# Patient Record
Sex: FEMALE | Race: WHITE | Employment: OTHER | URBAN - NONMETROPOLITAN AREA
[De-identification: names, ages, dates, MRNs, and addresses within clinical notes are randomized per-mention and may not be internally consistent; named-entity substitution may affect disease eponyms.]

---

## 2017-01-01 ENCOUNTER — HOSPITAL ENCOUNTER (OUTPATIENT)
Dept: PHYSICAL THERAPY | Age: 67
Discharge: HOME | End: 2017-01-19
Attending: EMERGENCY MEDICINE | Admitting: EMERGENCY MEDICINE

## 2018-03-07 ENCOUNTER — HOSPITAL ENCOUNTER (OUTPATIENT)
Dept: CT IMAGING | Age: 68
Discharge: HOME | End: 2018-03-07
Attending: EMERGENCY MEDICINE | Admitting: EMERGENCY MEDICINE

## 2018-03-07 DIAGNOSIS — M26.601 DISORDER OF RIGHT TEMPOROMANDIBULAR JOINT: ICD-10-CM

## 2018-03-07 DIAGNOSIS — R68.84 JAW PAIN: ICD-10-CM

## 2018-08-02 ENCOUNTER — HOSPITAL ENCOUNTER (OUTPATIENT)
Dept: LAB | Age: 68
Discharge: HOME | End: 2018-08-02
Attending: NURSE PRACTITIONER | Admitting: NURSE PRACTITIONER

## 2018-08-02 LAB
ALT SERPL-CCNC: 22 U/L (ref 10–40)
ANION GAP SERPL CALCULATED.3IONS-SCNC: 12 MMOL/L (ref 4–16)
BASOPHILS ABSOLUTE: 0.1 K/CU MM
BASOPHILS RELATIVE PERCENT: 0.8 % (ref 0–1)
BUN BLDV-MCNC: 19 MG/DL (ref 6–23)
CALCIUM SERPL-MCNC: 9.5 MG/DL (ref 8.3–10.6)
CHLORIDE BLD-SCNC: 100 MMOL/L (ref 99–110)
CHOLESTEROL, FASTING: 320 MG/DL
CO2: 28 MMOL/L (ref 21–32)
CREAT SERPL-MCNC: 0.9 MG/DL (ref 0.6–1.1)
DIFFERENTIAL TYPE: ABNORMAL
EOSINOPHILS ABSOLUTE: 0.4 K/CU MM
EOSINOPHILS RELATIVE PERCENT: 5.4 % (ref 0–3)
GFR AFRICAN AMERICAN: >60 ML/MIN/1.73M2
GFR NON-AFRICAN AMERICAN: >60 ML/MIN/1.73M2
GLUCOSE FASTING: 125 MG/DL (ref 70–99)
HCT VFR BLD CALC: 41.1 % (ref 37–47)
HDLC SERPL-MCNC: 89 MG/DL
HEMOGLOBIN: 13.6 GM/DL (ref 12.5–16)
IMMATURE NEUTROPHIL %: 0.3 % (ref 0–0.43)
LDL CHOLESTEROL DIRECT: 241 MG/DL
LYMPHOCYTES ABSOLUTE: 2.3 K/CU MM
LYMPHOCYTES RELATIVE PERCENT: 34.4 % (ref 24–44)
MCH RBC QN AUTO: 32.6 PG (ref 27–31)
MCHC RBC AUTO-ENTMCNC: 33.1 % (ref 32–36)
MCV RBC AUTO: 98.6 FL (ref 78–100)
MONOCYTES ABSOLUTE: 0.6 K/CU MM
MONOCYTES RELATIVE PERCENT: 9 % (ref 0–4)
PDW BLD-RTO: 11.5 % (ref 11.7–14.9)
PLATELET # BLD: 220 K/CU MM (ref 140–440)
PMV BLD AUTO: 10.2 FL (ref 7.5–11.1)
POTASSIUM SERPL-SCNC: 4.9 MMOL/L (ref 3.5–5.1)
RBC # BLD: 4.17 M/CU MM (ref 4.2–5.4)
SEGMENTED NEUTROPHILS ABSOLUTE COUNT: 3.3 K/CU MM
SEGMENTED NEUTROPHILS RELATIVE PERCENT: 50.1 % (ref 36–66)
SODIUM BLD-SCNC: 140 MMOL/L (ref 135–145)
TOTAL IMMATURE NEUTOROPHIL: 0.02 K/CU MM
TRIGLYCERIDE, FASTING: 125 MG/DL
TSH HIGH SENSITIVITY: 2.54 UIU/ML (ref 0.27–4.2)
VITAMIN D 25-HYDROXY: 30.88 NG/ML
WBC # BLD: 6.6 K/CU MM (ref 4–10.5)

## 2018-08-15 ENCOUNTER — HOSPITAL ENCOUNTER (OUTPATIENT)
Dept: LAB | Age: 68
Discharge: HOME | End: 2018-08-15
Attending: NURSE PRACTITIONER | Admitting: NURSE PRACTITIONER

## 2018-08-15 LAB
ESTIMATED AVERAGE GLUCOSE: 126 MG/DL
HBA1C MFR BLD: 6 % (ref 4.2–6.3)

## 2019-01-09 ENCOUNTER — APPOINTMENT (OUTPATIENT)
Dept: GENERAL RADIOLOGY | Age: 69
End: 2019-01-09
Payer: MEDICARE

## 2019-01-09 ENCOUNTER — HOSPITAL ENCOUNTER (EMERGENCY)
Age: 69
Discharge: HOME | End: 2019-01-09
Attending: EMERGENCY MEDICINE
Payer: MEDICARE

## 2019-01-09 VITALS
HEART RATE: 64 BPM | TEMPERATURE: 97.1 F | OXYGEN SATURATION: 98 % | HEIGHT: 64 IN | DIASTOLIC BLOOD PRESSURE: 78 MMHG | BODY MASS INDEX: 25.61 KG/M2 | WEIGHT: 150 LBS | SYSTOLIC BLOOD PRESSURE: 178 MMHG | RESPIRATION RATE: 16 BRPM

## 2019-01-09 DIAGNOSIS — R05.9 COUGH: ICD-10-CM

## 2019-01-09 DIAGNOSIS — R11.0 NAUSEA: Primary | ICD-10-CM

## 2019-01-09 LAB
ALBUMIN SERPL-MCNC: 4.2 GM/DL (ref 3.4–5)
ALP BLD-CCNC: 160 IU/L (ref 40–129)
ALT SERPL-CCNC: 46 U/L (ref 10–40)
ANION GAP SERPL CALCULATED.3IONS-SCNC: 19 MMOL/L (ref 4–16)
AST SERPL-CCNC: 34 IU/L (ref 15–37)
BACTERIA: ABNORMAL /HPF
BASOPHILS ABSOLUTE: 0.1 K/CU MM
BASOPHILS RELATIVE PERCENT: 1 % (ref 0–1)
BILIRUB SERPL-MCNC: 0.2 MG/DL (ref 0–1)
BILIRUBIN URINE: NEGATIVE MG/DL
BLOOD, URINE: NEGATIVE
BUN BLDV-MCNC: 15 MG/DL (ref 6–23)
CALCIUM SERPL-MCNC: 9.8 MG/DL (ref 8.3–10.6)
CAST TYPE: NEGATIVE /HPF
CHLORIDE BLD-SCNC: 103 MMOL/L (ref 99–110)
CLARITY: CLEAR
CO2: 19 MMOL/L (ref 21–32)
COLOR: YELLOW
CREAT SERPL-MCNC: 0.9 MG/DL (ref 0.6–1.1)
CRYSTAL TYPE: NEGATIVE /HPF
DIFFERENTIAL TYPE: ABNORMAL
EOSINOPHILS ABSOLUTE: 0.2 K/CU MM
EOSINOPHILS RELATIVE PERCENT: 3.4 % (ref 0–3)
EPITHELIAL CELLS, UA: NEGATIVE /HPF
GFR AFRICAN AMERICAN: >60 ML/MIN/1.73M2
GFR NON-AFRICAN AMERICAN: >60 ML/MIN/1.73M2
GLUCOSE BLD-MCNC: 118 MG/DL (ref 70–99)
GLUCOSE, URINE: NEGATIVE MG/DL
HCT VFR BLD CALC: 40.8 % (ref 37–47)
HEMOGLOBIN: 13.3 GM/DL (ref 12.5–16)
IMMATURE NEUTROPHIL %: 0.3 % (ref 0–0.43)
KETONES, URINE: NEGATIVE MG/DL
LEUKOCYTE ESTERASE, URINE: NEGATIVE
LYMPHOCYTES ABSOLUTE: 2 K/CU MM
LYMPHOCYTES RELATIVE PERCENT: 33.3 % (ref 24–44)
MCH RBC QN AUTO: 32.2 PG (ref 27–31)
MCHC RBC AUTO-ENTMCNC: 32.6 % (ref 32–36)
MCV RBC AUTO: 98.8 FL (ref 78–100)
MONOCYTES ABSOLUTE: 0.6 K/CU MM
MONOCYTES RELATIVE PERCENT: 9.9 % (ref 0–4)
NITRITE URINE, QUANTITATIVE: NEGATIVE
PDW BLD-RTO: 12 % (ref 11.7–14.9)
PH, URINE: 5 (ref 5–8)
PLATELET # BLD: 224 K/CU MM (ref 140–440)
PMV BLD AUTO: 10.2 FL (ref 7.5–11.1)
POTASSIUM SERPL-SCNC: 4.7 MMOL/L (ref 3.5–5.1)
PROTEIN UA: NEGATIVE MG/DL
RBC # BLD: 4.13 M/CU MM (ref 4.2–5.4)
RBC URINE: NEGATIVE /HPF (ref 0–6)
SEGMENTED NEUTROPHILS ABSOLUTE COUNT: 3.2 K/CU MM
SEGMENTED NEUTROPHILS RELATIVE PERCENT: 52.1 % (ref 36–66)
SODIUM BLD-SCNC: 141 MMOL/L (ref 135–145)
SPECIFIC GRAVITY UA: 1.01 (ref 1–1.03)
TOTAL IMMATURE NEUTOROPHIL: 0.02 K/CU MM
TOTAL PROTEIN: 6.4 GM/DL (ref 6.4–8.2)
TROPONIN T: <0.01 NG/ML
UROBILINOGEN, URINE: 0.2 MG/DL (ref 0.2–1)
WBC # BLD: 6.1 K/CU MM (ref 4–10.5)
WBC UA: ABNORMAL /HPF (ref 0–5)

## 2019-01-09 PROCEDURE — 85025 COMPLETE CBC W/AUTO DIFF WBC: CPT

## 2019-01-09 PROCEDURE — 81001 URINALYSIS AUTO W/SCOPE: CPT

## 2019-01-09 PROCEDURE — 84484 ASSAY OF TROPONIN QUANT: CPT

## 2019-01-09 PROCEDURE — 93010 ELECTROCARDIOGRAM REPORT: CPT | Performed by: INTERNAL MEDICINE

## 2019-01-09 PROCEDURE — 80053 COMPREHEN METABOLIC PANEL: CPT

## 2019-01-09 PROCEDURE — 99284 EMERGENCY DEPT VISIT MOD MDM: CPT

## 2019-01-09 PROCEDURE — 71046 X-RAY EXAM CHEST 2 VIEWS: CPT

## 2019-01-09 PROCEDURE — 6360000002 HC RX W HCPCS: Performed by: EMERGENCY MEDICINE

## 2019-01-09 PROCEDURE — 93005 ELECTROCARDIOGRAM TRACING: CPT | Performed by: EMERGENCY MEDICINE

## 2019-01-09 RX ORDER — ONDANSETRON 4 MG/1
4 TABLET, ORALLY DISINTEGRATING ORAL EVERY 8 HOURS PRN
Qty: 20 TABLET | Refills: 0 | Status: SHIPPED | OUTPATIENT
Start: 2019-01-09 | End: 2019-05-28 | Stop reason: ALTCHOICE

## 2019-01-09 RX ORDER — ONDANSETRON 4 MG/1
4 TABLET, ORALLY DISINTEGRATING ORAL ONCE
Status: COMPLETED | OUTPATIENT
Start: 2019-01-09 | End: 2019-01-09

## 2019-01-09 RX ADMIN — ONDANSETRON 4 MG: 4 TABLET, ORALLY DISINTEGRATING ORAL at 04:00

## 2019-01-10 LAB
EKG ATRIAL RATE: 70 BPM
EKG DIAGNOSIS: NORMAL
EKG P AXIS: 77 DEGREES
EKG P-R INTERVAL: 168 MS
EKG Q-T INTERVAL: 414 MS
EKG QRS DURATION: 88 MS
EKG QTC CALCULATION (BAZETT): 447 MS
EKG R AXIS: 11 DEGREES
EKG T AXIS: 42 DEGREES
EKG VENTRICULAR RATE: 70 BPM

## 2019-02-26 ENCOUNTER — APPOINTMENT (OUTPATIENT)
Dept: CT IMAGING | Age: 69
End: 2019-02-26
Payer: MEDICARE

## 2019-02-26 ENCOUNTER — HOSPITAL ENCOUNTER (OUTPATIENT)
Age: 69
Setting detail: OBSERVATION
Discharge: LEFT BEFORE DISCHARGE | End: 2019-02-26
Attending: EMERGENCY MEDICINE | Admitting: FAMILY MEDICINE
Payer: MEDICARE

## 2019-02-26 ENCOUNTER — APPOINTMENT (OUTPATIENT)
Dept: GENERAL RADIOLOGY | Age: 69
End: 2019-02-26
Payer: MEDICARE

## 2019-02-26 VITALS
SYSTOLIC BLOOD PRESSURE: 134 MMHG | HEIGHT: 62 IN | HEART RATE: 79 BPM | WEIGHT: 156 LBS | RESPIRATION RATE: 16 BRPM | OXYGEN SATURATION: 96 % | DIASTOLIC BLOOD PRESSURE: 76 MMHG | BODY MASS INDEX: 28.71 KG/M2 | TEMPERATURE: 98.1 F

## 2019-02-26 DIAGNOSIS — R42 DIZZINESS: Primary | ICD-10-CM

## 2019-02-26 DIAGNOSIS — R07.9 CHEST PAIN, UNSPECIFIED TYPE: ICD-10-CM

## 2019-02-26 PROBLEM — R07.2 PRECORDIAL CHEST PAIN: Status: ACTIVE | Noted: 2019-02-26

## 2019-02-26 LAB
ALBUMIN SERPL-MCNC: 4.4 GM/DL (ref 3.4–5)
ALP BLD-CCNC: 127 IU/L (ref 40–129)
ALT SERPL-CCNC: 19 U/L (ref 10–40)
ANION GAP SERPL CALCULATED.3IONS-SCNC: 12 MMOL/L (ref 4–16)
AST SERPL-CCNC: 24 IU/L (ref 15–37)
BACTERIA: ABNORMAL /HPF
BASOPHILS ABSOLUTE: 0 K/CU MM
BASOPHILS RELATIVE PERCENT: 0.3 % (ref 0–1)
BILIRUB SERPL-MCNC: 0.5 MG/DL (ref 0–1)
BILIRUBIN URINE: NEGATIVE MG/DL
BLOOD, URINE: NEGATIVE
BUN BLDV-MCNC: 12 MG/DL (ref 6–23)
CALCIUM SERPL-MCNC: 9.6 MG/DL (ref 8.3–10.6)
CAST TYPE: NEGATIVE /HPF
CHLORIDE BLD-SCNC: 98 MMOL/L (ref 99–110)
CLARITY: CLEAR
CO2: 26 MMOL/L (ref 21–32)
COLOR: YELLOW
CREAT SERPL-MCNC: 0.9 MG/DL (ref 0.6–1.1)
CRYSTAL TYPE: NEGATIVE /HPF
D DIMER: <200 NG/ML(DDU)
DIFFERENTIAL TYPE: ABNORMAL
EOSINOPHILS ABSOLUTE: 0.1 K/CU MM
EOSINOPHILS RELATIVE PERCENT: 0.7 % (ref 0–3)
EPITHELIAL CELLS, UA: ABNORMAL /HPF
GFR AFRICAN AMERICAN: >60 ML/MIN/1.73M2
GFR NON-AFRICAN AMERICAN: >60 ML/MIN/1.73M2
GLUCOSE BLD-MCNC: 133 MG/DL (ref 70–99)
GLUCOSE, URINE: NEGATIVE MG/DL
HCT VFR BLD CALC: 38.1 % (ref 37–47)
HEMOGLOBIN: 12.9 GM/DL (ref 12.5–16)
IMMATURE NEUTROPHIL %: 0.3 % (ref 0–0.43)
KETONES, URINE: NEGATIVE MG/DL
LEUKOCYTE ESTERASE, URINE: NEGATIVE
LYMPHOCYTES ABSOLUTE: 1.1 K/CU MM
LYMPHOCYTES RELATIVE PERCENT: 10.4 % (ref 24–44)
MCH RBC QN AUTO: 32.7 PG (ref 27–31)
MCHC RBC AUTO-ENTMCNC: 33.9 % (ref 32–36)
MCV RBC AUTO: 96.7 FL (ref 78–100)
MONOCYTES ABSOLUTE: 0.9 K/CU MM
MONOCYTES RELATIVE PERCENT: 8.6 % (ref 0–4)
NITRITE URINE, QUANTITATIVE: NEGATIVE
PDW BLD-RTO: 11.9 % (ref 11.7–14.9)
PH, URINE: 7 (ref 5–8)
PLATELET # BLD: 204 K/CU MM (ref 140–440)
PMV BLD AUTO: 10.6 FL (ref 7.5–11.1)
POTASSIUM SERPL-SCNC: 4.1 MMOL/L (ref 3.5–5.1)
PRO-BNP: 171.2 PG/ML
PROTEIN UA: NEGATIVE MG/DL
RBC # BLD: 3.94 M/CU MM (ref 4.2–5.4)
RBC URINE: NEGATIVE /HPF (ref 0–6)
SEGMENTED NEUTROPHILS ABSOLUTE COUNT: 8.4 K/CU MM
SEGMENTED NEUTROPHILS RELATIVE PERCENT: 79.7 % (ref 36–66)
SODIUM BLD-SCNC: 136 MMOL/L (ref 135–145)
SPECIFIC GRAVITY UA: 1.01 (ref 1–1.03)
TOTAL IMMATURE NEUTOROPHIL: 0.03 K/CU MM
TOTAL PROTEIN: 7.1 GM/DL (ref 6.4–8.2)
TROPONIN T: <0.01 NG/ML
UROBILINOGEN, URINE: 0.2 MG/DL (ref 0.2–1)
WBC # BLD: 10.5 K/CU MM (ref 4–10.5)
WBC UA: ABNORMAL /HPF (ref 0–5)

## 2019-02-26 PROCEDURE — 71045 X-RAY EXAM CHEST 1 VIEW: CPT

## 2019-02-26 PROCEDURE — 83880 ASSAY OF NATRIURETIC PEPTIDE: CPT

## 2019-02-26 PROCEDURE — 70450 CT HEAD/BRAIN W/O DYE: CPT

## 2019-02-26 PROCEDURE — 85025 COMPLETE CBC W/AUTO DIFF WBC: CPT

## 2019-02-26 PROCEDURE — 96360 HYDRATION IV INFUSION INIT: CPT

## 2019-02-26 PROCEDURE — 6370000000 HC RX 637 (ALT 250 FOR IP): Performed by: EMERGENCY MEDICINE

## 2019-02-26 PROCEDURE — 85379 FIBRIN DEGRADATION QUANT: CPT

## 2019-02-26 PROCEDURE — 93005 ELECTROCARDIOGRAM TRACING: CPT | Performed by: EMERGENCY MEDICINE

## 2019-02-26 PROCEDURE — 99285 EMERGENCY DEPT VISIT HI MDM: CPT

## 2019-02-26 PROCEDURE — 84484 ASSAY OF TROPONIN QUANT: CPT

## 2019-02-26 PROCEDURE — 93010 ELECTROCARDIOGRAM REPORT: CPT | Performed by: INTERNAL MEDICINE

## 2019-02-26 PROCEDURE — 81001 URINALYSIS AUTO W/SCOPE: CPT

## 2019-02-26 PROCEDURE — 72125 CT NECK SPINE W/O DYE: CPT

## 2019-02-26 PROCEDURE — G0378 HOSPITAL OBSERVATION PER HR: HCPCS

## 2019-02-26 PROCEDURE — 80053 COMPREHEN METABOLIC PANEL: CPT

## 2019-02-26 PROCEDURE — 2580000003 HC RX 258: Performed by: EMERGENCY MEDICINE

## 2019-02-26 RX ORDER — ELECTROLYTES/DEXTROSE
1 SOLUTION, ORAL ORAL DAILY
Status: DISCONTINUED | OUTPATIENT
Start: 2019-02-26 | End: 2019-02-26 | Stop reason: SDUPTHER

## 2019-02-26 RX ORDER — 0.9 % SODIUM CHLORIDE 0.9 %
500 INTRAVENOUS SOLUTION INTRAVENOUS ONCE
Status: COMPLETED | OUTPATIENT
Start: 2019-02-26 | End: 2019-02-26

## 2019-02-26 RX ORDER — M-VIT,TX,IRON,MINS/CALC/FOLIC 27MG-0.4MG
1 TABLET ORAL DAILY
Status: DISCONTINUED | OUTPATIENT
Start: 2019-02-26 | End: 2019-02-26 | Stop reason: HOSPADM

## 2019-02-26 RX ORDER — ASPIRIN 81 MG/1
324 TABLET, CHEWABLE ORAL ONCE
Status: DISCONTINUED | OUTPATIENT
Start: 2019-02-26 | End: 2019-02-26 | Stop reason: HOSPADM

## 2019-02-26 RX ORDER — MECLIZINE HCL 12.5 MG/1
12.5 TABLET ORAL ONCE
Status: COMPLETED | OUTPATIENT
Start: 2019-02-26 | End: 2019-02-26

## 2019-02-26 RX ORDER — AMLODIPINE BESYLATE 10 MG/1
10 TABLET ORAL DAILY
Status: DISCONTINUED | OUTPATIENT
Start: 2019-02-26 | End: 2019-02-26 | Stop reason: HOSPADM

## 2019-02-26 RX ORDER — FAMOTIDINE 20 MG/1
20 TABLET, FILM COATED ORAL DAILY
Status: DISCONTINUED | OUTPATIENT
Start: 2019-02-26 | End: 2019-02-26 | Stop reason: HOSPADM

## 2019-02-26 RX ORDER — ONDANSETRON 4 MG/1
4 TABLET, ORALLY DISINTEGRATING ORAL EVERY 8 HOURS PRN
Status: DISCONTINUED | OUTPATIENT
Start: 2019-02-26 | End: 2019-02-26 | Stop reason: HOSPADM

## 2019-02-26 RX ADMIN — MECLIZINE 12.5 MG: 12.5 TABLET ORAL at 03:40

## 2019-02-26 RX ADMIN — SODIUM CHLORIDE 500 ML: 9 INJECTION, SOLUTION INTRAVENOUS at 03:42

## 2019-02-26 ASSESSMENT — PAIN SCALES - GENERAL
PAINLEVEL_OUTOF10: 0
PAINLEVEL_OUTOF10: 0

## 2019-02-26 ASSESSMENT — HEART SCORE: ECG: 1

## 2019-02-27 LAB
EKG ATRIAL RATE: 84 BPM
EKG DIAGNOSIS: NORMAL
EKG P AXIS: 73 DEGREES
EKG P-R INTERVAL: 172 MS
EKG Q-T INTERVAL: 378 MS
EKG QRS DURATION: 88 MS
EKG QTC CALCULATION (BAZETT): 446 MS
EKG R AXIS: -8 DEGREES
EKG T AXIS: 15 DEGREES
EKG VENTRICULAR RATE: 84 BPM

## 2019-05-14 ENCOUNTER — HOSPITAL ENCOUNTER (OUTPATIENT)
Age: 69
Discharge: HOME | End: 2019-05-14
Payer: MEDICARE

## 2019-05-14 LAB
ALT SERPL-CCNC: 13 U/L (ref 10–40)
ANION GAP SERPL CALCULATED.3IONS-SCNC: 6 MMOL/L (ref 4–16)
BACTERIA: NEGATIVE /HPF
BASOPHILS ABSOLUTE: 0 K/CU MM
BASOPHILS RELATIVE PERCENT: 0.6 % (ref 0–1)
BILIRUBIN URINE: NEGATIVE MG/DL
BLOOD, URINE: NEGATIVE
BUN BLDV-MCNC: 20 MG/DL (ref 6–23)
CALCIUM SERPL-MCNC: 9.5 MG/DL (ref 8.3–10.6)
CAST TYPE: ABNORMAL /HPF
CHLORIDE BLD-SCNC: 102 MMOL/L (ref 99–110)
CHOLESTEROL, FASTING: 316 MG/DL
CLARITY: CLEAR
CO2: 31 MMOL/L (ref 21–32)
COLOR: YELLOW
CREAT SERPL-MCNC: 0.8 MG/DL (ref 0.6–1.1)
CRYSTAL TYPE: ABNORMAL /HPF
DIFFERENTIAL TYPE: ABNORMAL
EOSINOPHILS ABSOLUTE: 0.2 K/CU MM
EOSINOPHILS RELATIVE PERCENT: 2.7 % (ref 0–3)
EPITHELIAL CELLS, UA: ABNORMAL /HPF
GFR AFRICAN AMERICAN: >60 ML/MIN/1.73M2
GFR NON-AFRICAN AMERICAN: >60 ML/MIN/1.73M2
GLUCOSE FASTING: 127 MG/DL (ref 70–99)
GLUCOSE, URINE: NEGATIVE MG/DL
HCT VFR BLD CALC: 42.3 % (ref 37–47)
HDLC SERPL-MCNC: 87 MG/DL
HEMOGLOBIN: 13.7 GM/DL (ref 12.5–16)
IMMATURE NEUTROPHIL %: 0.5 % (ref 0–0.43)
KETONES, URINE: NEGATIVE MG/DL
LDL CHOLESTEROL DIRECT: 234 MG/DL
LEUKOCYTE ESTERASE, URINE: ABNORMAL
LYMPHOCYTES ABSOLUTE: 1.7 K/CU MM
LYMPHOCYTES RELATIVE PERCENT: 25.3 % (ref 24–44)
MCH RBC QN AUTO: 31.9 PG (ref 27–31)
MCHC RBC AUTO-ENTMCNC: 32.4 % (ref 32–36)
MCV RBC AUTO: 98.6 FL (ref 78–100)
MONOCYTES ABSOLUTE: 0.5 K/CU MM
MONOCYTES RELATIVE PERCENT: 7.1 % (ref 0–4)
MUCUS: NEGATIVE HPF
NITRITE URINE, QUANTITATIVE: NEGATIVE
PDW BLD-RTO: 11.9 % (ref 11.7–14.9)
PH, URINE: 5.5 (ref 5–8)
PLATELET # BLD: 251 K/CU MM (ref 140–440)
PMV BLD AUTO: 10 FL (ref 7.5–11.1)
POTASSIUM SERPL-SCNC: 5 MMOL/L (ref 3.5–5.1)
PROTEIN UA: NEGATIVE MG/DL
RBC # BLD: 4.29 M/CU MM (ref 4.2–5.4)
RBC URINE: ABNORMAL /HPF (ref 0–6)
SEGMENTED NEUTROPHILS ABSOLUTE COUNT: 4.2 K/CU MM
SEGMENTED NEUTROPHILS RELATIVE PERCENT: 63.8 % (ref 36–66)
SODIUM BLD-SCNC: 139 MMOL/L (ref 135–145)
SPECIFIC GRAVITY UA: 1.02 (ref 1–1.03)
TOTAL IMMATURE NEUTOROPHIL: 0.03 K/CU MM
TRIGLYCERIDE, FASTING: 177 MG/DL
TSH HIGH SENSITIVITY: 2.13 UIU/ML (ref 0.27–4.2)
UROBILINOGEN, URINE: 0.2 MG/DL (ref 0.2–1)
VITAMIN D 25-HYDROXY: 22.52 NG/ML
VOLUME, (UVOL): 12 ML (ref 10–12)
WBC # BLD: 6.6 K/CU MM (ref 4–10.5)
WBC UA: ABNORMAL /HPF (ref 0–5)

## 2019-05-14 PROCEDURE — 80048 BASIC METABOLIC PNL TOTAL CA: CPT

## 2019-05-14 PROCEDURE — 85025 COMPLETE CBC W/AUTO DIFF WBC: CPT

## 2019-05-14 PROCEDURE — 36415 COLL VENOUS BLD VENIPUNCTURE: CPT

## 2019-05-14 PROCEDURE — 80061 LIPID PANEL: CPT

## 2019-05-14 PROCEDURE — 81001 URINALYSIS AUTO W/SCOPE: CPT

## 2019-05-14 PROCEDURE — 82306 VITAMIN D 25 HYDROXY: CPT

## 2019-05-14 PROCEDURE — 84460 ALANINE AMINO (ALT) (SGPT): CPT

## 2019-05-14 PROCEDURE — 83036 HEMOGLOBIN GLYCOSYLATED A1C: CPT

## 2019-05-14 PROCEDURE — 84443 ASSAY THYROID STIM HORMONE: CPT

## 2019-05-15 ENCOUNTER — HOSPITAL ENCOUNTER (OUTPATIENT)
Dept: MAMMOGRAPHY | Age: 69
Discharge: HOME | End: 2019-05-15
Payer: MEDICARE

## 2019-05-15 DIAGNOSIS — Z12.31 VISIT FOR SCREENING MAMMOGRAM: ICD-10-CM

## 2019-05-15 PROCEDURE — 77067 SCR MAMMO BI INCL CAD: CPT

## 2019-05-16 LAB
ESTIMATED AVERAGE GLUCOSE: 137 MG/DL
HBA1C MFR BLD: 6.4 % (ref 4.2–6.3)

## 2019-05-28 ENCOUNTER — APPOINTMENT (OUTPATIENT)
Dept: GENERAL RADIOLOGY | Age: 69
End: 2019-05-28
Payer: MEDICARE

## 2019-05-28 ENCOUNTER — HOSPITAL ENCOUNTER (EMERGENCY)
Age: 69
Discharge: HOME | End: 2019-05-28
Attending: EMERGENCY MEDICINE
Payer: MEDICARE

## 2019-05-28 VITALS
DIASTOLIC BLOOD PRESSURE: 99 MMHG | WEIGHT: 155 LBS | HEART RATE: 74 BPM | OXYGEN SATURATION: 99 % | RESPIRATION RATE: 16 BRPM | BODY MASS INDEX: 24.91 KG/M2 | TEMPERATURE: 97.6 F | HEIGHT: 66 IN | SYSTOLIC BLOOD PRESSURE: 177 MMHG

## 2019-05-28 DIAGNOSIS — S50.02XA CONTUSION OF LEFT ELBOW, INITIAL ENCOUNTER: ICD-10-CM

## 2019-05-28 DIAGNOSIS — S80.02XA CONTUSION OF LEFT KNEE, INITIAL ENCOUNTER: ICD-10-CM
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
## 2019-05-28 PROCEDURE — 73560 X-RAY EXAM OF KNEE 1 OR 2: CPT

## 2019-05-28 PROCEDURE — 71046 X-RAY EXAM CHEST 2 VIEWS: CPT

## 2019-05-28 PROCEDURE — 6370000000 HC RX 637 (ALT 250 FOR IP): Performed by: EMERGENCY MEDICINE

## 2019-05-28 PROCEDURE — 99284 EMERGENCY DEPT VISIT MOD MDM: CPT

## 2019-05-28 PROCEDURE — 70110 X-RAY EXAM OF JAW 4/> VIEWS: CPT

## 2019-05-28 PROCEDURE — 73080 X-RAY EXAM OF ELBOW: CPT

## 2019-05-28 RX ORDER — ACETAMINOPHEN 325 MG/1
650 TABLET ORAL
Status: COMPLETED | OUTPATIENT
Start: 2019-05-28 | End: 2019-05-28

## 2019-05-28 RX ORDER — NAPROXEN 500 MG/1
500 TABLET ORAL 2 TIMES DAILY
Qty: 20 TABLET | Refills: 0 | Status: SHIPPED | OUTPATIENT
Start: 2019-05-28 | End: 2019-06-14 | Stop reason: ALTCHOICE

## 2019-05-28 RX ADMIN — ACETAMINOPHEN 650 MG: 325 TABLET ORAL at 10:05

## 2019-05-28 ASSESSMENT — PAIN DESCRIPTION - FREQUENCY: FREQUENCY: CONTINUOUS

## 2019-05-28 ASSESSMENT — PAIN DESCRIPTION - PAIN TYPE
TYPE: ACUTE PAIN
TYPE: ACUTE PAIN

## 2019-05-28 ASSESSMENT — PAIN DESCRIPTION - ORIENTATION: ORIENTATION: RIGHT;LEFT

## 2019-05-28 ASSESSMENT — PAIN SCALES - GENERAL
PAINLEVEL_OUTOF10: 1
PAINLEVEL_OUTOF10: 5
PAINLEVEL_OUTOF10: 2

## 2019-05-28 ASSESSMENT — PAIN DESCRIPTION - DESCRIPTORS: DESCRIPTORS: ACHING

## 2019-05-28 ASSESSMENT — PAIN DESCRIPTION - LOCATION: LOCATION: BACK;JAW

## 2019-05-28 NOTE — ED PROVIDER NOTES
on file    Food insecurity:     Worry: Not on file     Inability: Not on file    Transportation needs:     Medical: Not on file     Non-medical: Not on file   Tobacco Use    Smoking status: Never Smoker    Smokeless tobacco: Never Used   Substance and Sexual Activity    Alcohol use: No    Drug use: No    Sexual activity: Not on file   Lifestyle    Physical activity:     Days per week: Not on file     Minutes per session: Not on file    Stress: Not on file   Relationships    Social connections:     Talks on phone: Not on file     Gets together: Not on file     Attends Restoration service: Not on file     Active member of club or organization: Not on file     Attends meetings of clubs or organizations: Not on file     Relationship status: Not on file    Intimate partner violence:     Fear of current or ex partner: Not on file     Emotionally abused: Not on file     Physically abused: Not on file     Forced sexual activity: Not on file   Other Topics Concern    Not on file   Social History Narrative    Not on file     No current facility-administered medications for this encounter. Current Outpatient Medications   Medication Sig Dispense Refill    naproxen (NAPROSYN) 500 MG tablet Take 1 tablet by mouth 2 times daily 20 tablet 0    famotidine (PEPCID) 40 MG tablet Take 0.5 tablets by mouth daily 30 tablet 0    Multiple Vitamins-Minerals (MULTIVITAMIN ADULT PO) Take by mouth      AmLODIPine Besylate (NORVASC PO) Take 10 mg by mouth daily        Allergies   Allergen Reactions    Floxin [Ofloxacin]      Made her \"feel weird\"       Nursing Notes Reviewed    Physical Exam:  ED Triage Vitals [05/28/19 0910]   Enc Vitals Group      BP (!) 177/99      Pulse 74      Resp 16      Temp 97.6 °F (36.4 °C)      Temp Source Oral      SpO2 99 %      Weight 155 lb (70.3 kg)      Height 5' 6\" (1.676 m)      Head Circumference       Peak Flow       Pain Score       Pain Loc       Pain Edu? Excl.  in [address]? GENERAL APPEARANCE: Awake and alert. Cooperative. No acute distress. Nontoxic in appearance  HEAD: Normocephalic. Atraumatic. EYES: EOM's grossly intact. Sclera anicteric. ENT: Tolerates saliva. No trismus. Oral mucosa is moist and examination of her alveolar gums reveals no evidence of step. There is no tenderness palpation. There is no evidence of lesions in the area where she is feeling pain. She has no bruising to her jaw. NECK: Supple. Trachea midline. CARDIO: RRR. Radial pulse 2+. Chest wall is nontender   LUNGS: Respirations unlabored. CTAB. ABDOMEN: Soft. Non-distended. Non-tender. EXTREMITIES: No acute deformities. Patient has contusion and abrasion to her left knee and her left elbow. SKIN: Warm and dry. NEUROLOGICAL: No gross facial drooping. Moves all 4 extremities spontaneously. PSYCHIATRIC: Normal mood. Labs:  No results found for this visit on 05/28/19. Radiographs (if obtained):  [] The following radiograph was interpreted by myself in the absence of a radiologist:  [x] Radiologist's Report reviewed at time of ED visit:  XR CHEST STANDARD (2 VW)   Final Result   No acute abnormality. XR MANDIBLE (MIN 4 VIEWS)   Final Result   1. Normal mandibular alignment with bilateral TMJ degenerative changes and no   acute fracture. 2. Normal left elbow alignment with no acute fracture. 3. Normal left knee alignment with no acute fracture. XR ELBOW LEFT (MIN 3 VIEWS)   Final Result   1. Normal mandibular alignment with bilateral TMJ degenerative changes and no   acute fracture. 2. Normal left elbow alignment with no acute fracture. 3. Normal left knee alignment with no acute fracture. XR KNEE LEFT (1-2 VIEWS)   Final Result   1. Normal mandibular alignment with bilateral TMJ degenerative changes and no   acute fracture. 2. Normal left elbow alignment with no acute fracture. 3. Normal left knee alignment with no acute fracture. ED Course and MDM:  This patient fell yesterday. She is having pain in her mandible and her left elbow left knee and chest.  Her x-rays are negative. She was given Tylenol and her jaw pain improved and she was able to get her dentures placed in her mouth. She will be discharged in stable condition on Naprosyn. She is instructed to follow-up with her primary caregiver for recheck. She is instructed to return for any problems or concerns. Final Impression:  1. Fall, initial encounter    2. Contusion of left knee, initial encounter    3. Contusion of left elbow, initial encounter      DISPOSITION Decision To Discharge    Patient referred to:   Susan Velez, APRN - CNP   [address] [facility] [address] [facility]  [phone]    In 3 days  For follow up    Discharge medications:  Discharge Medication List as of 5/28/2019 12:27 PM      START taking these medications    Details   naproxen (NAPROSYN) 500 MG tablet Take 1 tablet by mouth 2 times daily, Disp-20 tablet, R-0Print           (Please note that portions of this note may have been completed with a voice recognition program. Efforts were made to edit the dictations but occasionally words are mis-transcribed.)    Ashtyn Chavez DO, [facility]  Board certified in [address], [place]  05/28/19 1603

## 2019-05-28 NOTE — ED NOTES
The patient presents to the er today with complaints of jaw pain secondary to a fall yesterday. She denies hitting her head, although she reports that this morning she was unable to get her dentures in. She has an abrasion to the left knee and left elbow. She is alert and oriented and does not appear to be in any distress.                          Lalit Holder RN  05/28/19 8215

## 2019-06-14 ENCOUNTER — HOSPITAL ENCOUNTER (EMERGENCY)
Age: 69
Discharge: HOME | End: 2019-06-14
Attending: EMERGENCY MEDICINE
Payer: MEDICARE

## 2019-06-14 ENCOUNTER — APPOINTMENT (OUTPATIENT)
Dept: CT IMAGING | Age: 69
End: 2019-06-14
Payer: MEDICARE

## 2019-06-14 VITALS
HEART RATE: 67 BPM | WEIGHT: 150 LBS | SYSTOLIC BLOOD PRESSURE: 158 MMHG | RESPIRATION RATE: 14 BRPM | OXYGEN SATURATION: 98 % | TEMPERATURE: 98.3 F | BODY MASS INDEX: 24.99 KG/M2 | DIASTOLIC BLOOD PRESSURE: 74 MMHG | HEIGHT: 65 IN

## 2019-06-14 DIAGNOSIS — M54.2 NECK PAIN: Primary | ICD-10-CM

## 2019-06-14 PROCEDURE — 99284 EMERGENCY DEPT VISIT MOD MDM: CPT

## 2019-06-14 PROCEDURE — 72125 CT NECK SPINE W/O DYE: CPT

## 2019-06-14 ASSESSMENT — PAIN DESCRIPTION - DESCRIPTORS: DESCRIPTORS: SHOOTING;SHARP

## 2019-06-14 ASSESSMENT — PAIN DESCRIPTION - LOCATION: LOCATION: NECK

## 2019-06-14 ASSESSMENT — PAIN DESCRIPTION - FREQUENCY: FREQUENCY: INTERMITTENT
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
## 2019-06-14 NOTE — ED NOTES
Discharge instructions reviewed with patient. Reviewed prescriptions with patient. No additional questions asked. Voiced understanding. Encouraged patient to follow up as discussed by the ED physician. Discussed with patient alternating acetaminophen and ibuprofen for pain control. Reviewed taking medications every 6 hours as directed on packages. For example: take acetaminophen then three hours later take ibuprofen then three hours later take acetaminophen then take ibuprofen three hours later. By doing that something is given every three hours for pain reduction and comfort.       Foster Rodriguez RN  06/14/19 6258

## 2019-06-14 NOTE — ED TRIAGE NOTES
Arrived ambulatory to room 2 for triage. Tolerated without difficulty. Bed in lowest position. Call light given.

## 2019-06-15 ENCOUNTER — HOSPITAL ENCOUNTER (OUTPATIENT)
Dept: MRI IMAGING | Age: 69
Discharge: HOME | End: 2019-06-15
Payer: MEDICARE

## 2019-06-15 DIAGNOSIS — M25.562 ACUTE PAIN OF LEFT KNEE: ICD-10-CM

## 2019-06-15 PROCEDURE — 73721 MRI JNT OF LWR EXTRE W/O DYE: CPT

## 2019-08-20 ENCOUNTER — HOSPITAL ENCOUNTER (EMERGENCY)
Age: 69
Discharge: HOME | End: 2019-08-20
Attending: FAMILY MEDICINE
Payer: MEDICARE

## 2019-08-20 VITALS
HEIGHT: 60 IN | HEART RATE: 76 BPM | WEIGHT: 150 LBS | DIASTOLIC BLOOD PRESSURE: 85 MMHG | BODY MASS INDEX: 29.45 KG/M2 | TEMPERATURE: 98.1 F | SYSTOLIC BLOOD PRESSURE: 192 MMHG | OXYGEN SATURATION: 99 % | RESPIRATION RATE: 16 BRPM

## 2019-08-20 DIAGNOSIS — M79.602 PAIN OF LEFT UPPER EXTREMITY: Primary | ICD-10-CM

## 2019-08-20 PROCEDURE — 6370000000 HC RX 637 (ALT 250 FOR IP): Performed by: FAMILY MEDICINE

## 2019-08-20 PROCEDURE — 99283 EMERGENCY DEPT VISIT LOW MDM: CPT

## 2019-08-20 RX ORDER — ACETAMINOPHEN 500 MG
1000 TABLET ORAL ONCE
Status: COMPLETED | OUTPATIENT
Start: 2019-08-20 | End: 2019-08-20

## 2019-08-20 RX ADMIN — ACETAMINOPHEN 1000 MG: 500 TABLET ORAL at 22:48

## 2019-08-20 ASSESSMENT — PAIN DESCRIPTION - PAIN TYPE: TYPE: ACUTE PAIN

## 2019-08-20 ASSESSMENT — PAIN SCALES - GENERAL
PAINLEVEL_OUTOF10: 3
PAINLEVEL_OUTOF10: 3

## 2019-08-20 ASSESSMENT — PAIN DESCRIPTION - ORIENTATION: ORIENTATION: LEFT

## 2019-08-20 ASSESSMENT — PAIN DESCRIPTION - LOCATION: LOCATION: ARM

## 2019-08-21 NOTE — ED NOTES
Patient given AVS, and discharge instructions. Verbalizes understanding no further needs identified at this time.      Dulce Pearl RN  08/20/19 1730
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
## 2019-09-06 ENCOUNTER — HOSPITAL ENCOUNTER (EMERGENCY)
Age: 69
Discharge: HOME | End: 2019-09-06
Attending: EMERGENCY MEDICINE
Payer: MEDICARE

## 2019-09-06 ENCOUNTER — APPOINTMENT (OUTPATIENT)
Dept: CT IMAGING | Age: 69
End: 2019-09-06
Payer: MEDICARE

## 2019-09-06 VITALS
BODY MASS INDEX: 31.41 KG/M2 | DIASTOLIC BLOOD PRESSURE: 73 MMHG | RESPIRATION RATE: 16 BRPM | TEMPERATURE: 98.6 F | OXYGEN SATURATION: 99 % | SYSTOLIC BLOOD PRESSURE: 151 MMHG | WEIGHT: 160 LBS | HEIGHT: 60 IN | HEART RATE: 80 BPM

## 2019-09-06 DIAGNOSIS — R10.9 RIGHT FLANK PAIN: ICD-10-CM

## 2019-09-06 DIAGNOSIS — N20.1 URETEROLITHIASIS: Primary | ICD-10-CM

## 2019-09-06 DIAGNOSIS — R11.0 NAUSEA: ICD-10-CM

## 2019-09-06 LAB
ALBUMIN SERPL-MCNC: 4.3 GM/DL (ref 3.4–5)
ALP BLD-CCNC: 147 IU/L (ref 40–129)
ALT SERPL-CCNC: 15 U/L (ref 10–40)
ANION GAP SERPL CALCULATED.3IONS-SCNC: 14 MMOL/L (ref 4–16)
AST SERPL-CCNC: 21 IU/L (ref 15–37)
BACTERIA: ABNORMAL /HPF
BASOPHILS ABSOLUTE: 0 K/CU MM
BASOPHILS RELATIVE PERCENT: 0.5 % (ref 0–1)
BILIRUB SERPL-MCNC: 0.2 MG/DL (ref 0–1)
BILIRUBIN URINE: NEGATIVE MG/DL
BLOOD, URINE: ABNORMAL
BUN BLDV-MCNC: 19 MG/DL (ref 6–23)
CALCIUM SERPL-MCNC: 9.4 MG/DL (ref 8.3–10.6)
CAST TYPE: NEGATIVE /HPF
CHLORIDE BLD-SCNC: 99 MMOL/L (ref 99–110)
CLARITY: CLEAR
CO2: 24 MMOL/L (ref 21–32)
COLOR: YELLOW
CREAT SERPL-MCNC: 1.1 MG/DL (ref 0.6–1.1)
CRYSTAL TYPE: NEGATIVE /HPF
DIFFERENTIAL TYPE: ABNORMAL
EOSINOPHILS ABSOLUTE: 0.2 K/CU MM
EOSINOPHILS RELATIVE PERCENT: 3 % (ref 0–3)
EPITHELIAL CELLS, UA: ABNORMAL /HPF
GFR AFRICAN AMERICAN: 59 ML/MIN/1.73M2
GFR NON-AFRICAN AMERICAN: 49 ML/MIN/1.73M2
GLUCOSE BLD-MCNC: 142 MG/DL (ref 70–99)
GLUCOSE, URINE: NEGATIVE MG/DL
HCT VFR BLD CALC: 39.5 % (ref 37–47)
HEMOGLOBIN: 12.9 GM/DL (ref 12.5–16)
IMMATURE NEUTROPHIL %: 0.4 % (ref 0–0.43)
KETONES, URINE: NEGATIVE MG/DL
LEUKOCYTE ESTERASE, URINE: NEGATIVE
LIPASE: 43 IU/L (ref 13–60)
LYMPHOCYTES ABSOLUTE: 2.1 K/CU MM
LYMPHOCYTES RELATIVE PERCENT: 27.1 % (ref 24–44)
MCH RBC QN AUTO: 32.2 PG (ref 27–31)
MCHC RBC AUTO-ENTMCNC: 32.7 % (ref 32–36)
MCV RBC AUTO: 98.5 FL (ref 78–100)
MONOCYTES ABSOLUTE: 0.7 K/CU MM
MONOCYTES RELATIVE PERCENT: 8.5 % (ref 0–4)
NITRITE URINE, QUANTITATIVE: NEGATIVE
PDW BLD-RTO: 11.8 % (ref 11.7–14.9)
PH, URINE: 5 (ref 5–8)
PLATELET # BLD: 217 K/CU MM (ref 140–440)
PMV BLD AUTO: 10.3 FL (ref 7.5–11.1)
POTASSIUM SERPL-SCNC: 4.3 MMOL/L (ref 3.5–5.1)
PROTEIN UA: NEGATIVE MG/DL
RBC # BLD: 4.01 M/CU MM (ref 4.2–5.4)
RBC URINE: ABNORMAL /HPF (ref 0–6)
SEGMENTED NEUTROPHILS ABSOLUTE COUNT: 4.6 K/CU MM
SEGMENTED NEUTROPHILS RELATIVE PERCENT: 60.5 % (ref 36–66)
SODIUM BLD-SCNC: 137 MMOL/L (ref 135–145)
SPECIFIC GRAVITY UA: 1.01 (ref 1–1.03)
TOTAL IMMATURE NEUTOROPHIL: 0.03 K/CU MM
TOTAL PROTEIN: 7.2 GM/DL (ref 6.4–8.2)
UROBILINOGEN, URINE: 0.2 MG/DL (ref 0.2–1)
WBC # BLD: 7.6 K/CU MM (ref 4–10.5)
WBC UA: ABNORMAL /HPF (ref 0–5)

## 2019-09-06 PROCEDURE — 6370000000 HC RX 637 (ALT 250 FOR IP): Performed by: EMERGENCY MEDICINE

## 2019-09-06 PROCEDURE — 81001 URINALYSIS AUTO W/SCOPE: CPT

## 2019-09-06 PROCEDURE — 6360000002 HC RX W HCPCS: Performed by: EMERGENCY MEDICINE

## 2019-09-06 PROCEDURE — 96375 TX/PRO/DX INJ NEW DRUG ADDON: CPT

## 2019-09-06 PROCEDURE — 83690 ASSAY OF LIPASE: CPT

## 2019-09-06 PROCEDURE — 2580000003 HC RX 258: Performed by: EMERGENCY MEDICINE

## 2019-09-06 PROCEDURE — 80053 COMPREHEN METABOLIC PANEL: CPT

## 2019-09-06 PROCEDURE — 74176 CT ABD & PELVIS W/O CONTRAST: CPT

## 2019-09-06 PROCEDURE — 96374 THER/PROPH/DIAG INJ IV PUSH: CPT

## 2019-09-06 PROCEDURE — 85025 COMPLETE CBC W/AUTO DIFF WBC: CPT

## 2019-09-06 PROCEDURE — 99283 EMERGENCY DEPT VISIT LOW MDM: CPT

## 2019-09-06 RX ORDER — NAPROXEN 500 MG/1
500 TABLET ORAL 2 TIMES DAILY WITH MEALS
Qty: 30 TABLET | Refills: 0 | Status: SHIPPED | OUTPATIENT
Start: 2019-09-06 | End: 2019-10-01 | Stop reason: ALTCHOICE

## 2019-09-06 RX ORDER — PROMETHAZINE HYDROCHLORIDE 25 MG/ML
12.5 INJECTION, SOLUTION INTRAMUSCULAR; INTRAVENOUS ONCE
Status: COMPLETED | OUTPATIENT
Start: 2019-09-06 | End: 2019-09-06

## 2019-09-06 RX ORDER — ONDANSETRON 4 MG/1
4 TABLET, ORALLY DISINTEGRATING ORAL EVERY 8 HOURS PRN
Qty: 20 TABLET | Refills: 0 | Status: SHIPPED | OUTPATIENT
Start: 2019-09-06 | End: 2019-09-12

## 2019-09-06 RX ORDER — NAPROXEN 500 MG/1
500 TABLET ORAL ONCE
Status: COMPLETED | OUTPATIENT
Start: 2019-09-06 | End: 2019-09-06

## 2019-09-06 RX ORDER — HYDROCODONE BITARTRATE AND ACETAMINOPHEN 5; 325 MG/1; MG/1
1 TABLET ORAL EVERY 6 HOURS PRN
Qty: 10 TABLET | Refills: 0 | Status: SHIPPED | OUTPATIENT
Start: 2019-09-06 | End: 2019-09-09

## 2019-09-06 RX ORDER — ONDANSETRON 2 MG/ML
4 INJECTION INTRAMUSCULAR; INTRAVENOUS EVERY 30 MIN PRN
Status: DISCONTINUED | OUTPATIENT
Start: 2019-09-06 | End: 2019-09-06 | Stop reason: HOSPADM

## 2019-09-06 RX ORDER — MORPHINE SULFATE 4 MG/ML
4 INJECTION, SOLUTION INTRAMUSCULAR; INTRAVENOUS ONCE
Status: COMPLETED | OUTPATIENT
Start: 2019-09-06 | End: 2019-09-06

## 2019-09-06 RX ORDER — SODIUM CHLORIDE 9 MG/ML
INJECTION, SOLUTION INTRAVENOUS CONTINUOUS
Status: DISCONTINUED | OUTPATIENT
Start: 2019-09-06 | End: 2019-09-06 | Stop reason: HOSPADM

## 2019-09-06 RX ORDER — TAMSULOSIN HYDROCHLORIDE 0.4 MG/1
0.4 CAPSULE ORAL DAILY
Qty: 10 CAPSULE | Refills: 0 | Status: SHIPPED | OUTPATIENT
Start: 2019-09-06 | End: 2019-09-12 | Stop reason: ALTCHOICE

## 2019-09-06 RX ADMIN — MORPHINE SULFATE 4 MG: 4 INJECTION, SOLUTION INTRAMUSCULAR; INTRAVENOUS at 06:24

## 2019-09-06 RX ADMIN — ONDANSETRON 4 MG: 2 INJECTION INTRAMUSCULAR; INTRAVENOUS at 05:39

## 2019-09-06 RX ADMIN — SODIUM CHLORIDE: 9 INJECTION, SOLUTION INTRAVENOUS at 05:39

## 2019-09-06 RX ADMIN — PROMETHAZINE HYDROCHLORIDE 12.5 MG: 25 INJECTION INTRAMUSCULAR; INTRAVENOUS at 06:24

## 2019-09-06 RX ADMIN — NAPROXEN 500 MG: 500 TABLET ORAL at 08:09

## 2019-09-06 ASSESSMENT — ENCOUNTER SYMPTOMS
NAUSEA: 1
ABDOMINAL PAIN: 1
SORE THROAT: 0
EYES NEGATIVE: 1
DIARRHEA: 0
RESPIRATORY NEGATIVE: 1
COUGH: 0

## 2019-09-06 ASSESSMENT — PAIN SCALES - GENERAL
PAINLEVEL_OUTOF10: 5
PAINLEVEL_OUTOF10: 9
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
## 2019-09-12 ENCOUNTER — APPOINTMENT (OUTPATIENT)
Dept: CT IMAGING | Age: 69
End: 2019-09-12
Payer: MEDICARE

## 2019-09-12 ENCOUNTER — HOSPITAL ENCOUNTER (EMERGENCY)
Age: 69
Discharge: HOME | End: 2019-09-12
Attending: EMERGENCY MEDICINE
Payer: MEDICARE

## 2019-09-12 VITALS
BODY MASS INDEX: 27.29 KG/M2 | RESPIRATION RATE: 16 BRPM | HEART RATE: 66 BPM | TEMPERATURE: 98.4 F | SYSTOLIC BLOOD PRESSURE: 167 MMHG | OXYGEN SATURATION: 100 % | DIASTOLIC BLOOD PRESSURE: 75 MMHG | WEIGHT: 154 LBS | HEIGHT: 63 IN

## 2019-09-12 DIAGNOSIS — R11.0 NAUSEA: ICD-10-CM

## 2019-09-12 DIAGNOSIS — N20.1 URETEROLITHIASIS: Primary | ICD-10-CM

## 2019-09-12 LAB
ALBUMIN SERPL-MCNC: 4.1 GM/DL (ref 3.4–5)
ALP BLD-CCNC: 153 IU/L (ref 40–129)
ALT SERPL-CCNC: 35 U/L (ref 10–40)
ANION GAP SERPL CALCULATED.3IONS-SCNC: 13 MMOL/L (ref 4–16)
AST SERPL-CCNC: 60 IU/L (ref 15–37)
BACTERIA: NEGATIVE /HPF
BASOPHILS ABSOLUTE: 0.1 K/CU MM
BASOPHILS RELATIVE PERCENT: 0.8 % (ref 0–1)
BILIRUB SERPL-MCNC: 0.2 MG/DL (ref 0–1)
BILIRUBIN URINE: NEGATIVE MG/DL
BLOOD, URINE: ABNORMAL
BUN BLDV-MCNC: 21 MG/DL (ref 6–23)
CALCIUM SERPL-MCNC: 9.9 MG/DL (ref 8.3–10.6)
CAST TYPE: ABNORMAL /HPF
CHLORIDE BLD-SCNC: 103 MMOL/L (ref 99–110)
CLARITY: CLEAR
CO2: 23 MMOL/L (ref 21–32)
COLOR: YELLOW
CREAT SERPL-MCNC: 1 MG/DL (ref 0.6–1.1)
CRYSTAL TYPE: ABNORMAL /HPF
DIFFERENTIAL TYPE: ABNORMAL
EOSINOPHILS ABSOLUTE: 0.3 K/CU MM
EOSINOPHILS RELATIVE PERCENT: 4.9 % (ref 0–3)
EPITHELIAL CELLS, UA: ABNORMAL /HPF
GFR AFRICAN AMERICAN: >60 ML/MIN/1.73M2
GFR NON-AFRICAN AMERICAN: 55 ML/MIN/1.73M2
GLUCOSE BLD-MCNC: 151 MG/DL (ref 70–99)
GLUCOSE, URINE: NEGATIVE MG/DL
HCT VFR BLD CALC: 37.9 % (ref 37–47)
HEMOGLOBIN: 12.4 GM/DL (ref 12.5–16)
IMMATURE NEUTROPHIL %: 0.3 % (ref 0–0.43)
KETONES, URINE: NEGATIVE MG/DL
LEUKOCYTE ESTERASE, URINE: ABNORMAL
LYMPHOCYTES ABSOLUTE: 1.8 K/CU MM
LYMPHOCYTES RELATIVE PERCENT: 29.8 % (ref 24–44)
MCH RBC QN AUTO: 32.3 PG (ref 27–31)
MCHC RBC AUTO-ENTMCNC: 32.7 % (ref 32–36)
MCV RBC AUTO: 98.7 FL (ref 78–100)
MONOCYTES ABSOLUTE: 0.6 K/CU MM
MONOCYTES RELATIVE PERCENT: 9.5 % (ref 0–4)
MUCUS: NEGATIVE HPF
NITRITE URINE, QUANTITATIVE: NEGATIVE
PDW BLD-RTO: 11.8 % (ref 11.7–14.9)
PH, URINE: 5.5 (ref 5–8)
PLATELET # BLD: 214 K/CU MM (ref 140–440)
PMV BLD AUTO: 10.4 FL (ref 7.5–11.1)
POTASSIUM SERPL-SCNC: 4.5 MMOL/L (ref 3.5–5.1)
PROTEIN UA: NEGATIVE MG/DL
RBC # BLD: 3.84 M/CU MM (ref 4.2–5.4)
RBC URINE: ABNORMAL /HPF (ref 0–6)
SEGMENTED NEUTROPHILS ABSOLUTE COUNT: 3.2 K/CU MM
SEGMENTED NEUTROPHILS RELATIVE PERCENT: 54.7 % (ref 36–66)
SODIUM BLD-SCNC: 139 MMOL/L (ref 135–145)
SPECIFIC GRAVITY UA: 1.01 (ref 1–1.03)
TOTAL IMMATURE NEUTOROPHIL: 0.02 K/CU MM
TOTAL PROTEIN: 6.8 GM/DL (ref 6.4–8.2)
UROBILINOGEN, URINE: 0.2 MG/DL (ref 0.2–1)
VOLUME, (UVOL): 12 ML (ref 10–12)
WBC # BLD: 5.9 K/CU MM (ref 4–10.5)
WBC UA: ABNORMAL /HPF (ref 0–5)

## 2019-09-12 PROCEDURE — 85025 COMPLETE CBC W/AUTO DIFF WBC: CPT

## 2019-09-12 PROCEDURE — 6360000002 HC RX W HCPCS: Performed by: EMERGENCY MEDICINE

## 2019-09-12 PROCEDURE — 2580000003 HC RX 258: Performed by: EMERGENCY MEDICINE

## 2019-09-12 PROCEDURE — 74176 CT ABD & PELVIS W/O CONTRAST: CPT

## 2019-09-12 PROCEDURE — 80053 COMPREHEN METABOLIC PANEL: CPT

## 2019-09-12 PROCEDURE — 81001 URINALYSIS AUTO W/SCOPE: CPT

## 2019-09-12 PROCEDURE — 99284 EMERGENCY DEPT VISIT MOD MDM: CPT

## 2019-09-12 PROCEDURE — 96374 THER/PROPH/DIAG INJ IV PUSH: CPT

## 2019-09-12 RX ORDER — ONDANSETRON 4 MG/1
4 TABLET, ORALLY DISINTEGRATING ORAL EVERY 8 HOURS PRN
Qty: 20 TABLET | Refills: 0 | Status: SHIPPED | OUTPATIENT
Start: 2019-09-12 | End: 2019-10-01 | Stop reason: SDUPTHER

## 2019-09-12 RX ORDER — TAMSULOSIN HYDROCHLORIDE 0.4 MG/1
0.4 CAPSULE ORAL DAILY
Qty: 10 CAPSULE | Refills: 0 | Status: SHIPPED | OUTPATIENT
Start: 2019-09-12 | End: 2019-10-11

## 2019-09-12 RX ORDER — ONDANSETRON 2 MG/ML
4 INJECTION INTRAMUSCULAR; INTRAVENOUS EVERY 30 MIN PRN
Status: DISCONTINUED | OUTPATIENT
Start: 2019-09-12 | End: 2019-09-12 | Stop reason: HOSPADM

## 2019-09-12 RX ORDER — 0.9 % SODIUM CHLORIDE 0.9 %
1000 INTRAVENOUS SOLUTION INTRAVENOUS ONCE
Status: COMPLETED | OUTPATIENT
Start: 2019-09-12 | End: 2019-09-12

## 2019-09-12 RX ADMIN — SODIUM CHLORIDE 1000 ML: 9 INJECTION, SOLUTION INTRAVENOUS at 09:01

## 2019-09-12 RX ADMIN — ONDANSETRON 4 MG: 2 INJECTION INTRAMUSCULAR; INTRAVENOUS at 09:01

## 2019-09-12 NOTE — ED PROVIDER NOTES
brother with cabg in his 45s    Hypertension     Kidney stone     Lumbar herniated disc     Restless leg syndrome     Spinal stenosis      Past Surgical History:   Procedure Laterality Date    HYSTERECTOMY         CURRENT MEDICATIONS    Current Outpatient Rx   Medication Sig Dispense Refill    ondansetron (ZOFRAN ODT) 4 MG disintegrating tablet Take 1 tablet by mouth every 8 hours as needed for Nausea 20 tablet 0    naproxen (NAPROSYN) 500 MG tablet Take 1 tablet by mouth 2 times daily (with meals) 30 tablet 0    Multiple Vitamins-Minerals (MULTIVITAMIN ADULT PO) Take by mouth      AmLODIPine Besylate (NORVASC PO) Take 10 mg by mouth daily          ALLERGIES    Allergies   Allergen Reactions    Floxin [Ofloxacin]      Made her \"feel weird\"       FAMILY HISTORY/SOCIAL HISTORY  History reviewed. No pertinent family history.   Social History     Socioeconomic History    Marital status: Single     Spouse name: None    Number of children: None    Years of education: None    Highest education level: None   Occupational History    None   Social Needs    Financial resource strain: None    Food insecurity:     Worry: None     Inability: None    Transportation needs:     Medical: None     Non-medical: None   Tobacco Use    Smoking status: Never Smoker    Smokeless tobacco: Never Used   Substance and Sexual Activity    Alcohol use: No    Drug use: No    Sexual activity: None   Lifestyle    Physical activity:     Days per week: None     Minutes per session: None    Stress: None   Relationships    Social connections:     Talks on phone: None     Gets together: None     Attends Congregational service: None     Active member of club or organization: None     Attends meetings of clubs or organizations: None     Relationship status: None    Intimate partner violence:     Fear of current or ex partner: None     Emotionally abused: None     Physically abused: None     Forced sexual activity: None   Other and the return precautions. Clinical  IMPRESSION    Nausea, ureterolithiasis      Diagnosis and plan discussed in detail with patient who understands and agrees. Patient agrees to return emergency department if symptoms worsen or any new symptoms develop.       (Please note the MDM and HPI sections of this note were completed with a voice recognition program.  Efforts were made to edit the dictations but occasionally words are mis-transcribed.)      Archana Kemp,   09/12/19 1038

## 2019-09-12 NOTE — ED NOTES
Discharge instructions reviewed; patient verbalized understanding; patient transferred from ED via private vehicle by family.       Susan Ford RN  09/12/19 9205

## 2019-10-01 ENCOUNTER — HOSPITAL ENCOUNTER (EMERGENCY)
Age: 69
Discharge: HOME | End: 2019-10-01
Attending: EMERGENCY MEDICINE
Payer: MEDICARE

## 2019-10-01 VITALS
OXYGEN SATURATION: 98 % | DIASTOLIC BLOOD PRESSURE: 80 MMHG | SYSTOLIC BLOOD PRESSURE: 161 MMHG | RESPIRATION RATE: 16 BRPM | TEMPERATURE: 98.4 F | BODY MASS INDEX: 27.29 KG/M2 | HEIGHT: 63 IN | HEART RATE: 72 BPM | WEIGHT: 154 LBS

## 2019-10-01 DIAGNOSIS — N21.9 CALCULUS OF LOWER URINARY TRACT: Primary | ICD-10-CM

## 2019-10-01 LAB
BACTERIA: NEGATIVE /HPF
BILIRUBIN URINE: NEGATIVE MG/DL
BLOOD, URINE: NEGATIVE
CAST TYPE: NORMAL /HPF
CLARITY: CLEAR
COLOR: YELLOW
CRYSTAL TYPE: NORMAL /HPF
EPITHELIAL CELLS, UA: NORMAL /HPF
GLUCOSE, URINE: NEGATIVE MG/DL
KETONES, URINE: NEGATIVE MG/DL
LEUKOCYTE ESTERASE, URINE: NEGATIVE
MUCUS: NEGATIVE HPF
NITRITE URINE, QUANTITATIVE: NEGATIVE
PH, URINE: 5.5 (ref 5–8)
PROTEIN UA: NEGATIVE MG/DL
RBC URINE: NORMAL /HPF (ref 0–6)
SPECIFIC GRAVITY UA: 1.01 (ref 1–1.03)
UROBILINOGEN, URINE: 0.2 MG/DL (ref 0.2–1)
VOLUME, (UVOL): 12 ML (ref 10–12)
WBC UA: NORMAL /HPF (ref 0–5)

## 2019-10-01 PROCEDURE — 99284 EMERGENCY DEPT VISIT MOD MDM: CPT

## 2019-10-01 PROCEDURE — 6370000000 HC RX 637 (ALT 250 FOR IP): Performed by: EMERGENCY MEDICINE

## 2019-10-01 PROCEDURE — 87086 URINE CULTURE/COLONY COUNT: CPT

## 2019-10-01 PROCEDURE — 81001 URINALYSIS AUTO W/SCOPE: CPT

## 2019-10-01 RX ORDER — ONDANSETRON 4 MG/1
4 TABLET, ORALLY DISINTEGRATING ORAL EVERY 8 HOURS PRN
Qty: 20 TABLET | Refills: 0 | Status: SHIPPED | OUTPATIENT
Start: 2019-10-01 | End: 2019-10-22

## 2019-10-01 RX ORDER — ONDANSETRON 4 MG/1
4 TABLET, ORALLY DISINTEGRATING ORAL ONCE
Status: COMPLETED | OUTPATIENT
Start: 2019-10-01 | End: 2019-10-01

## 2019-10-01 RX ADMIN — ONDANSETRON 4 MG: 4 TABLET, ORALLY DISINTEGRATING ORAL at 09:07

## 2019-10-01 ASSESSMENT — ENCOUNTER SYMPTOMS
ABDOMINAL PAIN: 1
NAUSEA: 1

## 2019-10-03 LAB
CULTURE: ABNORMAL
Lab: ABNORMAL
SPECIMEN: ABNORMAL
TOTAL COLONY COUNT: ABNORMAL

## 2019-10-11 ENCOUNTER — HOSPITAL ENCOUNTER (EMERGENCY)
Age: 69
Discharge: HOME | End: 2019-10-11
Attending: EMERGENCY MEDICINE
Payer: MEDICARE

## 2019-10-11 ENCOUNTER — APPOINTMENT (OUTPATIENT)
Dept: GENERAL RADIOLOGY | Age: 69
End: 2019-10-11
Payer: MEDICARE

## 2019-10-11 VITALS
HEART RATE: 78 BPM | OXYGEN SATURATION: 98 % | WEIGHT: 150 LBS | HEIGHT: 63 IN | BODY MASS INDEX: 26.58 KG/M2 | DIASTOLIC BLOOD PRESSURE: 74 MMHG | TEMPERATURE: 97.8 F | RESPIRATION RATE: 18 BRPM | SYSTOLIC BLOOD PRESSURE: 154 MMHG

## 2019-10-11 DIAGNOSIS — M25.512 ACUTE PAIN OF LEFT SHOULDER: Primary | ICD-10-CM
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
## 2019-10-11 PROCEDURE — 73030 X-RAY EXAM OF SHOULDER: CPT

## 2019-10-11 PROCEDURE — 99284 EMERGENCY DEPT VISIT MOD MDM: CPT

## 2019-10-11 ASSESSMENT — PAIN DESCRIPTION - ONSET: ONSET: ON-GOING

## 2019-10-11 ASSESSMENT — ENCOUNTER SYMPTOMS
ROS SKIN COMMENTS: ABRASION TO LEFT KNEE
COUGH: 0
SHORTNESS OF BREATH: 0

## 2019-10-11 ASSESSMENT — PAIN DESCRIPTION - FREQUENCY: FREQUENCY: INTERMITTENT

## 2019-10-11 ASSESSMENT — PAIN - FUNCTIONAL ASSESSMENT: PAIN_FUNCTIONAL_ASSESSMENT: PREVENTS OR INTERFERES SOME ACTIVE ACTIVITIES AND ADLS

## 2019-10-11 ASSESSMENT — PAIN DESCRIPTION - PAIN TYPE: TYPE: ACUTE PAIN

## 2019-10-11 ASSESSMENT — PAIN DESCRIPTION - LOCATION: LOCATION: SHOULDER

## 2019-10-11 ASSESSMENT — PAIN SCALES - GENERAL: PAINLEVEL_OUTOF10: 3

## 2019-10-11 ASSESSMENT — PAIN DESCRIPTION - ORIENTATION: ORIENTATION: LEFT

## 2019-10-11 ASSESSMENT — PAIN DESCRIPTION - PROGRESSION: CLINICAL_PROGRESSION: GRADUALLY IMPROVING

## 2019-10-11 ASSESSMENT — PAIN DESCRIPTION - DESCRIPTORS: DESCRIPTORS: SHARP;STABBING

## 2019-10-21 ENCOUNTER — TELEPHONE (OUTPATIENT)
Dept: INTERNAL MEDICINE CLINIC | Age: 69
End: 2019-10-21

## 2019-10-21 PROBLEM — N20.1 URETERIC STONE: Status: ACTIVE | Noted: 2019-10-21

## 2019-10-21 PROBLEM — R11.0 NAUSEA: Status: ACTIVE | Noted: 2019-10-21

## 2019-10-21 PROBLEM — Z82.49 FAMILY HISTORY OF ISCHEMIC HEART DISEASE AND OTHER DISEASES OF THE CIRCULATORY SYSTEM: Status: ACTIVE | Noted: 2019-10-21

## 2019-10-21 PROBLEM — R10.9 RIGHT FLANK PAIN: Status: ACTIVE | Noted: 2019-10-21

## 2019-10-22 ENCOUNTER — OFFICE VISIT (OUTPATIENT)
Dept: INTERNAL MEDICINE CLINIC | Age: 69
End: 2019-10-22
Payer: MEDICARE

## 2019-10-22 VITALS
WEIGHT: 147 LBS | OXYGEN SATURATION: 99 % | HEIGHT: 63 IN | SYSTOLIC BLOOD PRESSURE: 140 MMHG | HEART RATE: 65 BPM | DIASTOLIC BLOOD PRESSURE: 80 MMHG | BODY MASS INDEX: 26.05 KG/M2

## 2019-10-22 DIAGNOSIS — Z23 NEEDS FLU SHOT: ICD-10-CM

## 2019-10-22 DIAGNOSIS — E78.2 MIXED HYPERLIPIDEMIA: ICD-10-CM

## 2019-10-22 DIAGNOSIS — Z91.81 AT HIGH RISK FOR FALLS: ICD-10-CM

## 2019-10-22 DIAGNOSIS — R32 URINARY INCONTINENCE IN FEMALE: ICD-10-CM

## 2019-10-22 DIAGNOSIS — Z12.11 SCREENING FOR COLON CANCER: ICD-10-CM

## 2019-10-22 DIAGNOSIS — I10 ESSENTIAL HYPERTENSION: Primary | ICD-10-CM

## 2019-10-22 DIAGNOSIS — Z78.0 POST-MENOPAUSAL: ICD-10-CM

## 2019-10-22 DIAGNOSIS — Z23 NEED FOR VACCINATION AGAINST STREPTOCOCCUS PNEUMONIAE USING PNEUMOCOCCAL CONJUGATE VACCINE 13: ICD-10-CM

## 2019-10-22 DIAGNOSIS — R74.8 ELEVATED ALKALINE PHOSPHATASE LEVEL: ICD-10-CM

## 2019-10-22 DIAGNOSIS — E11.9 DIABETES MELLITUS TYPE 2, DIET-CONTROLLED (HCC): ICD-10-CM

## 2019-10-22 PROCEDURE — G8417 CALC BMI ABV UP PARAM F/U: HCPCS | Performed by: INTERNAL MEDICINE

## 2019-10-22 PROCEDURE — 1123F ACP DISCUSS/DSCN MKR DOCD: CPT | Performed by: INTERNAL MEDICINE

## 2019-10-22 PROCEDURE — 90653 IIV ADJUVANT VACCINE IM: CPT | Performed by: INTERNAL MEDICINE

## 2019-10-22 PROCEDURE — 4040F PNEUMOC VAC/ADMIN/RCVD: CPT | Performed by: INTERNAL MEDICINE

## 2019-10-22 PROCEDURE — 1090F PRES/ABSN URINE INCON ASSESS: CPT | Performed by: INTERNAL MEDICINE

## 2019-10-22 PROCEDURE — G8482 FLU IMMUNIZE ORDER/ADMIN: HCPCS | Performed by: INTERNAL MEDICINE

## 2019-10-22 PROCEDURE — 1036F TOBACCO NON-USER: CPT | Performed by: INTERNAL MEDICINE

## 2019-10-22 PROCEDURE — 99204 OFFICE O/P NEW MOD 45 MIN: CPT | Performed by: INTERNAL MEDICINE

## 2019-10-22 PROCEDURE — 2022F DILAT RTA XM EVC RTNOPTHY: CPT | Performed by: INTERNAL MEDICINE

## 2019-10-22 PROCEDURE — G0008 ADMIN INFLUENZA VIRUS VAC: HCPCS | Performed by: INTERNAL MEDICINE

## 2019-10-22 PROCEDURE — 3044F HG A1C LEVEL LT 7.0%: CPT | Performed by: INTERNAL MEDICINE

## 2019-10-22 PROCEDURE — 0509F URINE INCON PLAN DOCD: CPT | Performed by: INTERNAL MEDICINE

## 2019-10-22 PROCEDURE — G8400 PT W/DXA NO RESULTS DOC: HCPCS | Performed by: INTERNAL MEDICINE

## 2019-10-22 PROCEDURE — 3017F COLORECTAL CA SCREEN DOC REV: CPT | Performed by: INTERNAL MEDICINE

## 2019-10-22 PROCEDURE — G8427 DOCREV CUR MEDS BY ELIG CLIN: HCPCS | Performed by: INTERNAL MEDICINE

## 2019-10-22 RX ORDER — AMLODIPINE BESYLATE 10 MG/1
10 TABLET ORAL DAILY
Qty: 90 TABLET | Refills: 1 | Status: SHIPPED | OUTPATIENT
Start: 2019-10-22 | End: 2019-12-27 | Stop reason: SDUPTHER

## 2019-10-22 RX ORDER — LISINOPRIL 5 MG/1
5 TABLET ORAL DAILY
Qty: 90 TABLET | Refills: 1 | Status: SHIPPED | OUTPATIENT
Start: 2019-10-22 | End: 2019-12-27 | Stop reason: SDUPTHER

## 2019-10-22 RX ORDER — ATORVASTATIN CALCIUM 40 MG/1
40 TABLET, FILM COATED ORAL DAILY
Qty: 90 TABLET | Refills: 1 | Status: SHIPPED | OUTPATIENT
Start: 2019-10-22 | End: 2019-12-27

## 2019-10-22 ASSESSMENT — PATIENT HEALTH QUESTIONNAIRE - PHQ9
SUM OF ALL RESPONSES TO PHQ QUESTIONS 1-9: 0
SUM OF ALL RESPONSES TO PHQ QUESTIONS 1-9: 0
2. FEELING DOWN, DEPRESSED OR HOPELESS: 0
SUM OF ALL RESPONSES TO PHQ9 QUESTIONS 1 & 2: 0
1. LITTLE INTEREST OR PLEASURE IN DOING THINGS: 0

## 2019-10-23 ENCOUNTER — HOSPITAL ENCOUNTER (OUTPATIENT)
Dept: ULTRASOUND IMAGING | Age: 69
Discharge: HOME | End: 2019-10-23
Payer: MEDICARE

## 2019-10-23 DIAGNOSIS — R74.8 ELEVATED ALKALINE PHOSPHATASE LEVEL: ICD-10-CM

## 2019-10-23 PROCEDURE — 76705 ECHO EXAM OF ABDOMEN: CPT

## 2019-10-25 ENCOUNTER — NURSE ONLY (OUTPATIENT)
Dept: INTERNAL MEDICINE CLINIC | Age: 69
End: 2019-10-25
Payer: MEDICARE

## 2019-10-25 DIAGNOSIS — Z12.11 SCREENING FOR COLON CANCER: ICD-10-CM

## 2019-10-25 DIAGNOSIS — Z23 NEED FOR PNEUMOCOCCAL VACCINATION: Primary | ICD-10-CM

## 2019-10-25 LAB
CONTROL: PRESENT
HEMOCCULT STL QL: NEGATIVE

## 2019-10-25 PROCEDURE — 90670 PCV13 VACCINE IM: CPT | Performed by: INTERNAL MEDICINE

## 2019-10-25 PROCEDURE — G0009 ADMIN PNEUMOCOCCAL VACCINE: HCPCS | Performed by: INTERNAL MEDICINE

## 2019-10-25 PROCEDURE — 82274 ASSAY TEST FOR BLOOD FECAL: CPT | Performed by: INTERNAL MEDICINE
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
## 2019-11-21 PROBLEM — M25.519 SHOULDER PAIN: Status: ACTIVE | Noted: 2019-11-21

## 2019-11-21 PROBLEM — Z12.11 SCREENING FOR COLON CANCER: Status: RESOLVED | Noted: 2019-10-22 | Resolved: 2019-11-21

## 2019-12-27 ENCOUNTER — OFFICE VISIT (OUTPATIENT)
Dept: INTERNAL MEDICINE CLINIC | Age: 69
End: 2019-12-27
Payer: MEDICARE

## 2019-12-27 VITALS
BODY MASS INDEX: 25.87 KG/M2 | SYSTOLIC BLOOD PRESSURE: 150 MMHG | OXYGEN SATURATION: 98 % | WEIGHT: 146 LBS | HEART RATE: 85 BPM | DIASTOLIC BLOOD PRESSURE: 80 MMHG | HEIGHT: 63 IN

## 2019-12-27 DIAGNOSIS — R32 URINARY INCONTINENCE IN FEMALE: ICD-10-CM

## 2019-12-27 DIAGNOSIS — J06.9 VIRAL URI: ICD-10-CM

## 2019-12-27 DIAGNOSIS — E78.2 MIXED HYPERLIPIDEMIA: ICD-10-CM

## 2019-12-27 DIAGNOSIS — I10 ESSENTIAL HYPERTENSION: Primary | ICD-10-CM

## 2019-12-27 DIAGNOSIS — K21.9 GASTROESOPHAGEAL REFLUX DISEASE, ESOPHAGITIS PRESENCE NOT SPECIFIED: ICD-10-CM

## 2019-12-27 PROCEDURE — 99214 OFFICE O/P EST MOD 30 MIN: CPT | Performed by: INTERNAL MEDICINE

## 2019-12-27 PROCEDURE — G8427 DOCREV CUR MEDS BY ELIG CLIN: HCPCS | Performed by: INTERNAL MEDICINE

## 2019-12-27 PROCEDURE — G8482 FLU IMMUNIZE ORDER/ADMIN: HCPCS | Performed by: INTERNAL MEDICINE

## 2019-12-27 PROCEDURE — 1090F PRES/ABSN URINE INCON ASSESS: CPT | Performed by: INTERNAL MEDICINE

## 2019-12-27 PROCEDURE — G8400 PT W/DXA NO RESULTS DOC: HCPCS | Performed by: INTERNAL MEDICINE

## 2019-12-27 PROCEDURE — G8417 CALC BMI ABV UP PARAM F/U: HCPCS | Performed by: INTERNAL MEDICINE

## 2019-12-27 PROCEDURE — 0509F URINE INCON PLAN DOCD: CPT | Performed by: INTERNAL MEDICINE

## 2019-12-27 PROCEDURE — 1036F TOBACCO NON-USER: CPT | Performed by: INTERNAL MEDICINE

## 2019-12-27 PROCEDURE — 1123F ACP DISCUSS/DSCN MKR DOCD: CPT | Performed by: INTERNAL MEDICINE

## 2019-12-27 PROCEDURE — 3017F COLORECTAL CA SCREEN DOC REV: CPT | Performed by: INTERNAL MEDICINE

## 2019-12-27 PROCEDURE — 4040F PNEUMOC VAC/ADMIN/RCVD: CPT | Performed by: INTERNAL MEDICINE

## 2019-12-27 RX ORDER — FLUTICASONE PROPIONATE 50 MCG
1 SPRAY, SUSPENSION (ML) NASAL DAILY
Qty: 1 BOTTLE | Refills: 0 | Status: SHIPPED | OUTPATIENT
Start: 2019-12-27 | End: 2020-04-03 | Stop reason: SDUPTHER

## 2019-12-27 RX ORDER — ATORVASTATIN CALCIUM 80 MG/1
80 TABLET, FILM COATED ORAL DAILY
Qty: 90 TABLET | Refills: 1 | Status: SHIPPED | OUTPATIENT
Start: 2019-12-27 | End: 2020-06-10 | Stop reason: SDUPTHER

## 2019-12-27 RX ORDER — FAMOTIDINE 20 MG/1
20 TABLET, FILM COATED ORAL 2 TIMES DAILY
Qty: 180 TABLET | Refills: 1 | Status: SHIPPED | OUTPATIENT
Start: 2019-12-27 | End: 2020-07-10 | Stop reason: SDUPTHER

## 2019-12-27 RX ORDER — LISINOPRIL 5 MG/1
5 TABLET ORAL DAILY
Qty: 90 TABLET | Refills: 1 | Status: ON HOLD
Start: 2019-12-27 | End: 2020-04-02 | Stop reason: HOSPADM

## 2019-12-27 RX ORDER — AMLODIPINE BESYLATE 10 MG/1
10 TABLET ORAL DAILY
Qty: 90 TABLET | Refills: 1 | Status: SHIPPED | OUTPATIENT
Start: 2019-12-27 | End: 2020-06-10 | Stop reason: SDUPTHER

## 2020-01-30 ENCOUNTER — HOSPITAL ENCOUNTER (EMERGENCY)
Age: 70
Discharge: OTHER INSTITUTION | End: 2020-01-31
Attending: EMERGENCY MEDICINE
Payer: MEDICARE

## 2020-01-30 ENCOUNTER — APPOINTMENT (OUTPATIENT)
Dept: CT IMAGING | Age: 70
End: 2020-01-30
Payer: MEDICARE

## 2020-01-30 PROCEDURE — 70450 CT HEAD/BRAIN W/O DYE: CPT

## 2020-01-30 PROCEDURE — 99285 EMERGENCY DEPT VISIT HI MDM: CPT

## 2020-01-30 ASSESSMENT — ENCOUNTER SYMPTOMS
VOMITING: 0
SHORTNESS OF BREATH: 0
ABDOMINAL PAIN: 0
BACK PAIN: 0
RHINORRHEA: 0
SORE THROAT: 0
NAUSEA: 0
COUGH: 0
EYE DISCHARGE: 0
EYE PAIN: 0

## 2020-01-31 ENCOUNTER — HOSPITAL ENCOUNTER (INPATIENT)
Age: 70
LOS: 3 days | Discharge: HOME | DRG: 057 | End: 2020-02-03
Attending: PSYCHIATRY & NEUROLOGY | Admitting: PSYCHIATRY & NEUROLOGY
Payer: MEDICARE

## 2020-01-31 ENCOUNTER — TELEPHONE (OUTPATIENT)
Dept: INTERNAL MEDICINE CLINIC | Age: 70
End: 2020-01-31

## 2020-01-31 VITALS
HEART RATE: 82 BPM | SYSTOLIC BLOOD PRESSURE: 148 MMHG | HEIGHT: 63 IN | WEIGHT: 150 LBS | OXYGEN SATURATION: 100 % | RESPIRATION RATE: 18 BRPM | BODY MASS INDEX: 26.58 KG/M2 | TEMPERATURE: 97.9 F | DIASTOLIC BLOOD PRESSURE: 84 MMHG

## 2020-01-31 PROBLEM — F02.818 LATE ONSET ALZHEIMER'S DISEASE WITH BEHAVIORAL DISTURBANCE (HCC): Status: ACTIVE | Noted: 2020-01-31

## 2020-01-31 PROBLEM — G30.1 LATE ONSET ALZHEIMER'S DISEASE WITH BEHAVIORAL DISTURBANCE (HCC): Status: ACTIVE | Noted: 2020-01-31

## 2020-01-31 LAB
ACETAMINOPHEN LEVEL: <5 UG/ML (ref 15–30)
ALBUMIN SERPL-MCNC: 4 GM/DL (ref 3.4–5)
ALCOHOL SCREEN SERUM: NORMAL %WT/VOL
ALP BLD-CCNC: 169 IU/L (ref 40–129)
ALT SERPL-CCNC: 10 U/L (ref 10–40)
AMPHETAMINES: NEGATIVE
ANION GAP SERPL CALCULATED.3IONS-SCNC: 12 MMOL/L (ref 4–16)
AST SERPL-CCNC: 17 IU/L (ref 15–37)
BACTERIA: NEGATIVE /HPF
BARBITURATE SCREEN URINE: NEGATIVE
BASOPHILS ABSOLUTE: 0.1 K/CU MM
BASOPHILS RELATIVE PERCENT: 0.7 % (ref 0–1)
BENZODIAZEPINE SCREEN, URINE: NEGATIVE
BILIRUB SERPL-MCNC: 0.2 MG/DL (ref 0–1)
BILIRUBIN URINE: NEGATIVE MG/DL
BLOOD, URINE: NEGATIVE
BUN BLDV-MCNC: 18 MG/DL (ref 6–23)
CALCIUM SERPL-MCNC: 9.4 MG/DL (ref 8.3–10.6)
CANNABINOID SCREEN URINE: NEGATIVE
CHLORIDE BLD-SCNC: 101 MMOL/L (ref 99–110)
CLARITY: CLEAR
CO2: 27 MMOL/L (ref 21–32)
COCAINE METABOLITE: NEGATIVE
COLOR: YELLOW
CREAT SERPL-MCNC: 0.9 MG/DL (ref 0.6–1.1)
DIFFERENTIAL TYPE: ABNORMAL
DOSE AMOUNT: ABNORMAL
DOSE AMOUNT: ABNORMAL
DOSE TIME: ABNORMAL
DOSE TIME: ABNORMAL
EOSINOPHILS ABSOLUTE: 0.1 K/CU MM
EOSINOPHILS RELATIVE PERCENT: 1.5 % (ref 0–3)
GFR AFRICAN AMERICAN: >60 ML/MIN/1.73M2
GFR NON-AFRICAN AMERICAN: >60 ML/MIN/1.73M2
GLUCOSE BLD-MCNC: 165 MG/DL (ref 70–99)
GLUCOSE, URINE: NEGATIVE MG/DL
HCT VFR BLD CALC: 38.9 % (ref 37–47)
HEMOGLOBIN: 12.6 GM/DL (ref 12.5–16)
IMMATURE NEUTROPHIL %: 0.4 % (ref 0–0.43)
KETONES, URINE: NEGATIVE MG/DL
LEUKOCYTE ESTERASE, URINE: NEGATIVE
LYMPHOCYTES ABSOLUTE: 2.1 K/CU MM
LYMPHOCYTES RELATIVE PERCENT: 24.5 % (ref 24–44)
MCH RBC QN AUTO: 31.5 PG (ref 27–31)
MCHC RBC AUTO-ENTMCNC: 32.4 % (ref 32–36)
MCV RBC AUTO: 97.3 FL (ref 78–100)
MONOCYTES ABSOLUTE: 0.5 K/CU MM
MONOCYTES RELATIVE PERCENT: 6.4 % (ref 0–4)
NITRITE URINE, QUANTITATIVE: NEGATIVE
NUCLEATED RBC %: 0 %
OPIATES, URINE: NEGATIVE
OXYCODONE: NORMAL
PDW BLD-RTO: 11.9 % (ref 11.7–14.9)
PH, URINE: 6 (ref 5–8)
PHENCYCLIDINE, URINE: NEGATIVE
PLATELET # BLD: 293 K/CU MM (ref 140–440)
PMV BLD AUTO: 10 FL (ref 7.5–11.1)
POTASSIUM SERPL-SCNC: 4.7 MMOL/L (ref 3.5–5.1)
PROTEIN UA: NEGATIVE MG/DL
RBC # BLD: 4 M/CU MM (ref 4.2–5.4)
RBC URINE: NORMAL /HPF (ref 0–6)
SALICYLATE LEVEL: <0.3 MG/DL (ref 15–30)
SEGMENTED NEUTROPHILS ABSOLUTE COUNT: 5.7 K/CU MM
SEGMENTED NEUTROPHILS RELATIVE PERCENT: 66.5 % (ref 36–66)
SODIUM BLD-SCNC: 140 MMOL/L (ref 135–145)
SPECIFIC GRAVITY UA: 1.01 (ref 1–1.03)
TOTAL IMMATURE NEUTOROPHIL: 0.03 K/CU MM
TOTAL NUCLEATED RBC: 0 K/CU MM
TOTAL PROTEIN: 7.4 GM/DL (ref 6.4–8.2)
TRICHOMONAS: NORMAL /HPF
UROBILINOGEN, URINE: NORMAL MG/DL (ref 0.2–1)
WBC # BLD: 8.5 K/CU MM (ref 4–10.5)
WBC UA: <1 /HPF (ref 0–5)

## 2020-01-31 PROCEDURE — 81001 URINALYSIS AUTO W/SCOPE: CPT

## 2020-01-31 PROCEDURE — 80307 DRUG TEST PRSMV CHEM ANLYZR: CPT

## 2020-01-31 PROCEDURE — G0480 DRUG TEST DEF 1-7 CLASSES: HCPCS

## 2020-01-31 PROCEDURE — 1240000000 HC EMOTIONAL WELLNESS R&B

## 2020-01-31 PROCEDURE — 85025 COMPLETE CBC W/AUTO DIFF WBC: CPT

## 2020-01-31 PROCEDURE — 80053 COMPREHEN METABOLIC PANEL: CPT

## 2020-01-31 PROCEDURE — 6370000000 HC RX 637 (ALT 250 FOR IP): Performed by: NURSE PRACTITIONER

## 2020-01-31 PROCEDURE — 99223 1ST HOSP IP/OBS HIGH 75: CPT | Performed by: NURSE PRACTITIONER

## 2020-01-31 RX ORDER — MEMANTINE HYDROCHLORIDE 5 MG/1
5 TABLET ORAL DAILY
Status: DISCONTINUED | OUTPATIENT
Start: 2020-01-31 | End: 2020-02-03 | Stop reason: HOSPADM

## 2020-01-31 RX ORDER — LORAZEPAM 1 MG/1
1 TABLET ORAL EVERY 4 HOURS PRN
Status: DISCONTINUED | OUTPATIENT
Start: 2020-01-31 | End: 2020-02-03 | Stop reason: HOSPADM

## 2020-01-31 RX ORDER — CARBAMAZEPINE 200 MG/1
100 TABLET ORAL 2 TIMES DAILY
Status: DISCONTINUED | OUTPATIENT
Start: 2020-01-31 | End: 2020-02-01

## 2020-01-31 RX ORDER — LISINOPRIL 2.5 MG/1
5 TABLET ORAL DAILY
Status: DISCONTINUED | OUTPATIENT
Start: 2020-01-31 | End: 2020-02-03

## 2020-01-31 RX ORDER — HALOPERIDOL 5 MG/ML
5 INJECTION INTRAMUSCULAR EVERY 4 HOURS PRN
Status: DISCONTINUED | OUTPATIENT
Start: 2020-01-31 | End: 2020-02-03 | Stop reason: HOSPADM

## 2020-01-31 RX ORDER — ATORVASTATIN CALCIUM 40 MG/1
80 TABLET, FILM COATED ORAL DAILY
Status: DISCONTINUED | OUTPATIENT
Start: 2020-02-01 | End: 2020-02-03 | Stop reason: HOSPADM

## 2020-01-31 RX ORDER — ACETAMINOPHEN 500 MG
500 TABLET ORAL EVERY 4 HOURS PRN
Status: DISCONTINUED | OUTPATIENT
Start: 2020-01-31 | End: 2020-02-03 | Stop reason: HOSPADM

## 2020-01-31 RX ORDER — ACETAMINOPHEN 325 MG/1
325 TABLET ORAL EVERY 4 HOURS PRN
Status: DISCONTINUED | OUTPATIENT
Start: 2020-01-31 | End: 2020-02-03 | Stop reason: HOSPADM

## 2020-01-31 RX ORDER — M-VIT,TX,IRON,MINS/CALC/FOLIC 27MG-0.4MG
1 TABLET ORAL DAILY
Status: DISCONTINUED | OUTPATIENT
Start: 2020-02-01 | End: 2020-02-03 | Stop reason: HOSPADM

## 2020-01-31 RX ORDER — TRAZODONE HYDROCHLORIDE 50 MG/1
50 TABLET ORAL NIGHTLY PRN
Status: DISCONTINUED | OUTPATIENT
Start: 2020-01-31 | End: 2020-02-03 | Stop reason: HOSPADM

## 2020-01-31 RX ORDER — LORAZEPAM 2 MG/ML
1 INJECTION INTRAMUSCULAR EVERY 4 HOURS PRN
Status: DISCONTINUED | OUTPATIENT
Start: 2020-01-31 | End: 2020-02-03 | Stop reason: HOSPADM

## 2020-01-31 RX ORDER — FLUTICASONE PROPIONATE 50 MCG
1 SPRAY, SUSPENSION (ML) NASAL DAILY
Status: DISCONTINUED | OUTPATIENT
Start: 2020-02-01 | End: 2020-02-03 | Stop reason: HOSPADM

## 2020-01-31 RX ORDER — FAMOTIDINE 10 MG
20 TABLET ORAL 2 TIMES DAILY
Status: DISCONTINUED | OUTPATIENT
Start: 2020-01-31 | End: 2020-02-03 | Stop reason: HOSPADM

## 2020-01-31 RX ORDER — HALOPERIDOL 5 MG
5 TABLET ORAL EVERY 4 HOURS PRN
Status: DISCONTINUED | OUTPATIENT
Start: 2020-01-31 | End: 2020-02-03 | Stop reason: HOSPADM

## 2020-01-31 RX ORDER — ACETAMINOPHEN 325 MG/1
650 TABLET ORAL EVERY 4 HOURS PRN
Status: DISCONTINUED | OUTPATIENT
Start: 2020-01-31 | End: 2020-02-03 | Stop reason: HOSPADM

## 2020-01-31 RX ORDER — AMLODIPINE BESYLATE 5 MG/1
10 TABLET ORAL DAILY
Status: DISCONTINUED | OUTPATIENT
Start: 2020-01-31 | End: 2020-02-03 | Stop reason: HOSPADM

## 2020-01-31 RX ADMIN — AMLODIPINE BESYLATE 10 MG: 5 TABLET ORAL at 16:35

## 2020-01-31 RX ADMIN — CARBAMAZEPINE 100 MG: 200 TABLET ORAL at 20:53

## 2020-01-31 RX ADMIN — TRAZODONE HYDROCHLORIDE 50 MG: 50 TABLET ORAL at 20:53

## 2020-01-31 RX ADMIN — FAMOTIDINE 20 MG: 10 TABLET ORAL at 21:00

## 2020-01-31 RX ADMIN — LISINOPRIL 5 MG: 2.5 TABLET ORAL at 16:36

## 2020-01-31 RX ADMIN — MEMANTINE 5 MG: 5 TABLET ORAL at 16:37

## 2020-01-31 ASSESSMENT — SLEEP AND FATIGUE QUESTIONNAIRES
AVERAGE NUMBER OF SLEEP HOURS: 8
DO YOU USE A SLEEP AID: NO
DO YOU HAVE DIFFICULTY SLEEPING: NO

## 2020-01-31 ASSESSMENT — LIFESTYLE VARIABLES: HISTORY_ALCOHOL_USE: NO

## 2020-01-31 ASSESSMENT — PAIN SCALES - GENERAL: PAINLEVEL_OUTOF10: 0

## 2020-01-31 NOTE — ED PROVIDER NOTES
her medications today. Nursing Notes, Triage Notes & Vital Signs were reviewed. REVIEW OF SYSTEMS    (2-9 systems for level 4, 10 or more for level 5)     Review of Systems   Constitutional: Negative for chills and fever. HENT: Negative for congestion, rhinorrhea and sore throat. Eyes: Negative for pain and discharge. Respiratory: Negative for cough and shortness of breath. Cardiovascular: Negative for chest pain and palpitations. Gastrointestinal: Negative for abdominal pain, nausea and vomiting. Endocrine: Negative for polydipsia and polyuria. Genitourinary: Negative for dysuria and flank pain. Musculoskeletal: Negative for back pain and neck pain. Skin: Negative for pallor and wound. Neurological: Negative for dizziness, facial asymmetry, light-headedness, numbness and headaches. Psychiatric/Behavioral: Positive for behavioral problems. Negative for self-injury. Except as noted above the remainder of the review of systems was reviewed and negative. PAST MEDICAL HISTORY     Past Medical History:   Diagnosis Date    Bronchitis     CAD (coronary artery disease)     Chest wall trauma     COPD [facility]chronic obstructive pulmonary disease) ([facility] [address]     FH: CAD (coronary artery disease)     brother with cabg in his 45s    Hypertension     Kidney stone     Lumbar herniated disc     Restless leg syndrome     Spinal stenosis        Prior to Admission medications    Medication Sig Start Date End Date Taking?  Authorizing Provider   amLODIPine (NORVASC) 10 MG tablet Take 1 tablet by mouth daily 12/27/19   Alphonse Hernandez MD   lisinopril (PRINIVIL;ZESTRIL) 5 MG tablet Take 1 tablet by mouth daily 12/27/19   Alphonse Hernandez MD   Incontinence Supplies MISC Pull-ups size large 12/27/19   Alphonse Hernandez MD   famotidine (PEPCID) 20 MG tablet Take 1 tablet by mouth 2 times daily 12/27/19   Alphonse Hernandez MD   atorvastatin (LIPITOR) 80 MG tablet Take 1 tablet by mouth (FLONASE) 50 MCG/ACT nasal spray 1 spray by Each Nostril route daily, Disp-1 Bottle, R-0Normal      Multiple Vitamins-Minerals (MULTIVITAMIN ADULT PO) Take by mouth             ALLERGIES     Aspirin and Floxin [ofloxacin]    FAMILY HISTORY       Family History   Problem Relation Age of Onset    No Known Problems Mother     No Known Problems Sister     Heart Disease Brother     Coronary Art Dis Brother         stents    No Known Problems Sister     No Known Problems Sister     Alcohol Abuse Brother           SOCIAL HISTORY       Social History     Socioeconomic History    Marital status: Single     Spouse name: None    Number of children: 0    Years of education: 9    Highest education level: 9th grade   Occupational History    None   Social Needs    Financial resource strain: None    Food insecurity:     Worry: None     Inability: None    Transportation needs:     Medical: None     Non-medical: None   Tobacco Use    Smoking status: Never Smoker    Smokeless tobacco: Never Used   Substance and Sexual Activity    Alcohol use: No    Drug use: No    Sexual activity: None   Lifestyle    Physical activity:     Days per week: None     Minutes per session: None    Stress: None   Relationships    Social connections:     Talks on phone: None     Gets together: None     Attends Latter-day service: None     Active member of club or organization: None     Attends meetings of clubs or organizations: None     Relationship status: None    Intimate partner violence:     Fear of current or ex partner: None     Emotionally abused: None     Physically abused: None     Forced sexual activity: None   Other Topics Concern    None   Social History Narrative    None       SCREENINGS    [place] Coma Scale  Eye Opening: Spontaneous  Best Verbal Response: Oriented  Best Motor Response: Obeys commands  [place] Coma Scale Score: 15          PHYSICAL EXAM    (up to 7 for level 4, 8 or more for level 5)     ED Triage MCH 31.5 (*)     Segs Relative 66.5 (*)     Monocytes % 6.4 (*)     All other components within normal limits   ACETAMINOPHEN LEVEL - Abnormal; Notable for the following components:    Acetaminophen Level <5.0 (*)     All other components within normal limits   SALICYLATE LEVEL - Abnormal; Notable for the following components:    Salicylate Lvl <6.6 (*)     All other components within normal limits   URINE DRUG SCREEN   URINALYSIS   ETHANOL            RADIOLOGY:     Non-plain film images such as CT, Ultrasound and MRI are read by the radiologist. Plain radiographic images are visualized and preliminarily interpreted by the emergency physician. Interpretation per the Radiologist below, if available at the time of this note:    CT HEAD WO CONTRAST   Final Result   No acute intracranial abnormality. Global cerebral atrophy with chronic small vessel ischemic change an   intracranial atherosclerosis. ED BEDSIDE ULTRASOUND:   Performed by ED Physician Keysha Swain MD       LABS:  Labs Reviewed   COMPREHENSIVE METABOLIC PANEL W/ REFLEX TO MG FOR LOW K - Abnormal; Notable for the following components:       Result Value    Glucose 165 (*)     Alkaline Phosphatase 169 (*)     All other components within normal limits   CBC WITH AUTO DIFFERENTIAL - Abnormal; Notable for the following components:    RBC 4.00 (*)     MCH 31.5 (*)     Segs Relative 66.5 (*)     Monocytes % 6.4 (*)     All other components within normal limits   ACETAMINOPHEN LEVEL - Abnormal; Notable for the following components:    Acetaminophen Level <5.0 (*)     All other components within normal limits   SALICYLATE LEVEL - Abnormal; Notable for the following components:    Salicylate Lvl <9.9 (*)     All other components within normal limits   URINE DRUG SCREEN   URINALYSIS   ETHANOL       All other labs were within normal range or not returned as of this dictation.     EMERGENCY DEPARTMENT COURSE and DIFFERENTIAL

## 2020-01-31 NOTE — CARE COORDINATION
CM consult per Dr Tal Nova for discharge planning. Pt walked off from home today was gone for 2 hours police had to be contacted to assist with finding pt. Police did locate pt at [facility] and brought her home. Family/sister brought pt in for mental health evaluation due to increase in this unsafe behavior has flights of ideas, becomes upset when attempts made to redirect. Pt behavior has continued to worsen. She/pt was charged with trespassing at [address] [facility], was deem incompetent by court appointed psychiatrist case was dismissed. Pt lives with sister who can no longer control pt behavior and feels she is unable to keep her safe. Information given for [place] court contact for Emergency guardianship # [phone], explained they would need to  paperwork from [place] and pt PCP has to complete a form. Spoke with pt, sister and granddaughter at bedside. Pt agrees to be admitted to hospital if needed. This CM discussed with pt family concerns for her safety, pt states she was find, was up set and was going to stay at [facility]. I explained she couldn't spend the night at [facility] pt states that what they told her a [employer]. Discussed with Dr Tal Nova admission to [facility] vs admission at [facility] for behavioral stabilization. Call to [facility] #[phone], spoke with intake, initial information given on pt, Dr Blanca Myers will be contacted for possible admission. Waiting a call back from [facility]. Discussed with Dr Lizette Ayers pt will have to be [place] [facility] to transport pt from ER to [place] if accepted. Update to sister/    Dr Tal Nova will accept call back from [facility] Unit for decision on admission from Dr Blanca Myers.  MICHAEL,RN/CM

## 2020-01-31 NOTE — H&P
Initial Psychiatric History and Physical    Kacie Hartman  [id number]  1/31/2020 01/31/20    ID: Patient is a 68 yrs y.o. female    CC: \"My sister wants to rule my life. \"    HPI:  Carmela Mariscal is a 68year old female who presented to the ER on 1/30/2020 with family who is concerned for her safety at home. She was recently diagnosed with Alzheimer's dementia. Family notes that she has had increasingly bizarre delusions at home. Today, patient became upset with family and left the home for 2 hours. She went to a grocery store. Police were called and she was brought to the ED for further evaluation. She denied any SI, HI or AVH for police. No pink slip was written. There is no report that patient try to hurt herself or anyone else. In the emergency department, patient is alert oriented answering questions appropriately. She is endorsing chronic back pain. She denies any SI, HI or AVH. She states she is a \"Godly woman. \"  She states she does not believe that she has a mental health disease. She reports that she simply became upset at her sister earlier today and left the home and walk to the grocery store. Family is available emergency department for collateral information. They do believe that patient is becoming increasingly unable to take care of herself at home. They are concerned and believe that patient may need placement in a facility. Patient does appear well groomed in the emergency department. She is coherent. Family denies that she is ever been deemed incompetent or has any kind of guardian or power of  who makes medical decisions. Patient does report that her blood pressure is high because she is not taking her medications today. During today's interview she was alert & oriented x 3. She denies SI/HI and AVH. She denies depression and anxiety. She states she is sleeping \"very good\" and that her appetite is \"very good. \" She denies any history of jovita, issues with her temper or risky Floxin [Ofloxacin]      Made her \"feel weird\"        OBJECTIVE  Vital Signs:  Vitals:    01/31/20 1110   BP: (!) 144/70   Pulse: 90   Resp: 16   Temp: 97.2 °F (36.2 °C)   SpO2: 97%       Labs:  Recent Results (from the past 48 hour(s))   Comprehensive Metabolic Panel w/ Reflex to MG    Collection Time: 01/31/20 12:57 AM   Result Value Ref Range    Sodium 140 135 - 145 MMOL/L    Potassium 4.7 3.5 - 5.1 MMOL/L    Chloride 101 99 - 110 mMol/L    CO2 27 21 - 32 MMOL/L    BUN 18 6 - 23 MG/DL    CREATININE 0.9 0.6 - 1.1 MG/DL    Glucose 165 (H) 70 - 99 MG/DL    Calcium 9.4 8.3 - 10.6 MG/DL    Alb 4.0 3.4 - 5.0 GM/DL    Total Protein 7.4 6.4 - 8.2 GM/DL    Total Bilirubin 0.2 0.0 - 1.0 MG/DL    ALT 10 10 - 40 U/L    AST 17 15 - 37 IU/L    Alkaline Phosphatase 169 (H) 40 - 129 IU/L    GFR Non-African American >60 >60 mL/min/1.73m2    GFR African American >60 >60 mL/min/1.73m2    Anion Gap 12 4 - 16   CBC Auto Differential    Collection Time: 01/31/20 12:57 AM   Result Value Ref Range    WBC 8.5 4.0 - 10.5 K/CU MM    RBC 4.00 (L) 4.2 - 5.4 M/CU MM    Hemoglobin 12.6 12.5 - 16.0 GM/DL    Hematocrit 38.9 37 - 47 %    MCV 97.3 78 - 100 FL    MCH 31.5 (H) 27 - 31 PG    MCHC 32.4 32.0 - 36.0 %    RDW 11.9 11.7 - 14.9 %    Platelets 426 385 - 978 K/CU MM    MPV 10.0 7.5 - 11.1 FL    Differential Type AUTOMATED DIFFERENTIAL     Segs Relative 66.5 (H) 36 - 66 %    Lymphocytes % 24.5 24 - 44 %    Monocytes % 6.4 (H) 0 - 4 %    Eosinophils % 1.5 0 - 3 %    Basophils % 0.7 0 - 1 %    Segs Absolute 5.7 K/CU MM    Lymphocytes Absolute 2.1 K/CU MM    Monocytes Absolute 0.5 K/CU MM    Eosinophils Absolute 0.1 K/CU MM    Basophils Absolute 0.1 K/CU MM    Nucleated RBC % 0.0 %    Total Nucleated RBC 0.0 K/CU MM    Total Immature Neutrophil 0.03 K/CU MM    Immature Neutrophil % 0.4 0 - 0.43 %   Ethanol Level    Collection Time: 01/31/20 12:57 AM   Result Value Ref Range    Alcohol Scrn <0.01  THE VALUE IS BELOW OUR DETECTION LIMIT.    <0.01 %WT/VOL   Acetaminophen Level    Collection Time: 01/31/20 12:57 AM   Result Value Ref Range    Acetaminophen Level <5.0 (L) 15 - 30 ug/ml    DOSE AMOUNT DOSE AMT. GIVEN - UNKNOWN     DOSE TIME DOSE TIME GIVEN - UNKNOWN    Salicylate Level    Collection Time: 01/31/20 12:57 AM   Result Value Ref Range    Salicylate Lvl <8.7 (L) 15 - 30 MG/DL    DOSE AMOUNT DOSE AMT. GIVEN - UNKNOWN     DOSE TIME DOSE TIME GIVEN - UNKNOWN    Drug screen multi urine    Collection Time: 01/31/20  2:15 AM   Result Value Ref Range    Cannabinoid Scrn, Ur NEGATIVE NEGATIVE    Amphetamines NEGATIVE NEGATIVE    Cocaine Metabolite NEGATIVE NEGATIVE    Benzodiazepine Screen, Urine NEGATIVE NEGATIVE    Barbiturate Screen, Ur NEGATIVE NEGATIVE    Opiates, Urine NEGATIVE NEGATIVE    Phencyclidine, Urine NEGATIVE NEGATIVE    Oxycodone  NEGATIVE     NEGATIVE          THRESHOLD CONCENTRATIONS (mg/dL)  AMPHT               1000  JADA,OPIA             300  BZO,BAR              200  PCP                   25  THC                   50  OXY                  100          IF POSITIVE, SPECIMEN WILL BE  DISCARDED AFTER 6 MONTHS. CALL LAB IF CONFIRMATION NEEDED. ALL NEGATIVE SPECIMENS WILL BE  DISCARDED AFTER ONE WEEK. * UNCONFIRMED POSITIVES MAY  NOT MEET FORENSIC REQUIREMENTS.              Urinalysis    Collection Time: 01/31/20  2:15 AM   Result Value Ref Range    Color, UA YELLOW YELLOW    Clarity, UA CLEAR CLEAR    Glucose, Urine NEGATIVE NEGATIVE MG/DL    Bilirubin Urine NEGATIVE NEGATIVE MG/DL    Ketones, Urine NEGATIVE NEGATIVE MG/DL    Specific Gravity, UA 1.014 1.001 - 1.035    Blood, Urine NEGATIVE NEGATIVE    pH, Urine 6.0 5.0 - 8.0    Protein, UA NEGATIVE NEGATIVE MG/DL    Urobilinogen, Urine NORMAL 0.2 - 1.0 MG/DL    Nitrite Urine, Quantitative NEGATIVE NEGATIVE    Leukocyte Esterase, Urine NEGATIVE NEGATIVE    RBC, UA NONE SEEN 0 - 6 /HPF    WBC, UA <1 0 - 5 /HPF    Bacteria, UA NEGATIVE NEGATIVE /HPF    Trichomonas, UA NONE SEEN NONE SEEN /HPF       Review of Systems:  Reports of no current cardiovascular, respiratory, gastrointestinal, genitourinary, integumentary, neurological, musculoskeletal, or immunological symptoms today. PSYCHIATRIC: See HPI above. Neurologic examination:  Mental status: The patient is alert, attentive, and oriented. Speech is clear and fluent with good repetition, comprehension, and naming. Cranial nerves:    CN II: Visual fields are full to confrontation. Pupils are 4 mm and briskly reactive to light. CN III, IV, VI: At primary gaze, there is no eye deviation. EOMs intact    CN V: Facial sensation is intact to pinprick in all 3 divisions bilaterally. Corneal responses are intact. CN VII: Face is symmetric with normal eye closure and smile. CN VII: Hearing is normal to rubbing fingers    CN IX, X: Palate elevates symmetrically. Phonation is normal.    CN XI: Head turning and shoulder shrug are intact    CN XII: Tongue is midline with normal movements and no atrophy. Motor: There is no pronator drift of out-stretched arms. Muscle bulk and tone are normal. Strength is full bilaterally. Reflexes:  Reflexes are 2+ and symmetric at the biceps, triceps, knees, and ankles. Plantar responses are flexor. Sensory:  Light touch, pinprick, position sense, and vibration sense are intact in fingers and toes. Coordination:  Rapid alternating movements and fine finger movements are intact. There is no dysmetria on finger-to-nose and heel-knee-shin. There are no abnormal or extraneous movements. Romberg is absent. Gait/Stance:  Posture is normal. Gait is steady with normal steps, base, arm swing, and turning. Heel and toe walking are normal. Tandem gait is normal when the patient closes one of her eyes.         PSYCHIATRIC EXAMINATION / MENTAL STATUS EXAM    CONSTITUTIONAL:    Vitals:   Vitals:    01/31/20 1110   BP: (!) 144/70   Pulse: 90   Resp: 16   Temp: 97.2 °F (36.2 °C)   SpO2: 97%      General

## 2020-01-31 NOTE — GROUP NOTE
Group Therapy Note    Date: 1/31/2020    Group Start Time: 1300  Group End Time: 1330    Number of Participants: 5/7    Type: Exercise/Recreation Group    Group Topic/Objective: [place] [address] and Chair Exercises    Notes:  Pt participated actively in the group. Pt noted to socialize actively with peers during group. Pt's affect was bright and conversation was appropriate to activities. Status After Intervention:  Unchanged    Participation Level:  Active Listener and Interactive    Participation Quality: Appropriate, Attentive and Sharing    Speech:  normal    Thought Process/Content: Logical    Affective Functioning: Congruent    Mood: Bright    Level of consciousness:  Alert and Attentive    Response to Learning: Able to verbalize current knowledge/experience    Endings: None Reported    Modes of Intervention: Activity and Movement    Discipline Responsible: Certified Therapeutic Recreation Specialist    Electronically signed by JIM Hendricks MA on 1/31/2020 at 1:50 PM

## 2020-01-31 NOTE — GROUP NOTE
Group Therapy Note    Date: 1/31/2020    Group Start Time: 1500  Group End Time: 2684  Group Topic: Recreational    [address]        Group Therapy Note    Attendees: 6/6       Notes:  Pts participated in recreational therapy group; Bingo Activity. Pts were encouraged to engage in a leisure activity to promote socialization, positive mood, and coping with negative emotions. Pt participated actively in the group. Pt reported prior to group starting that she had never participate in this leisure activity before. After engaging in leisure activity, pt expressed interest in participating in it more. Pt noted to laugh and smile throughout the group. Status After Intervention:  Improved    Participation Level:  Active Listener and Interactive    Participation Quality: Appropriate, Attentive and Sharing      Speech:  normal      Thought Process/Content: Logical      Affective Functioning: Congruent      Mood: Bright      Level of consciousness:  Alert and Attentive      Response to Learning: Able to verbalize current knowledge/experience      Endings: None Reported    Modes of Intervention: Socialization and Activity      Discipline Responsible: Certified Therapeutic Recreation Specailist      Signature:  Darlyn Alpers, [place], [place]

## 2020-01-31 NOTE — PROGRESS NOTES
Pt arrived on [facility] at approx 1045. Pleasant and cooperative with staff, changed into gown and skin assessed. Clothing being laundered and personal items taken to locker. Pt very talkative, although tends to talk in circles at times, avoiding the primary question asked. Questioning staff why she is here and wants to go home, stating she will call the police if needed. Denies any pain or any needs at this time.

## 2020-01-31 NOTE — ED NOTES
This nurse gave the patient a blanket and pillow. She is alert and watching TV. Updated on plan of care and breakfast offered. Call light placed in lap.      Anabel Bangura RN  01/31/20 0800

## 2020-01-31 NOTE — ED NOTES
This nurse allowed the patient to use the phone and speak with her sister, Maria Teresa Garza. The patient understands that she is going to another facility in [place]. She does seem confused about how long she may be there.  This nurse chose not to concentrate on that prospect, as we do not know the outcome at this point of her length of stay     Davey Leigh RN  01/31/20 [id number]

## 2020-01-31 NOTE — ED NOTES
Breakfast tray ordered for this patient. Assisted to position of comfort.       Elzbieta Adams RN  01/31/20 0800

## 2020-01-31 NOTE — ED TRIAGE NOTES
Pt to ED with family for mental health evaluation. Pt went missing tonight for 2 hours and was found by police, was not pink slipped due to not being suicidal. Pt had recent court date for trespassing at [address] and was evaluated and dx with alzheimers. Pt appears manic in triage with flight of ideas. Pt is cooperative at this time.

## 2020-01-31 NOTE — ED PROVIDER NOTES
PCP: Mouna Seay MD    CHIEF COMPLAINT    Chief Complaint   Patient presents with    Mental Health Problem       HPI    In brief, Evelina Isaac is a 68 y.o. female who presents with dementia and behavioral issues. Signout by Dr. Fran Huitron for pending transfer to [facility] at [facility]. PHYSICAL EXAM    VITAL SIGNS: BP (!) 201/97   Pulse 100   Temp 97.4 °F (36.3 °C) (Oral)   Resp 18   Ht 5' 3\" (1.6 m)   Wt 150 lb (68 kg)   SpO2 100%   BMI 26.57 kg/m²    Focused exam revealed awake, alert, well-hydrated, well-nourished, and in no acute distress. Speech is clear. Breathing is unlabored. Skin is dry. Mental status is normal. The patient moves all extremities. LABS:  Labs Reviewed   COMPREHENSIVE METABOLIC PANEL W/ REFLEX TO MG FOR LOW K - Abnormal; Notable for the following components:       Result Value    Glucose 165 (*)     Alkaline Phosphatase 169 (*)     All other components within normal limits   CBC WITH AUTO DIFFERENTIAL - Abnormal; Notable for the following components:    RBC 4.00 (*)     MCH 31.5 (*)     Segs Relative 66.5 (*)     Monocytes % 6.4 (*)     All other components within normal limits   ACETAMINOPHEN LEVEL - Abnormal; Notable for the following components:    Acetaminophen Level <5.0 (*)     All other components within normal limits   SALICYLATE LEVEL - Abnormal; Notable for the following components:    Salicylate Lvl <1.3 (*)     All other components within normal limits   URINE DRUG SCREEN   URINALYSIS   ETHANOL     Ct Head Wo Contrast    Result Date: 1/31/2020  EXAMINATION: CT OF THE HEAD WITHOUT CONTRAST  1/30/2020 11:53 pm TECHNIQUE: CT of the head was performed without the administration of intravenous contrast. Dose modulation, iterative reconstruction, and/or weight based adjustment of the mA/kV was utilized to reduce the radiation dose to as low as reasonably achievable.  COMPARISON: 02/28/2019 HISTORY: ORDERING SYSTEM PROVIDED HISTORY: altered mental status TECHNOLOGIST PROVIDED HISTORY: Reason for exam:->altered mental status Has a \"code stroke\" or \"stroke alert\" been called? ->No Reason for Exam: ams Acuity: Acute Type of Exam: Initial Additional signs and symptoms: Pt to ED with family for mental health evaluation. Pt went missing tonight for 2 hours and was found by police, was not pink slipped due to not being suicidal. Pt had recent court date for trespassing at [address] and was evaluated and dx with alzheimers. Pt appears manic in triage with flight of ideas. Pt is cooperative at this time. Relevant Medical/Surgical History: pt alert FINDINGS: BRAIN/VENTRICLES: There is no acute intracranial hemorrhage, mass effect or midline shift. No abnormal extra-axial fluid collection. No evidence of recent territorial infarct. There are nonspecific areas of hypoattenuation in the periventricular white matter and centrum semiovale that are likely related to chronic small vessel ischemic disease. The ventricles and cisternal spaces are prominent consistent with cerebral atrophy. There is no evidence of hydrocephalus. There is intracranial atherosclerosis. ORBITS: The visualized portion of the orbits demonstrate no acute abnormality. SINUSES: The visualized paranasal sinuses and mastoid air cells demonstrate no acute abnormality. Scattered areas of mucosal thickening. SOFT TISSUES/SKULL:  No acute abnormality of the visualized skull or soft tissues. No acute intracranial abnormality. Global cerebral atrophy with chronic small vessel ischemic change an intracranial atherosclerosis. ED COURSE & MEDICAL DECISION MAKING       Vital signs and nursing notes reviewed during ED course. All pertinent Lab data and radiographic results reviewed with patient at bedside. The patient and/or the family were informed of the results of any tests/labs/imaging, the treatment plan, and time was allotted to answer questions.      This patient was seen and evaluated by an initial ED provider and signed out to me pending transfer. They performed the initial evaluation including history, physical exam and laboratory evaluation. No medical problems were identified which require immediate intervention or which would preclude psychiatric evaluation. They have required no interventions during my shift.      Clinical  IMPRESSION    Dementia with behavioral disturbance           (Please note the MDM and HPI sections of this note were completed with a voice recognition program.  Efforts were made to edit the dictations but occasionally words are mis-transcribed.)      John Mishra,   01/31/20 5814

## 2020-02-01 LAB
ANION GAP SERPL CALCULATED.3IONS-SCNC: 0 MMOL/L (ref 4–16)
BUN BLDV-MCNC: 15 MG/DL (ref 6–23)
CALCIUM SERPL-MCNC: 9.3 MG/DL (ref 8.3–10.6)
CHLORIDE BLD-SCNC: 103 MMOL/L (ref 99–110)
CHOLESTEROL: 222 MG/DL
CO2: 37 MMOL/L (ref 21–32)
CREAT SERPL-MCNC: 1 MG/DL (ref 0.6–1.1)
ESTIMATED AVERAGE GLUCOSE: 134 MG/DL
GFR AFRICAN AMERICAN: >60 ML/MIN/1.73M2
GFR NON-AFRICAN AMERICAN: 55 ML/MIN/1.73M2
GLUCOSE BLD-MCNC: 125 MG/DL (ref 70–99)
HBA1C MFR BLD: 6.3 % (ref 4.2–6.3)
HDLC SERPL-MCNC: 77 MG/DL
LDL CHOLESTEROL DIRECT: 150 MG/DL
POTASSIUM SERPL-SCNC: 5.3 MMOL/L (ref 3.5–5.1)
SODIUM BLD-SCNC: 140 MMOL/L (ref 135–145)
T4 FREE: 1.1 NG/DL (ref 0.9–1.8)
TRIGL SERPL-MCNC: 75 MG/DL
TSH HIGH SENSITIVITY: 1.16 UIU/ML (ref 0.27–4.2)

## 2020-02-01 PROCEDURE — 80048 BASIC METABOLIC PNL TOTAL CA: CPT

## 2020-02-01 PROCEDURE — 99232 SBSQ HOSP IP/OBS MODERATE 35: CPT | Performed by: PSYCHIATRY & NEUROLOGY

## 2020-02-01 PROCEDURE — 84443 ASSAY THYROID STIM HORMONE: CPT

## 2020-02-01 PROCEDURE — 1240000000 HC EMOTIONAL WELLNESS R&B

## 2020-02-01 PROCEDURE — 6370000000 HC RX 637 (ALT 250 FOR IP): Performed by: NURSE PRACTITIONER

## 2020-02-01 PROCEDURE — 83036 HEMOGLOBIN GLYCOSYLATED A1C: CPT

## 2020-02-01 PROCEDURE — 80061 LIPID PANEL: CPT

## 2020-02-01 PROCEDURE — 36415 COLL VENOUS BLD VENIPUNCTURE: CPT

## 2020-02-01 PROCEDURE — 6370000000 HC RX 637 (ALT 250 FOR IP): Performed by: PSYCHIATRY & NEUROLOGY

## 2020-02-01 PROCEDURE — 84439 ASSAY OF FREE THYROXINE: CPT

## 2020-02-01 PROCEDURE — 83721 ASSAY OF BLOOD LIPOPROTEIN: CPT

## 2020-02-01 RX ORDER — CARBAMAZEPINE 200 MG/1
200 TABLET ORAL 2 TIMES DAILY
Status: DISCONTINUED | OUTPATIENT
Start: 2020-02-01 | End: 2020-02-03 | Stop reason: HOSPADM

## 2020-02-01 RX ADMIN — CARBAMAZEPINE 200 MG: 200 TABLET ORAL at 20:52

## 2020-02-01 RX ADMIN — ATORVASTATIN CALCIUM 80 MG: 40 TABLET, FILM COATED ORAL at 08:27

## 2020-02-01 RX ADMIN — CARBAMAZEPINE 100 MG: 200 TABLET ORAL at 08:28

## 2020-02-01 RX ADMIN — LISINOPRIL 5 MG: 2.5 TABLET ORAL at 08:28

## 2020-02-01 RX ADMIN — FAMOTIDINE 20 MG: 10 TABLET ORAL at 08:33

## 2020-02-01 RX ADMIN — MEMANTINE 5 MG: 5 TABLET ORAL at 08:26

## 2020-02-01 RX ADMIN — AMLODIPINE BESYLATE 10 MG: 5 TABLET ORAL at 08:28

## 2020-02-01 RX ADMIN — MULTIPLE VITAMINS W/ MINERALS TAB 1 TABLET: TAB at 08:27

## 2020-02-01 RX ADMIN — FAMOTIDINE 20 MG: 10 TABLET ORAL at 20:52

## 2020-02-01 ASSESSMENT — PAIN SCALES - GENERAL
PAINLEVEL_OUTOF10: 0
PAINLEVEL_OUTOF10: 0

## 2020-02-01 NOTE — BH NOTE
Pt calm and cooperative after admission. Pt admitted that she was angry about being here because she had no memory problems or mental health problems. Pt stated she was willing to prove that to the psychiatrist tomorrow. Mini Mental Status Exam completed and pt scored 20 of 30. Pt continues to be very verbal but unfocused and unable to stay on topic. All of verbalizations are syntactically correct and appear to be conveying events and feelings in a lucid manner.

## 2020-02-01 NOTE — BH NOTE
Focus Note    General Observations: Pt appears to be Anxious, Cooperative and Pleasant during the shift today. Pt alert and oriented to self, and place majority of the time. Pt confused at times. Pt has been compliant with medication and denies side effects. Pt denies SI/HI, & AVH. Pt also denies pain. Pt ambulates on unit without assistive devices, steady gait. Pt has came out of room just a few times after she went to bed but has rested and slept most of the shift once she has gone to sleep. Pt is continent of bowel and bladder.          Appetite: normal    Suicidal Ideations: No    Homicidal Ideations: No    Hallucinations:  absent     Delusions:  absent         Nancy Shaffer LPN

## 2020-02-01 NOTE — GROUP NOTE
Group Therapy Note    Date: 2/1/2020    Group Start Time: 1445  Group End Time: 8102  Group Topic: Recreational    [address]    ISABELLA Gill LSW; JIM Avilez    Group Therapy Note    Attendees: 5/8       Patient's Goal: [place] another day here and be able to go home\"    Notes: Patient actively participated in group. Relaxation Through Music. Pts were each allowed to pick a song they could listen to that will help them relax. Pts were encouraged to relax and socialize as the music was playing. Status After Intervention:  Improved    Participation Level:  Active Listener and Interactive    Participation Quality: Appropriate, Sharing and Supportive    Affective Functioning: Congruent    Mood: elevated    Level of consciousness:  Alert and Attentive    Response to Learning: Able to verbalize current knowledge/experience    Endings: None Reported    Modes of Intervention: Socialization and Activity    Discipline Responsible: /Counselor    Signature: ISABELLA Gill LSW

## 2020-02-01 NOTE — PROGRESS NOTES
MMOL/L    Chloride 101 99 - 110 mMol/L    CO2 27 21 - 32 MMOL/L    BUN 18 6 - 23 MG/DL    CREATININE 0.9 0.6 - 1.1 MG/DL    Glucose 165 (H) 70 - 99 MG/DL    Calcium 9.4 8.3 - 10.6 MG/DL    Alb 4.0 3.4 - 5.0 GM/DL    Total Protein 7.4 6.4 - 8.2 GM/DL    Total Bilirubin 0.2 0.0 - 1.0 MG/DL    ALT 10 10 - 40 U/L    AST 17 15 - 37 IU/L    Alkaline Phosphatase 169 (H) 40 - 129 IU/L    GFR Non-African American >60 >60 mL/min/1.73m2    GFR African American >60 >60 mL/min/1.73m2    Anion Gap 12 4 - 16   CBC Auto Differential    Collection Time: 01/31/20 12:57 AM   Result Value Ref Range    WBC 8.5 4.0 - 10.5 K/CU MM    RBC 4.00 (L) 4.2 - 5.4 M/CU MM    Hemoglobin 12.6 12.5 - 16.0 GM/DL    Hematocrit 38.9 37 - 47 %    MCV 97.3 78 - 100 FL    MCH 31.5 (H) 27 - 31 PG    MCHC 32.4 32.0 - 36.0 %    RDW 11.9 11.7 - 14.9 %    Platelets 596 004 - 903 K/CU MM    MPV 10.0 7.5 - 11.1 FL    Differential Type AUTOMATED DIFFERENTIAL     Segs Relative 66.5 (H) 36 - 66 %    Lymphocytes % 24.5 24 - 44 %    Monocytes % 6.4 (H) 0 - 4 %    Eosinophils % 1.5 0 - 3 %    Basophils % 0.7 0 - 1 %    Segs Absolute 5.7 K/CU MM    Lymphocytes Absolute 2.1 K/CU MM    Monocytes Absolute 0.5 K/CU MM    Eosinophils Absolute 0.1 K/CU MM    Basophils Absolute 0.1 K/CU MM    Nucleated RBC % 0.0 %    Total Nucleated RBC 0.0 K/CU MM    Total Immature Neutrophil 0.03 K/CU MM    Immature Neutrophil % 0.4 0 - 0.43 %   Ethanol Level    Collection Time: 01/31/20 12:57 AM   Result Value Ref Range    Alcohol Scrn <0.01  THE VALUE IS BELOW OUR DETECTION LIMIT. <0.01 %WT/VOL   Acetaminophen Level    Collection Time: 01/31/20 12:57 AM   Result Value Ref Range    Acetaminophen Level <5.0 (L) 15 - 30 ug/ml    DOSE AMOUNT DOSE AMT. GIVEN - UNKNOWN     DOSE TIME DOSE TIME GIVEN - UNKNOWN    Salicylate Level    Collection Time: 01/31/20 12:57 AM   Result Value Ref Range    Salicylate Lvl <0.9 (L) 15 - 30 MG/DL    DOSE AMOUNT DOSE AMT.  GIVEN - UNKNOWN     DOSE TIME DOSE TIME GIVEN - UNKNOWN    Drug screen multi urine    Collection Time: 01/31/20  2:15 AM   Result Value Ref Range    Cannabinoid Scrn, Ur NEGATIVE NEGATIVE    Amphetamines NEGATIVE NEGATIVE    Cocaine Metabolite NEGATIVE NEGATIVE    Benzodiazepine Screen, Urine NEGATIVE NEGATIVE    Barbiturate Screen, Ur NEGATIVE NEGATIVE    Opiates, Urine NEGATIVE NEGATIVE    Phencyclidine, Urine NEGATIVE NEGATIVE    Oxycodone  NEGATIVE     NEGATIVE          THRESHOLD CONCENTRATIONS (mg/dL)  AMPHT               1000  JADA,OPIA             300  BZO,BAR              200  PCP                   25  THC                   50  OXY                  100          IF POSITIVE, SPECIMEN WILL BE  DISCARDED AFTER 6 MONTHS. CALL LAB IF CONFIRMATION NEEDED. ALL NEGATIVE SPECIMENS WILL BE  DISCARDED AFTER ONE WEEK. * UNCONFIRMED POSITIVES MAY  NOT MEET FORENSIC REQUIREMENTS.              Urinalysis    Collection Time: 01/31/20  2:15 AM   Result Value Ref Range    Color, UA YELLOW YELLOW    Clarity, UA CLEAR CLEAR    Glucose, Urine NEGATIVE NEGATIVE MG/DL    Bilirubin Urine NEGATIVE NEGATIVE MG/DL    Ketones, Urine NEGATIVE NEGATIVE MG/DL    Specific Gravity, UA 1.014 1.001 - 1.035    Blood, Urine NEGATIVE NEGATIVE    pH, Urine 6.0 5.0 - 8.0    Protein, UA NEGATIVE NEGATIVE MG/DL    Urobilinogen, Urine NORMAL 0.2 - 1.0 MG/DL    Nitrite Urine, Quantitative NEGATIVE NEGATIVE    Leukocyte Esterase, Urine NEGATIVE NEGATIVE    RBC, UA NONE SEEN 0 - 6 /HPF    WBC, UA <1 0 - 5 /HPF    Bacteria, UA NEGATIVE NEGATIVE /HPF    Trichomonas, UA NONE SEEN NONE SEEN /HPF   Basic Metabolic Panel w/ Reflex to MG    Collection Time: 02/01/20  6:00 AM   Result Value Ref Range    Sodium 140 135 - 145 MMOL/L    Potassium 5.3 (H) 3.5 - 5.1 MMOL/L    Chloride 103 99 - 110 mMol/L    CO2 37 (H) 21 - 32 MMOL/L    Anion Gap 0 (L) 4 - 16    BUN 15 6 - 23 MG/DL    CREATININE 1.0 0.6 - 1.1 MG/DL    Glucose 125 (H) 70 - 99 MG/DL    Calcium 9.3 8.3 - 10.6 MG/DL    GFR Non-African American 55 (L) >60 mL/min/1.73m2    GFR African American >60 >60 mL/min/1.73m2   Hemoglobin A1c    Collection Time: 02/01/20  6:00 AM   Result Value Ref Range    Hemoglobin A1C 6.3 4.2 - 6.3 %    eAG 134 mg/dL   TSH without Reflex    Collection Time: 02/01/20  6:00 AM   Result Value Ref Range    TSH, High Sensitivity 1.160 0.270 - 4.20 uIu/ml   T4, free    Collection Time: 02/01/20  6:00 AM   Result Value Ref Range    T4 Free 1.10 0.9 - 1.8 NG/DL   Lipid panel    Collection Time: 02/01/20  6:00 AM   Result Value Ref Range    Triglycerides 75 <150 MG/DL    Cholesterol 222 (H) <200 MG/DL    HDL 77 >40 MG/DL    LDL Direct 150 (H) <100 MG/DL       Review of Systems:  Reports of no current cardiovascular, respiratory, gastrointestinal, genitourinary, integumentary, neurological, musculoskeletal, or immunological symptoms today. PSYCHIATRIC: See HPI above. Neurologic examination:  Mental status: The patient is alert, attentive, and oriented. Speech is clear and fluent with good repetition, comprehension, and naming. Cranial nerves:    CN II: Visual fields are full to confrontation. Pupils are 4 mm and briskly reactive to light. CN III, IV, VI: At primary gaze, there is no eye deviation. EOMs intact    CN V: Facial sensation is intact to pinprick in all 3 divisions bilaterally. Corneal responses are intact. CN VII: Face is symmetric with normal eye closure and smile. CN VII: Hearing is normal to rubbing fingers    CN IX, X: Palate elevates symmetrically. Phonation is normal.    CN XI: Head turning and shoulder shrug are intact    CN XII: Tongue is midline with normal movements and no atrophy. Motor: There is no pronator drift of out-stretched arms. Muscle bulk and tone are normal. Strength is full bilaterally. Reflexes:  Reflexes are 2+ and symmetric at the biceps, triceps, knees, and ankles. Plantar responses are flexor.     Sensory:  Light touch, pinprick, position sense, and vibration sense are intact in fingers and toes. Coordination:  Rapid alternating movements and fine finger movements are intact. There is no dysmetria on finger-to-nose and heel-knee-shin. There are no abnormal or extraneous movements. Romberg is absent. Gait/Stance:  Posture is normal. Gait is steady with normal steps, base, arm swing, and turning. Heel and toe walking are normal. Tandem gait is normal when the patient closes one of her eyes. PSYCHIATRIC EXAMINATION / MENTAL STATUS EXAM    CONSTITUTIONAL:    Vitals:   Vitals:    02/01/20 0800   BP: (!) 154/81   Pulse: 87   Resp: 16   Temp: 97.7 °F (36.5 °C)   SpO2: 98%      General appearance: [x] appears age, []  appears older than stated age,               [x]  adequately dressed and groomed, [] disheveled,               [x]  in no acute distress, [] appears mildly distressed, [] other           MUSCULOSKELETAL:   Gait:   [x] normal, [] antalgic, [] unsteady, [] gait not evaluated   Station:             [] erect, [] sitting, [] recumbent, [] other        Strength/tone:  [x] muscle strength and tone appear consistent with age and condition     [] atrophy      [] abnormal movements  PSYCHIATRIC:    Appearance: appears stated age. Alert and oriented to person, place, time & situation. no acute distress. Adequate grooming and hygiene. Good eye contact. No prominent physical abnormalities. Attitude: Manner is cooperative and pleasant  Motor: No psychomotor agitation, retardation or abnormal movements noted  Speech: Clearly articulated; normal rate, volume, tone & amount. Language: intact understanding and production  Mood: anxious  Affect: anxious, full range, non-labile, congruent with mood and content of speech  Thought Production: Spontaneous. Thought Form: Coherent, linear, logical & goal-directed. Tangentiality and circumstantiality. Some flight of ideas no loosening of associations.   Thought Content/Perceptions: No [place], no AVH, no delusion  Insight: limited  Judgment: limited  Memory: Immediate, recent, and remote appear intact, though not formally tested. Attention: maintained throughout interview  Fund of knowledge: Average  Gait/Balance: WNL/WNL    Impression:   Late onset Alzheimer's disease with behavioral disturbance    Problem List:   Late onset Alzheimer's disease with behavioral disturbance ([facility] [address]    Plan:  1. Admit to [place] [address] Unit  2. Consult Hospitalist to evaluate and treat medical conditions  3. Adjust psychotropic medications to target symptoms  4. Occupational Therapy, Physical Therapy, Group Psychotherapy as tolerated  5. Reviewed treatment plan with patient including medication risks, benefits, side effects. Obtained informed consent for treatment. 6. Anticipated length of stay 10-12 days  7. I certify that inpatient psychiatric hospital admission is medically necessary for treatment, which can be expected to improve the patient's condition, and/or for diagnostic study. 8. Psychiatric management:medication initiation and titration, group and individual therapy, safe and therapeutic environment. 9. Status of problem/condition: Improving  10. Medical co-morbidities: Management per [facility] group, appreciate assistance  11. Legal Status:Pending  12. The treatment team reviewed with the patient the diagnosis and treatment recommendations to include the risks, benefits, and side effects of chosen medications. 13. The patient verbalized understanding and agreed with the treatment regimen as outlined above. 14. The patient was encouraged to participate in groups. 15. Medical records, Labs, Diagnotic tests reviewed  16. q15 min safety checks for safety  17. Interval History. 18. Review current labs  19. Continue current medications  20. Supportive Therapy Provided  21. Pt had an opportunity to ask questions and address concerns  22.  Pt encouraged to continue therapy group or individual.  23. Pt was in agreement

## 2020-02-02 PROCEDURE — 99232 SBSQ HOSP IP/OBS MODERATE 35: CPT | Performed by: PSYCHIATRY & NEUROLOGY

## 2020-02-02 PROCEDURE — 1240000000 HC EMOTIONAL WELLNESS R&B

## 2020-02-02 PROCEDURE — 6370000000 HC RX 637 (ALT 250 FOR IP): Performed by: PSYCHIATRY & NEUROLOGY

## 2020-02-02 PROCEDURE — 6370000000 HC RX 637 (ALT 250 FOR IP): Performed by: NURSE PRACTITIONER

## 2020-02-02 RX ADMIN — MEMANTINE 5 MG: 5 TABLET ORAL at 08:28

## 2020-02-02 RX ADMIN — TRAZODONE HYDROCHLORIDE 50 MG: 50 TABLET ORAL at 20:25

## 2020-02-02 RX ADMIN — ATORVASTATIN CALCIUM 80 MG: 40 TABLET, FILM COATED ORAL at 08:27

## 2020-02-02 RX ADMIN — CARBAMAZEPINE 200 MG: 200 TABLET ORAL at 20:25

## 2020-02-02 RX ADMIN — MULTIPLE VITAMINS W/ MINERALS TAB 1 TABLET: TAB at 08:28

## 2020-02-02 RX ADMIN — FAMOTIDINE 20 MG: 10 TABLET ORAL at 08:29

## 2020-02-02 RX ADMIN — LISINOPRIL 5 MG: 2.5 TABLET ORAL at 08:27

## 2020-02-02 RX ADMIN — FAMOTIDINE 20 MG: 10 TABLET ORAL at 20:25

## 2020-02-02 RX ADMIN — FLUTICASONE PROPIONATE 1 SPRAY: 50 SPRAY, METERED NASAL at 08:28

## 2020-02-02 RX ADMIN — CARBAMAZEPINE 200 MG: 200 TABLET ORAL at 08:27

## 2020-02-02 RX ADMIN — AMLODIPINE BESYLATE 10 MG: 5 TABLET ORAL at 08:28

## 2020-02-02 ASSESSMENT — PAIN SCALES - GENERAL: PAINLEVEL_OUTOF10: 0

## 2020-02-02 NOTE — PROGRESS NOTES
MMOL/L    Chloride 103 99 - 110 mMol/L    CO2 37 (H) 21 - 32 MMOL/L    Anion Gap 0 (L) 4 - 16    BUN 15 6 - 23 MG/DL    CREATININE 1.0 0.6 - 1.1 MG/DL    Glucose 125 (H) 70 - 99 MG/DL    Calcium 9.3 8.3 - 10.6 MG/DL    GFR Non- 55 (L) >60 mL/min/1.73m2    GFR African American >60 >60 mL/min/1.73m2   Hemoglobin A1c    Collection Time: 02/01/20  6:00 AM   Result Value Ref Range    Hemoglobin A1C 6.3 4.2 - 6.3 %    eAG 134 mg/dL   TSH without Reflex    Collection Time: 02/01/20  6:00 AM   Result Value Ref Range    TSH, High Sensitivity 1.160 0.270 - 4.20 uIu/ml   T4, free    Collection Time: 02/01/20  6:00 AM   Result Value Ref Range    T4 Free 1.10 0.9 - 1.8 NG/DL   Lipid panel    Collection Time: 02/01/20  6:00 AM   Result Value Ref Range    Triglycerides 75 <150 MG/DL    Cholesterol 222 (H) <200 MG/DL    HDL 77 >40 MG/DL    LDL Direct 150 (H) <100 MG/DL       Review of Systems:  Reports of no current cardiovascular, respiratory, gastrointestinal, genitourinary, integumentary, neurological, musculoskeletal, or immunological symptoms today. PSYCHIATRIC: See HPI above. Neurologic examination:  Mental status: The patient is alert, attentive, and oriented. Speech is clear and fluent with good repetition, comprehension, and naming. Cranial nerves:    CN II: Visual fields are full to confrontation. Pupils are 4 mm and briskly reactive to light. CN III, IV, VI: At primary gaze, there is no eye deviation. EOMs intact    CN V: Facial sensation is intact to pinprick in all 3 divisions bilaterally. Corneal responses are intact. CN VII: Face is symmetric with normal eye closure and smile. CN VII: Hearing is normal to rubbing fingers    CN IX, X: Palate elevates symmetrically. Phonation is normal.    CN XI: Head turning and shoulder shrug are intact    CN XII: Tongue is midline with normal movements and no atrophy. Motor: There is no pronator drift of out-stretched arms.  Muscle bulk and tone are normal. Strength is full bilaterally. Reflexes:  Reflexes are 2+ and symmetric at the biceps, triceps, knees, and ankles. Plantar responses are flexor. Sensory:  Light touch, pinprick, position sense, and vibration sense are intact in fingers and toes. Coordination:  Rapid alternating movements and fine finger movements are intact. There is no dysmetria on finger-to-nose and heel-knee-shin. There are no abnormal or extraneous movements. Romberg is absent. Gait/Stance:  Posture is normal. Gait is steady with normal steps, base, arm swing, and turning. Heel and toe walking are normal. Tandem gait is normal when the patient closes one of her eyes. PSYCHIATRIC EXAMINATION / MENTAL STATUS EXAM    CONSTITUTIONAL:    Vitals:   Vitals:    02/02/20 0817   BP: (!) 162/88   Pulse: 84   Resp: 18   Temp: 97.9 °F (36.6 °C)   SpO2: 99%      General appearance: [x] appears age, []  appears older than stated age,               [x]  adequately dressed and groomed, [] disheveled,               [x]  in no acute distress, [] appears mildly distressed, [] other           MUSCULOSKELETAL:   Gait:   [x] normal, [] antalgic, [] unsteady, [] gait not evaluated   Station:             [] erect, [] sitting, [] recumbent, [] other        Strength/tone:  [x] muscle strength and tone appear consistent with age and condition     [] atrophy      [] abnormal movements  PSYCHIATRIC:    Appearance: appears stated age. Alert and oriented to person, place, time & situation. no acute distress. Adequate grooming and hygiene. Good eye contact. No prominent physical abnormalities. Attitude: Manner is cooperative and pleasant  Motor: No psychomotor agitation, retardation or abnormal movements noted  Speech: Clearly articulated; normal rate, volume, tone & amount.   Language: intact understanding and production  Mood: anxious  Affect: anxious, full range, non-labile, congruent with mood and content of speech  Thought Production: Spontaneous. Thought Form: Coherent, linear, logical & goal-directed. Tangentiality and circumstantiality. Some flight of ideas no loosening of associations. Thought Content/Perceptions: No [place], no AVH, no delusion  Insight: limited  Judgment: limited  Memory: Immediate, recent, and remote appear intact, though not formally tested. Attention: maintained throughout interview  Fund of knowledge: Average  Gait/Balance: WNL/WNL    Impression:   Late onset Alzheimer's disease with behavioral disturbance    Problem List:   Late onset Alzheimer's disease with behavioral disturbance ([facility] [address]    Plan:  1. Admit to [place] [address] Unit  2. Consult Hospitalist to evaluate and treat medical conditions  3. Adjust psychotropic medications to target symptoms  4. Occupational Therapy, Physical Therapy, Group Psychotherapy as tolerated  5. Reviewed treatment plan with patient including medication risks, benefits, side effects. Obtained informed consent for treatment. 6. Anticipated length of stay 10-12 days  7. I certify that inpatient psychiatric hospital admission is medically necessary for treatment, which can be expected to improve the patient's condition, and/or for diagnostic study. 8. Psychiatric management:medication initiation and titration, group and individual therapy, safe and therapeutic environment. 9. Status of problem/condition: Improving  10. Medical co-morbidities: Management per [facility] group, appreciate assistance  11. Legal Status:Pending  12. The treatment team reviewed with the patient the diagnosis and treatment recommendations to include the risks, benefits, and side effects of chosen medications. 13. The patient verbalized understanding and agreed with the treatment regimen as outlined above. 14. The patient was encouraged to participate in groups. 15. Medical records, Labs, Diagnotic tests reviewed  16. q15 min safety checks for safety  17. Interval History.   18. Review

## 2020-02-02 NOTE — GROUP NOTE
Group Therapy Note    Date: 2/2/2020    Group Start Time: 1400  Group End Time: 1430  Group Topic: Recreational    [address]    ISABELLA Carlton LSW; JIM Augustin    Group Therapy Note    Attendees: 5/9    Patient's Goal: \"work towards tomorrow\"    Notes: Patient actively participated in group. Relaxation through Art. Patients were each provided with painting supplies. Patient painted 4 pictures during group session and discussed that she enjoyed art as a kid. Patients were encouraged to relax and socialize as they painted.      Status After Intervention:  Improved    Participation Level: Interactive    Participation Quality: Appropriate and Sharing    Speech:  normal    Thought Process/Content: Linear    Affective Functioning: Congruent    Mood: elevated    Level of consciousness:  Alert and Attentive    Response to Learning: Able to verbalize current knowledge/experience    Endings: None Reported    Modes of Intervention: Socialization and Activity    Discipline Responsible: /Counselor    Signature: ISABELLA Carlton LSW

## 2020-02-02 NOTE — GROUP NOTE
Group Therapy Note    Date: 2/2/2020    Group Start Time: 1500  Group End Time: 1354    Number of Participants: 4/9    Type: Exercise/Recreation Group    Group Topic/Objective: Balloon Toss    Notes:  Pt participated actively in the group. Pt required Mod cueing to allow others to engage in the activity as well. Pt's affect noted to be bright and socialized well with peers. Status After Intervention:  Improved    Participation Level:  Active Listener and Interactive    Participation Quality: Appropriate, Attentive and Sharing    Speech:  normal    Thought Process/Content: Logical    Affective Functioning: Congruent    Mood: Bright    Level of consciousness:  Alert and Attentive    Response to Learning: Able to verbalize current knowledge/experience    Endings: None Reported    Modes of Intervention: Activity and Movement    Discipline Responsible: Certified Therapeutic Recreation Specialist     Electronically signed by Rocky Hendricks MA on 2/2/2020 at 3:52 PM

## 2020-02-02 NOTE — CARE COORDINATION
Spoke with patient's sister Douglass Favre about patient's upcoming discharge. She stated that the patient will discharge home with her and they declined home health. She stated that the Pharmacy they use is [facility].

## 2020-02-02 NOTE — GROUP NOTE
Group Therapy Note    Date: 2/2/2020    Group Start Time: 0845  Group End Time: 2690    Number of Participants: 6/9    Type: Morning Goals Group/ Community Meeting    Group Topic/Objective: Set Goal For The Day and to review Unit Rules and Regulations. Patient's Goal:  Pt states her goal is \"Work towards tomorrow. \"    Notes:  Pt participated actively in the group, but required Mod prompting d/t wanting to speak for others. Pt also noted to continuously make references towards God and how peers need to start praying to him. Pt also noted to continually report how \"wonderful\" she is feeling and reports this is proof that she does not need to be on the unit.      Depression (0-10): 0    Anxiety (0-10): 0    Irritability/Aggitation (0-10): 0    Status After Intervention:  Unchanged    Participation Level: Monopolizing    Participation Quality: Intrusive    Speech:  normal    Thought Process/Content: Logical    Affective Functioning: Exaggerated    Mood: Elevated    Level of consciousness:  Alert    Response to Learning: Able to verbalize current knowledge/experience    Endings: None Reported    Modes of Intervention: Education, Support and Socialization    Discipline Responsible: Certified Therapeutic Recreation Specialist     Electronically signed by Idalia Devries MA on 2/2/2020 at 10:29 AM

## 2020-02-03 VITALS
RESPIRATION RATE: 16 BRPM | HEART RATE: 75 BPM | BODY MASS INDEX: 26.58 KG/M2 | HEIGHT: 63 IN | TEMPERATURE: 96.7 F | SYSTOLIC BLOOD PRESSURE: 182 MMHG | OXYGEN SATURATION: 98 % | DIASTOLIC BLOOD PRESSURE: 84 MMHG | WEIGHT: 150 LBS

## 2020-02-03 PROBLEM — G30.1 LATE ONSET ALZHEIMER'S DISEASE WITHOUT BEHAVIORAL DISTURBANCE (HCC): Chronic | Status: ACTIVE | Noted: 2020-01-31

## 2020-02-03 PROBLEM — F02.80 LATE ONSET ALZHEIMER'S DISEASE WITHOUT BEHAVIORAL DISTURBANCE (HCC): Chronic | Status: ACTIVE | Noted: 2020-01-31

## 2020-02-03 PROCEDURE — 6370000000 HC RX 637 (ALT 250 FOR IP): Performed by: PSYCHIATRY & NEUROLOGY

## 2020-02-03 PROCEDURE — 6370000000 HC RX 637 (ALT 250 FOR IP): Performed by: NURSE PRACTITIONER

## 2020-02-03 PROCEDURE — 6370000000 HC RX 637 (ALT 250 FOR IP): Performed by: HOSPITALIST

## 2020-02-03 PROCEDURE — 99239 HOSP IP/OBS DSCHRG MGMT >30: CPT | Performed by: NURSE PRACTITIONER

## 2020-02-03 RX ORDER — TRAZODONE HYDROCHLORIDE 50 MG/1
50 TABLET ORAL NIGHTLY PRN
Qty: 30 TABLET | Refills: 1 | Status: SHIPPED | OUTPATIENT
Start: 2020-02-03 | End: 2020-02-03

## 2020-02-03 RX ORDER — MEMANTINE HYDROCHLORIDE 5 MG/1
5 TABLET ORAL DAILY
Qty: 30 TABLET | Refills: 1 | Status: ON HOLD
Start: 2020-02-04 | End: 2020-03-13 | Stop reason: HOSPADM

## 2020-02-03 RX ORDER — MEMANTINE HYDROCHLORIDE 5 MG/1
5 TABLET ORAL DAILY
Qty: 60 TABLET | Refills: 1 | Status: SHIPPED | OUTPATIENT
Start: 2020-02-04 | End: 2020-02-03

## 2020-02-03 RX ORDER — TRAZODONE HYDROCHLORIDE 50 MG/1
50 TABLET ORAL NIGHTLY PRN
Qty: 30 TABLET | Refills: 1 | Status: ON HOLD
Start: 2020-02-03 | End: 2020-03-13 | Stop reason: HOSPADM

## 2020-02-03 RX ORDER — CARBAMAZEPINE 200 MG/1
200 TABLET ORAL 2 TIMES DAILY
Qty: 60 TABLET | Refills: 1 | Status: ON HOLD
Start: 2020-02-03 | End: 2020-03-13 | Stop reason: HOSPADM

## 2020-02-03 RX ORDER — CARBAMAZEPINE 200 MG/1
200 TABLET ORAL 2 TIMES DAILY
Qty: 60 TABLET | Refills: 1 | Status: SHIPPED | OUTPATIENT
Start: 2020-02-03 | End: 2020-02-03

## 2020-02-03 RX ADMIN — FAMOTIDINE 20 MG: 10 TABLET ORAL at 08:26

## 2020-02-03 RX ADMIN — LISINOPRIL 5 MG: 2.5 TABLET ORAL at 08:20

## 2020-02-03 RX ADMIN — ATORVASTATIN CALCIUM 80 MG: 40 TABLET, FILM COATED ORAL at 08:20

## 2020-02-03 RX ADMIN — METOPROLOL TARTRATE 25 MG: 25 TABLET ORAL at 08:27

## 2020-02-03 RX ADMIN — CARBAMAZEPINE 200 MG: 200 TABLET ORAL at 08:20

## 2020-02-03 RX ADMIN — MULTIPLE VITAMINS W/ MINERALS TAB 1 TABLET: TAB at 08:21

## 2020-02-03 RX ADMIN — AMLODIPINE BESYLATE 10 MG: 5 TABLET ORAL at 08:21

## 2020-02-03 RX ADMIN — MEMANTINE 5 MG: 5 TABLET ORAL at 08:21

## 2020-02-03 RX ADMIN — FLUTICASONE PROPIONATE 1 SPRAY: 50 SPRAY, METERED NASAL at 08:21

## 2020-02-03 NOTE — CARE COORDINATION
Patient follow-up appointment set for 02/25/20 at 2:30 PM with Dipak Hui CNP. AVS updated to reflect.

## 2020-02-03 NOTE — GROUP NOTE
Group Therapy Note    Date: 2/3/2020    Group Start Time: 0830  Group End Time: 0900    Number of Participants: 6/9    Type: Morning Goals Group/ Community Meeting    Group Topic/Objective: Set Goal For The Day and to review Unit Rules and Regulations. Patient's Goal:  Pt states her goal is \"Go home and be with my sister. \"    Notes:  Pt noted to be monopolizing in conversation and required Mod redirection. Pt wanting to force peers to tell her about their [employer] beliefs and their addresses. Pt informed this was inappropriate. Pt would stop for a couple of minutes and then return to asking peers for information.      Depression (0-10): 0    Anxiety (0-10): 0    Irritability/Aggitation (0-10): 0    Status After Intervention:  Unchanged    Participation Level: Monopolizing    Participation Quality: Inappropriate and Intrusive    Speech:  normal    Thought Process/Content: Perseverating    Affective Functioning: Exaggerated    Mood: elevated    Level of consciousness:  Inattentive    Response to Learning: Able to verbalize current knowledge/experience    Endings: None Reported    Modes of Intervention: Education, Support and Socialization    Discipline Responsible: Certified Therapeutic Recreation Specialist    Electronically signed by Shania Conroy MA on 2/3/2020 at 9:37 AM

## 2020-02-03 NOTE — PROGRESS NOTES
All discharge instructions reviewed with patient and her sister, all questions answered. At time of discharge patient was have no anxiety, depression, SI, or HI. Patient ambulated from facility with her sister.

## 2020-02-03 NOTE — DISCHARGE INSTR - COC
Continuity of Care Form    Patient Name: Gee Fiore   :  1950  MRN:  [id number]    [address] [place] [address] date:  2020  Discharge date:  2/3/2020    Code Status Order: Full Code   Advance Directives:   [facility] Documentation     Date/Time Healthcare Directive Type of Healthcare Directive Copy in [address] Agent's Name Healthcare Agent's Phone Number    20 [phone]  No, patient does not have an advance directive for healthcare treatment -- -- -- -- --          Admitting Physician:  Eron Barrera DO  PCP: Sue Perry MD    Discharging Nurse: [facility] Unit/Room#: 8837/2309-80  Discharging Unit Phone Number: ***    Emergency Contact:   Extended Emergency Contact Information  Primary Emergency Contact: Isidoro Mclain [address] Phone: [phone]  Relation: Brother/Sister    Past Surgical History:  Past Surgical History:   Procedure Laterality Date    HYSTERECTOMY      URETER STENT PLACEMENT         Immunization History:   Immunization History   Administered Date(s) Administered    Influenza, Triv, inactivated, subunit, adjuvanted, IM (Fluad 65 yrs and older) 10/22/2019    Pneumococcal Conjugate 13-valent (Jasen Kaelyn) 10/25/2019       Active Problems:  Patient Active Problem List   Diagnosis Code    Essential hypertension I10    Lumbar herniated disc M51.26    Spinal stenosis M48.00    Restless leg syndrome G25.81    Chest pain R07.9    FH: CAD (coronary artery disease) Z82.49    Shortness of breath R06.02    Acute bronchitis J20.9    Precordial chest pain R07.2    Nausea R11.0    Right flank pain R10.9    Ureteric stone N20.1    Family history of ischemic heart disease and other diseases of the circulatory system Z82.49    Need for vaccination against Streptococcus pneumoniae using pneumococcal conjugate vaccine 13 Z23    Needs flu shot Z23    At high risk for falls Z91.81    Post-menopausal Z78.0    Elevated alkaline phosphatase level R74.8    Diabetes mellitus type 2, diet-controlled (HCC) E11.9    Mixed hyperlipidemia E78.2    Urinary incontinence in female R32    Fall on same level from slipping, tripping or stumbling W01. 0XXA    Shoulder pain M25.519    Gastroesophageal reflux disease K21.9    Viral URI J06.9    Late onset Alzheimer's disease without behavioral disturbance (HCC) G30.1, F02.80       Isolation/Infection:   Isolation          No Isolation        Patient Infection Status     None to display          Nurse Assessment:  Last Vital Signs: BP (!) 182/84   Pulse 75   Temp 96.7 °F (35.9 °C) (Temporal)   Resp 16   Ht 5' 2.99\" (1.6 m)   Wt 150 lb (68 kg)   SpO2 98%   Breastfeeding No   BMI 26.58 kg/m²     Last documented pain score (0-10 scale): Pain Level: 0  Last Weight:   Wt Readings from Last 1 Encounters:   01/31/20 150 lb (68 kg)     Mental Status:  {IP PT MENTAL STATUS:20030}    IV Access:  {[facility] IV ACCESS:305596857}    Nursing Mobility/ADLs:  Walking   {P DME GUGB:127601174}  Transfer  {P DME OWVI:459064724}  Bathing  {[facility] DME XVJI:739009314}  Dressing  {P DME HDJU:328809223}  Toileting  {P DME IKHF:705003541}  Feeding  {[facility] DME JQAN:109461934}  Med Admin  {[facility] DME KFDP:818188791}  Med Delivery   {[facility] MED Delivery:509943102}    Wound Care Documentation and Therapy:        Elimination:  Continence:   · Bowel: {YES / LI:36877}  · Bladder: {YES / TO:73260}  Urinary Catheter: {Urinary Catheter:703188460}   Colostomy/Ileostomy/Ileal Conduit: {YES / UQ:73061}       Date of Last BM: ***    Intake/Output Summary (Last 24 hours) at 2/3/2020 1117  Last data filed at 2/3/2020 0813  Gross per 24 hour   Intake 120 ml   Output --   Net 120 ml     No intake/output data recorded.     Safety Concerns:     [address] Safety Concerns:972906231}    Impairments/Disabilities:      [address] JADA Impairments/Disabilities:764289806}    Nutrition Therapy:  Current Nutrition Therapy:   [address] ELIAS Diet

## 2020-02-03 NOTE — DISCHARGE SUMMARY
Department of Psychiatry    Discharge Summary    Renato Stevenson  [id number]    Admission date:   1/31/2020    Discharge:   Date: 02/03/20  Location: [facility]    Inpatient Provider: Dorinda Morrell D.O. and SHAWN LimBC  Unit: [facility]    Diagnosis on Discharge: Active Hospital Problems    Diagnosis Date Noted    Late onset Alzheimer's disease without behavioral disturbance ([facility] [address] [G30.1, F02.80] 01/31/2020     Priority: High     Class: Chronic       Reason for Admission:  Late onset Alzheimer's with behavioral disturbance    Hospital Course:   Patient was seen and evaluated by a multidisciplinary treatment team, in this evaluation patient was able to contribute freely. Patient was able to provide informed consent and outline the risks and benefits of medications. Renato Stevenson was compliant with medications. Pt noted a significant reduction in her depression and anxiety during her  stay on [facility]. Pt noted that she slowly improved to the point that she   was comfortable and safe to return home. Pt noted she felt her medications were  working well and denied any current side effects. Treatment team encouraged  patient to stay out of bed and try to find activities to do, verbalized  understanding. Patient reported that she felt safe on the unit and comfortable  for discharge. Pt denied suicidal/homicidal ideations, denied any problems  or concerns with medications or side effects. Patient voiced progression towards  treatment goals and was offered a copy of updated treatment plan completed  during visit today. Denied any immediate needs or concerns. Pt throughout her stay on [facility] pt felt like her medications were working and  felt comfortable being discharged on these medications. Pt was advised to take  all medications as prescribed, follow up with all scheduled appointments and  abstain from any alcohol or illicit substances. Pt was in agreement.   Pt felt  safe and comfortable to (PEPCID) 20 MG tablet, Take 1 tablet by mouth 2 times daily  atorvastatin (LIPITOR) 80 MG tablet, Take 1 tablet by mouth daily  fluticasone (FLONASE) 50 MCG/ACT nasal spray, 1 spray by Each Nostril route daily  Multiple Vitamins-Minerals (MULTIVITAMIN ADULT PO), Take by mouth  Allergies   Allergen Reactions    Aspirin     Floxin [Ofloxacin]      Made her \"feel weird\"        The patient verbalized understanding and agreement with the above information    LEGAL STATUS ON DISCHARGE:  Voluntary    DISCHARGE DISPOSITION:  Home    DISCHARGE CONDITION:  Stable for outpatient treatment    DISCHARGE DIET:  Resume previous home diet    ACTI VITES:  As tolerated    FOLLOWUP APPOINTMENT(S):  As noted in the After Visit Summary    CONSULTS:  [address] Directive POA was offered and reviewed with pt:      Talked to pts poa,went over patients benefits and other matters. Reveiwed financial options /programs etc.... CORE MEASURES  -Was the patient discharged on two or more Antipsychotics? NO  -If multiple Antipsychotic medications prescribed,is tapering recommended? NA    ? If yes, document plan: NA  ?  -If multiple Antipsychotic medications prescribed at discharge, is cross tapering in process at D/C? NA  -Have there been 3 or more failed attempts of mono therapy? NA?  -If yes, list at least 3 of the failed Antipsychotic medications with the reason why each one failed: NA  -Was Hemoglobin A1C or fasting Glucose measure done within the last 12 months? YES  -Lipid Panel measure done within the last 12 months? YES  -Pt was offered an FDA approved medication for Chemical Dependency: (offered refused/ordered) NA  -Pt was offered for discharged on tobacco/nicotine cessation and/or codependency cessation via medication assistance: (offered refused/ordered) NA    More than 30 mins was spent face to face with the patient to discuss the diagnosis, treatment recommendations, and prognosis.  Safety planning was also reviewed. The patient agreed to go to the nearest ER or call 911 if they experienced an emergency    The patient was admitted to the psychiatric unit and monitored for stabilization. A multidisciplinary team met with the patient on a daily basis. The diagnosis, treatment recommendations, and prognosis were reviewed with the patient.     Objective:  Vital signs in last 24 hours:  Vitals:    02/03/20 0815   BP: (!) 182/84   Pulse: 75   Resp: 16   Temp: 96.7 °F (35.9 °C)   SpO2: 98%       Labs:      Recent Results (from the past 504 hour(s))   Comprehensive Metabolic Panel w/ Reflex to MG    Collection Time: 01/31/20 12:57 AM   Result Value Ref Range    Sodium 140 135 - 145 MMOL/L    Potassium 4.7 3.5 - 5.1 MMOL/L    Chloride 101 99 - 110 mMol/L    CO2 27 21 - 32 MMOL/L    BUN 18 6 - 23 MG/DL    CREATININE 0.9 0.6 - 1.1 MG/DL    Glucose 165 (H) 70 - 99 MG/DL    Calcium 9.4 8.3 - 10.6 MG/DL    Alb 4.0 3.4 - 5.0 GM/DL    Total Protein 7.4 6.4 - 8.2 GM/DL    Total Bilirubin 0.2 0.0 - 1.0 MG/DL    ALT 10 10 - 40 U/L    AST 17 15 - 37 IU/L    Alkaline Phosphatase 169 (H) 40 - 129 IU/L    GFR Non-African American >60 >60 mL/min/1.73m2    GFR African American >60 >60 mL/min/1.73m2    Anion Gap 12 4 - 16   CBC Auto Differential    Collection Time: 01/31/20 12:57 AM   Result Value Ref Range    WBC 8.5 4.0 - 10.5 K/CU MM    RBC 4.00 (L) 4.2 - 5.4 M/CU MM    Hemoglobin 12.6 12.5 - 16.0 GM/DL    Hematocrit 38.9 37 - 47 %    MCV 97.3 78 - 100 FL    MCH 31.5 (H) 27 - 31 PG    MCHC 32.4 32.0 - 36.0 %    RDW 11.9 11.7 - 14.9 %    Platelets 779 238 - 562 K/CU MM    MPV 10.0 7.5 - 11.1 FL    Differential Type AUTOMATED DIFFERENTIAL     Segs Relative 66.5 (H) 36 - 66 %    Lymphocytes % 24.5 24 - 44 %    Monocytes % 6.4 (H) 0 - 4 %    Eosinophils % 1.5 0 - 3 %    Basophils % 0.7 0 - 1 %    Segs Absolute 5.7 K/CU MM    Lymphocytes Absolute 2.1 K/CU MM    Monocytes Absolute 0.5 K/CU MM    Eosinophils Absolute 0.1 K/CU MM    Basophils

## 2020-02-03 NOTE — PROGRESS NOTES
Pt discharged before examination. Add BB while inhouse for uncontrolled HTN. Has mild hyperkalemia and on ACE. Would recommend to lower dose or hold while on new med BB and then follow up with BMP . Called pt phone number in epic but voice mail not set up. Will try to call again.

## 2020-02-03 NOTE — PLAN OF CARE
[facility]  Initial Interdisciplinary Treatment Plan NOTE    Review Date & Time: 01/31/20  1130    Admission Type:   Admission Type: Voluntary    Reason for admission:  Reason for Admission: Dementia    Patient Admission Diagnosis: Late onset Alzheimer's disease with behavioral disturbance ([facility] [address]      PATIENT STRENGTHS:  Patient Strengths Strengths: Communication, Medication Compliance, Motivated, Employment  Patient Strengths and Limitations:   Addictive Behavior:Addictive Behavior  Do you have a history of Chemical Use?: No  Do you have a history of Alcohol Use?: No  Do you have a history of Street Drug Abuse?: No  Histroy of Prescripton Drug Abuse?: No  Medical Problems:  Past Medical History:   Diagnosis Date    Bronchitis     CAD (coronary artery disease)     Chest wall trauma     COPD (chronic obstructive pulmonary disease) ([facility] [address]     FH: CAD (coronary artery disease)     brother with cabg in his 45s    Hypertension     Kidney stone     Lumbar herniated disc     Restless leg syndrome     Spinal stenosis        EDUCATION:   Learner Progress Toward Treatment Goals: Reviewed goals and plan of care    Method: Group    Outcome: Verbalized understanding and Needs reinforcement    PATIENT GOALS: Med titration, compliance with unit programming    PLAN/TREATMENT RECOMMENDATIONS UPDATE: Med titration    Estimated Length of Stay Update:  5 days  Estimated Discharge Date Update: 02/05/20  Discharge Criteria: Med titration    SHORT-TERM GOALS UPDATE:   Time frame for Short-Term Goals: 5 days    LONG-TERM GOALS UPDATE:   Time frame for Long-Term Goals: 5 days  Members Present in Team Meeting: See Signature Sheet      ISABELLA Jones, LSW
Problem: Physical Regulation:  Goal: Complications related to the disease process, condition or treatment will be avoided or minimized  Description  Complications related to the disease process, condition or treatment will be avoided or minimized  2/1/2020 2354 by David Higgins RN  Outcome: Ongoing  2/1/2020 1822 by Brenda Romero LPN  Outcome: Ongoing     Problem: Safety:  Goal: Ability to remain free from injury will improve  Description  Ability to remain free from injury will improve  2/1/2020 2354 by David Higgins RN  Outcome: Ongoing  2/1/2020 1822 by Brenda Romero LPN  Outcome: Ongoing  Goal: Ability to correctly utilize injury-preventing devices as appropriate will improve  Description  Ability to correctly utilize injury-preventing devices as appropriate will improve  2/1/2020 2354 by David Higgins RN  Outcome: Ongoing  2/1/2020 1822 by Brenda Romero LPN  Outcome: Ongoing     Problem: Falls - Risk of:  Goal: Will remain free from falls  Description  Will remain free from falls  Outcome: Ongoing  Goal: Absence of physical injury  Description  Absence of physical injury  Outcome: Ongoing
Problem: Physical Regulation:  Goal: Complications related to the disease process, condition or treatment will be avoided or minimized  Description  Complications related to the disease process, condition or treatment will be avoided or minimized  2/3/2020 1118 by Briana Tim LPN  Outcome: Completed  2/3/2020 0242 by Shreya Garcia RN  Outcome: Ongoing     Problem: Safety:  Goal: Ability to remain free from injury will improve  Description  Ability to remain free from injury will improve  2/3/2020 1118 by Briana Tim LPN  Outcome: Completed  2/3/2020 0242 by Shreya Garcia RN  Outcome: Ongoing  Goal: Ability to correctly utilize injury-preventing devices as appropriate will improve  Description  Ability to correctly utilize injury-preventing devices as appropriate will improve  2/3/2020 1118 by Briana Tim LPN  Outcome: Completed  2/3/2020 0242 by Shreya Garcia RN  Outcome: Ongoing     Problem: Falls - Risk of:  Goal: Will remain free from falls  Description  Will remain free from falls  2/3/2020 1118 by Briana Tim LPN  Outcome: Completed  2/3/2020 0242 by Shreya Garcia RN  Outcome: Ongoing  Goal: Absence of physical injury  Description  Absence of physical injury  2/3/2020 1118 by Briana Tim LPN  Outcome: Completed  2/3/2020 0242 by Shreya Garcia RN  Outcome: Ongoing     Problem: Altered Mood, Deterioration in Function:  Goal: Ability to perform activities of daily living will improve  Description  Ability to perform activities of daily living will improve  2/3/2020 1118 by Briana Tim LPN  Outcome: Completed  2/3/2020 0242 by Shreya Garcia RN  Outcome: Ongoing  Goal: Able to verbalize reality based thinking  Description  Able to verbalize reality based thinking  2/3/2020 1118 by Briana Tim LPN  Outcome: Completed  2/3/2020 0242 by Shreya Garcia RN  Outcome: Ongoing  Goal: Skin appearance normal  Description  Skin appearance normal  2/3/2020 1118 by Briana Tim LPN  Outcome:
Problem: Physical Regulation:  Goal: Complications related to the disease process, condition or treatment will be avoided or minimized  Description  Complications related to the disease process, condition or treatment will be avoided or minimized  Outcome: Ongoing     Problem: Safety:  Goal: Ability to remain free from injury will improve  Description  Ability to remain free from injury will improve  Outcome: Ongoing  Goal: Ability to correctly utilize injury-preventing devices as appropriate will improve  Description  Ability to correctly utilize injury-preventing devices as appropriate will improve  Outcome: Ongoing
Limits    Patient Monitoring:  Frequency of Checks: 4 times per hour, close    Psychiatric Symptoms:   Depression Symptoms  Depression Symptoms: Impaired concentration  Anxiety Symptoms  Anxiety Symptoms: Generalized  Nakia Symptoms  Nakia Symptoms: Poor judgment     Psychosis Symptoms  Delusion Type: No problems reported or observed.     Suicide Risk CSSR-S:  1) Within the past month, have you wished you were dead or wished you could go to sleep and not wake up? : No  2) Have you actually had any thoughts of killing yourself? : No  6) Have you ever done anything, started to do anything, or prepared to do anything to end your life?: No    EDUCATION:   Learner Progress Toward Treatment Goals: Reviewed goals and plan of care    Method: Group    Outcome: Verbalized understanding    PATIENT GOALS: Med titration and compliance with unit programming    PLAN/TREATMENT RECOMMENDATIONS UPDATE: Med titration    Estimated Length of Stay Update:  5 days  Estimated Discharge Date Update: 02/05/2020  Discharge criteria: Med titration    SHORT-TERM GOALS UPDATE:   Time frame for Short-Term Goals: 4 days    LONG-TERM GOALS UPDATE:   Time frame for Long-Term Goals: 5 days  Members Present in Team Meeting: See Signature Sheet    ISABELLA Marinelli, LSW
Ongoing  2/2/2020 1452 by Kathrin Griffin LPN  Outcome: Ongoing  Goal: Maintenance of adequate nutrition will improve  Description  Maintenance of adequate nutrition will improve  2/3/2020 0242 by Justin Castellanos RN  Outcome: Ongoing  2/2/2020 1452 by Kathrin Griffin LPN  Outcome: Ongoing  Goal: Ability to tolerate increased activity will improve  Description  Ability to tolerate increased activity will improve  2/3/2020 0242 by Justin Castellanos RN  Outcome: Ongoing  2/2/2020 1452 by Kathrin Griffin LPN  Outcome: Ongoing  Goal: Participates in care planning  Description  Participates in care planning  2/3/2020 0242 by Justin Castellanos RN  Outcome: Ongoing  2/2/2020 1452 by Kathrin Griffin LPN  Outcome: Ongoing  Goal: Patient specific goal  Description  Patient specific goal  2/3/2020 0242 by Justin Castellanos RN  Outcome: Ongoing  2/2/2020 1452 by Kathrin Griffin LPN  Outcome: Ongoing
improve  Description  Maintenance of adequate nutrition will improve  Outcome: Ongoing  Goal: Ability to tolerate increased activity will improve  Description  Ability to tolerate increased activity will improve  Outcome: Ongoing  Goal: Participates in care planning  Description  Participates in care planning  Outcome: Ongoing  Goal: Patient specific goal  Description  Patient specific goal  Outcome: Ongoing

## 2020-02-04 ENCOUNTER — TELEPHONE (OUTPATIENT)
Dept: INTERNAL MEDICINE CLINIC | Age: 70
End: 2020-02-04

## 2020-02-04 NOTE — TELEPHONE ENCOUNTER
[facility] Trans[facility] Initial Follow Up Call    Outreach made within 2 business days of discharge: Yes    Patient: Aure Chapman Patient : 1950   MRN: [id number]  Reason for Admission: There are no discharge diagnoses documented for the most recent discharge.   Discharge Date: 2/3/20       Spoke with: no answer     Discharge department/facility: [facility]        Follow Up  Future Appointments   Date Time Provider Reyna Duran   2020  2:30 PM EVELYN Benitez - CNP [facility] [address] [facility]       T[place]in [place], [address] [facility] [place]

## 2020-02-17 ENCOUNTER — HOSPITAL ENCOUNTER (EMERGENCY)
Age: 70
Discharge: HOME | End: 2020-02-17
Attending: EMERGENCY MEDICINE
Payer: MEDICARE

## 2020-02-17 VITALS
WEIGHT: 150 LBS | RESPIRATION RATE: 16 BRPM | HEIGHT: 62 IN | TEMPERATURE: 98.2 F | SYSTOLIC BLOOD PRESSURE: 148 MMHG | OXYGEN SATURATION: 97 % | HEART RATE: 86 BPM | DIASTOLIC BLOOD PRESSURE: 86 MMHG | BODY MASS INDEX: 27.6 KG/M2

## 2020-02-17 LAB
ALBUMIN SERPL-MCNC: 4.2 GM/DL (ref 3.4–5)
ALP BLD-CCNC: 137 IU/L (ref 40–129)
ALT SERPL-CCNC: 18 U/L (ref 10–40)
ANION GAP SERPL CALCULATED.3IONS-SCNC: 12 MMOL/L (ref 4–16)
AST SERPL-CCNC: 25 IU/L (ref 15–37)
BACTERIA: NEGATIVE /HPF
BASOPHILS ABSOLUTE: 0 K/CU MM
BASOPHILS RELATIVE PERCENT: 0.3 % (ref 0–1)
BILIRUB SERPL-MCNC: 0.3 MG/DL (ref 0–1)
BILIRUBIN URINE: NEGATIVE MG/DL
BLOOD, URINE: ABNORMAL
BUN BLDV-MCNC: 21 MG/DL (ref 6–23)
CALCIUM SERPL-MCNC: 9.1 MG/DL (ref 8.3–10.6)
CHLORIDE BLD-SCNC: 101 MMOL/L (ref 99–110)
CLARITY: CLEAR
CO2: 26 MMOL/L (ref 21–32)
COLOR: YELLOW
CREAT SERPL-MCNC: 0.9 MG/DL (ref 0.6–1.1)
DIFFERENTIAL TYPE: ABNORMAL
EOSINOPHILS ABSOLUTE: 0.2 K/CU MM
EOSINOPHILS RELATIVE PERCENT: 1.8 % (ref 0–3)
GFR AFRICAN AMERICAN: >60 ML/MIN/1.73M2
GFR NON-AFRICAN AMERICAN: >60 ML/MIN/1.73M2
GLUCOSE BLD-MCNC: 173 MG/DL (ref 70–99)
GLUCOSE, URINE: NEGATIVE MG/DL
HCT VFR BLD CALC: 42.1 % (ref 37–47)
HEMOGLOBIN: 13.3 GM/DL (ref 12.5–16)
IMMATURE NEUTROPHIL %: 0.3 % (ref 0–0.43)
KETONES, URINE: NEGATIVE MG/DL
LEUKOCYTE ESTERASE, URINE: NEGATIVE
LIPASE: 25 IU/L (ref 13–60)
LYMPHOCYTES ABSOLUTE: 0.6 K/CU MM
LYMPHOCYTES RELATIVE PERCENT: 5.1 % (ref 24–44)
MCH RBC QN AUTO: 31.7 PG (ref 27–31)
MCHC RBC AUTO-ENTMCNC: 31.6 % (ref 32–36)
MCV RBC AUTO: 100.5 FL (ref 78–100)
MONOCYTES ABSOLUTE: 0.5 K/CU MM
MONOCYTES RELATIVE PERCENT: 3.8 % (ref 0–4)
MUCUS: ABNORMAL HPF
NITRITE URINE, QUANTITATIVE: NEGATIVE
NUCLEATED RBC %: 0 %
PDW BLD-RTO: 12.2 % (ref 11.7–14.9)
PH, URINE: 5 (ref 5–8)
PLATELET # BLD: 238 K/CU MM (ref 140–440)
PMV BLD AUTO: 10.6 FL (ref 7.5–11.1)
POTASSIUM SERPL-SCNC: 4.3 MMOL/L (ref 3.5–5.1)
PROTEIN UA: NEGATIVE MG/DL
RBC # BLD: 4.19 M/CU MM (ref 4.2–5.4)
RBC URINE: <1 /HPF (ref 0–6)
SEGMENTED NEUTROPHILS ABSOLUTE COUNT: 10.9 K/CU MM
SEGMENTED NEUTROPHILS RELATIVE PERCENT: 88.7 % (ref 36–66)
SODIUM BLD-SCNC: 139 MMOL/L (ref 135–145)
SPECIFIC GRAVITY UA: 1.02 (ref 1–1.03)
SQUAMOUS EPITHELIAL: <1 /HPF
TOTAL IMMATURE NEUTOROPHIL: 0.04 K/CU MM
TOTAL NUCLEATED RBC: 0 K/CU MM
TOTAL PROTEIN: 7.2 GM/DL (ref 6.4–8.2)
TRICHOMONAS: ABNORMAL /HPF
UROBILINOGEN, URINE: NORMAL MG/DL (ref 0.2–1)
WBC # BLD: 12.3 K/CU MM (ref 4–10.5)
WBC UA: 1 /HPF (ref 0–5)

## 2020-02-17 PROCEDURE — 81001 URINALYSIS AUTO W/SCOPE: CPT

## 2020-02-17 PROCEDURE — 85025 COMPLETE CBC W/AUTO DIFF WBC: CPT

## 2020-02-17 PROCEDURE — 36415 COLL VENOUS BLD VENIPUNCTURE: CPT

## 2020-02-17 PROCEDURE — 96374 THER/PROPH/DIAG INJ IV PUSH: CPT

## 2020-02-17 PROCEDURE — 99284 EMERGENCY DEPT VISIT MOD MDM: CPT

## 2020-02-17 PROCEDURE — 93005 ELECTROCARDIOGRAM TRACING: CPT | Performed by: EMERGENCY MEDICINE

## 2020-02-17 PROCEDURE — 2580000003 HC RX 258: Performed by: EMERGENCY MEDICINE

## 2020-02-17 PROCEDURE — 6360000002 HC RX W HCPCS: Performed by: EMERGENCY MEDICINE

## 2020-02-17 PROCEDURE — 80053 COMPREHEN METABOLIC PANEL: CPT

## 2020-02-17 PROCEDURE — 83690 ASSAY OF LIPASE: CPT

## 2020-02-17 RX ORDER — ONDANSETRON 2 MG/ML
4 INJECTION INTRAMUSCULAR; INTRAVENOUS EVERY 6 HOURS PRN
Status: DISCONTINUED | OUTPATIENT
Start: 2020-02-17 | End: 2020-02-17 | Stop reason: HOSPADM

## 2020-02-17 RX ORDER — 0.9 % SODIUM CHLORIDE 0.9 %
1000 INTRAVENOUS SOLUTION INTRAVENOUS ONCE
Status: COMPLETED | OUTPATIENT
Start: 2020-02-17 | End: 2020-02-17

## 2020-02-17 RX ORDER — ONDANSETRON 4 MG/1
4 TABLET, ORALLY DISINTEGRATING ORAL EVERY 8 HOURS PRN
Qty: 15 TABLET | Refills: 0 | Status: ON HOLD | OUTPATIENT
Start: 2020-02-17 | End: 2020-04-02 | Stop reason: HOSPADM

## 2020-02-17 RX ADMIN — SODIUM CHLORIDE 1000 ML: 9 INJECTION, SOLUTION INTRAVENOUS at 13:53

## 2020-02-17 RX ADMIN — ONDANSETRON 4 MG: 2 INJECTION INTRAMUSCULAR; INTRAVENOUS at 13:53

## 2020-02-17 NOTE — ED PROVIDER NOTES
Triage Chief Complaint:   Nausea and Emesis    HPI:  Kacie Hartman is a 68 y.o. female that presents with dry, vomiting, diarrhea. States she woke up feeling nauseous this morning. States multiple episodes of nonbilious nonbloody emesis, watery diarrhea. She denies abdominal pain, states a little bit of discomfort. No fever or chills. She is not sure if she has had any sick contacts.     ROS:  General:  No fevers, no chills, no weakness  Eyes:   No vision change, no discharge  ENT:  No sore throat, no nasal congestion  Cardiovascular:  No chest pain  Respiratory:  No shortness of breath or cough  Gastrointestinal:  + pain,  nausea,  vomiting, and diarrhea  Musculoskeletal:  No muscle pain, no joint pain  Skin:  No rash, color change  Neurologic:   no headache, focal weakness  Genitourinary:  No dysuria, flank pain  Extremities:  no edema, no pain    Past Medical History:   Diagnosis Date    Bronchitis     CAD (coronary artery disease)     Chest wall trauma     COPD (chronic obstructive pulmonary disease) ([facility] [address]     FH: CAD (coronary artery disease)     brother with cabg in his 45s    Hypertension     Kidney stone     Lumbar herniated disc     Restless leg syndrome     Spinal stenosis      Past Surgical History:   Procedure Laterality Date    HYSTERECTOMY      URETER STENT PLACEMENT       Family History   Problem Relation Age of Onset    No Known Problems Mother     No Known Problems Sister     Heart Disease Brother     Coronary Art Dis Brother         stents    No Known Problems Sister     No Known Problems Sister     Alcohol Abuse Brother      Social History     Socioeconomic History    Marital status: Single     Spouse name: Not on file    Number of children: 0    Years of education: 9    Highest education level: 9th grade   Occupational History    Not on file   Social Needs    Financial resource strain: Not on file    Food insecurity:     Worry: Not on file     Inability: Not on file of the mA/kV was utilized to reduce the radiation dose to as low as reasonably achievable. COMPARISON: 02/28/2019 HISTORY: ORDERING SYSTEM PROVIDED HISTORY: altered mental status TECHNOLOGIST PROVIDED HISTORY: Reason for exam:->altered mental status Has a \"code stroke\" or \"stroke alert\" been called? ->No Reason for Exam: ams Acuity: Acute Type of Exam: Initial Additional signs and symptoms: Pt to ED with family for mental health evaluation. Pt went missing tonight for 2 hours and was found by police, was not pink slipped due to not being suicidal. Pt had recent court date for trespassing at [address] [facility] and was evaluated and dx with alzheimers. Pt appears manic in triage with flight of ideas. Pt is cooperative at this time. Relevant Medical/Surgical History: pt alert FINDINGS: BRAIN/VENTRICLES: There is no acute intracranial hemorrhage, mass effect or midline shift. No abnormal extra-axial fluid collection. No evidence of recent territorial infarct. There are nonspecific areas of hypoattenuation in the periventricular white matter and centrum semiovale that are likely related to chronic small vessel ischemic disease. The ventricles and cisternal spaces are prominent consistent with cerebral atrophy. There is no evidence of hydrocephalus. There is intracranial atherosclerosis. ORBITS: The visualized portion of the orbits demonstrate no acute abnormality. SINUSES: The visualized paranasal sinuses and mastoid air cells demonstrate no acute abnormality. Scattered areas of mucosal thickening. SOFT TISSUES/SKULL:  No acute abnormality of the visualized skull or soft tissues. No acute intracranial abnormality. Global cerebral atrophy with chronic small vessel ischemic change an intracranial atherosclerosis. MDM:  Patient presented with nausea vomiting and diarrhea. Abdominal exam is nonsurgical.  Patient is afebrile and nontoxic-appearing.   I do not have a clear cause for the patient's symptoms, no indication

## 2020-02-17 NOTE — ED NOTES
Bed: ED-15  Expected date:   Expected time:   Means of arrival:   Comments:  [place] bed 3       Torri Smith [facility]  02/17/20 0093

## 2020-02-18 PROCEDURE — 93010 ELECTROCARDIOGRAM REPORT: CPT | Performed by: INTERNAL MEDICINE

## 2020-02-24 ENCOUNTER — HOSPITAL ENCOUNTER (INPATIENT)
Age: 70
LOS: 18 days | Discharge: HOME | DRG: 057 | End: 2020-03-13
Attending: EMERGENCY MEDICINE | Admitting: PSYCHIATRY & NEUROLOGY
Payer: MEDICARE

## 2020-02-24 PROBLEM — F03.918 DEMENTIA WITH BEHAVIORAL DISTURBANCE: Status: ACTIVE | Noted: 2020-02-24

## 2020-02-24 LAB
ACETAMINOPHEN LEVEL: <5 UG/ML (ref 15–30)
ALBUMIN SERPL-MCNC: 4.7 GM/DL (ref 3.4–5)
ALCOHOL SCREEN SERUM: NORMAL %WT/VOL
ALP BLD-CCNC: 153 IU/L (ref 40–129)
ALT SERPL-CCNC: 19 U/L (ref 10–40)
ANION GAP SERPL CALCULATED.3IONS-SCNC: 16 MMOL/L (ref 4–16)
ANION GAP SERPL CALCULATED.3IONS-SCNC: 22 MMOL/L (ref 4–16)
AST SERPL-CCNC: 25 IU/L (ref 15–37)
BASOPHILS ABSOLUTE: 0 K/CU MM
BASOPHILS RELATIVE PERCENT: 0.4 % (ref 0–1)
BILIRUB SERPL-MCNC: 0.2 MG/DL (ref 0–1)
BUN BLDV-MCNC: 12 MG/DL (ref 6–23)
BUN BLDV-MCNC: 12 MG/DL (ref 6–23)
CALCIUM SERPL-MCNC: 9.7 MG/DL (ref 8.3–10.6)
CALCIUM SERPL-MCNC: 9.9 MG/DL (ref 8.3–10.6)
CHLORIDE BLD-SCNC: 101 MMOL/L (ref 99–110)
CHLORIDE BLD-SCNC: 102 MMOL/L (ref 99–110)
CO2: 18 MMOL/L (ref 21–32)
CO2: 23 MMOL/L (ref 21–32)
CREAT SERPL-MCNC: 0.9 MG/DL (ref 0.6–1.1)
CREAT SERPL-MCNC: 0.9 MG/DL (ref 0.6–1.1)
DIFFERENTIAL TYPE: ABNORMAL
DOSE AMOUNT: ABNORMAL
DOSE TIME: ABNORMAL
EOSINOPHILS ABSOLUTE: 0.1 K/CU MM
EOSINOPHILS RELATIVE PERCENT: 1.2 % (ref 0–3)
GFR AFRICAN AMERICAN: >60 ML/MIN/1.73M2
GFR AFRICAN AMERICAN: >60 ML/MIN/1.73M2
GFR NON-AFRICAN AMERICAN: >60 ML/MIN/1.73M2
GFR NON-AFRICAN AMERICAN: >60 ML/MIN/1.73M2
GLUCOSE BLD-MCNC: 126 MG/DL (ref 70–99)
GLUCOSE BLD-MCNC: 127 MG/DL (ref 70–99)
HCT VFR BLD CALC: 44.1 % (ref 37–47)
HEMOGLOBIN: 13.9 GM/DL (ref 12.5–16)
IMMATURE NEUTROPHIL %: 0.3 % (ref 0–0.43)
LYMPHOCYTES ABSOLUTE: 2 K/CU MM
LYMPHOCYTES RELATIVE PERCENT: 21 % (ref 24–44)
MCH RBC QN AUTO: 31.2 PG (ref 27–31)
MCHC RBC AUTO-ENTMCNC: 31.5 % (ref 32–36)
MCV RBC AUTO: 99.1 FL (ref 78–100)
MONOCYTES ABSOLUTE: 0.6 K/CU MM
MONOCYTES RELATIVE PERCENT: 6.1 % (ref 0–4)
PDW BLD-RTO: 12.1 % (ref 11.7–14.9)
PLATELET # BLD: 275 K/CU MM (ref 140–440)
PMV BLD AUTO: 10.7 FL (ref 7.5–11.1)
POTASSIUM SERPL-SCNC: 5 MMOL/L (ref 3.5–5.1)
POTASSIUM SERPL-SCNC: 5 MMOL/L (ref 3.5–5.1)
RBC # BLD: 4.45 M/CU MM (ref 4.2–5.4)
SEGMENTED NEUTROPHILS ABSOLUTE COUNT: 6.7 K/CU MM
SEGMENTED NEUTROPHILS RELATIVE PERCENT: 71 % (ref 36–66)
SODIUM BLD-SCNC: 140 MMOL/L (ref 135–145)
SODIUM BLD-SCNC: 142 MMOL/L (ref 135–145)
TOTAL IMMATURE NEUTOROPHIL: 0.03 K/CU MM
TOTAL PROTEIN: 7.9 GM/DL (ref 6.4–8.2)
WBC # BLD: 9.4 K/CU MM (ref 4–10.5)

## 2020-02-24 PROCEDURE — 85025 COMPLETE CBC W/AUTO DIFF WBC: CPT

## 2020-02-24 PROCEDURE — G0480 DRUG TEST DEF 1-7 CLASSES: HCPCS

## 2020-02-24 PROCEDURE — 96372 THER/PROPH/DIAG INJ SC/IM: CPT

## 2020-02-24 PROCEDURE — 99285 EMERGENCY DEPT VISIT HI MDM: CPT

## 2020-02-24 PROCEDURE — 2580000003 HC RX 258: Performed by: EMERGENCY MEDICINE

## 2020-02-24 PROCEDURE — 6360000002 HC RX W HCPCS: Performed by: EMERGENCY MEDICINE

## 2020-02-24 PROCEDURE — 80053 COMPREHEN METABOLIC PANEL: CPT

## 2020-02-24 PROCEDURE — 1240000000 HC EMOTIONAL WELLNESS R&B

## 2020-02-24 PROCEDURE — 80048 BASIC METABOLIC PNL TOTAL CA: CPT

## 2020-02-24 RX ORDER — ZIPRASIDONE MESYLATE 20 MG/ML
10 INJECTION, POWDER, LYOPHILIZED, FOR SOLUTION INTRAMUSCULAR ONCE
Status: COMPLETED | OUTPATIENT
Start: 2020-02-24 | End: 2020-02-24

## 2020-02-24 RX ORDER — LORAZEPAM 1 MG/1
1 TABLET ORAL EVERY 4 HOURS PRN
Status: DISCONTINUED | OUTPATIENT
Start: 2020-02-24 | End: 2020-03-14 | Stop reason: HOSPADM

## 2020-02-24 RX ORDER — TRAZODONE HYDROCHLORIDE 50 MG/1
50 TABLET ORAL NIGHTLY PRN
Status: DISCONTINUED | OUTPATIENT
Start: 2020-02-24 | End: 2020-03-02

## 2020-02-24 RX ORDER — POLYETHYLENE GLYCOL 3350 17 G/17G
17 POWDER, FOR SOLUTION ORAL DAILY PRN
Status: DISCONTINUED | OUTPATIENT
Start: 2020-02-24 | End: 2020-03-14 | Stop reason: HOSPADM

## 2020-02-24 RX ORDER — ACETAMINOPHEN 325 MG/1
325 TABLET ORAL EVERY 4 HOURS PRN
Status: DISCONTINUED | OUTPATIENT
Start: 2020-02-24 | End: 2020-03-14 | Stop reason: HOSPADM

## 2020-02-24 RX ORDER — HALOPERIDOL 5 MG
5 TABLET ORAL EVERY 4 HOURS PRN
Status: DISCONTINUED | OUTPATIENT
Start: 2020-02-24 | End: 2020-03-14 | Stop reason: HOSPADM

## 2020-02-24 RX ORDER — LORAZEPAM 2 MG/ML
1 INJECTION INTRAMUSCULAR ONCE
Status: COMPLETED | OUTPATIENT
Start: 2020-02-24 | End: 2020-02-24

## 2020-02-24 RX ORDER — ACETAMINOPHEN 500 MG
500 TABLET ORAL EVERY 4 HOURS PRN
Status: DISCONTINUED | OUTPATIENT
Start: 2020-02-24 | End: 2020-03-14 | Stop reason: HOSPADM

## 2020-02-24 RX ORDER — ACETAMINOPHEN 325 MG/1
650 TABLET ORAL EVERY 4 HOURS PRN
Status: DISCONTINUED | OUTPATIENT
Start: 2020-02-24 | End: 2020-03-14 | Stop reason: HOSPADM

## 2020-02-24 RX ORDER — HALOPERIDOL 5 MG/ML
5 INJECTION INTRAMUSCULAR EVERY 4 HOURS PRN
Status: DISCONTINUED | OUTPATIENT
Start: 2020-02-24 | End: 2020-03-14 | Stop reason: HOSPADM

## 2020-02-24 RX ORDER — LORAZEPAM 2 MG/ML
1 INJECTION INTRAMUSCULAR EVERY 4 HOURS PRN
Status: DISCONTINUED | OUTPATIENT
Start: 2020-02-24 | End: 2020-03-14 | Stop reason: HOSPADM

## 2020-02-24 RX ADMIN — LORAZEPAM 1 MG: 2 INJECTION INTRAMUSCULAR; INTRAVENOUS at 17:06

## 2020-02-24 RX ADMIN — ZIPRASIDONE MESYLATE 10 MG: 20 INJECTION, POWDER, LYOPHILIZED, FOR SOLUTION INTRAMUSCULAR at 17:06

## 2020-02-24 RX ADMIN — WATER 1.2 ML: 1 INJECTION INTRAMUSCULAR; INTRAVENOUS; SUBCUTANEOUS at 17:06

## 2020-02-24 ASSESSMENT — PAIN SCALES - GENERAL: PAINLEVEL_OUTOF10: 0

## 2020-02-24 ASSESSMENT — SLEEP AND FATIGUE QUESTIONNAIRES
AVERAGE NUMBER OF SLEEP HOURS: 8
DO YOU USE A SLEEP AID: NO
DO YOU USE A SLEEP AID: NO
AVERAGE NUMBER OF SLEEP HOURS: 8
DO YOU HAVE DIFFICULTY SLEEPING: NO
DO YOU HAVE DIFFICULTY SLEEPING: NO

## 2020-02-24 NOTE — PROGRESS NOTES
1740 spoke with June Ramos about pt. She reports that [facility] will not accept unless clinician starts a case with [facility] ([place], [place]) and they fax over the information. 1742 spoke with Aubree Mascorro at [facility] ([place], [place]) and explained the situation. Case was started and she was going to call Michelle barker to fax over the packet.

## 2020-02-24 NOTE — PROGRESS NOTES
Provisional Diagnosis:   Per 1/30/2020 note pt was dx with Alzheimers     Risk, Psychosocial and Contextual Factors:  Per doctor report, pt has a history of alzheimers with behavioral disturbances     Current MH Treatment: denies      Present Suicidal Behavior:      Verbal: denies          Attempt: denies    Access to Weapons:  denies    Current Suicide Risk: Low, Moderate or High:   low     Past Suicidal Behavior:       Verbal: denies    Attempt: denies    Self-Injurious/Self-Mutilation: denies    Traumatic Event Within Past 2 Weeks:   Denies     Current Abuse: denies    Legal: denies    Violence: denies    Protective Factors:  Pt's sister    Housing:    Lives with sister. Says she lives in [place]    Clinical Summary:      Patient is a 68year old female who presents to the ED voluntarily. Per report, pt was brought in by her sister. Pt presented with concerns for high cholesterol (though Dr sanchez says potassium). Per report, pts sister says pt was trying to crawl in other people's cars. During assessment pt was alert and oriented x4. Pt was pleasant and cooperative. Did not show signs of anxiety and motor activity was normal. Pt repors medication compliance. When asked if there was a concern about her mental health pt became defensive saying she does not have a mental illness and is getting tired of hearing the word. Pt is not suicidal/homicidal. No hallucinations or delusions. No AOD. No self harm. Pt reports she wants to go home to [place] where she belongs. Level of Care Disposition:      1944 - consulted with med provider, pt will be transported to [facility]. Not medically cleared a this time.

## 2020-02-24 NOTE — ED PROVIDER NOTES
 Food insecurity:     Worry: Not on file     Inability: Not on file    Transportation needs:     Medical: Not on file     Non-medical: Not on file   Tobacco Use    Smoking status: Never Smoker    Smokeless tobacco: Never Used   Substance and Sexual Activity    Alcohol use: No    Drug use: No    Sexual activity: Not on file   Lifestyle    Physical activity:     Days per week: Not on file     Minutes per session: Not on file    Stress: Not on file   Relationships    Social connections:     Talks on phone: Not on file     Gets together: Not on file     Attends Catholic service: Not on file     Active member of club or organization: Not on file     Attends meetings of clubs or organizations: Not on file     Relationship status: Not on file    Intimate partner violence:     Fear of current or ex partner: Not on file     Emotionally abused: Not on file     Physically abused: Not on file     Forced sexual activity: Not on file   Other Topics Concern    Not on file   Social History Narrative    Not on file     No current facility-administered medications for this encounter.       Current Outpatient Medications   Medication Sig Dispense Refill    ondansetron (ZOFRAN ODT) 4 MG disintegrating tablet Take 1 tablet by mouth every 8 hours as needed for Nausea 15 tablet 0    carBAMazepine (TEGRETOL) 200 MG tablet Take 1 tablet by mouth 2 times daily 60 tablet 1    traZODone (DESYREL) 50 MG tablet Take 1 tablet by mouth nightly as needed for Sleep 30 tablet 1    metoprolol tartrate (LOPRESSOR) 25 MG tablet Take 1 tablet by mouth 2 times daily 60 tablet 1    memantine (NAMENDA) 5 MG tablet Take 1 tablet by mouth daily 30 tablet 1    amLODIPine (NORVASC) 10 MG tablet Take 1 tablet by mouth daily 90 tablet 1    lisinopril (PRINIVIL;ZESTRIL) 5 MG tablet Take 1 tablet by mouth daily 90 tablet 1    Incontinence Supplies MISC Pull-ups size large 100 each 3    famotidine (PEPCID) 20 MG tablet Take 1 tablet by mouth tenderness. Abdominal:      General: Bowel sounds are normal. There is no distension. Palpations: Abdomen is soft. There is no mass. Tenderness: There is no abdominal tenderness. There is no guarding or rebound. Hernia: No hernia is present. Musculoskeletal: Normal range of motion. General: No tenderness or deformity. Lymphadenopathy:      Cervical: No cervical adenopathy. Skin:     General: Skin is warm and dry. Coloration: Skin is not pale. Findings: No erythema or rash. Neurological:      Mental Status: She is alert and oriented to person, place, and time. Cranial Nerves: No cranial nerve deficit. Sensory: No sensory deficit. Deep Tendon Reflexes: Reflexes are normal and symmetric. Reflexes normal.   Psychiatric:         Mood and Affect: Mood is anxious. Speech: Speech normal.         Behavior: Behavior normal.         Thought Content: Thought content normal.         Judgment: Judgment normal.         I have reviewed and interpreted all of the currently available lab results from this visit (ifapplicable):  Results for orders placed or performed during the hospital encounter of 02/24/20   BMP   Result Value Ref Range    Sodium 140 135 - 145 MMOL/L    Potassium 5.0 3.5 - 5.1 MMOL/L    Chloride 101 99 - 110 mMol/L    CO2 23 21 - 32 MMOL/L    Anion Gap 16 4 - 16    BUN 12 6 - 23 MG/DL    CREATININE 0.9 0.6 - 1.1 MG/DL    Glucose 126 (H) 70 - 99 MG/DL    Calcium 9.7 8.3 - 10.6 MG/DL    GFR Non-African American >60 >60 mL/min/1.73m2    GFR African American >60 >60 mL/min/1.73m2   Acetaminophen Level   Result Value Ref Range    Acetaminophen Level <5.0 (L) 15 - 30 ug/ml    DOSE AMOUNT DOSE AMT. GIVEN - UNKNOWN     DOSE TIME DOSE TIME GIVEN - UNKNOWN    ETOH Blood   Result Value Ref Range    Alcohol Scrn <0.01  THE VALUE IS BELOW OUR DETECTION LIMIT.    <0.01 %WT/VOL      Radiographs (if obtained):  [] The following radiograph wasinterpreted by myself in the absence of a radiologist:   [] Radiologist's Report Reviewed:  No orders to display         EKG (if obtained): (All EKG's are interpreted by myself in the absence of a cardiologist)    Chart review shows recent radiographs:  Ct Head Wo Contrast    Result Date: 1/31/2020  EXAMINATION: CT OF THE HEAD WITHOUT CONTRAST  1/30/2020 11:53 pm TECHNIQUE: CT of the head was performed without the administration of intravenous contrast. Dose modulation, iterative reconstruction, and/or weight based adjustment of the mA/kV was utilized to reduce the radiation dose to as low as reasonably achievable. COMPARISON: 02/28/2019 HISTORY: ORDERING SYSTEM PROVIDED HISTORY: altered mental status TECHNOLOGIST PROVIDED HISTORY: Reason for exam:->altered mental status Has a \"code stroke\" or \"stroke alert\" been called? ->No Reason for Exam: ams Acuity: Acute Type of Exam: Initial Additional signs and symptoms: Pt to ED with family for mental health evaluation. Pt went missing tonight for 2 hours and was found by police, was not pink slipped due to not being suicidal. Pt had recent court date for trespassing at [address] [facility] and was evaluated and dx with alzheimers. Pt appears manic in triage with flight of ideas. Pt is cooperative at this time. Relevant Medical/Surgical History: pt alert FINDINGS: BRAIN/VENTRICLES: There is no acute intracranial hemorrhage, mass effect or midline shift. No abnormal extra-axial fluid collection. No evidence of recent territorial infarct. There are nonspecific areas of hypoattenuation in the periventricular white matter and centrum semiovale that are likely related to chronic small vessel ischemic disease. The ventricles and cisternal spaces are prominent consistent with cerebral atrophy. There is no evidence of hydrocephalus. There is intracranial atherosclerosis. ORBITS: The visualized portion of the orbits demonstrate no acute abnormality.  SINUSES: The visualized paranasal sinuses and mastoid air cells

## 2020-02-24 NOTE — ED NOTES
Patient refuses to speak to Wolf[address], states she has no mental issues, will not take meds and does not need to go to David Velasquez, RN  02/24/20 9560

## 2020-02-25 PROBLEM — F03.918 DEMENTIA WITH BEHAVIORAL DISTURBANCE: Status: RESOLVED | Noted: 2020-02-24 | Resolved: 2020-02-25

## 2020-02-25 PROBLEM — R07.2 PRECORDIAL CHEST PAIN: Status: RESOLVED | Noted: 2019-02-26 | Resolved: 2020-02-25

## 2020-02-25 PROBLEM — F02.818 ALZHEIMER'S DEMENTIA WITH BEHAVIORAL DISTURBANCE (HCC): Status: ACTIVE | Noted: 2020-02-25

## 2020-02-25 PROBLEM — G30.9 ALZHEIMER'S DEMENTIA WITH BEHAVIORAL DISTURBANCE (HCC): Status: ACTIVE | Noted: 2020-02-25

## 2020-02-25 PROBLEM — R11.0 NAUSEA: Status: RESOLVED | Noted: 2019-10-21 | Resolved: 2020-02-25

## 2020-02-25 PROBLEM — Z23 NEEDS FLU SHOT: Status: RESOLVED | Noted: 2019-10-22 | Resolved: 2020-02-25

## 2020-02-25 PROBLEM — R10.9 RIGHT FLANK PAIN: Status: RESOLVED | Noted: 2019-10-21 | Resolved: 2020-02-25

## 2020-02-25 PROBLEM — Z78.0 POST-MENOPAUSAL: Status: RESOLVED | Noted: 2019-10-22 | Resolved: 2020-02-25
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
## 2020-02-25 PROBLEM — J06.9 VIRAL URI: Status: RESOLVED | Noted: 2019-12-27 | Resolved: 2020-02-25

## 2020-02-25 LAB
EKG ATRIAL RATE: 95 BPM
EKG DIAGNOSIS: NORMAL
EKG P AXIS: 73 DEGREES
EKG P-R INTERVAL: 150 MS
EKG Q-T INTERVAL: 360 MS
EKG QRS DURATION: 86 MS
EKG QTC CALCULATION (BAZETT): 452 MS
EKG R AXIS: 19 DEGREES
EKG T AXIS: 54 DEGREES
EKG VENTRICULAR RATE: 95 BPM

## 2020-02-25 PROCEDURE — 6370000000 HC RX 637 (ALT 250 FOR IP): Performed by: NURSE PRACTITIONER

## 2020-02-25 PROCEDURE — 99223 1ST HOSP IP/OBS HIGH 75: CPT | Performed by: NURSE PRACTITIONER

## 2020-02-25 PROCEDURE — 6370000000 HC RX 637 (ALT 250 FOR IP): Performed by: PSYCHIATRY & NEUROLOGY

## 2020-02-25 PROCEDURE — 1240000000 HC EMOTIONAL WELLNESS R&B

## 2020-02-25 RX ORDER — AMLODIPINE BESYLATE 10 MG/1
10 TABLET ORAL DAILY
Status: DISCONTINUED | OUTPATIENT
Start: 2020-02-25 | End: 2020-03-14 | Stop reason: HOSPADM

## 2020-02-25 RX ORDER — FLUTICASONE PROPIONATE 50 MCG
1 SPRAY, SUSPENSION (ML) NASAL DAILY
Status: DISCONTINUED | OUTPATIENT
Start: 2020-02-25 | End: 2020-03-14 | Stop reason: HOSPADM

## 2020-02-25 RX ORDER — LISINOPRIL 5 MG/1
5 TABLET ORAL DAILY
Status: DISCONTINUED | OUTPATIENT
Start: 2020-02-25 | End: 2020-03-14 | Stop reason: HOSPADM

## 2020-02-25 RX ORDER — M-VIT,TX,IRON,MINS/CALC/FOLIC 27MG-0.4MG
1 TABLET ORAL DAILY
Status: DISCONTINUED | OUTPATIENT
Start: 2020-02-25 | End: 2020-03-14 | Stop reason: HOSPADM

## 2020-02-25 RX ORDER — CARBAMAZEPINE 200 MG/1
200 TABLET ORAL 2 TIMES DAILY
Status: DISCONTINUED | OUTPATIENT
Start: 2020-02-25 | End: 2020-03-04

## 2020-02-25 RX ORDER — FAMOTIDINE 20 MG/1
20 TABLET, FILM COATED ORAL DAILY
Status: DISCONTINUED | OUTPATIENT
Start: 2020-02-25 | End: 2020-03-14 | Stop reason: HOSPADM

## 2020-02-25 RX ORDER — MEMANTINE HYDROCHLORIDE 5 MG/1
5 TABLET ORAL DAILY
Status: DISCONTINUED | OUTPATIENT
Start: 2020-02-25 | End: 2020-03-07

## 2020-02-25 RX ORDER — ATORVASTATIN CALCIUM 40 MG/1
80 TABLET, FILM COATED ORAL NIGHTLY
Status: DISCONTINUED | OUTPATIENT
Start: 2020-02-25 | End: 2020-03-14 | Stop reason: HOSPADM

## 2020-02-25 RX ORDER — ONDANSETRON 4 MG/1
4 TABLET, ORALLY DISINTEGRATING ORAL EVERY 8 HOURS PRN
Status: DISCONTINUED | OUTPATIENT
Start: 2020-02-25 | End: 2020-03-14 | Stop reason: HOSPADM

## 2020-02-25 RX ADMIN — LISINOPRIL 5 MG: 5 TABLET ORAL at 13:13

## 2020-02-25 RX ADMIN — TRAZODONE HYDROCHLORIDE 50 MG: 50 TABLET ORAL at 20:35

## 2020-02-25 RX ADMIN — MULTIPLE VITAMINS W/ MINERALS TAB 1 TABLET: TAB at 13:13

## 2020-02-25 RX ADMIN — MEMANTINE 5 MG: 5 TABLET ORAL at 13:13

## 2020-02-25 RX ADMIN — FAMOTIDINE 20 MG: 20 TABLET ORAL at 13:13

## 2020-02-25 RX ADMIN — AMLODIPINE BESYLATE 10 MG: 10 TABLET ORAL at 13:13

## 2020-02-25 RX ADMIN — CARBAMAZEPINE 200 MG: 200 TABLET ORAL at 20:35

## 2020-02-25 RX ADMIN — ATORVASTATIN CALCIUM 80 MG: 40 TABLET, FILM COATED ORAL at 20:35

## 2020-02-25 RX ADMIN — METOPROLOL TARTRATE 25 MG: 25 TABLET ORAL at 20:35

## 2020-02-25 RX ADMIN — FLUTICASONE PROPIONATE 1 SPRAY: 50 SPRAY, METERED NASAL at 13:13

## 2020-02-25 ASSESSMENT — PAIN SCALES - GENERAL
PAINLEVEL_OUTOF10: 0
PAINLEVEL_OUTOF10: 0

## 2020-02-25 NOTE — PLAN OF CARE
Problem: Altered Mood, Deterioration in Function:  Goal: Ability to perform activities of daily living will improve  Description  Ability to perform activities of daily living will improve  Outcome: Ongoing  Goal: Able to verbalize reality based thinking  Description  Able to verbalize reality based thinking  Outcome: Ongoing  Goal: Skin appearance normal  Description  Skin appearance normal  Outcome: Ongoing  Goal: Maintenance of adequate nutrition will improve  Description  Maintenance of adequate nutrition will improve  Outcome: Ongoing  Goal: Ability to tolerate increased activity will improve  Description  Ability to tolerate increased activity will improve  Outcome: Ongoing  Goal: Participates in care planning  Description  Participates in care planning  Outcome: Ongoing  Goal: Patient specific goal  Description  Patient specific goal  Outcome: Ongoing
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
## 2020-02-25 NOTE — GROUP NOTE
Group Therapy Note    Date: 2/25/2020    Group Start Time: 1100  Group End Time: 1200  Group Topic: Psychotherapy    Eddy Jacome, KASEY MASON    Group Therapy Note    Attendees: 4/9       Patient's Goal: \"go home and pay the bills\"    Notes: Patient actively participated in group. Open discussion with peers about positive steps to wellbeing, sleep hygiene, and coping skills. Patient discussed past trama that her mother experienced, \"righteous anger\" with her father, and her current situation with her sister. Status After Intervention:  Improved    Participation Level:  Active Listener and Interactive    Participation Quality: Appropriate, Attentive, Sharing and Supportive    Speech:  normal    Thought Process/Content: Linear    Affective Functioning: Congruent    Mood: elevated    Level of consciousness:  Alert and Attentive    Response to Learning: Able to verbalize current knowledge/experience    Endings: None Reported    Modes of Intervention: Education, Support, Clarifying and Problem-solving    Discipline Responsible: /Counselor    Signature: ISABELLA Poole LSW

## 2020-02-25 NOTE — CONSULTS
petechiae or ecchymoses. MSK Spontaneous movement of BL upper extremities  SKIN Normal coloration, warm, dry. NEURO Cranial nerves appear grossly intact  PSYCH Awake, alert.  oriented x4    Medications:   Medications:    Infusions:   PRN Meds: acetaminophen, 325 mg, Q4H PRN  traZODone, 50 mg, Nightly PRN  polyethylene glycol, 17 g, Daily PRN  LORazepam, 1 mg, Q4H PRN    Or  LORazepam, 1 mg, Q4H PRN  haloperidol lactate, 5 mg, Q4H PRN    Or  haloperidol, 5 mg, Q4H PRN  acetaminophen, 500 mg, Q4H PRN  acetaminophen, 650 mg, Q4H PRN          Electronically signed by Susana Anderson DO on 2/25/2020 at 10:04 AM

## 2020-02-25 NOTE — PLAN OF CARE
[facility]  Initial Interdisciplinary Treatment Plan NOTE    Review Date & Time: 02/25/2020 8:45am    Admission Type:   Admission Type:  Involuntary    Reason for admission:  Reason for Admission: dementia w/behavioral disturbance    Patient Admission Diagnosis: Dementia with Behavioral Disturbance    PATIENT STRENGTHS:  Patient Strengths Strengths: No significant Physical Illness, Connection to output provider  Patient Strengths and Limitations:Limitations: Difficulty problem solving/relies on others to help solve problems  Addictive Behavior:   Medical Problems:  Past Medical History:   Diagnosis Date    Bronchitis     CAD (coronary artery disease)     Chest wall trauma     COPD [facility]ch[facility]) ([facility] [address]     FH: CAD (coronary artery disease)     brother with cabg in his 45s    Hypertension     Kidney stone     Lumbar herniated disc     Restless leg syndrome     Spinal stenosis      EDUCATION:   Learner Progress Toward Treatment Goals: Reviewed goals and plan of care    Method: Group    Outcome: Needs reinforcement and No evidence of Learning    PATIENT GOALS: Orientation to unit programming and med titration    PLAN/TREATMENT RECOMMENDATIONS UPDATE: Med titration    Estimated Length of Stay Update:  7 - 10 days  Estimated Discharge Date Update: 3/3/2020  Discharge Criteria: Med titration    SHORT-TERM GOALS UPDATE:   Time frame for Short-Term Goals: 7 days    LONG-TERM GOALS UPDATE:   Time frame for Long-Term Goals: 10 days  Members Present in Team Meeting: See Signature Sheet    Wendy Winters, MSW, LSW
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
## 2020-02-25 NOTE — GROUP NOTE
Group Therapy Note    Date: 2/25/2020    Group Start Time: 0830  Group End Time: 0900    Number of Participants: 6/9    Type: Morning Goals Group/ Community Meeting    Group Topic/Objective: Set Goal For The Day and to review Unit Rules and Regulations. Patient's Goal:  Pt states her goal is \"Go home and pay the bills. \"    Notes:  Pt fixated on being discharged today. Pt has no insight into her mental health and denies need to be on the unit. Pt also noted to be religiously preoccupied and would often end her comments with \"and all trust in God. \"    Depression (0-10): 0    Anxiety (0-10): 0    Irritability/Aggitation (0-10): 0    Status After Intervention:  Unchanged    Participation Level: Interactive    Participation Quality: Sharing    Speech:  normal    Thought Process/Content: Perseverating  religiously preoccupied.      Affective Functioning: Exaggerated    Mood: elevated    Level of consciousness:  Preoccupied    Response to Learning: Able to verbalize current knowledge/experience    Endings: None Reported    Modes of Intervention: Education, Support and Socialization    Discipline Responsible: Certified Therapeutic Recreation Specialist    Electronically signed by Akbar Corado, [address] [place] [address], [place] on 2/25/2020 at 9:23 AM
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
## 2020-02-25 NOTE — BH NOTE
( ) Patient refused counseling  (X ) Patient has not smoked in the last 30 days      Pt admitted with followings belongings:  Dentures: None  Vision - Corrective Lenses: None  Hearing Aid: None  Jewelry: None  Clothing: Pants, Shirt, Footwear, Undergarments (Comment), Socks, Jacket / coat  Were All Patient Medications Collected?: Not Applicable  Other Valuables: None     Valuables sent home with: N/A. Valuables placed in safe: shoes and jacket. Patient's home medications were not brought to the hospital.  Patient oriented to surroundings and program expectations and copy of patient rights given, pt declined a copy. Patient offered Psychiatric Advanced Directive: Refused. Received admission packet: Declined packet. Consents not reviewed at this time as patient wanted to sleep.     Juwan Castillo RN

## 2020-02-26 PROCEDURE — 6370000000 HC RX 637 (ALT 250 FOR IP): Performed by: PSYCHIATRY & NEUROLOGY

## 2020-02-26 PROCEDURE — 6370000000 HC RX 637 (ALT 250 FOR IP): Performed by: NURSE PRACTITIONER

## 2020-02-26 PROCEDURE — 1240000000 HC EMOTIONAL WELLNESS R&B

## 2020-02-26 PROCEDURE — 99233 SBSQ HOSP IP/OBS HIGH 50: CPT | Performed by: NURSE PRACTITIONER

## 2020-02-26 RX ADMIN — AMLODIPINE BESYLATE 10 MG: 10 TABLET ORAL at 09:04

## 2020-02-26 RX ADMIN — MEMANTINE 5 MG: 5 TABLET ORAL at 09:12

## 2020-02-26 RX ADMIN — LISINOPRIL 5 MG: 5 TABLET ORAL at 09:04

## 2020-02-26 RX ADMIN — CARBAMAZEPINE 200 MG: 200 TABLET ORAL at 09:04

## 2020-02-26 RX ADMIN — TRAZODONE HYDROCHLORIDE 50 MG: 50 TABLET ORAL at 20:27

## 2020-02-26 RX ADMIN — METOPROLOL TARTRATE 25 MG: 25 TABLET ORAL at 20:27

## 2020-02-26 RX ADMIN — FLUTICASONE PROPIONATE 1 SPRAY: 50 SPRAY, METERED NASAL at 09:04

## 2020-02-26 RX ADMIN — FAMOTIDINE 20 MG: 20 TABLET ORAL at 09:04

## 2020-02-26 RX ADMIN — MULTIPLE VITAMINS W/ MINERALS TAB 1 TABLET: TAB at 09:04

## 2020-02-26 RX ADMIN — METOPROLOL TARTRATE 25 MG: 25 TABLET ORAL at 09:04

## 2020-02-26 RX ADMIN — CARBAMAZEPINE 200 MG: 200 TABLET ORAL at 20:27

## 2020-02-26 RX ADMIN — ATORVASTATIN CALCIUM 80 MG: 40 TABLET, FILM COATED ORAL at 20:27

## 2020-02-26 ASSESSMENT — PAIN SCALES - GENERAL: PAINLEVEL_OUTOF10: 0

## 2020-02-26 NOTE — PROGRESS NOTES
tablet, Take 1 tablet by mouth daily  fluticasone (FLONASE) 50 MCG/ACT nasal spray, 1 spray by Each Nostril route daily  Multiple Vitamins-Minerals (MULTIVITAMIN ADULT PO), Take by mouth    Past Medical History:   Diagnosis Date    Bronchitis     CAD (coronary artery disease)     Chest wall trauma     COPD (chronic obstructive pulmonary disease) ([address]     FH: CAD (coronary artery disease)     brother with cabg in his 45s    Hypertension     Kidney stone     Lumbar herniated disc     Restless leg syndrome     Spinal stenosis         Patient Active Problem List   Diagnosis    Essential hypertension    Lumbar herniated disc    Spinal stenosis    Restless leg syndrome    FH: CAD (coronary artery disease)    Ureteric stone    Family history of ischemic heart disease and other diseases of the circulatory system    Need for vaccination against Streptococcus pneumoniae using pneumococcal conjugate vaccine 13    At high risk for falls    Elevated alkaline phosphatase level    Diabetes mellitus type 2, diet-controlled (HCC)    Mixed hyperlipidemia    Urinary incontinence in female    Shoulder pain    Gastroesophageal reflux disease    Late onset Alzheimer's disease without behavioral disturbance ([address]    [facility]'s dementia [facility] disturbance [facility]    CAD (coronary artery disease)    COPD [facility]chronic obstructive pulmonary disease) [facility]       Review of Systems    OBJECTIVE  Vital Signs:  Vitals:    02/26/20 0735   BP: (!) 141/64   Pulse: 67   Resp: 18   Temp: 96.9 °F (36.1 °C)   SpO2: 99%       Labs:  Recent Results (from the past 48 hour(s))   BMP    Collection Time: 02/24/20  3:49 PM   Result Value Ref Range    Sodium 140 135 - 145 MMOL/L    Potassium 5.0 3.5 - 5.1 MMOL/L    Chloride 101 99 - 110 mMol/L    CO2 23 21 - 32 MMOL/L    Anion Gap 16 4 - 16    BUN 12 6 - 23 MG/DL    CREATININE 0.9 0.6 - 1.1 MG/DL    Glucose 126 (H) 70 - 99 MG/DL    Calcium 9.7 8.3 - 10.6 MG/DL    GFR Non-African American >60 >60 mL/min/1.73m2    GFR African American >60 >60 mL/min/1.73m2   Comprehensive Metabolic Panel w/ Reflex to MG    Collection Time: 02/24/20  3:49 PM   Result Value Ref Range    Sodium 142 135 - 145 MMOL/L    Potassium 5.0 3.5 - 5.1 MMOL/L    Chloride 102 99 - 110 mMol/L    CO2 18 (L) 21 - 32 MMOL/L    BUN 12 6 - 23 MG/DL    CREATININE 0.9 0.6 - 1.1 MG/DL    Glucose 127 (H) 70 - 99 MG/DL    Calcium 9.9 8.3 - 10.6 MG/DL    Alb 4.7 3.4 - 5.0 GM/DL    Total Protein 7.9 6.4 - 8.2 GM/DL    Total Bilirubin 0.2 0.0 - 1.0 MG/DL    ALT 19 10 - 40 U/L    AST 25 15 - 37 IU/L    Alkaline Phosphatase 153 (H) 40 - 129 IU/L    GFR Non-African American >60 >60 mL/min/1.73m2    GFR African American >60 >60 mL/min/1.73m2    Anion Gap 22 (H) 4 - 16   Acetaminophen Level    Collection Time: 02/24/20  3:49 PM   Result Value Ref Range    Acetaminophen Level <5.0 (L) 15 - 30 ug/ml    DOSE AMOUNT DOSE AMT. GIVEN - UNKNOWN     DOSE TIME DOSE TIME GIVEN - UNKNOWN    ETOH Blood    Collection Time: 02/24/20  3:49 PM   Result Value Ref Range    Alcohol Scrn <0.01  THE VALUE IS BELOW OUR DETECTION LIMIT.    <0.01 %WT/VOL   CBC Auto Differential    Collection Time: 02/24/20  3:49 PM   Result Value Ref Range    WBC 9.4 4.0 - 10.5 K/CU MM    RBC 4.45 4.2 - 5.4 M/CU MM    Hemoglobin 13.9 12.5 - 16.0 GM/DL    Hematocrit 44.1 37 - 47 %    MCV 99.1 78 - 100 FL    MCH 31.2 (H) 27 - 31 PG    MCHC 31.5 (L) 32.0 - 36.0 %    RDW 12.1 11.7 - 14.9 %    Platelets 473 320 - 261 K/CU MM    MPV 10.7 7.5 - 11.1 FL    Differential Type AUTOMATED DIFFERENTIAL     Segs Relative 71.0 (H) 36 - 66 %    Lymphocytes % 21.0 (L) 24 - 44 %    Monocytes % 6.1 (H) 0 - 4 %    Eosinophils % 1.2 0 - 3 %    Basophils % 0.4 0 - 1 %    Segs Absolute 6.7 K/CU MM    Lymphocytes Absolute 2.0 K/CU MM    Monocytes Absolute 0.6 K/CU MM    Eosinophils Absolute 0.1 K/CU MM    Basophils Absolute 0.0 K/CU MM    Immature Neutrophil % 0.3 0 - 0.43 %    Total Immature Neutrophil 0.03 K/CU MM       PSYCHIATRIC ASSESSMENT / MENTAL STATUS EXAM:   Vitals: Blood pressure (!) 141/64, pulse 67, temperature 96.9 °F (36.1 °C), temperature source Temporal, resp. rate 18, height 5' 2\" (1.575 m), weight 150 lb (68 kg), SpO2 99 %, not currently breastfeeding. CONSTITUTIONAL:    Appearance: appears stated age. Alert and oriented to person, place, time. No acute distress. Adequate grooming and hygiene. Good eye contact. No prominent physical abnormalities. Attitude: Manner is cooperative and pleasant  Motor: No psychomotor agitation, retardation or abnormal movements noted  Speech: Clearly articulated; normal rate, volume, tone & amount. Language: intact understanding and production  Mood: euthymic  Affect: euthymic, full range, non-labile, congruent with mood and content of speech  Thought Production: Spontaneous. Thought Form: Coherent, linear, illogical & goal-directed. Tangentiality and circumstantiality. No flight of ideas or loosening of associations. Thought Content/Perceptions: No [place], no AVH, voices delusion that others are persecuting her  Insight: impaired  Judgment: impaired  Memory: Immediate, recent, and remote appear intact, though not formally tested. Attention: maintained throughout interview  Fund of knowledge: Average  Gait/Balance: WNL/WNL    IMPRESSION:   No change in diagnosis        PLAN:  Psychiatric management: medication initiation and titration, group and individual therapy, safe and therapeutic environment. Status of problem/condition: Improving  Medical co-morbidities: Management per [facility] group, appreciate assistance  Legal Status: Involuntary  Disposition:estimated LOS: Pending. The treatment team reviewed with the patient the diagnosis and treatment recommendations to include the risks, benefits, and side effects of chosen medications. The patient verbalized understanding and agreed with the treatment regimen as outlined above.   The patient was encouraged

## 2020-02-26 NOTE — GROUP NOTE
Group Therapy Note    Date: 2/26/2020    Group Start Time: 0830  Group End Time: 0900    Number of Participants: 6/9    Type: Morning Goals Group/ Community Meeting    Group Topic/Objective: Set Goal For The Day and to review Unit Rules and Regulations. Patient's Goal:  Pt states her goal is \"Go home. \"    Notes:  Pt participated actively in group, but all conversation was r/t being discharged. Pt not receptive to any discussion about staying. Pt has no insight into her mental health. Pt expressed feeling \"wonderful\" and is grateful for \"nice people. \"    Depression (0-10): 0    Anxiety (0-10): 0    Irritability/Aggitation (0-10): 0    Status After Intervention:  Unchanged    Participation Level: Interactive    Participation Quality: Attentive    Speech:  normal    Thought Process/Content: Perseverating    Affective Functioning: Exaggerated    Mood: elevated    Level of consciousness:  Alert and Attentive    Response to Learning: Able to verbalize current knowledge/experience    Endings: None Reported    Modes of Intervention: Education, Support and Socialization    Discipline Responsible: Certified Therapeutic Recreation Specialist     Electronically signed by Yanna Lin MA on 2/26/2020 at 9:48 AM

## 2020-02-26 NOTE — PROGRESS NOTES
Pt alert and oriented to person, place and time. Pt states she only came in to have her cholesterol checked. Pt had hyperkalemia during stay at [facility] and was informed to f/u with labs to recheck after dc. Pt states she was [facility], she has no mental illness. Pt states that Randell told her that she would be going home tomorrow so she knows that it is true. Pt cooperative with medications and assessment during the shift. Pt denies SI, HI or AVH.

## 2020-02-26 NOTE — BH NOTE
Group Therapy Note    Date: 2/25/2020  Start Time: 3285  End Time:  2000  Number of Participants: 1      Type of Group: Nursing Education      Notes:  Educated pt on current medication regime uses and side effects. Status After Intervention:  Improved    Participation Level: Active Listener and Interactive    Participation Quality: Appropriate, Attentive and Sharing      Speech:  normal      Thought Process/Content: Logical      Affective Functioning: Congruent      Mood: elevated      Level of consciousness:  Alert, Oriented x4 and Attentive      Response to Learning: Able to verbalize current knowledge/experience      Endings: None Reported    Modes of Intervention: Education      Discipline Responsible: Licensed Practical Nurse      Signature:   Adam Keane LPN

## 2020-02-27 PROBLEM — F43.23 ADJUSTMENT DISORDER WITH MIXED ANXIETY AND DEPRESSED MOOD: Status: ACTIVE | Noted: 2020-02-27

## 2020-02-27 PROCEDURE — 6370000000 HC RX 637 (ALT 250 FOR IP): Performed by: PSYCHIATRY & NEUROLOGY

## 2020-02-27 PROCEDURE — 6370000000 HC RX 637 (ALT 250 FOR IP): Performed by: NURSE PRACTITIONER

## 2020-02-27 PROCEDURE — 1240000000 HC EMOTIONAL WELLNESS R&B

## 2020-02-27 PROCEDURE — 99232 SBSQ HOSP IP/OBS MODERATE 35: CPT | Performed by: PSYCHIATRY & NEUROLOGY

## 2020-02-27 RX ADMIN — CARBAMAZEPINE 200 MG: 200 TABLET ORAL at 20:00

## 2020-02-27 RX ADMIN — MULTIPLE VITAMINS W/ MINERALS TAB 1 TABLET: TAB at 08:49

## 2020-02-27 RX ADMIN — MEMANTINE 5 MG: 5 TABLET ORAL at 08:49

## 2020-02-27 RX ADMIN — AMLODIPINE BESYLATE 10 MG: 10 TABLET ORAL at 08:49

## 2020-02-27 RX ADMIN — LISINOPRIL 5 MG: 5 TABLET ORAL at 08:49

## 2020-02-27 RX ADMIN — METOPROLOL TARTRATE 25 MG: 25 TABLET ORAL at 08:49

## 2020-02-27 RX ADMIN — TRAZODONE HYDROCHLORIDE 50 MG: 50 TABLET ORAL at 23:56

## 2020-02-27 RX ADMIN — FLUTICASONE PROPIONATE 1 SPRAY: 50 SPRAY, METERED NASAL at 08:49

## 2020-02-27 RX ADMIN — CARBAMAZEPINE 200 MG: 200 TABLET ORAL at 08:49

## 2020-02-27 RX ADMIN — LORAZEPAM 1 MG: 1 TABLET ORAL at 23:56

## 2020-02-27 RX ADMIN — ATORVASTATIN CALCIUM 80 MG: 40 TABLET, FILM COATED ORAL at 20:00

## 2020-02-27 RX ADMIN — FAMOTIDINE 20 MG: 20 TABLET ORAL at 08:49

## 2020-02-27 RX ADMIN — METOPROLOL TARTRATE 25 MG: 25 TABLET ORAL at 20:00

## 2020-02-27 ASSESSMENT — PAIN SCALES - GENERAL: PAINLEVEL_OUTOF10: 0

## 2020-02-27 NOTE — CARE COORDINATION
Probate paperwork filed with [facility]. Copies sent to Bre Galvan ([phone]) and Wolfgang Rossi ([phone]).

## 2020-02-27 NOTE — GROUP NOTE
Group Therapy Note    Date: 2/27/2020    Group Start Time: 1300  Group End Time: 1330    Number of Participants: 4/7    Type: Exercise/Recreation Group    Group Topic/Objective: Chair Exercises    Notes:  Pt participated actively in the group. Pt required Mild prompting to focus on the activity d/t pt wanting to discuss reasons she feels she does not belong on the unit. Pt expressed enjoyment in group activity at the end. Status After Intervention:  Improved    Participation Level:  Active Listener and Interactive    Participation Quality: Attentive and Sharing    Speech:  normal    Thought Process/Content: Logical    Affective Functioning: Exaggerated    Mood: elevated    Level of consciousness:  Alert and Attentive    Response to Learning: Able to verbalize current knowledge/experience    Endings: None Reported    Modes of Intervention: Activity and Movement    Discipline Responsible: Certified Therapeutic Recreation Specialist     Electronically signed by JIM Baum MA on 2/27/2020 at 1:40 PM

## 2020-02-27 NOTE — PROGRESS NOTES
Psychiatric Progress Note    Krystal Jones  [id number]  02/27/20    CHIEF COMPLAINT: unchanged    HPI: Krystal Jones is a 72 year old female who presented to the ER on 2/24/2020. She has not been taking any of her psychotropic medications since discharge from the [facility] on 2/3/2020 denying that she has a mental problem. Her sister was concerned about the patient's behaviors stating that she was getting into stranger's cars asking them to drive her to various places. She has been diagnosed with Alzheimer's dementia. Probate pending      Pt noted \"the only reason I am here is because me taxes were off. .. Jenita Deem Jenita Deem i need out of here I have bills to pay. ... get me my purse. \"      During today's interview she was alert & oriented x 2. She denies SI/HI and AVH. She denies depression and anxiety. She states she is sleeping \"very good\" and that her appetite is \"very good. \" Pt continues to display bizarre delusions and confusion. Pt does not understand her diagnosis and treatment plan,  Pt is oriented to self and location only at this time. Pt noted she currently feels safe and comfortable on the unit.  Pt was in agreement with treatment team.  Pt was polite and cordial during the interview process. Has been taking medications and has been compliant with treatment. Tolerating medications without side effect complaints.     Review of the chart notes patient's speech pressured, tangential and circumstantial.    Allergies   Allergen Reactions    Floxin [Ofloxacin] Other (See Comments)     Made her \"feel weird\"       Medications Prior to Admission: ondansetron (ZOFRAN ODT) 4 MG disintegrating tablet, Take 1 tablet by mouth every 8 hours as needed for Nausea  carBAMazepine (TEGRETOL) 200 MG tablet, Take 1 tablet by mouth 2 times daily  traZODone (DESYREL) 50 MG tablet, Take 1 tablet by mouth nightly as needed for Sleep  metoprolol tartrate (LOPRESSOR) 25 MG tablet, Take 1 tablet by mouth 2 times daily  memantine (NAMENDA) 5 MG in groups. Medical records, Labs, Diagnotic tests reviewed  q15 min safety checks for safety  Interval History.   Review current labs  Continue current medications  Supportive Therapy Provided  Pt had an opportunity to ask questions and address concerns  Pt encouraged to continue therapy group or individual.       Electronically signed by Ronny Alegre DO on 2/27/2020 at 8:17 AM

## 2020-02-27 NOTE — GROUP NOTE
Group Therapy Note    Date: 2/27/2020    Group Start Time: 1100  Group End Time: 1228  Group Topic: Psychotherapy    Nicki Godwin MSW, MELINDAW        Group Therapy Note    Attendees: 5/7     Patient's Goal:  Topic was finding happiness. Notes:  Patient was active and participated in group    Status After Intervention:  Improved    Participation Level:  Active Listener    Participation Quality: Appropriate    Speech:  normal    Thought Process/Content: Linear    Affective Functioning: Congruent    Mood: elevated    Level of consciousness:  Alert    Response to Learning: Able to retain information    Endings: None Reported    Modes of Intervention: Education    Discipline Responsible: /Counselor      Signature:  ISABELLA Cheng, LSJUN

## 2020-02-27 NOTE — PROGRESS NOTES
Attempted to complete 1:1 session with pt at ~1020. Pt sleeping and per discussion with staff pt was allowed to continue rest. Therapist will attempt again at a later time.      Electronically signed by Claude Holliday [address] [place] [address] on 2/27/2020 at 10:23 AM

## 2020-02-27 NOTE — GROUP NOTE
Group Therapy Note    Date: 2/27/2020    Group Start Time: 0830  Group End Time: 0900    Number of Participants: 3/8    Type: Morning Goals Group/ Community Meeting    Group Topic/Objective: Set Goal For The Day and to review Unit Rules and Regulations. Notes:  Pt encouraged to attend, pt refused.      Discipline Responsible: Certified Therapeutic Recreation Specialist    Electronically signed by April Asher, [address], [address] [place] [address] Martina on 2/27/2020 at 9:17 AM

## 2020-02-28 PROCEDURE — 6370000000 HC RX 637 (ALT 250 FOR IP): Performed by: NURSE PRACTITIONER

## 2020-02-28 PROCEDURE — 99232 SBSQ HOSP IP/OBS MODERATE 35: CPT | Performed by: PSYCHIATRY & NEUROLOGY

## 2020-02-28 PROCEDURE — 6370000000 HC RX 637 (ALT 250 FOR IP): Performed by: PSYCHIATRY & NEUROLOGY

## 2020-02-28 PROCEDURE — 1240000000 HC EMOTIONAL WELLNESS R&B

## 2020-02-28 RX ORDER — RISPERIDONE 0.5 MG/1
0.5 TABLET, FILM COATED ORAL NIGHTLY
Status: DISCONTINUED | OUTPATIENT
Start: 2020-02-28 | End: 2020-02-29

## 2020-02-28 RX ADMIN — LISINOPRIL 5 MG: 5 TABLET ORAL at 08:53

## 2020-02-28 RX ADMIN — MULTIPLE VITAMINS W/ MINERALS TAB 1 TABLET: TAB at 08:53

## 2020-02-28 RX ADMIN — METOPROLOL TARTRATE 25 MG: 25 TABLET ORAL at 08:53

## 2020-02-28 RX ADMIN — FAMOTIDINE 20 MG: 20 TABLET ORAL at 08:53

## 2020-02-28 RX ADMIN — MEMANTINE 5 MG: 5 TABLET ORAL at 08:53

## 2020-02-28 RX ADMIN — RISPERIDONE 0.5 MG: 0.5 TABLET ORAL at 21:29

## 2020-02-28 RX ADMIN — TRAZODONE HYDROCHLORIDE 50 MG: 50 TABLET ORAL at 21:29

## 2020-02-28 RX ADMIN — METOPROLOL TARTRATE 25 MG: 25 TABLET ORAL at 21:29

## 2020-02-28 RX ADMIN — AMLODIPINE BESYLATE 10 MG: 10 TABLET ORAL at 08:53

## 2020-02-28 RX ADMIN — FLUTICASONE PROPIONATE 1 SPRAY: 50 SPRAY, METERED NASAL at 08:53

## 2020-02-28 RX ADMIN — CARBAMAZEPINE 200 MG: 200 TABLET ORAL at 08:53

## 2020-02-28 RX ADMIN — ATORVASTATIN CALCIUM 80 MG: 40 TABLET, FILM COATED ORAL at 21:29

## 2020-02-28 RX ADMIN — CARBAMAZEPINE 200 MG: 200 TABLET ORAL at 21:29

## 2020-02-28 ASSESSMENT — PAIN SCALES - GENERAL: PAINLEVEL_OUTOF10: 0

## 2020-02-28 NOTE — PROGRESS NOTES
Pt asleep on couch in tv area upon shift arrival. Woke up around 0930 for vitals, meds and breakfast. Pleasant mood, ate well. Asking repeatedly about red coat in locker. Reassured her we would give all her belongings back upon discharge. Refused shower.

## 2020-02-28 NOTE — GROUP NOTE
Group Therapy Note    Date: 2/28/2020    Group Start Time: 1500  Group End Time: 5892  Group Topic: Psychoeducation    [address]    Spike Gregorio, [place], MA        Group Therapy Note    Attendees: 4/7       Notes:  Pts participated in recreational therapy group; Ariane Mcclelland of the [employer]. Pts answered questions r/t random cards they picked off the topic of the discussion. Discussion centered how the activity could make people anxious and ways they managed their anxiety during the activity. Pt participated in group and required Mod prompting to participate appropriately. Pt fixated on wanting to complain about not being discharged today, how she was not allowed to help pts as if she was a staff member, and was noted to be religiously preoccupied.      Status After Intervention:  Unchanged    Participation Level: Monopolizing    Participation Quality: Inappropriate and Intrusive      Speech:  normal      Thought Process/Content: Delusional      Affective Functioning: Exaggerated      Mood: elevated      Level of consciousness:  Preoccupied      Response to Learning: Able to verbalize current knowledge/experience      Endings: None Reported    Modes of Intervention: Education, Support, Socialization and Activity      Discipline Responsible: Certified Therapeutic Recreation Specialist       Signature:  Kendall Brown

## 2020-02-28 NOTE — PLAN OF CARE
Problem: Altered Mood, Deterioration in Function:  Goal: Ability to perform activities of daily living will improve  Description  Ability to perform activities of daily living will improve  2/27/2020 2209 by Lydia Drake RN  Outcome: Ongoing  2/27/2020 1624 by Paul Bunch, LPN  Outcome: Ongoing  2/27/2020 1436 by Paul Bunch LPN  Outcome: Ongoing  Goal: Able to verbalize reality based thinking  Description  Able to verbalize reality based thinking  2/27/2020 2209 by Lydia Drake RN  Outcome: Ongoing  2/27/2020 1624 by Paul Bunch LPN  Outcome: Ongoing  2/27/2020 1436 by Paul Bunch, LPN  Outcome: Ongoing  Goal: Skin appearance normal  Description  Skin appearance normal  2/27/2020 2209 by Lydia Drake RN  Outcome: Ongoing  2/27/2020 1624 by Paul Bunch LPN  Outcome: Ongoing  2/27/2020 1436 by Paul Bunch LPN  Outcome: Ongoing  Goal: Maintenance of adequate nutrition will improve  Description  Maintenance of adequate nutrition will improve  2/27/2020 2209 by Lydia Drake RN  Outcome: Ongoing  2/27/2020 1624 by Paul Bunch LPN  Outcome: Ongoing  2/27/2020 1436 by Paul Bunch LPN  Outcome: Ongoing  Goal: Ability to tolerate increased activity will improve  Description  Ability to tolerate increased activity will improve  2/27/2020 2209 by Lydia Drake RN  Outcome: Ongoing  2/27/2020 1624 by Paul Bunch, LPN  Outcome: Ongoing  2/27/2020 1436 by Paul Bunch LPN  Outcome: Ongoing  Goal: Participates in care planning  Description  Participates in care planning  2/27/2020 2209 by Lydia Drake RN  Outcome: Ongoing  2/27/2020 1624 by Paul Bunch LPN  Outcome: Ongoing  2/27/2020 1436 by Paul Bunch LPN  Outcome: Ongoing  Goal: Patient specific goal  Description  Patient specific goal  2/27/2020 2209 by Lydia Drake RN  Outcome: Ongoing  2/27/2020 1624 by Paul Bunch, LPN  Outcome: Ongoing  2/27/2020 1436 by Paul Bunch LPN  Outcome: Ongoing

## 2020-02-28 NOTE — GROUP NOTE
Group Therapy Note    Date: 2/28/2020    Group Start Time: 1300  Group End Time: 1330    Number of Participants: 4/8    Type: Exercise/Recreation Group    Group Topic/Objective: Mental Health Benefits of Exercise Discussion    Notes:  Pt attended group, but refused to participate. Pt fixated on being discharged and agitated when told she can't leave at this time.      Status After Intervention:  Unchanged    Participation Level: None    Participation Quality: Resistant    Speech:  normal    Thought Process/Content: Delusional    Affective Functioning: Congruent    Mood: irritable    Level of consciousness:  Inattentive    Response to Learning: Resistant    Endings: None Reported    Modes of Intervention: Education, Support and Socialization    Discipline Responsible: Certified Therapeutic Recreation Specialist     Electronically signed by Jenny Mabry, [address] [place] [address], [place] on 2/28/2020 at 1:47 PM

## 2020-02-28 NOTE — PLAN OF CARE
Problem: Altered Mood, Deterioration in Function:  Goal: Ability to perform activities of daily living will improve  Description  Ability to perform activities of daily living will improve  2/28/2020 1000 by Rober Stallings RN  Outcome: Ongoing  2/27/2020 2209 by Renu Ambrosio RN  Outcome: Ongoing  Goal: Able to verbalize reality based thinking  Description  Able to verbalize reality based thinking  2/28/2020 1000 by Rober Stallings RN  Outcome: Ongoing  2/27/2020 2209 by Renu Ambrosio RN  Outcome: Ongoing  Goal: Skin appearance normal  Description  Skin appearance normal  2/28/2020 1000 by Rober Stallings RN  Outcome: Ongoing  2/27/2020 2209 by Renu Ambrosio RN  Outcome: Ongoing  Goal: Maintenance of adequate nutrition will improve  Description  Maintenance of adequate nutrition will improve  2/28/2020 1000 by Rober Stallings RN  Outcome: Ongoing  2/27/2020 2209 by Renu Ambrosio RN  Outcome: Ongoing  Goal: Ability to tolerate increased activity will improve  Description  Ability to tolerate increased activity will improve  2/28/2020 1000 by Rober Stallings RN  Outcome: Ongoing  2/27/2020 2209 by Renu Ambrosio RN  Outcome: Ongoing  Goal: Participates in care planning  Description  Participates in care planning  2/28/2020 1000 by Rober Stallings RN  Outcome: Ongoing  2/27/2020 2209 by Renu Ambrosio RN  Outcome: Ongoing  Goal: Patient specific goal  Description  Patient specific goal  2/28/2020 1000 by Rober Stallings RN  Outcome: Ongoing  2/27/2020 2209 by Renu Ambrosio RN  Outcome: Ongoing

## 2020-02-28 NOTE — BH NOTE
Group Therapy Note    Date: 2/27/2020  Start Time: 9070  End Time:  2000  Number of Participants: 1      Type of Group: Nursing Education      Notes:  Medication was discussed and she voiced understanding and asked a few questions. She was compliant with medication. Status After Intervention:  Unchanged    Participation Level:  Active Listener and Interactive    Participation Quality: Appropriate and Attentive      Speech:  normal      Thought Process/Content: Logical at times, dementia      Affective Functioning: Flat      Mood: euthymic      Level of consciousness:  Alert and Attentive      Response to Learning: Progressing to goal      Endings: None Reported    Modes of Intervention: Education      Discipline Responsible: Registered Nurse      Signature:  Catie Walker RN

## 2020-02-28 NOTE — BH NOTE
Group Therapy Note    Date: 2/27/2020  Start Time: 7945  End Time:  2000  Number of Participants: 1      Type of Group: Nursing Education      Notes:  Stella Castañeda was resting in bed, awake. Discussed her medication and answered her questions. She was unsure if she wanted to take the Tegretol. It was explained to her that she could talk to the MD tomorrow. She agreed and was compliant with medication. Status After Intervention:  Unchanged    Participation Level:  Active Listener and Interactive    Participation Quality: Appropriate and Attentive      Speech:  normal      Thought Process/Content: Logical      Affective Functioning: Blunted      Mood: euthymic      Level of consciousness:  Alert and Attentive      Response to Learning: Progressing to goal      Endings: None Reported    Modes of Intervention: Education      Discipline Responsible: Registered Nurse      Signature:  Jazzmine Aleman RN

## 2020-02-28 NOTE — PROGRESS NOTES
Psychiatric Progress Note    Leonila Jessica  [id number]  02[birth year]28[birth year]    CHIEF COMPLAINT: unchanged    HPI: Leonila Jessica is a 72 year old female who presented to the ER on 2/24/2020. She has not been taking any of her psychotropic medications since discharge from the [facility] on 2/3/2020 denying that she has a mental problem. Her sister was concerned about the patient's behaviors stating that she was getting into stranger's cars asking them to drive her to various places. She has been diagnosed with Alzheimer's dementia. Probate pending, currently filed, forced meds approved     During today's interview she was alert & oriented x 2. She denies SI/HI and AVH. She denies depression and anxiety. She states she is sleeping \"very good\" and that her appetite is \"very good. \" Pt continues to display bizarre delusions and confusion. Pt does not understand her diagnosis and treatment plan,  Pt is oriented to self and location only at this time. Pt noted she currently feels safe and comfortable on the unit.  Pt was in agreement with treatment team.  Pt was polite and cordial during the interview process. Has been taking medications and has been compliant with treatment. Tolerating medications without side effect complaints.     Review of the chart notes patient's speech pressured, tangential and circumstantial.    Allergies   Allergen Reactions    Floxin [Ofloxacin] Other (See Comments)     Made her \"feel weird\"       Medications Prior to Admission: ondansetron (ZOFRAN ODT) 4 MG disintegrating tablet, Take 1 tablet by mouth every 8 hours as needed for Nausea  carBAMazepine (TEGRETOL) 200 MG tablet, Take 1 tablet by mouth 2 times daily  traZODone (DESYREL) 50 MG tablet, Take 1 tablet by mouth nightly as needed for Sleep  metoprolol tartrate (LOPRESSOR) 25 MG tablet, Take 1 tablet by mouth 2 times daily  memantine (NAMENDA) 5 MG tablet, Take 1 tablet by mouth daily  amLODIPine (NORVASC) 10 MG tablet, Take 1 tablet by mouth MENTAL STATUS EXAM:   Vitals: Blood pressure 139/67, pulse 72, temperature 96.9 °F (36.1 °C), temperature source Temporal, resp. rate 18, height 5' 2\" (1.575 m), weight 150 lb (68 kg), SpO2 96 %, not currently breastfeeding. CONSTITUTIONAL:    Appearance: appears stated age. Alert and oriented to person, place. Pt unable to understand diagnosis or current date. No acute distress. Adequate grooming and hygiene. Good eye contact. No prominent physical abnormalities. Attitude: Manner is cooperative and pleasant  Motor: No psychomotor agitation, retardation or abnormal movements noted  Speech: Clearly articulated; normal rate, volume, tone & amount. Language: intact understanding and production  Mood: euthymic  Affect: euthymic, full range, non-labile, congruent with mood and content of speech  Thought Production: Spontaneous. Thought Form: Coherent, linear, illogical & goal-directed. Tangentiality and circumstantiality. No flight of ideas or loosening of associations. Thought Content/Perceptions: No [place], no AVH, voices delusion that others are persecuting her  Insight: impaired  Judgment: impaired  Memory: Immediate, recent, and remote appear intact, though not formally tested. Attention: maintained throughout interview  Fund of knowledge: Average  Gait/Balance: WNL/WNL    IMPRESSION:   No change in diagnosis        PLAN:  Psychiatric management: medication initiation and titration, group and individual therapy, safe and therapeutic environment. Status of problem/condition: Improving slowly  Medical co-morbidities: Management per [facility] group, appreciate assistance  Legal Status: Involuntary  Disposition:estimated LOS: Pending. The treatment team reviewed with the patient the diagnosis and treatment recommendations to include the risks, benefits, and side effects of chosen medications. PT is currently pending probate   The patient was encouraged to participate in groups.   Hold current phone privileges due to exacerbation of diagnosis and instablility  Medical records, Labs, Diagnotic tests reviewed  q15 min safety checks for safety  Interval History.   Review current labs  Continue current medications  Supportive Therapy Provided  Pt had an opportunity to ask questions and address concerns  Pt encouraged to continue therapy group or individual.       Electronically signed by Delmer Holguin DO on 2/28/2020 at 9:20 AM

## 2020-02-28 NOTE — BH NOTE
Fidelina Keith was visible on the milieu. She was polite on approach and cooperative with vitals. Her affect was blunted. She denied SI. She had some religiosity and was able to carry on a logical conversation. Medication was discussed and she was compliant. Later she became forgetful and a little anxious. She wanted to walk home because her sister would be worried about her. It was explained to her that her sister knew that she was here. She accepted the explanation but a short time later she started it again. She did this three times. She went to rest in her room but came out an hour later looking for Spike Nicholson (her sister). She has not been a management issue, not irritable or angry. Safety checks maintained.

## 2020-02-29 PROCEDURE — 1240000000 HC EMOTIONAL WELLNESS R&B

## 2020-02-29 PROCEDURE — 99232 SBSQ HOSP IP/OBS MODERATE 35: CPT | Performed by: PSYCHIATRY & NEUROLOGY

## 2020-02-29 PROCEDURE — 6370000000 HC RX 637 (ALT 250 FOR IP): Performed by: NURSE PRACTITIONER

## 2020-02-29 PROCEDURE — 6370000000 HC RX 637 (ALT 250 FOR IP): Performed by: PSYCHIATRY & NEUROLOGY

## 2020-02-29 RX ORDER — RISPERIDONE 0.5 MG/1
1 TABLET, FILM COATED ORAL NIGHTLY
Status: DISCONTINUED | OUTPATIENT
Start: 2020-02-29 | End: 2020-03-03

## 2020-02-29 RX ADMIN — METOPROLOL TARTRATE 25 MG: 25 TABLET ORAL at 08:05

## 2020-02-29 RX ADMIN — FAMOTIDINE 20 MG: 20 TABLET ORAL at 08:06

## 2020-02-29 RX ADMIN — LISINOPRIL 5 MG: 5 TABLET ORAL at 08:06

## 2020-02-29 RX ADMIN — AMLODIPINE BESYLATE 10 MG: 10 TABLET ORAL at 08:05

## 2020-02-29 RX ADMIN — FLUTICASONE PROPIONATE 1 SPRAY: 50 SPRAY, METERED NASAL at 08:05

## 2020-02-29 RX ADMIN — CARBAMAZEPINE 200 MG: 200 TABLET ORAL at 08:06

## 2020-02-29 RX ADMIN — METOPROLOL TARTRATE 25 MG: 25 TABLET ORAL at 20:05

## 2020-02-29 RX ADMIN — MULTIPLE VITAMINS W/ MINERALS TAB 1 TABLET: TAB at 08:05

## 2020-02-29 RX ADMIN — RISPERIDONE 1 MG: 0.5 TABLET ORAL at 20:05

## 2020-02-29 RX ADMIN — ATORVASTATIN CALCIUM 80 MG: 40 TABLET, FILM COATED ORAL at 20:04

## 2020-02-29 RX ADMIN — CARBAMAZEPINE 200 MG: 200 TABLET ORAL at 20:05

## 2020-02-29 RX ADMIN — MEMANTINE 5 MG: 5 TABLET ORAL at 08:06

## 2020-02-29 ASSESSMENT — PAIN SCALES - GENERAL
PAINLEVEL_OUTOF10: 0
PAINLEVEL_OUTOF10: 0

## 2020-02-29 NOTE — BH NOTE
Group Therapy Note    Date: 2/28/2020  Start Time: 2100  End Time:  2130  Number of Participants: 1      Type of Group: Nursing Education      Notes:  Discussed medication with Kahlil Solorzano. She voiced understanding, denied having any questions about medication and she was compliant. Status After Intervention:  Unchanged    Participation Level:  Active Listener    Participation Quality: Attentive      Speech:  normal      Thought Process/Content: Dementia, forgetful    Affective Functioning: Blunted      Mood: euthymic      Level of consciousness:  Drowsy      Response to Learning: Progressing to goal      Endings: None Reported    Modes of Intervention: Education      Discipline Responsible: Registered Nurse      Signature:  Lydia Drake RN

## 2020-02-29 NOTE — PLAN OF CARE
Problem: Altered Mood, Deterioration in Function:  Goal: Ability to perform activities of daily living will improve  Description  Ability to perform activities of daily living will improve  2/29/2020 1002 by Vishal Hernandez LPN  Outcome: Ongoing     Problem: Altered Mood, Deterioration in Function:  Goal: Able to verbalize reality based thinking  Description  Able to verbalize reality based thinking  2/29/2020 1002 by Vishal Hernandez LPN  Outcome: Ongoing     Problem: Altered Mood, Deterioration in Function:  Goal: Skin appearance normal  Description  Skin appearance normal  2/29/2020 1002 by Vishal Hernandez LPN  Outcome: Ongoing     Problem: Altered Mood, Deterioration in Function:  Goal: Maintenance of adequate nutrition will improve  Description  Maintenance of adequate nutrition will improve  2/29/2020 1002 by Nancy Bee LPN  Outcome: Ongoing     Problem: Altered Mood, Deterioration in Function:  Goal: Ability to tolerate increased activity will improve  Description  Ability to tolerate increased activity will improve  2/29/2020 1002 by Nancy Bee LPN  Outcome: Ongoing     Problem: Altered Mood, Deterioration in Function:  Goal: Participates in care planning  Description  Participates in care planning  2/29/2020 1002 by Vishal Hernandez LPN  Outcome: Ongoing     Problem: Altered Mood, Deterioration in Function:  Goal: Patient specific goal  Description  Patient specific goal  2/29/2020 1002 by Vishal Hernandez LPN  Outcome: Ongoing

## 2020-02-29 NOTE — PLAN OF CARE
Problem: Altered Mood, Deterioration in Function:  Goal: Ability to perform activities of daily living will improve  Description  Ability to perform activities of daily living will improve  2/28/2020 2218 by Pietro Artis RN  Outcome: Ongoing  2/28/2020 1000 by Luis Charles RN  Outcome: Ongoing  Goal: Able to verbalize reality based thinking  Description  Able to verbalize reality based thinking  2/28/2020 2218 by Pietro Artis RN  Outcome: Ongoing  2/28/2020 1000 by Luis Charles RN  Outcome: Ongoing  Goal: Skin appearance normal  Description  Skin appearance normal  2/28/2020 2218 by Pietro Artis RN  Outcome: Ongoing  2/28/2020 1000 by Luis Charles RN  Outcome: Ongoing  Goal: Maintenance of adequate nutrition will improve  Description  Maintenance of adequate nutrition will improve  2/28/2020 2218 by Pietro Artis RN  Outcome: Ongoing  2/28/2020 1000 by Luis Charles RN  Outcome: Ongoing  Goal: Ability to tolerate increased activity will improve  Description  Ability to tolerate increased activity will improve  2/28/2020 2218 by Pietro Artis RN  Outcome: Ongoing  2/28/2020 1000 by Luis Charles RN  Outcome: Ongoing  Goal: Participates in care planning  Description  Participates in care planning  2/28/2020 2218 by Pietro Artis RN  Outcome: Ongoing  2/28/2020 1000 by Luis Charles RN  Outcome: Ongoing  Goal: Patient specific goal  Description  Patient specific goal  2/28/2020 2218 by Pietro Artis RN  Outcome: Ongoing  2/28/2020 1000 by Luis Charles RN  Outcome: Ongoing

## 2020-02-29 NOTE — PROGRESS NOTES
Psychiatric Progress Note    Rainey Leyden  [id number]  [birth year]    CHIEF COMPLAINT: unchanged    HPI: Rainey Leyden is a 72 year old female who presented to the ER on 2/24/2020. She has not been taking any of her psychotropic medications since discharge from the [facility] on 2/3/2020 denying that she has a mental problem. Her sister was concerned about the patient's behaviors stating that she was getting into stranger's cars asking them to drive her to various places. She has been diagnosed with Alzheimer's dementia. Probate pending, currently filed, forced meds approved     Pt continues to show agitation and aggression with staff however redirectable and responding to medications.     During today's interview she was alert & oriented x 2. She denies SI/HI and AVH. She denies depression and anxiety. She states she is sleeping \"very good\" and that her appetite is \"very good. \" Pt continues to display bizarre delusions and confusion. Pt does not understand her diagnosis and treatment plan,  Pt is oriented to self and location only at this time. Pt noted she currently feels safe and comfortable on the unit.  Pt was in agreement with treatment team.  Pt was polite and cordial during the interview process. Has been taking medications and has been compliant with treatment. Tolerating medications without side effect complaints.     Review of the chart notes patient's speech pressured, tangential and circumstantial.    Allergies   Allergen Reactions    Floxin [Ofloxacin] Other (See Comments)     Made her \"feel weird\"       Medications Prior to Admission: ondansetron (ZOFRAN ODT) 4 MG disintegrating tablet, Take 1 tablet by mouth every 8 hours as needed for Nausea  carBAMazepine (TEGRETOL) 200 MG tablet, Take 1 tablet by mouth 2 times daily  traZODone (DESYREL) 50 MG tablet, Take 1 tablet by mouth nightly as needed for Sleep  metoprolol tartrate (LOPRESSOR) 25 MG tablet, Take 1 tablet by mouth 2 times daily  memantine

## 2020-02-29 NOTE — BH NOTE
Mark Dempsey was asleep at the beginning of this shift. When this writer went to give her hs medication she was in bed awake. She stated a man woke her up by talking too loud (male peer using the phone). Then she stated that she did not know who he was, that it was someone her sister lets stay there over the weekend. Discussed medication and she was compliant. She was polite with good eye contact. She did not mention that she needed to go home or find her sister. Safety observations maintained.

## 2020-02-29 NOTE — GROUP NOTE
Group Therapy Note    Date: 2/29/2020    Group Start Time: 1450  Group End Time: 1520  Group Topic: Healthy Living/Wellness/ Exercise Group    [address]    ISABELLA Orellana LSW    Group Therapy Note    Attendees: 4/6       Patient's Goal: \"go home\"    Notes: Patient actively participated in group. Patient participated in an exercise card game with peers.      Status After Intervention:  Improved    Participation Level: Interactive    Participation Quality: Appropriate    Thought Process/Content: Linear    Affective Functioning: Congruent    Mood: elevated    Level of consciousness:  Alert and Attentive    Response to Learning: Able to verbalize current knowledge/experience    Endings: None Reported    Modes of Intervention: Activity and Movement    Discipline Responsible: /Counselor    Signature: ISABELLA Orellana LSW

## 2020-03-01 PROCEDURE — 99232 SBSQ HOSP IP/OBS MODERATE 35: CPT | Performed by: PSYCHIATRY & NEUROLOGY

## 2020-03-01 PROCEDURE — 6370000000 HC RX 637 (ALT 250 FOR IP): Performed by: NURSE PRACTITIONER

## 2020-03-01 PROCEDURE — 6370000000 HC RX 637 (ALT 250 FOR IP): Performed by: PSYCHIATRY & NEUROLOGY

## 2020-03-01 PROCEDURE — 1240000000 HC EMOTIONAL WELLNESS R&B

## 2020-03-01 RX ADMIN — RISPERIDONE 1 MG: 0.5 TABLET ORAL at 21:35

## 2020-03-01 RX ADMIN — METOPROLOL TARTRATE 25 MG: 25 TABLET ORAL at 21:23

## 2020-03-01 RX ADMIN — LISINOPRIL 5 MG: 5 TABLET ORAL at 08:27

## 2020-03-01 RX ADMIN — MULTIPLE VITAMINS W/ MINERALS TAB 1 TABLET: TAB at 08:27

## 2020-03-01 RX ADMIN — ATORVASTATIN CALCIUM 80 MG: 40 TABLET, FILM COATED ORAL at 21:35

## 2020-03-01 RX ADMIN — MEMANTINE 5 MG: 5 TABLET ORAL at 08:27

## 2020-03-01 RX ADMIN — ONDANSETRON 4 MG: 4 TABLET, ORALLY DISINTEGRATING ORAL at 09:14

## 2020-03-01 RX ADMIN — TRAZODONE HYDROCHLORIDE 50 MG: 50 TABLET ORAL at 00:20

## 2020-03-01 RX ADMIN — FAMOTIDINE 20 MG: 20 TABLET ORAL at 08:27

## 2020-03-01 RX ADMIN — TRAZODONE HYDROCHLORIDE 50 MG: 50 TABLET ORAL at 21:23

## 2020-03-01 RX ADMIN — AMLODIPINE BESYLATE 10 MG: 10 TABLET ORAL at 08:27

## 2020-03-01 RX ADMIN — FLUTICASONE PROPIONATE 1 SPRAY: 50 SPRAY, METERED NASAL at 08:26

## 2020-03-01 RX ADMIN — CARBAMAZEPINE 200 MG: 200 TABLET ORAL at 08:27

## 2020-03-01 RX ADMIN — METOPROLOL TARTRATE 25 MG: 25 TABLET ORAL at 08:27

## 2020-03-01 RX ADMIN — CARBAMAZEPINE 200 MG: 200 TABLET ORAL at 21:23

## 2020-03-01 ASSESSMENT — PAIN SCALES - GENERAL
PAINLEVEL_OUTOF10: 0
PAINLEVEL_OUTOF10: 0

## 2020-03-01 NOTE — PROGRESS NOTES
Group Therapy Note    Date: 2/29/2020  Start Time: 2000  End Time:  2016  Number of Participants: 1      Type of Group: Nursing Education      Notes:  Educated on medications and plan for tomorrow    Status After Intervention:  Unchanged    Participation Level:  Active Listener    Participation Quality: Attentive      Speech:  normal      Thought Process/Content: Flight of ideas      Affective Functioning: Congruent      Mood: elevated      Level of consciousness:  Alert      Response to Learning: Needs reinforcement      Endings: None Reported    Modes of Intervention: Education and Support      Discipline Responsible: Registered Nurse      Signature:  Lew Hanley RN

## 2020-03-01 NOTE — GROUP NOTE
Group Therapy Note    Date: 3/1/2020    Group Start Time: 1300  Group End Time: 1400  Group Topic: Psychoeducation    Eddy Jacome, MSW, MELINDAW    Group Therapy Note    Attendees: 4/6       Patient's Goal: \"go home\"    Notes: Patient actively participated in group. Open discussion about maintaining mobility as part of healthy aging and activities of daily living with peers. Participated in a game of [employer]. Status After Intervention:  Improved    Participation Level:  Active Listener and Interactive    Participation Quality: Appropriate    Speech:  normal    Thought Process/Content: Linear    Affective Functioning: Congruent    Mood: elevated    Level of consciousness:  Alert and Attentive    Response to Learning: Able to verbalize current knowledge/experience    Endings: None Reported    Modes of Intervention: Education, Socialization and Activity    Discipline Responsible: /Counselor    Signature: Shellie Flaherty MSW, LSW

## 2020-03-01 NOTE — PROGRESS NOTES
Pt expressed that Zofran and [employer] helped with her nausea. Pt walking around with paperwork from court and an attend. Pt comes up to desk, stating \"thanks for everything but I need to go take care of things\". Pt attempted to go out door of unit but discovered it locked. Pt back to desk asking what door she needs to use to leave and go home. Explained to pt that she is unable to leave until the Dr discharges. Pt then states \"oh, ok I guess I will wait until he gets here\". Pt returned to chair, continued reviewing court paperwork re: probate.

## 2020-03-01 NOTE — GROUP NOTE
Group Therapy Note    Date: 3/1/2020    Group Start Time: 0830  Group End Time: 0920  Group Topic: Community Meeting/ AM Goals Group    [facility]    ISABELLA Abdalla, KASEY    Group Therapy Note    Attendees: 6/6       Patient's Goal: \"go home\"    Notes: Patient actively participated in group. Patient stated that she was feeling \"not too great\" and grateful for \"this day\". Patient reported getting restful sleep last night. Patient rated her depression, anxiety, and irritability/agitation as a 0, on a scale of 0 to 10, with 0 being not at all. Reviewed goal, meal selection, and unit schedule for the day. Status After Intervention:  Unchanged    Participation Level: Active Listener and Interactive    Participation Quality: Appropriate and Sharing    Speech:  normal    Thought Process/Content: Perseverating on going home.     Affective Functioning: Congruent    Mood: irritable     Level of consciousness:  Alert and Attentive    Response to Learning: Able to verbalize current knowledge/experience    Endings: None Reported    Modes of Intervention: Exploration and Clarifying    Discipline Responsible: /Counselor    Signature: ISABELLA Abdalla LSW

## 2020-03-01 NOTE — PROGRESS NOTES
Pt alert and oriented to person, place and time. Pt disoriented to situation. Pt states the Dr drove by herself yesterday and saw that she drives a '[address] and determined her to be mentally ill. Pt also states she does not feel like eating breakfast d/t a man was in her room last night and gave her sleeping pill. Pt unsure of who the man was but now she feels bad d/t taking the sleeping pill. Pt informed that it was females on staff last night but pt insists it was a male in her room. Pt states frequently while doing assessment and meds that she is not mentally ill and she will obtain a  if needed. Pt states that her sister wants her home and she wants to talk to someone above Dr. Arjun Beasley since he is driving by her home unnecessarily.

## 2020-03-01 NOTE — PROGRESS NOTES
Psychiatric Progress Note    Dorn Boas  [id number]  [birth year]    CHIEF COMPLAINT: unchanged    HPI: Dorn Boas is a 72 year old female who presented to the ER on 2/24/2020. She has not been taking any of her psychotropic medications since discharge from the [facility] on 2/3/2020 denying that she has a mental problem. Her sister was concerned about the patient's behaviors stating that she was getting into stranger's cars asking them to drive her to various places. She has been diagnosed with Alzheimer's dementia. Probate pending 3/5/2020, currently filed, forced meds approved     Pt continues to show agitation and aggression with staff however redirectable and responding to medications.     During today's interview she was alert & oriented x 2. She denies SI/HI and AVH. She denies depression and anxiety. She states she is sleeping \"very good\" and that her appetite is \"very good. \" Pt continues to display bizarre delusions and confusion. Pt does not understand her diagnosis and treatment plan,  Pt is oriented to self and location only at this time. Pt noted she currently feels safe and comfortable on the unit.  Pt was in agreement with treatment team.  Pt was polite and cordial during the interview process. Has been taking medications and has been compliant with treatment. Tolerating medications without side effect complaints.     Review of the chart notes patient's speech pressured, tangential and circumstantial.    Allergies   Allergen Reactions    Floxin [Ofloxacin] Other (See Comments)     Made her \"feel weird\"       Medications Prior to Admission: ondansetron (ZOFRAN ODT) 4 MG disintegrating tablet, Take 1 tablet by mouth every 8 hours as needed for Nausea  carBAMazepine (TEGRETOL) 200 MG tablet, Take 1 tablet by mouth 2 times daily  traZODone (DESYREL) 50 MG tablet, Take 1 tablet by mouth nightly as needed for Sleep  metoprolol tartrate (LOPRESSOR) 25 MG tablet, Take 1 tablet by mouth 2 times SpO2: 99%       Labs:  No results found for this or any previous visit (from the past 48 hour(s)). PSYCHIATRIC ASSESSMENT / MENTAL STATUS EXAM:   Vitals: Blood pressure (!) 160/73, pulse 69, temperature 98.2 °F (36.8 °C), temperature source Temporal, resp. rate 14, height 5' 2\" (1.575 m), weight 150 lb (68 kg), SpO2 99 %, not currently breastfeeding. CONSTITUTIONAL:    Appearance: appears stated age. Alert and oriented to person, place. Pt unable to understand diagnosis or current date. No acute distress. Adequate grooming and hygiene. Good eye contact. No prominent physical abnormalities. Attitude: Manner is cooperative and pleasant  Motor: No psychomotor agitation, retardation or abnormal movements noted  Speech: Clearly articulated; normal rate, volume, tone & amount. Language: intact understanding and production  Mood: euthymic  Affect: euthymic, full range, non-labile, congruent with mood and content of speech  Thought Production: Spontaneous. Thought Form: Coherent, linear, illogical & goal-directed. Tangentiality and circumstantiality. No flight of ideas or loosening of associations. Thought Content/Perceptions: No [place], no AVH, voices delusion that others are persecuting her, and need to do financal chores and get back to work. Insight: impaired  Judgment: impaired  Memory: Immediate, recent, and remote appear intact, though not formally tested. Attention: maintained throughout interview  Fund of knowledge: Average  Gait/Balance: WNL/WNL    IMPRESSION:   No change in diagnosis        PLAN:  Psychiatric management: medication initiation and titration, group and individual therapy, safe and therapeutic environment. Status of problem/condition: Improving slowly  Medical co-morbidities: Management per [facility] group, appreciate assistance  Legal Status: Involuntary  Disposition:estimated LOS: Pending.   The treatment team reviewed with the patient the diagnosis and treatment recommendations

## 2020-03-02 PROCEDURE — 6370000000 HC RX 637 (ALT 250 FOR IP): Performed by: PSYCHIATRY & NEUROLOGY

## 2020-03-02 PROCEDURE — 1240000000 HC EMOTIONAL WELLNESS R&B

## 2020-03-02 PROCEDURE — 6370000000 HC RX 637 (ALT 250 FOR IP): Performed by: NURSE PRACTITIONER

## 2020-03-02 PROCEDURE — 99233 SBSQ HOSP IP/OBS HIGH 50: CPT | Performed by: NURSE PRACTITIONER

## 2020-03-02 RX ORDER — TRAZODONE HYDROCHLORIDE 50 MG/1
50 TABLET ORAL NIGHTLY
Status: DISCONTINUED | OUTPATIENT
Start: 2020-03-02 | End: 2020-03-04

## 2020-03-02 RX ADMIN — LISINOPRIL 5 MG: 5 TABLET ORAL at 10:12

## 2020-03-02 RX ADMIN — CARBAMAZEPINE 200 MG: 200 TABLET ORAL at 20:06

## 2020-03-02 RX ADMIN — AMLODIPINE BESYLATE 10 MG: 10 TABLET ORAL at 10:12

## 2020-03-02 RX ADMIN — FAMOTIDINE 20 MG: 20 TABLET ORAL at 10:12

## 2020-03-02 RX ADMIN — CARBAMAZEPINE 200 MG: 200 TABLET ORAL at 10:12

## 2020-03-02 RX ADMIN — MEMANTINE 5 MG: 5 TABLET ORAL at 10:12

## 2020-03-02 RX ADMIN — FLUTICASONE PROPIONATE 1 SPRAY: 50 SPRAY, METERED NASAL at 10:12

## 2020-03-02 RX ADMIN — TRAZODONE HYDROCHLORIDE 50 MG: 50 TABLET ORAL at 20:05

## 2020-03-02 RX ADMIN — MULTIPLE VITAMINS W/ MINERALS TAB 1 TABLET: TAB at 10:12

## 2020-03-02 RX ADMIN — METOPROLOL TARTRATE 25 MG: 25 TABLET ORAL at 20:06

## 2020-03-02 RX ADMIN — RISPERIDONE 1 MG: 0.5 TABLET ORAL at 20:05

## 2020-03-02 RX ADMIN — ATORVASTATIN CALCIUM 80 MG: 40 TABLET, FILM COATED ORAL at 20:05

## 2020-03-02 RX ADMIN — METOPROLOL TARTRATE 25 MG: 25 TABLET ORAL at 10:12

## 2020-03-02 ASSESSMENT — PAIN SCALES - GENERAL
PAINLEVEL_OUTOF10: 0
PAINLEVEL_OUTOF10: 0

## 2020-03-02 NOTE — PROGRESS NOTES
Psychiatric Progress Note    Kashmir Melo  [id number]  03/02/20    CHIEF COMPLAINT: unchanged    HPI: Kashmir Melo is a 72 year old female who presented to the ER on 2/24/2020. She has not been taking any of her psychotropic medications since [facility]2020 denying that she has a mental problem. Her sister was concerned about the patient's behaviors stating that she was getting into stranger's cars asking them to drive her to various places. She has been diagnosed with Alzheimer's dementia. Probate pending 3/5/2020, currently filed, forced meds approved     Pt continues to show agitation and aggression with staff however redirectable and responding to medications.     During today's interview she was alert & oriented x 2. She denies SI/HI and AVH. She denies depression and anxiety. She states she is sleeping \"very good\" and that her appetite is \"very good. \" Pt continues to display bizarre delusions and confusion. Pt does not understand her diagnosis and treatment plan,  Pt is oriented to self and location only at this time. Pt noted she currently feels safe and comfortable on the unit.  Pt was in agreement with treatment team.  Pt was polite and cordial during the interview process. Has been taking medications and has been compliant with treatment. Tolerating medications without side effect complaints. Review of the chart notes patient walking around with her shoes on the wrong feet. She is unable to retain new information. She displays poor insight and [facility] due to her sister getting mad at her over not sweeping the floor correctly.     Allergies   Allergen Reactions    Floxin [Ofloxacin] Other (See Comments)     Made her \"feel weird\"       Medications Prior to Admission: ondansetron (ZOFRAN ODT) 4 MG disintegrating tablet, Take 1 tablet by mouth every 8 hours as needed for Nausea  carBAMazepine (TEGRETOL) 200 MG tablet, Take 1 tablet by disorder with mixed anxiety and depressed mood       Review of Systems    OBJECTIVE  Vital Signs:  Vitals:    03/02/20 0755   BP: 129/69   Pulse: 68   Resp: 14   Temp: 96 °F (35.6 °C)   SpO2: 98%       Labs:  No results found for this or any previous visit (from the past 48 hour(s)). PSYCHIATRIC ASSESSMENT / MENTAL STATUS EXAM:   Vitals: Blood pressure 129/69, pulse 68, temperature 96 °F (35.6 °C), temperature source Temporal, resp. rate 14, height 5' 2\" (1.575 m), weight 150 lb (68 kg), SpO2 98 %, not currently breastfeeding. CONSTITUTIONAL:    Appearance: appears stated age. Alert and oriented to person, place. Pt unable to understand diagnosis or current date. No acute distress. Adequate grooming and hygiene. Good eye contact. No prominent physical abnormalities. Attitude: Manner is cooperative and pleasant  Motor: No psychomotor agitation, retardation or abnormal movements noted  Speech: Clearly articulated; normal rate, volume, tone & amount. Language: intact understanding and production  Mood: euthymic  Affect: euthymic, full range, non-labile, congruent with mood and content of speech  Thought Production: Spontaneous. Thought Form: Coherent, linear, illogical & goal-directed. Tangentiality and circumstantiality. No flight of ideas or loosening of associations. Thought Content/Perceptions: No [place], no AVH, voices delusion that others are persecuting her, and need to do financal chores and get back to work. Insight: impaired  Judgment: impaired  Memory: Immediate, recent, and remote appear intact, though not formally tested. Attention: maintained throughout interview  Fund of knowledge: Average  Gait/Balance: WNL/WNL    IMPRESSION:   No change in diagnosis        PLAN:  Psychiatric management: medication initiation and titration, group and individual therapy, safe and therapeutic environment.   Status of problem/condition: Improving slowly  Medical co-morbidities: Management per [facility]

## 2020-03-02 NOTE — GROUP NOTE
Group Therapy Note    Date: 3/2/2020    Group Start Time: 8502  Group End Time: 1600  Group Topic: Recreational    [address]    [place]        Group Therapy Note    Attendees: 1/4       Notes:  Pts participated in recreational therapy group; Leisure Choice and Socialization. Pts were asked to choose between 2 different leisure activities for group. Purpose was to encourage decision making, understanding how leisure can help with mood, and increase socialization. Pt chose to participate in Tesha Woodson 50 Page. Pt noted to be hypersexual in conversation and often wanting to make comments r/t her father having sex with her mother, her, or her younger sister. Pt required Max prompting for more appropriate conversation topics. Pt not tearful when talking about this and noted to be animated during discussion. Pt also noted to struggle to color simple coloring page and would often color the same area of the page over and over again with different colors.      Status After Intervention:  Unchanged    Participation Level: Interactive    Participation Quality: Sharing      Speech:  normal      Thought Process/Content: Perseverating      Affective Functioning: Exaggerated      Mood: elevated      Level of consciousness:  Preoccupied      Response to Learning: Able to verbalize current knowledge/experience      Endings: None Reported    Modes of Intervention: Socialization and Activity      Discipline Responsible: Certified Therapeutic Recreation Specialist       Signature:  Berry Riddle, [address] [facility], [facility] [place]

## 2020-03-02 NOTE — PLAN OF CARE
[facility] Interdisciplinary Treatment Plan Note     Review Date & Time: 03/02/20 0830    Admission Type:   Admission Type:  Involuntary    Reason for admission:  Reason for Admission: dementia w/behavioral disturbance    Patient Diagnosis:   Alzheimer's dementia with behavioral disturbance ([facility]    PATIENT STRENGTHS:  Patient Strengths:Strengths: No significant Physical Illness, Connection to output provider  Patient Strengths and Limitations:Limitations: Difficulty problem solving/relies on others to help solve problems  Addictive Behavior:   Medical Problems:   Past Medical History:   Diagnosis Date    Bronchitis     CAD (coronary artery disease)     Chest wall trauma     COPD (chronic obstructive pulmonary disease) ([facility]     FH: CAD (coronary artery disease)     brother with cabg in his 45s    Hypertension     Kidney stone     Lumbar herniated disc     Restless leg syndrome     Spinal stenosis        Risk:  Fall RiskTotal: 84  Kash Scale Kash Scale Score: 22  BVC Total: 1    Status EXAM:   Status and Exam  Normal: No  Facial Expression: Brightened  Affect: Appropriate  Level of Consciousness: Confused  Mood:Normal: No  Mood: Anxious  Motor Activity:Normal: No  Motor Activity: Decreased  Interview Behavior: Cooperative  Preception: [place] to Person, [place] to Time, [place] to Place  Attention:Normal: No  Attention: Distractible  Thought Processes: Tangential  Thought Content:Normal: No  Thought Content: Preoccupations  Hallucinations: None  Delusions: Yes  Delusions: Persecution  Memory:Normal: No  Memory: Poor Recent, Poor Remote, Confabulation  Insight and Judgment: No  Insight and Judgment: Poor Judgment, Poor Insight  Present Suicidal Ideation: No  Present Homicidal Ideation: No    Daily Assessment Last Entry:   Daily Sleep (WDL): Within Defined Limits  Patient Currently in Pain: No  Daily Nutrition (WDL): Within Defined Limits    Patient Monitoring:  Frequency of Checks: 4 times per hour, close    Psychiatric Symptoms:   Depression Symptoms  Depression Symptoms: Impaired concentration, Change in energy level  Anxiety Symptoms  Anxiety Symptoms: Generalized  Nakia Symptoms  Nakia Symptoms: Poor judgment     Psychosis Symptoms  Delusion Type: No problems reported or observed.     Suicide Risk CSSR-S:  1) Within the past month, have you wished you were dead or wished you could go to sleep and not wake up? : No  2) Have you actually had any thoughts of killing yourself? : No  6) Have you ever done anything, started to do anything, or prepared to do anything to end your life?: No    EDUCATION:   Learner Progress Toward Treatment Goals: Reviewed goals and plan of care    Method: Group    Outcome: No evidence of Learning    PATIENT GOALS: Med titration, compliance with unit programming    PLAN/TREATMENT RECOMMENDATIONS UPDATE: Med titration    Estimated Length of Stay Update:  11 days   Estimated Discharge Date Update: 03/13/20  Discharge Criteria: Med titration      SHORT-TERM GOALS UPDATE:  Time frame for Short-Term Goals: 11 days     LONG-TERM GOALS UPDATE:  Time frame for Long-Term Goals: 11 days   Members Present in Team Meeting: See Signature Sheet    Willy Robles MSW, LSW

## 2020-03-02 NOTE — PLAN OF CARE
Problem: Altered Mood, Deterioration in Function:  Goal: Ability to perform activities of daily living will improve  Description  Ability to perform activities of daily living will improve  3/2/2020 1024 by Lm Sheth RN  Outcome: Ongoing  3/2/2020 0018 by Steven Calderon RN  Outcome: Ongoing  Goal: Able to verbalize reality based thinking  Description  Able to verbalize reality based thinking  3/2/2020 1024 by Lm Sheth RN  Outcome: Ongoing  3/2/2020 0018 by Steven Calderon RN  Outcome: Ongoing  Goal: Skin appearance normal  Description  Skin appearance normal  3/2/2020 1024 by Lm Sheth RN  Outcome: Ongoing  3/2/2020 0018 by Steven Calderon RN  Outcome: Ongoing  Goal: Maintenance of adequate nutrition will improve  Description  Maintenance of adequate nutrition will improve  3/2/2020 1024 by Lm Sheth RN  Outcome: Ongoing  3/2/2020 0018 by Steven Calderon RN  Outcome: Ongoing  Goal: Ability to tolerate increased activity will improve  Description  Ability to tolerate increased activity will improve  3/2/2020 1024 by Lm Sheth RN  Outcome: Ongoing  3/2/2020 0018 by Steven Calderon RN  Outcome: Ongoing  Goal: Participates in care planning  Description  Participates in care planning  3/2/2020 1024 by Lm Sheth RN  Outcome: Ongoing  3/2/2020 0018 by Steven Calderon RN  Outcome: Ongoing  Goal: Patient specific goal  Description  Patient specific goal  3/2/2020 1024 by Lm Sheth RN  Outcome: Ongoing  3/2/2020 0018 by Steven Calderon RN  Outcome: Ongoing

## 2020-03-03 LAB
ALBUMIN SERPL-MCNC: 3.9 GM/DL (ref 3.4–5)
ALP BLD-CCNC: 133 IU/L (ref 40–129)
ALT SERPL-CCNC: 21 U/L (ref 10–40)
ANION GAP SERPL CALCULATED.3IONS-SCNC: 7 MMOL/L (ref 4–16)
AST SERPL-CCNC: 25 IU/L (ref 15–37)
BASOPHILS ABSOLUTE: 0.1 K/CU MM
BASOPHILS RELATIVE PERCENT: 1.2 % (ref 0–1)
BILIRUB SERPL-MCNC: 0.2 MG/DL (ref 0–1)
BUN BLDV-MCNC: 9 MG/DL (ref 6–23)
CALCIUM SERPL-MCNC: 9.4 MG/DL (ref 8.3–10.6)
CHLORIDE BLD-SCNC: 102 MMOL/L (ref 99–110)
CO2: 31 MMOL/L (ref 21–32)
CREAT SERPL-MCNC: 0.9 MG/DL (ref 0.6–1.1)
DIFFERENTIAL TYPE: ABNORMAL
EOSINOPHILS ABSOLUTE: 0.2 K/CU MM
EOSINOPHILS RELATIVE PERCENT: 2.9 % (ref 0–3)
GFR AFRICAN AMERICAN: >60 ML/MIN/1.73M2
GFR NON-AFRICAN AMERICAN: >60 ML/MIN/1.73M2
GLUCOSE BLD-MCNC: 123 MG/DL (ref 70–99)
HCT VFR BLD CALC: 39.5 % (ref 37–47)
HEMOGLOBIN: 12.6 GM/DL (ref 12.5–16)
IMMATURE NEUTROPHIL %: 0.3 % (ref 0–0.43)
LYMPHOCYTES ABSOLUTE: 1.7 K/CU MM
LYMPHOCYTES RELATIVE PERCENT: 24.7 % (ref 24–44)
MCH RBC QN AUTO: 31.2 PG (ref 27–31)
MCHC RBC AUTO-ENTMCNC: 31.9 % (ref 32–36)
MCV RBC AUTO: 97.8 FL (ref 78–100)
MONOCYTES ABSOLUTE: 0.6 K/CU MM
MONOCYTES RELATIVE PERCENT: 8.1 % (ref 0–4)
PDW BLD-RTO: 11.8 % (ref 11.7–14.9)
PLATELET # BLD: 213 K/CU MM (ref 140–440)
PMV BLD AUTO: 10.2 FL (ref 7.5–11.1)
POTASSIUM SERPL-SCNC: 4.7 MMOL/L (ref 3.5–5.1)
RBC # BLD: 4.04 M/CU MM (ref 4.2–5.4)
SEGMENTED NEUTROPHILS ABSOLUTE COUNT: 4.4 K/CU MM
SEGMENTED NEUTROPHILS RELATIVE PERCENT: 62.8 % (ref 36–66)
SODIUM BLD-SCNC: 140 MMOL/L (ref 135–145)
TOTAL IMMATURE NEUTOROPHIL: 0.02 K/CU MM
TOTAL PROTEIN: 6.8 GM/DL (ref 6.4–8.2)
VITAMIN D 25-HYDROXY: 26.3 NG/ML
WBC # BLD: 6.9 K/CU MM (ref 4–10.5)

## 2020-03-03 PROCEDURE — 80053 COMPREHEN METABOLIC PANEL: CPT

## 2020-03-03 PROCEDURE — 99233 SBSQ HOSP IP/OBS HIGH 50: CPT | Performed by: NURSE PRACTITIONER

## 2020-03-03 PROCEDURE — 1240000000 HC EMOTIONAL WELLNESS R&B

## 2020-03-03 PROCEDURE — 36415 COLL VENOUS BLD VENIPUNCTURE: CPT

## 2020-03-03 PROCEDURE — 82306 VITAMIN D 25 HYDROXY: CPT

## 2020-03-03 PROCEDURE — 85025 COMPLETE CBC W/AUTO DIFF WBC: CPT

## 2020-03-03 PROCEDURE — 6370000000 HC RX 637 (ALT 250 FOR IP): Performed by: NURSE PRACTITIONER

## 2020-03-03 RX ORDER — RISPERIDONE 2 MG/1
2 TABLET, FILM COATED ORAL NIGHTLY
Status: DISCONTINUED | OUTPATIENT
Start: 2020-03-03 | End: 2020-03-14 | Stop reason: HOSPADM

## 2020-03-03 RX ADMIN — FLUTICASONE PROPIONATE 1 SPRAY: 50 SPRAY, METERED NASAL at 08:02

## 2020-03-03 RX ADMIN — FAMOTIDINE 20 MG: 20 TABLET ORAL at 08:02

## 2020-03-03 RX ADMIN — AMLODIPINE BESYLATE 10 MG: 10 TABLET ORAL at 08:02

## 2020-03-03 RX ADMIN — LISINOPRIL 5 MG: 5 TABLET ORAL at 08:01

## 2020-03-03 RX ADMIN — MEMANTINE 5 MG: 5 TABLET ORAL at 08:02

## 2020-03-03 RX ADMIN — METOPROLOL TARTRATE 25 MG: 25 TABLET ORAL at 20:10

## 2020-03-03 RX ADMIN — METOPROLOL TARTRATE 25 MG: 25 TABLET ORAL at 08:02

## 2020-03-03 RX ADMIN — MULTIPLE VITAMINS W/ MINERALS TAB 1 TABLET: TAB at 08:01

## 2020-03-03 RX ADMIN — TRAZODONE HYDROCHLORIDE 50 MG: 50 TABLET ORAL at 20:10

## 2020-03-03 RX ADMIN — CARBAMAZEPINE 200 MG: 200 TABLET ORAL at 20:09

## 2020-03-03 RX ADMIN — CARBAMAZEPINE 200 MG: 200 TABLET ORAL at 08:02

## 2020-03-03 RX ADMIN — ATORVASTATIN CALCIUM 80 MG: 40 TABLET, FILM COATED ORAL at 20:09

## 2020-03-03 RX ADMIN — RISPERIDONE 2 MG: 2 TABLET ORAL at 20:10

## 2020-03-03 ASSESSMENT — PAIN SCALES - GENERAL: PAINLEVEL_OUTOF10: 0

## 2020-03-03 NOTE — GROUP NOTE
Group Therapy Note    Date: 3/3/2020    Group Start Time: 1250  Group End Time: 8471  Group Topic: Psychoeducation    [address] [place], [place]        Group Therapy Note    Attendees: 4/4       Notes:  Pts participated in recreational therapy group; Pet Therapy. Volunteer brought pet therapy trained dog on the unit and allowed pts to interact with pts. Purpose was to encourage socialization, improve mood, and introduce coping skill. Pt participated actively in the group. Pt did require Mod prompting for appropriate conversation. Pt wanting to discuss the numerous animals she has seen shot. Pt left group after ~15 minutes d/t  for probate here to see pt.      Status After Intervention:  Unchanged    Participation Level: Interactive    Participation Quality: Attentive and Sharing; with prompting      Speech:  normal      Thought Process/Content: Logical      Affective Functioning: Congruent      Mood: Bright      Level of consciousness:  Alert and Attentive      Response to Learning: Able to verbalize current knowledge/experience      Endings: None Reported    Modes of Intervention: Education, Support, Socialization and Activity      Discipline Responsible: Certified Therapeutic Recreation Specialist       Signature:  Lillei Cronin [place]

## 2020-03-03 NOTE — GROUP NOTE
Group Therapy Note    Date: 3/3/2020    Group Start Time: 0830  Group End Time: 0900    Number of Participants: 3/4    Type: Morning Goals Group/ Community Meeting    Group Topic/Objective: Set Goal For The Day and to review Unit Rules and Regulations. Patient's Goal:  Pt states her goal is \"Get a good night's rest.\"    Notes:  Pt noted with elevated mood during group. Pt noted to be religiously preoccupied and expressed \"wanting to tell everyone how the [employer] can save them. \"  Pt required Mod redirection. Pt states she is feeling \"fine\" and is grateful \"for everyday that God gives us. \"    Depression (0-10): 0    Anxiety (0-10): 0    Irritability/Aggitation (0-10): 0    Status After Intervention:  Unchanged    Participation Level:  Active Listener and Interactive    Participation Quality: Attentive and Sharing    Speech:  normal    Thought Process/Content: Religiously Preoccupied    Affective Functioning: Exaggerated    Mood: elevated    Level of consciousness:  Preoccupied    Response to Learning: Able to verbalize current knowledge/experience    Endings: None Reported    Modes of Intervention: Education, Support and Socialization    Discipline Responsible: Certified Therapeutic Recreation Specialist    Electronically signed by Maggie Montejo MA on 3/3/2020 at 9:23 AM

## 2020-03-03 NOTE — BH NOTE
Pt alert and oriented, complaint with HS medications. Pt continent, ambulates without assistive device, gait is steady. Pt got up a couple times to check the time, but would go back to bed to rest without any issues. Pt confused, but pleasant. Pt denies pain, SI/HI, & AVH. Will continue to monitor.

## 2020-03-03 NOTE — PROGRESS NOTES
Hospitalist Progress Note       Stan Mclain M.D.  3/3/2020 3:14 PM  Admit Date: 2/24/2020    PCP: Felicita Villa MD     Assessment and Plan:   1. Alzheimer's dementia with behavioral disturbance  2. htn  3. hld    - am bp reviewed and improves over the course of the day  - will continue statin medication  - remainder of psychiatric management per priary team  - please call with any medical questions    Patient known Problem List:     Essential hypertension     Lumbar herniated disc     Spinal stenosis     Restless leg syndrome     FH: CAD (coronary artery disease)     Ureteric stone     Family history of ischemic heart disease and other diseases of the circulatory system     Need for vaccination against Streptococcus pneumoniae using pneumococcal conjugate vaccine 13     At high risk for falls     Elevated alkaline phosphatase level     Diabetes mellitus type 2, diet-controlled ([address]     Mixed hyperlipidemia     Urinary incontinence in female     Shoulder pain     Gastroesophageal reflux disease     Late onset Alzheimer's disease without behavioral disturbance ([address]     Alzheimer's dementia with behavioral disturbance ([address]     CAD (coronary artery disease)     COPD (chronic obstructive pulmonary disease) ([address]     Adjustment disorder with mixed anxiety and depressed mood      Subjective:     Chief Complaint   Patient presents with    Other     when was at [facility] several weeks and potassium level was high. States (forgot when at PCP to mention she wanted her potassium level checked so she came here to have it checked). F/U:  Interval History: states there are men who are peering over her bed or across the kee. States she would like to leave. She had a discussion with her  today for probate. She states that with the way everything is angling into her room it is a fire hazard. Denied chest pain, sob, light headedness, nausea.    She did not eat very much at breakfast because she gets sick to her stomach if she eats too much. Objective: Intake/Output Summary (Last 24 hours) at 3/3/2020 1514  Last data filed at 3/3/2020 0815  Gross per 24 hour   Intake 600 ml   Output --   Net 600 ml      Vitals:   Vitals:    03/03/20 0725   BP: (!) 163/77   Pulse: 70   Resp: 16   Temp: 96.5 °F (35.8 °C)   SpO2: 96%     Physical Exam:  Gen:  awake, alert, cooperative, no apparent distress, EYES:  Lids and lashes normal, pupils equal, round and reactive to light, extra ocular muscles intact, sclera clear, conjunctiva normal  ENT:  Normocephalic, oral pharynx with moist mucus membranes, tonsils without erythema or exudates,  NECK:  Supple, symmetrical, trachea midline, no adenopathy,  LUNGS:  Clear to auscultate bilaterally, no rales ronchi or wheezing noted. CARDIOVASCULAR:  regular rate and rhythm, normal S1 and S2, no S3 or S4, and no murmur noted  ABDOMEN: Normal BS, Non tender, non distended, no HSM noted. MUSCULOSKELETAL:  ROM of all extremities grossly wnl  NEUROLOGIC: AOx 3,  Cranial nerves II-XII are grossly intact. Motor is 5 out of 5 bilaterally. Sensory is intact  SKIN:  no bruising or bleeding, normal skin color, texture, turgor and no redness, warmth, or swelling    No results found.-    DATA:    CBC   Recent Labs     03/03/20  0600   WBC 6.9   HGB 12.6   HCT 39.5         BMP   Recent Labs     03/03/20  0600      K 4.7      CO2 31   BUN 9   CREATININE 0.9     LFT'S   Recent Labs     03/03/20  0600   AST 25   ALT 21   BILITOT 0.2   ALKPHOS 133*     COAG No results for input(s): INR in the last 72 hours. CARDIAC ENZYMES  No results for input(s): CKTOTAL, CKMB, CKMBINDEX, TROPONINI in the last 72 hours.   U/A:    Lab Results   Component Value Date    COLORU YELLOW 02/17/2020    WBCUA 1 02/17/2020    RBCUA <1 02/17/2020    MUCUS RARE 02/17/2020    BACTERIA NEGATIVE 02/17/2020    CLARITYU CLEAR 02/17/2020    SPECGRAV 1.023 02/17/2020    LEUKOCYTESUR NEGATIVE 02/17/2020    BLOODU SMALL 02/17/2020         Alex Parker MD  Rounding Hospitalist

## 2020-03-03 NOTE — PROGRESS NOTES
Psychiatric Progress Note    Dorn Boas  [id number]  03/03/20    CHIEF COMPLAINT: unchanged    HPI: Dorn Boas is a 72 year old female who presented to the ER on 2/24/2020. She has not been taking any of her psychotropic medications since [facility] the [facility] on [facility]/2020 denying that she has a mental problem. Her sister was concerned about the patient's behaviors stating that she was getting into stranger's cars asking them to drive her to various places. She has been diagnosed with Alzheimer's dementia. Probate pending 3/5/2020, currently filed, forced meds approved     Pt continues to show agitation and aggression with staff however redirectable and responding to medications.     During today's interview she was alert & oriented x 3. She denies SI/HI and AVH. She denies depression and anxiety. She states she is sleeping \"well\" and that her appetite is \"good. \" Pt continues to display bizarre delusions and confusion. She stated \"The reason I'm here is because I was working in [employer] shop with my sister and I went across the street in [facility]. She told me not to do that because it was dangerous. I went anyway and that's why she put me here. \" She also made bizarre statements about family members engaging in bestiality. Pt does not understand her diagnosis and treatment plan. Pt noted she currently feels safe and comfortable on the unit.  Pt was in agreement with treatment team.  Pt was polite and cordial during the interview process. Has been taking medications and has been compliant with treatment. Tolerating medications without side effect complaints. Review of the chart notes patient making hypersexual comments in group Psychotherapy sessions requiring maximum prompting to talk about more appropriate subjects. She also displays fixation about the location of her red coat. Noted to be religiously occupied.     Allergies   Allergen Reactions    Floxin [Ofloxacin] Other (See Comments) Total Immature Neutrophil 0.02 K/CU MM       PSYCHIATRIC ASSESSMENT / MENTAL STATUS EXAM:   Vitals: Blood pressure (!) 163/77, pulse 70, temperature 96.5 °F (35.8 °C), temperature source Temporal, resp. rate 16, height 5' 2\" (1.575 m), weight 150 lb (68 kg), SpO2 96 %, not currently breastfeeding. CONSTITUTIONAL:    Appearance: appears stated age. Alert and oriented to person, place. Pt unable to understand diagnosis or current date. No acute distress. Adequate grooming and hygiene. Good eye contact. No prominent physical abnormalities. Attitude: Manner is cooperative and pleasant  Motor: No psychomotor agitation, retardation or abnormal movements noted  Speech: Clearly articulated; normal rate, volume, tone & amount. Language: intact understanding and production  Mood: euthymic  Affect: euthymic, full range, non-labile, congruent with mood and content of speech  Thought Production: Spontaneous. Thought Form: Coherent, linear, illogical & goal-directed. Tangentiality and circumstantiality. No flight of ideas or loosening of associations. Thought Content/Perceptions: No [place], no AVH, voices delusion that others are persecuting her  Insight: impaired  Judgment: impaired  Memory: Immediate, recent, and remote appear intact, though not formally tested. Attention: maintained throughout interview  Fund of knowledge: Average  Gait/Balance: WNL/WNL    IMPRESSION:   No change in diagnosis        PLAN:  Psychiatric management: medication initiation and titration, group and individual therapy, safe and therapeutic environment. Status of problem/condition: Improving slowly  Medical co-morbidities: Management per [facility] group, appreciate assistance  Legal Status: Involuntary  Disposition:estimated LOS: Pending. The treatment team reviewed with the patient the diagnosis and treatment recommendations to include the risks, benefits, and side effects of chosen medications.   PT is currently pending probate  The patient was encouraged to participate in groups. Hold current phone privileges due to exacerbation of diagnosis and instablility  Medical records, Labs, Diagnotic tests reviewed  q15 min safety checks for safety  Interval History.   Review current labs  Continue current medications - increase risperidone to 2 mg nightly  Supportive Therapy Provided  Pt had an opportunity to ask questions and address concerns  Pt encouraged to continue therapy group or individual.       Electronically signed by EVELYN Langston CNP on 3/3/2020 at 9:21 AM

## 2020-03-04 PROCEDURE — 6370000000 HC RX 637 (ALT 250 FOR IP): Performed by: NURSE PRACTITIONER

## 2020-03-04 PROCEDURE — 1240000000 HC EMOTIONAL WELLNESS R&B

## 2020-03-04 PROCEDURE — 6370000000 HC RX 637 (ALT 250 FOR IP): Performed by: PSYCHIATRY & NEUROLOGY

## 2020-03-04 PROCEDURE — 99233 SBSQ HOSP IP/OBS HIGH 50: CPT | Performed by: NURSE PRACTITIONER

## 2020-03-04 RX ORDER — DIPHENHYDRAMINE HYDROCHLORIDE 50 MG/ML
50 INJECTION INTRAMUSCULAR; INTRAVENOUS EVERY 6 HOURS PRN
Status: DISCONTINUED | OUTPATIENT
Start: 2020-03-04 | End: 2020-03-14 | Stop reason: HOSPADM

## 2020-03-04 RX ORDER — TRAZODONE HYDROCHLORIDE 100 MG/1
100 TABLET ORAL NIGHTLY
Status: DISCONTINUED | OUTPATIENT
Start: 2020-03-04 | End: 2020-03-12

## 2020-03-04 RX ADMIN — RISPERIDONE 2 MG: 2 TABLET ORAL at 21:54

## 2020-03-04 RX ADMIN — DIVALPROEX SODIUM 750 MG: 500 TABLET, EXTENDED RELEASE ORAL at 12:13

## 2020-03-04 RX ADMIN — MEMANTINE 5 MG: 5 TABLET ORAL at 12:10

## 2020-03-04 RX ADMIN — AMLODIPINE BESYLATE 10 MG: 10 TABLET ORAL at 12:14

## 2020-03-04 RX ADMIN — TRAZODONE HYDROCHLORIDE 100 MG: 100 TABLET ORAL at 21:54

## 2020-03-04 RX ADMIN — FAMOTIDINE 20 MG: 20 TABLET ORAL at 12:15

## 2020-03-04 RX ADMIN — METOPROLOL TARTRATE 25 MG: 25 TABLET ORAL at 12:14

## 2020-03-04 RX ADMIN — MULTIPLE VITAMINS W/ MINERALS TAB 1 TABLET: TAB at 12:10

## 2020-03-04 RX ADMIN — HALOPERIDOL 5 MG: 5 TABLET ORAL at 02:50

## 2020-03-04 RX ADMIN — FLUTICASONE PROPIONATE 1 SPRAY: 50 SPRAY, METERED NASAL at 12:10

## 2020-03-04 RX ADMIN — LORAZEPAM 1 MG: 1 TABLET ORAL at 02:49

## 2020-03-04 RX ADMIN — LISINOPRIL 5 MG: 5 TABLET ORAL at 12:14

## 2020-03-04 ASSESSMENT — PAIN SCALES - GENERAL
PAINLEVEL_OUTOF10: 0
PAINLEVEL_OUTOF10: 0

## 2020-03-04 NOTE — PROGRESS NOTES
Pt too lethargic to take medications this am d/t behaviors last shift. Pt was medicated with PRN meds for agitation late in the shift. Concetta Mendiola NP, aware of meds held.

## 2020-03-04 NOTE — BH NOTE
Viral Lilly was visible on the milieu at the beginning of this shift. She was polite on approach and cooperative with vitals. Medication was discussed. She , at first, refused her medication. Explained to her that her blood pressure medication was in the cup and then she was compliant with all HS medication. She rested in her bed where she napped off and on. She woke up wanting to go outside to her car. Stated there was a kitten she found on the side of the road and it was in her car. She stated that she and her sister were going to adopt it. She began looking for her sister walking down the hallway and trying all the locked doors. She approached this nurse and asked where her sister was. She was told that her sister was probable home in bed because she has to work tomorrow. She stated that was Cassandra Cunningham that had to work but she wanted to know where Susy Soria was. She pointed at a room and stated that is the room that she always sleeps in. She was reminded that she was in the hospital and that her sister were not here. She repeatedly asked to go outside, go to the hospital and for staff to call the police. She became demanding. She told staff she was going to break out a window to go outside and wrapped a piece of clothing around her hand as if she were going to hit the window but did not. She pulled a drawer out of the bedside table but did not throw it or attempt to use it to break the window. She would not accept redirection. She threatened to hit this nurse over her neck with a chair. This nurse asked her if she was threatening her and she agreed that she was but made no attempt. Explained that her behavior was not acceptable and that I had an injection that I could give her to help her relax. She became very upset and more agitated. This nurse called security. Security was able to redirect her somewhat by telling her they would get in touch with her sister in the morning.  At this time Viral Lilly agreed to take Ativan and Haldol po. Approximately 25 minutes later she was in bed asleep. Will continue to monitor for safety.

## 2020-03-04 NOTE — BH NOTE
Group Therapy Note    Date: 3/3/2020  Start Time: 1930  End Time:  2000  Number of Participants: 1      Type of Group: Nursing Education      Notes:  Discussed medication and the importance of taking it as prescribed and goals. Status After Intervention:  Unchanged    Participation Level:  Active Listener and Interactive    Participation Quality: Appropriate and Attentive      Speech:  normal      Thought Process/Content: Confused      Affective Functioning: Blunted      Mood: anxious and depressed      Level of consciousness:  Alert, Attentive and Preoccupied      Response to Learning: Progressing to goal      Endings: None Reported    Modes of Intervention: Education      Discipline Responsible: Registered Nurse      Signature:  Juwan Castillo RN

## 2020-03-04 NOTE — PROGRESS NOTES
Pt remains cooperative for the remainder of the shift. Pt refusing to shower and is wearing all of the clothes she came in with. Pt refuses to change so that her clothing can be washed. Pt asked if she can go to [facility] for a little while to find something to wear for her court hearing tomorrow. Explained to pt that she is unable to leave at this time, pt accepted w/o issue.

## 2020-03-04 NOTE — PROGRESS NOTES
Psychiatric Progress Note    Alycia Hartman  [id number]  20    CHIEF COMPLAINT: unchanged    HPI: Alycia Hartman is a 72 year old female who presented to the ER on 2020. She has not been taking any of her psychotropic medications since discharge from the [facility] on [facility]/2020 denying that she has a mental problem. Her sister was concerned about the patient's behaviors stating that she was getting into stranger's cars asking them to drive her to various places. She has been diagnosed with Alzheimer's dementia. Probate pending 3/5/2020, currently filed, forced meds approved     Pt continues to show agitation and aggression with staff however redirectable and responding to medications.     During today's interview she was sleeping soundly and could not be roused. Review of the chart notes that she is confused, anxious and depressed with a blunted affect. She did participate in 2 group Psychotherapy sessions, but required moderate prompting to discuss appropriate subjects. She wanted to discuss animals that she had seen shot. During the night she was confused stating that she wanted to go out to her car because there was a kitten in her car. She then began looking for her  sister pointing to another patient room stating that her sister always slept there. When reminded that she was in the hospital, she became agitated, exit seeking and threatening violence toward staff members. She was not redirectable. Security was called and the patient was given PRN medication for calm.     Allergies   Allergen Reactions    Floxin [Ofloxacin] Other (See Comments)     Made her \"feel weird\"       Medications Prior to Admission: ondansetron (ZOFRAN ODT) 4 MG disintegrating tablet, Take 1 tablet by mouth every 8 hours as needed for Nausea  carBAMazepine (TEGRETOL) 200 MG tablet, Take 1 tablet by mouth 2 times daily  traZODone (DESYREL) 50 MG tablet, Take 1 tablet by mouth nightly as needed for Sleep  metoprolol tartrate 115/73   Pulse: 90   Resp: 16   Temp:    SpO2:        Labs:  Recent Results (from the past 48 hour(s))   Comprehensive Metabolic Panel w/ Reflex to MG    Collection Time: 03/03/20  6:00 AM   Result Value Ref Range    Sodium 140 135 - 145 MMOL/L    Potassium 4.7 3.5 - 5.1 MMOL/L    Chloride 102 99 - 110 mMol/L    CO2 31 21 - 32 MMOL/L    BUN 9 6 - 23 MG/DL    CREATININE 0.9 0.6 - 1.1 MG/DL    Glucose 123 (H) 70 - 99 MG/DL    Calcium 9.4 8.3 - 10.6 MG/DL    Alb 3.9 3.4 - 5.0 GM/DL    Total Protein 6.8 6.4 - 8.2 GM/DL    Total Bilirubin 0.2 0.0 - 1.0 MG/DL    ALT 21 10 - 40 U/L    AST 25 15 - 37 IU/L    Alkaline Phosphatase 133 (H) 40 - 129 IU/L    GFR Non-African American >60 >60 mL/min/1.73m2    GFR African American >60 >60 mL/min/1.73m2    Anion Gap 7 4 - 16   CBC auto differential    Collection Time: 03/03/20  6:00 AM   Result Value Ref Range    WBC 6.9 4.0 - 10.5 K/CU MM    RBC 4.04 (L) 4.2 - 5.4 M/CU MM    Hemoglobin 12.6 12.5 - 16.0 GM/DL    Hematocrit 39.5 37 - 47 %    MCV 97.8 78 - 100 FL    MCH 31.2 (H) 27 - 31 PG    MCHC 31.9 (L) 32.0 - 36.0 %    RDW 11.8 11.7 - 14.9 %    Platelets 201 160 - 894 K/CU MM    MPV 10.2 7.5 - 11.1 FL    Differential Type AUTOMATED DIFFERENTIAL     Segs Relative 62.8 36 - 66 %    Lymphocytes % 24.7 24 - 44 %    Monocytes % 8.1 (H) 0 - 4 %    Eosinophils % 2.9 0 - 3 %    Basophils % 1.2 (H) 0 - 1 %    Segs Absolute 4.4 K/CU MM    Lymphocytes Absolute 1.7 K/CU MM    Monocytes Absolute 0.6 K/CU MM    Eosinophils Absolute 0.2 K/CU MM    Basophils Absolute 0.1 K/CU MM    Immature Neutrophil % 0.3 0 - 0.43 %    Total Immature Neutrophil 0.02 K/CU MM   Vitamin D 25 hydroxy    Collection Time: 03/03/20  6:00 AM   Result Value Ref Range    Vit D, 25-Hydroxy 26.30 >20 NG/ML       PSYCHIATRIC ASSESSMENT / MENTAL STATUS EXAM:   Vitals: Blood pressure 115/73, pulse 90, temperature 97.2 °F (36.2 °C), temperature source Temporal, resp.  rate 16, height 5' 2\" (1.575 m), weight 150 lb (68 kg), SpO2 current medications - discontinue carbamazepine and switch to Depakote, increase trazodone to 100 mg nightly  Supportive Therapy Provided  Pt had an opportunity to ask questions and address concerns  Pt encouraged to continue therapy group or individual.       Electronically signed by EVELYN Morton CNP on 3/4/2020 at 9:52 AM

## 2020-03-04 NOTE — PLAN OF CARE
Problem: Altered Mood, Deterioration in Function:  Goal: Ability to perform activities of daily living will improve  Description  Ability to perform activities of daily living will improve  3/3/2020 2210 by Renu Ambrosio RN  Outcome: Ongoing  3/3/2020 1705 by Oren Blackmon RN  Outcome: Ongoing  Goal: Able to verbalize reality based thinking  Description  Able to verbalize reality based thinking  3/3/2020 2210 by Renu Ambrosio RN  Outcome: Ongoing  3/3/2020 1705 by Oren Blackmon RN  Outcome: Ongoing  Goal: Skin appearance normal  Description  Skin appearance normal  3/3/2020 2210 by Renu Ambrosio RN  Outcome: Ongoing  3/3/2020 1705 by Oren Blackmon RN  Outcome: Ongoing  Goal: Maintenance of adequate nutrition will improve  Description  Maintenance of adequate nutrition will improve  3/3/2020 2210 by Renu Ambrosio RN  Outcome: Ongoing  3/3/2020 1705 by Oren Blackmon RN  Outcome: Ongoing  Goal: Ability to tolerate increased activity will improve  Description  Ability to tolerate increased activity will improve  3/3/2020 2210 by Renu Ambrosio RN  Outcome: Ongoing  3/3/2020 1705 by Oren Blackmon RN  Outcome: Ongoing  Goal: Participates in care planning  Description  Participates in care planning  3/3/2020 2210 by Renu Ambrosio RN  Outcome: Ongoing  3/3/2020 1705 by Oren Blackmon RN  Outcome: Ongoing  Goal: Patient specific goal  Description  Patient specific goal  3/3/2020 2210 by Renu Ambrosio RN  Outcome: Ongoing  3/3/2020 1705 by Oren Blackmon RN  Outcome: Ongoing

## 2020-03-04 NOTE — PROGRESS NOTES
Pt alert and oriented to self this shift. Pt slept until lunchtime d/t occurences last night. Pt pleasant and cooperative with meds and assessment. Pt denies SI,HI or AVH. Pt also denies depression or anxiety. Pt able to ambulate w/o difficulty or use of an aide.

## 2020-03-05 PROCEDURE — 97161 PT EVAL LOW COMPLEX 20 MIN: CPT

## 2020-03-05 PROCEDURE — 97166 OT EVAL MOD COMPLEX 45 MIN: CPT

## 2020-03-05 PROCEDURE — 97535 SELF CARE MNGMENT TRAINING: CPT

## 2020-03-05 PROCEDURE — 99233 SBSQ HOSP IP/OBS HIGH 50: CPT | Performed by: NURSE PRACTITIONER

## 2020-03-05 PROCEDURE — 6370000000 HC RX 637 (ALT 250 FOR IP): Performed by: PSYCHIATRY & NEUROLOGY

## 2020-03-05 PROCEDURE — 6370000000 HC RX 637 (ALT 250 FOR IP): Performed by: NURSE PRACTITIONER

## 2020-03-05 PROCEDURE — 1240000000 HC EMOTIONAL WELLNESS R&B

## 2020-03-05 PROCEDURE — 97530 THERAPEUTIC ACTIVITIES: CPT

## 2020-03-05 RX ORDER — DONEPEZIL HYDROCHLORIDE 5 MG/1
5 TABLET, FILM COATED ORAL NIGHTLY
Status: DISCONTINUED | OUTPATIENT
Start: 2020-03-05 | End: 2020-03-07

## 2020-03-05 RX ADMIN — DIVALPROEX SODIUM 750 MG: 500 TABLET, EXTENDED RELEASE ORAL at 08:42

## 2020-03-05 RX ADMIN — RISPERIDONE 2 MG: 2 TABLET ORAL at 20:07

## 2020-03-05 RX ADMIN — MULTIPLE VITAMINS W/ MINERALS TAB 1 TABLET: TAB at 08:42

## 2020-03-05 RX ADMIN — TRAZODONE HYDROCHLORIDE 100 MG: 100 TABLET ORAL at 20:07

## 2020-03-05 RX ADMIN — LISINOPRIL 5 MG: 5 TABLET ORAL at 08:42

## 2020-03-05 RX ADMIN — FLUTICASONE PROPIONATE 1 SPRAY: 50 SPRAY, METERED NASAL at 08:41

## 2020-03-05 RX ADMIN — METOPROLOL TARTRATE 25 MG: 25 TABLET ORAL at 08:42

## 2020-03-05 RX ADMIN — AMLODIPINE BESYLATE 10 MG: 10 TABLET ORAL at 08:42

## 2020-03-05 RX ADMIN — FAMOTIDINE 20 MG: 20 TABLET ORAL at 08:42

## 2020-03-05 RX ADMIN — MEMANTINE 5 MG: 5 TABLET ORAL at 08:42

## 2020-03-05 RX ADMIN — DONEPEZIL HYDROCHLORIDE 5 MG: 5 TABLET, FILM COATED ORAL at 20:07

## 2020-03-05 RX ADMIN — ATORVASTATIN CALCIUM 80 MG: 40 TABLET, FILM COATED ORAL at 20:07

## 2020-03-05 ASSESSMENT — PAIN SCALES - GENERAL
PAINLEVEL_OUTOF10: 0

## 2020-03-05 NOTE — PLAN OF CARE
Problem: Altered Mood, Deterioration in Function:  Goal: Ability to perform activities of daily living will improve  Description  Ability to perform activities of daily living will improve  3/5/2020 0903 by Maude Seip, RN  Outcome: Ongoing  3/4/2020 2339 by Janee Curtis RN  Outcome: Ongoing  Goal: Able to verbalize reality based thinking  Description  Able to verbalize reality based thinking  3/5/2020 0903 by Maude Seip, RN  Outcome: Ongoing  3/4/2020 2339 by Janee Curtis RN  Outcome: Ongoing  Goal: Skin appearance normal  Description  Skin appearance normal  3/5/2020 0903 by Maude Seip, RN  Outcome: Ongoing  3/4/2020 2339 by Janee Curtis RN  Outcome: Ongoing  Goal: Maintenance of adequate nutrition will improve  Description  Maintenance of adequate nutrition will improve  3/5/2020 0903 by Maude Seip, RN  Outcome: Ongoing  3/4/2020 2339 by Janee Curtis RN  Outcome: Ongoing  Goal: Ability to tolerate increased activity will improve  Description  Ability to tolerate increased activity will improve  3/5/2020 0903 by Maude Seip, RN  Outcome: Ongoing  3/4/2020 2339 by Janee Curtis RN  Outcome: Ongoing  Goal: Participates in care planning  Description  Participates in care planning  3/5/2020 0903 by Maude Seip, RN  Outcome: Ongoing  3/4/2020 2339 by Janee Curtis RN  Outcome: Ongoing  Goal: Patient specific goal  Description  Patient specific goal  3/5/2020 1595 by Maude Seip, RN  Outcome: Ongoing  3/4/2020 2339 by Janee Curtis RN  Outcome: Ongoing

## 2020-03-05 NOTE — PROGRESS NOTES
Occupational Therapy   Occupational Therapy Initial Assessment  Date: 3/5/2020   Patient Name: Sophia Schaeffer  MRN: [id number]     : 1950    Date of Service: 3/5/2020    Discharge Recommendations:  Subacute/Skilled Nursing Facility       Assessment   Performance deficits / Impairments: Decreased ADL status; Decreased strength;Decreased endurance;Decreased vision/visual deficit  Assessment: 69 yo female who was admitted to [facility] unit with Alzheimers dementia and unsafe behaviors. She presents as friendly and cooperative but she seemed to have difficulties staying on topic and has some wordfinding difficulties. She was wearing many layers of clothing. OT to see the pt tomaximize I with adls, transfers  Treatment Diagnosis: alzheimmers dementia  Prognosis: Fair  Decision Making: Medium Complexity  History: see eval  Exam: see eval  OT Education: OT Role;Transfer Training  REQUIRES OT FOLLOW UP: Yes  Activity Tolerance  Activity Tolerance: Patient Tolerated treatment well  Safety Devices  Safety Devices in place: Yes(on [facility])           Patient Diagnosis(es): The encounter diagnosis was Alzheimer's dementia with behavioral disturbance, unspecified timing of dementia onset ([address] has a past medical history of Bronchitis, CAD (coronary artery disease), Chest wall trauma, COPD (chronic obstructive pulmonary disease) ([address] FH: CAD (coronary artery disease), Hypertension, Kidney stone, Lumbar herniated disc, Restless leg syndrome, and Spinal stenosis. has a past surgical history that includes Hysterectomy and Ureter stent placement. Treatment Diagnosis: alzheimmers dementia      Restrictions  Restrictions/Precautions  Restrictions/Precautions: General Precautions    Subjective   General  Patient assessed for rehabilitation services?: Yes  Family / Caregiver Present: No  Subjective  Subjective: Pt is very talkative. She states that she works in [employer].   When OT asked specific questions, she would at times lose track of the topic but was always friendly  Patient Currently in Pain: Denies  Pain Assessment  Pain Assessment: 0-10  Pain Level: 0  Vital Signs  Patient Currently in Pain: Denies  Social/Functional History  Social/Functional History  Lives With: Family(sister)  Type of Home: House  Home Layout: One level  Home Access: Level entry  Bathroom Shower/Tub: Tub/Shower unit  Bathroom Toilet: Standard  Receives Help From: Family  ADL Assistance: Independent  Homemaking Assistance: Independent  Ambulation Assistance: Independent  Transfer Assistance: Independent  Active : No  Patient's  Info: Sister provides transportation but per nursing, pt has been getting into cars with strangers to take her places  Mode of Transportation: Family  Education: completed 11th grade and quit during 12th grade  Occupation: Retired  Type of occupation: [employer]  [address]: [address] [employer]  Additional Comments: Obtained partially from patient and chart       Objective   Vision: Impaired  Vision Exceptions: (cataract surgery, wears contacts for reading)  Hearing: Within functional limits    Orientation  Overall Orientation Status: (knew birthdate)  Observation/Palpation  Observation: Pt was at nursing station; she had on several layers of clothing, her overpants were falling down but she had other pants underneath. Her shirt was on backwards.   Her hair was uncombed  Balance  Sitting Balance: Independent  Standing Balance: Independent  Functional Mobility  Activity: (ambulated independently around [facility])  Toilet Transfers  Toilet - Technique: Ambulating  Toilet Transfer: Independent  ADL  Feeding: Setup  UE Bathing: (refusing but does have the AROM to do this and can simulate the actions)  LE Bathing: (refusing bath)  LE Dressing: Stand by assistance(socks and pulling up pants that are over other pants)  Tone RUE  RUE Tone: Normotonic  Tone LUE  LUE Tone:

## 2020-03-05 NOTE — PLAN OF CARE
Problem: Altered Mood, Deterioration in Function:  Goal: Ability to perform activities of daily living will improve  Description  Ability to perform activities of daily living will improve  3/4/2020 2339 by Cirilo Lomeli RN  Outcome: Ongoing  3/4/2020 1803 by Nadine Green RN  Outcome: Ongoing  Goal: Able to verbalize reality based thinking  Description  Able to verbalize reality based thinking  3/4/2020 2339 by Cirilo Lomeli RN  Outcome: Ongoing  3/4/2020 1803 by Nadine Green RN  Outcome: Ongoing  Goal: Skin appearance normal  Description  Skin appearance normal  3/4/2020 2339 by Cirilo Lomeli RN  Outcome: Ongoing  3/4/2020 1803 by Nadine Green RN  Outcome: Ongoing  Goal: Maintenance of adequate nutrition will improve  Description  Maintenance of adequate nutrition will improve  3/4/2020 2339 by Cirilo Lomeli RN  Outcome: Ongoing  3/4/2020 1803 by Nadine Green RN  Outcome: Ongoing  Goal: Ability to tolerate increased activity will improve  Description  Ability to tolerate increased activity will improve  3/4/2020 2339 by Cirilo Lomeli RN  Outcome: Ongoing  3/4/2020 1803 by Nadine Green RN  Outcome: Ongoing  Goal: Participates in care planning  Description  Participates in care planning  3/4/2020 2339 by Cirilo Lomeli RN  Outcome: Ongoing  3/4/2020 1803 by Nadine Green RN  Outcome: Ongoing  Goal: Patient specific goal  Description  Patient specific goal  3/4/2020 2339 by Cirilo Lomeli RN  Outcome: Ongoing  3/4/2020 1803 by Nadine Green RN  Outcome: Ongoing

## 2020-03-05 NOTE — GROUP NOTE
Group Therapy Note    Date: 3/5/2020    Group Start Time: 0900  Group End Time: 0930    Number of Participants: 1/2    Type: Morning Goals Group/ Community Meeting    Group Topic/Objective: Set Goal For The Day and to review Unit Rules and Regulations. Patient's Goal:  Pt states \"Go home. \"    Notes:  Pt observed to be wearing all her clothing. Pt reports she has to wear all her clothes so that she can be discharged today. Pt unwilling to accept any redirection or explanation about not needing to wear all her clothes all the time. Pt reports sleeping well, but unable to provide a numerical value. Pt would only state \"normal amounts. \" and would repeat this if prompted to expand upon answer. Pt reports feeling grateful \"everyday God gives me. \"    Depression (0-10): 0    Anxiety (0-10): 0    Irritability/Aggitation (0-10): 0    Status After Intervention:  Unchanged    Participation Level: Interactive    Participation Quality: Attentive and Sharing    Speech:  normal    Thought Process/Content: Delusional    Affective Functioning: Exaggerated    Mood: elevated    Level of consciousness:  Alert and Attentive    Response to Learning: Able to verbalize current knowledge/experience and Resistant    Endings: None Reported    Modes of Intervention: Education, Support and Socialization    Discipline Responsible: Certified Therapeutic Recreation Specialist    Electronically signed by Law Valadez MA on 3/5/2020 at 9:32 AM

## 2020-03-05 NOTE — PROGRESS NOTES
Psychiatric Progress Note    Jessy Mustafa  [id number]  [birth year]    CHIEF COMPLAINT: unchanged    HPI: Jessy Mustafa is a 72 year old female who presented to the ER on 2/24/2020. She has not been taking any of her psychotropic medications since discharge from the [facility] on 2/3/2020 denying that she has a mental problem. Her sister was concerned about the patient's behaviors stating that she was getting into stranger's cars asking them to drive her to various places. She has been diagnosed with Alzheimer's dementia. Probate pending 3/5/2020, currently filed, forced meds approved     Pt continues to show agitation and aggression with staff however redirectable and responding to medications. During today's interview she was alert & oriented x 3. She denies SI/HI and AVH. She denies depression and anxiety. She states she is sleeping \"good\" and that her appetite is \"good. \" Review of the chart notes patient not participating in group Psychotherapy sessions, refuses to shower. She is wearing all of the clothes she came in with and refuses to change her clothing so it can be washed. She is however cooperative with the staff. Pt does not understand her diagnosis and treatment plan. Pt noted she currently feels safe and comfortable on the unit.  Pt was in agreement with treatment team.  Pt was polite and cordial during the interview process. Has been taking medications and has been compliant with treatment. Tolerating medications without side effect complaints.     Allergies   Allergen Reactions    Floxin [Ofloxacin] Other (See Comments)     Made her \"feel weird\"       Medications Prior to Admission: ondansetron (ZOFRAN ODT) 4 MG disintegrating tablet, Take 1 tablet by mouth every 8 hours as needed for Nausea  carBAMazepine (TEGRETOL) 200 MG tablet, Take 1 tablet by mouth 2 times daily  traZODone (DESYREL) 50 MG tablet, Take 1 tablet by mouth nightly as needed for Sleep  metoprolol tartrate (LOPRESSOR) 25 MG tablet, Temp: 96.5 °F (35.8 °C)   SpO2: 97%       Labs:  No results found for this or any previous visit (from the past 48 hour(s)). PSYCHIATRIC ASSESSMENT / MENTAL STATUS EXAM:   Vitals: Blood pressure 121/69, pulse 86, temperature 96.5 °F (35.8 °C), temperature source Temporal, resp. rate 16, height 5' 2\" (1.575 m), weight 150 lb (68 kg), SpO2 97 %, not currently breastfeeding. CONSTITUTIONAL:    Appearance: appears stated age. Alert and oriented to person, place. Pt unable to understand diagnosis or current date. No acute distress. Adequate grooming and hygiene. Good eye contact. No prominent physical abnormalities. Attitude: Manner is cooperative and pleasant  Motor: No psychomotor agitation, retardation or abnormal movements noted  Speech: Clearly articulated; normal rate, volume, tone & amount. Language: intact understanding and production  Mood: euthymic  Affect: euthymic, full range, non-labile, congruent with mood and content of speech  Thought Production: Spontaneous. Thought Form: Coherent, linear, illogical & goal-directed. Tangentiality and circumstantiality. No flight of ideas or loosening of associations. Thought Content/Perceptions: No [place], no AVH, voices delusion that others are persecuting her  Insight: impaired  Judgment: impaired  Memory: Immediate, recent, and remote appear intact, though not formally tested. Attention: maintained throughout interview  Fund of knowledge: Average  Gait/Balance: WNL/WNL    IMPRESSION:   No change in diagnosis        PLAN:  Psychiatric management: medication initiation and titration, group and individual therapy, safe and therapeutic environment. Status of problem/condition: Improving slowly  Medical co-morbidities: Management per [facility] group, appreciate assistance  Legal Status: Involuntary  Disposition:estimated LOS: Pending.   The treatment team reviewed with the patient the diagnosis and treatment recommendations to include the risks, benefits, and side effects of chosen medications. PT is currently pending probate  The patient was encouraged to participate in groups. Hold current phone privileges due to exacerbation of diagnosis and instablility  Medical records, Labs, Diagnotic tests reviewed  q15 min safety checks for safety  Interval History.   Review current labs  Continue current medications - discontinue carbamazepine and switch to Depakote, increase trazodone to 100 mg nightly  Supportive Therapy Provided  Pt had an opportunity to ask questions and address concerns  Pt encouraged to continue therapy group or individual.    Electronically signed by EVELYN Levi CNP on 3/5/2020 at 8:41 AM

## 2020-03-05 NOTE — BH NOTE
Group Therapy Note    Date: 3/4/2020  Start Time: 2000  End Time:  2030  Number of Participants: 1      Type of Group: Wrap Up    Patient's Goal:  No goal stated, did not participate in AM goals group    Notes:  Patient asleep, not willing to participate in group    Status After Intervention:  Unchanged    Participation Level: None    Endings: None Reported    Modes of Intervention: Support      Discipline Responsible: Registered Nurse      Signature:  Madelyn Caputo RN

## 2020-03-06 PROCEDURE — 1240000000 HC EMOTIONAL WELLNESS R&B

## 2020-03-06 PROCEDURE — 97530 THERAPEUTIC ACTIVITIES: CPT

## 2020-03-06 PROCEDURE — 6370000000 HC RX 637 (ALT 250 FOR IP): Performed by: PSYCHIATRY & NEUROLOGY

## 2020-03-06 PROCEDURE — 6370000000 HC RX 637 (ALT 250 FOR IP): Performed by: NURSE PRACTITIONER

## 2020-03-06 PROCEDURE — 99233 SBSQ HOSP IP/OBS HIGH 50: CPT | Performed by: NURSE PRACTITIONER

## 2020-03-06 RX ADMIN — DIVALPROEX SODIUM 750 MG: 500 TABLET, EXTENDED RELEASE ORAL at 08:09

## 2020-03-06 RX ADMIN — FLUTICASONE PROPIONATE 1 SPRAY: 50 SPRAY, METERED NASAL at 08:09

## 2020-03-06 RX ADMIN — DONEPEZIL HYDROCHLORIDE 5 MG: 5 TABLET, FILM COATED ORAL at 20:57

## 2020-03-06 RX ADMIN — LISINOPRIL 5 MG: 5 TABLET ORAL at 08:10

## 2020-03-06 RX ADMIN — ATORVASTATIN CALCIUM 80 MG: 40 TABLET, FILM COATED ORAL at 20:57

## 2020-03-06 RX ADMIN — AMLODIPINE BESYLATE 10 MG: 10 TABLET ORAL at 08:10

## 2020-03-06 RX ADMIN — MULTIPLE VITAMINS W/ MINERALS TAB 1 TABLET: TAB at 08:09

## 2020-03-06 RX ADMIN — TRAZODONE HYDROCHLORIDE 100 MG: 100 TABLET ORAL at 20:57

## 2020-03-06 RX ADMIN — MEMANTINE 5 MG: 5 TABLET ORAL at 08:09

## 2020-03-06 RX ADMIN — RISPERIDONE 2 MG: 2 TABLET ORAL at 20:58

## 2020-03-06 RX ADMIN — METOPROLOL TARTRATE 25 MG: 25 TABLET ORAL at 20:57

## 2020-03-06 RX ADMIN — FAMOTIDINE 20 MG: 20 TABLET ORAL at 08:10

## 2020-03-06 RX ADMIN — LORAZEPAM 1 MG: 1 TABLET ORAL at 20:57

## 2020-03-06 RX ADMIN — METOPROLOL TARTRATE 25 MG: 25 TABLET ORAL at 08:10

## 2020-03-06 ASSESSMENT — PAIN SCALES - GENERAL: PAINLEVEL_OUTOF10: 0

## 2020-03-06 NOTE — PROGRESS NOTES
Patient had 1 episode of loose stool within minutes of sitting down to eat her lunch. Patient was incontinent of loose stool in her depends and pants. Patient was washed up and given clean clothes to wear. Patient did not want to eat anymore of her lunch and returned to her room where she c/o some nausea. Sprite was given and patient instructed to sip slowly and rest.  I checked on patient patient a few minutes later and she stated she still felt a little nauseous but the sprite was helping. Patient is currently resting quietly on her bed.

## 2020-03-06 NOTE — PROGRESS NOTES
Therapist completed 1:1 session with pt at ~1115. Pt offered leisure activities and pt chose to participate in Card Activities. Once in the group room, pt then denied knowing any card activities to participate in. Therapist attempted to instruct pt in learning new leisure activity; Zan. Pt unable to follow 1 step instructions/demonstration. Therapist then attempted to have pt sort cards into their 4 different suits. Pt required Mod prompting with this activity, but was able to complete.     Electronically signed by JIM Burns MA on 3/6/2020 at 1:44 PM

## 2020-03-06 NOTE — GROUP NOTE
Group Therapy Note    Date: 3/6/2020    Group Start Time: 0830  Group End Time: 0900    Number of Participants: 2/3    Type: Morning Goals Group/ Community Meeting    Group Topic/Objective: Set Goal For The Day and to review Unit Rules and Regulations. Patient's Goal:  Pt states \"Go home. \"    Notes:  Pt presented as pleasant and cooperative during group. Pt shared having recently taken a shower and expressed feeling better after taking her shower. Pt given encouragement for completing self-care. Pt remains focused on being discharged, but noted to be more accepting of information r/t the doctor needing to be the one to discharge her. Pt expressed feeling \"very good\" and is grateful for \"this day. \"  Pt also shared she feels she has slept \"8-9 hours. \"    Depression (0-10): 0    Anxiety (0-10): 0    Irritability/Aggitation (0-10): 0    Status After Intervention:  Improved    Participation Level:  Active Listener and Interactive    Participation Quality: Appropriate, Attentive and Sharing    Speech:  normal    Thought Process/Content: Logical    Affective Functioning: Congruent    Mood: Bright    Level of consciousness:  Alert and Attentive    Response to Learning: Able to verbalize current knowledge/experience    Endings: None Reported    Modes of Intervention: Education, Support and Socialization    Discipline Responsible: Certified Therapeutic Recreation Specialist     Electronically signed by Mookie Farooq MA on 3/6/2020 at 9:05 AM

## 2020-03-06 NOTE — PROGRESS NOTES
tablet, Take 1 tablet by mouth daily  lisinopril (PRINIVIL;ZESTRIL) 5 MG tablet, Take 1 tablet by mouth daily  Incontinence Supplies MISC, Pull-ups size large  famotidine (PEPCID) 20 MG tablet, Take 1 tablet by mouth 2 times daily  atorvastatin (LIPITOR) 80 MG tablet, Take 1 tablet by mouth daily  fluticasone (FLONASE) 50 MCG/ACT nasal spray, 1 spray by Each Nostril route daily  Multiple Vitamins-Minerals (MULTIVITAMIN ADULT PO), Take by mouth    Past Medical History:   Diagnosis Date    Bronchitis     CAD (coronary artery disease)     Chest wall trauma     COPD (chronic obstructive pulmonary disease) ([address]     FH: CAD (coronary artery disease)     brother with cabg in his 45s    Hypertension     Kidney stone     Lumbar herniated disc     Restless leg syndrome     Spinal stenosis         Patient Active Problem List   Diagnosis    Essential hypertension    Lumbar herniated disc    Spinal stenosis    Restless leg syndrome    FH: CAD (coronary artery disease)    Ureteric stone    Family history of ischemic heart disease and other diseases of the circulatory system    Need for vaccination against Streptococcus pneumoniae using pneumococcal conjugate vaccine 13    At high risk for falls    Elevated alkaline phosphatase level    Diabetes mellitus type 2, diet-controlled (HCC)    Mixed hyperlipidemia    Urinary incontinence in female    Shoulder pain    Gastroesophageal reflux disease    Late onset Alzheimer's disease without behavioral disturbance ([address]    Alzheimer's dementia with behavioral disturbance ([address]    CAD (coronary artery disease)    COPD (chronic obstructive pulmonary disease) (HCC)    Adjustment disorder with mixed anxiety and depressed mood       Review of Systems    OBJECTIVE  Vital Signs:  Vitals:    03/06/20 0923   BP: 137/72   Pulse: 104   Resp: 16   Temp: 97.9 °F (36.6 °C)   SpO2: 98%       Labs:  No results found for this or any previous visit (from the past 50 hour(s)). PSYCHIATRIC ASSESSMENT / MENTAL STATUS EXAM:   Vitals: Blood pressure 137/72, pulse 104, temperature 97.9 °F (36.6 °C), temperature source Temporal, resp. rate 16, height 5' 2\" (1.575 m), weight 150 lb (68 kg), SpO2 98 %, not currently breastfeeding. CONSTITUTIONAL:    Appearance: appears stated age. Alert and oriented to person, place. Pt unable to understand diagnosis or current date. No acute distress. Adequate grooming and hygiene. Good eye contact. No prominent physical abnormalities. Attitude: Manner is cooperative and pleasant  Motor: No psychomotor agitation, retardation or abnormal movements noted  Speech: Clearly articulated; normal rate, volume, tone & amount. Language: intact understanding and production  Mood: euthymic  Affect: euthymic, full range, non-labile, congruent with mood and content of speech  Thought Production: Spontaneous. Thought Form: Coherent, linear, illogical & goal-directed. Tangentiality and circumstantiality. No flight of ideas or loosening of associations. Thought Content/Perceptions: No [place], no AVH, voices delusion that others are persecuting her  Insight: impaired  Judgment: impaired  Memory: Immediate, recent, and remote appear intact, though not formally tested. Attention: maintained throughout interview  Fund of knowledge: Average  Gait/Balance: WNL/WNL    IMPRESSION:   No change in diagnosis        PLAN:  Psychiatric management: medication initiation and titration, group and individual therapy, safe and therapeutic environment. Status of problem/condition: Improving slowly  Medical co-morbidities: Management per [facility] group, appreciate assistance  Legal Status: Involuntary  Disposition:estimated LOS: Pending. The treatment team reviewed with the patient the diagnosis and treatment recommendations to include the risks, benefits, and side effects of chosen medications.   PT is currently pending probate  The patient was encouraged to participate

## 2020-03-06 NOTE — BH NOTE
Group Therapy Note    Date: 3/5/2020  Start Time: 1930  End Time:  2000  Number of Participants: 1      Type of Group: Nursing Education      Notes:  Education provided on medication uses and side effects    Status After Intervention:  Improved    Participation Level:  Active Listener    Participation Quality: Attentive      Speech:  normal      Thought Process/Content: Circumstantial      Affective Functioning: Congruent      Mood: neutral      Level of consciousness:  Alert      Response to Learning: Progressing to goal      Endings: None Reported    Modes of Intervention: Education      Discipline Responsible: Registered Nurse      Signature:  Madelyn Caputo RN

## 2020-03-07 PROCEDURE — 6370000000 HC RX 637 (ALT 250 FOR IP): Performed by: NURSE PRACTITIONER

## 2020-03-07 PROCEDURE — 6370000000 HC RX 637 (ALT 250 FOR IP): Performed by: PSYCHIATRY & NEUROLOGY

## 2020-03-07 PROCEDURE — 99232 SBSQ HOSP IP/OBS MODERATE 35: CPT | Performed by: PSYCHIATRY & NEUROLOGY

## 2020-03-07 PROCEDURE — 1240000000 HC EMOTIONAL WELLNESS R&B

## 2020-03-07 RX ORDER — DONEPEZIL HYDROCHLORIDE 10 MG/1
10 TABLET, FILM COATED ORAL NIGHTLY
Status: DISCONTINUED | OUTPATIENT
Start: 2020-03-07 | End: 2020-03-14 | Stop reason: HOSPADM

## 2020-03-07 RX ORDER — MEMANTINE HYDROCHLORIDE 5 MG/1
10 TABLET ORAL DAILY
Status: DISCONTINUED | OUTPATIENT
Start: 2020-03-08 | End: 2020-03-14 | Stop reason: HOSPADM

## 2020-03-07 RX ADMIN — ATORVASTATIN CALCIUM 80 MG: 40 TABLET, FILM COATED ORAL at 20:40

## 2020-03-07 RX ADMIN — FAMOTIDINE 20 MG: 20 TABLET ORAL at 08:05

## 2020-03-07 RX ADMIN — AMLODIPINE BESYLATE 10 MG: 10 TABLET ORAL at 08:35

## 2020-03-07 RX ADMIN — DONEPEZIL HYDROCHLORIDE 10 MG: 10 TABLET ORAL at 20:40

## 2020-03-07 RX ADMIN — DIVALPROEX SODIUM 750 MG: 500 TABLET, EXTENDED RELEASE ORAL at 08:36

## 2020-03-07 RX ADMIN — LISINOPRIL 5 MG: 5 TABLET ORAL at 08:36

## 2020-03-07 RX ADMIN — MULTIPLE VITAMINS W/ MINERALS TAB 1 TABLET: TAB at 08:36

## 2020-03-07 RX ADMIN — TRAZODONE HYDROCHLORIDE 100 MG: 100 TABLET ORAL at 20:40

## 2020-03-07 RX ADMIN — METOPROLOL TARTRATE 25 MG: 25 TABLET ORAL at 20:40

## 2020-03-07 RX ADMIN — RISPERIDONE 2 MG: 2 TABLET ORAL at 20:40

## 2020-03-07 RX ADMIN — MEMANTINE 5 MG: 5 TABLET ORAL at 08:36

## 2020-03-07 RX ADMIN — METOPROLOL TARTRATE 25 MG: 25 TABLET ORAL at 08:36

## 2020-03-07 ASSESSMENT — PAIN SCALES - GENERAL
PAINLEVEL_OUTOF10: 0
PAINLEVEL_OUTOF10: 0

## 2020-03-07 NOTE — PLAN OF CARE
Problem: Altered Mood, Deterioration in Function:  Goal: Ability to perform activities of daily living will improve  Description  Ability to perform activities of daily living will improve  3/6/2020 2140 by Clementine Ziegler LPN  Outcome: Ongoing  3/6/2020 1653 by Ernie Nava LPN  Outcome: Ongoing  Goal: Able to verbalize reality based thinking  Description  Able to verbalize reality based thinking  3/6/2020 2140 by Clementine Ziegler LPN  Outcome: Ongoing  3/6/2020 1653 by Ernie Nava LPN  Outcome: Ongoing  Goal: Skin appearance normal  Description  Skin appearance normal  3/6/2020 2140 by Clementine Ziegler LPN  Outcome: Ongoing  3/6/2020 1653 by Ernie Nava LPN  Outcome: Ongoing  Goal: Maintenance of adequate nutrition will improve  Description  Maintenance of adequate nutrition will improve  3/6/2020 2140 by Clementine Ziegler LPN  Outcome: Ongoing  3/6/2020 1653 by Ernie Nava LPN  Outcome: Ongoing  Goal: Ability to tolerate increased activity will improve  Description  Ability to tolerate increased activity will improve  3/6/2020 2140 by Clementine Ziegler LPN  Outcome: Ongoing  3/6/2020 1653 by Ernie Nava LPN  Outcome: Ongoing  Goal: Participates in care planning  Description  Participates in care planning  3/6/2020 2140 by Clementine Ziegler LPN  Outcome: Ongoing  3/6/2020 1653 by Ernie Nava LPN  Outcome: Ongoing  Goal: Patient specific goal  Description  Patient specific goal  3/6/2020 2140 by Clementine Ziegler LPN  Outcome: Ongoing  3/6/2020 1653 by Ernie Nava LPN  Outcome: Ongoing

## 2020-03-07 NOTE — BH NOTE
Patient was in bed at start of shift. She has been pleasant and cooperative, medication compliant. Alert with intermittent confused. She is oriented to self. She has been talkative. She has been appropriate and congruent with conversation. No behaviors thus far and easily redirected. Has denied pain, SI,HI, AVH. Vitals were 97.2-84-/63-99% on room air. LCTA, bowel sounds present in all four quadrants. Call light is within reach and monitoring for safety continues.

## 2020-03-07 NOTE — BH NOTE
Patient has been pleasant and directable this shift. At the start of shift she offered c/o upset stomach. After taking her morning medications and eating breakfast, she was able to state that she did feel better. She was able to shower and has remained directable. No overt distress or anxiety noted, no further c/o offered. She did lay down on her bed after her lunch and was able to take a nap. Respirations remained unlabored and even. Up for dinner and visiting hours, which she was able to visit with her sister. She has been alert and oriented X's 3 when up. Continued monitoring to occur.

## 2020-03-07 NOTE — PROGRESS NOTES
Psychiatric Progress Note    Kacie Hartman  [id number]  [birth year]    CHIEF COMPLAINT: unchanged    HPI: Kacie Hartman is a 72 year old female who presented to the ER on 2/24/2020. She has not been taking any of her psychotropic medications since discharge from the [facility] on 2/3/2020 denying that she has a mental problem. Her sister was concerned about the patient's behaviors stating that she was getting into stranger's cars asking them to drive her to various places. She has been diagnosed with Alzheimer's dementia. Probate placed 3/5/2020, forced meds approved     Pt continues to show improvement redirectable and responding to medications.     During today's interview she was alert & oriented x 3. She denies SI/HI and AVH. She denies depression and anxiety. She states she is sleeping \"very good\" and that her appetite is \"very good. \" Pt continues to display bizarre delusions and confusion. Pt does not understand her diagnosis and treatment plan. Pt noted she currently feels safe and comfortable on the unit.  Pt was in agreement with treatment team.  Pt was polite and cordial during the interview process. Has been taking medications and has been compliant with treatment. Tolerating medications without side effect complaints.     Review of the chart notes patient's speech pressured, tangential and circumstantial.    Allergies   Allergen Reactions    Floxin [Ofloxacin] Other (See Comments)     Made her \"feel weird\"       Medications Prior to Admission: ondansetron (ZOFRAN ODT) 4 MG disintegrating tablet, Take 1 tablet by mouth every 8 hours as needed for Nausea  carBAMazepine (TEGRETOL) 200 MG tablet, Take 1 tablet by mouth 2 times daily  traZODone (DESYREL) 50 MG tablet, Take 1 tablet by mouth nightly as needed for Sleep  metoprolol tartrate (LOPRESSOR) 25 MG tablet, Take 1 tablet by mouth 2 times daily  memantine (NAMENDA) 5 MG tablet, Take 1 tablet by mouth daily  amLODIPine (NORVASC) 10 MG tablet, Take 1 participate in groups. Hold current phone privileges due to exacerbation of diagnosis and instablility  Medical records, Labs, Diagnotic tests reviewed  q15 min safety checks for safety  Interval History.   Review current labs  Continue current medications  Supportive Therapy Provided  Pt had an opportunity to ask questions and address concerns  Pt encouraged to continue therapy group or individual.       Electronically signed by Sindy Chaudhry DO on 3/7/2020 at 11:23 AM

## 2020-03-08 PROCEDURE — 6370000000 HC RX 637 (ALT 250 FOR IP): Performed by: PSYCHIATRY & NEUROLOGY

## 2020-03-08 PROCEDURE — 1240000000 HC EMOTIONAL WELLNESS R&B

## 2020-03-08 PROCEDURE — 99232 SBSQ HOSP IP/OBS MODERATE 35: CPT | Performed by: PSYCHIATRY & NEUROLOGY

## 2020-03-08 PROCEDURE — 6370000000 HC RX 637 (ALT 250 FOR IP): Performed by: NURSE PRACTITIONER

## 2020-03-08 RX ADMIN — DIVALPROEX SODIUM 750 MG: 500 TABLET, EXTENDED RELEASE ORAL at 08:10

## 2020-03-08 RX ADMIN — DONEPEZIL HYDROCHLORIDE 10 MG: 10 TABLET ORAL at 20:28

## 2020-03-08 RX ADMIN — FAMOTIDINE 20 MG: 20 TABLET ORAL at 07:47

## 2020-03-08 RX ADMIN — METOPROLOL TARTRATE 25 MG: 25 TABLET ORAL at 20:28

## 2020-03-08 RX ADMIN — TRAZODONE HYDROCHLORIDE 100 MG: 100 TABLET ORAL at 20:28

## 2020-03-08 RX ADMIN — ATORVASTATIN CALCIUM 80 MG: 40 TABLET, FILM COATED ORAL at 20:28

## 2020-03-08 RX ADMIN — METOPROLOL TARTRATE 25 MG: 25 TABLET ORAL at 08:11

## 2020-03-08 RX ADMIN — MULTIPLE VITAMINS W/ MINERALS TAB 1 TABLET: TAB at 08:11

## 2020-03-08 RX ADMIN — RISPERIDONE 2 MG: 2 TABLET ORAL at 20:28

## 2020-03-08 RX ADMIN — MEMANTINE 10 MG: 5 TABLET ORAL at 08:10

## 2020-03-08 ASSESSMENT — PAIN SCALES - GENERAL
PAINLEVEL_OUTOF10: 0
PAINLEVEL_OUTOF10: 0

## 2020-03-08 NOTE — GROUP NOTE
Group Therapy Note    Date: 3/8/2020    Group Start Time: 1030  Group End Time: 1100  Group Topic: Psychoeducation    Nicki [address]., MSW, LSW        Group Therapy Note    Attendees: 3/4     Patient's Goal:  Toss and Talk Ball    Notes:  Patient was active and participated in group.     Status After Intervention:  Improved    Participation Level: Interactive    Participation Quality: Appropriate, Attentive, Sharing and Supportive    Speech:  normal    Thought Process/Content: Linear    Affective Functioning: Congruent    Mood: elevated    Level of consciousness:  Alert and Attentive    Response to Learning: Able to verbalize current knowledge/experience, Able to verbalize/acknowledge new learning, Able to retain information and Capable of insight    Endings: None Reported    Modes of Intervention: Socialization, Exploration and Activity    Discipline Responsible: /Counselor      Signature:  Nohemi Stallings MSW, LSJUN

## 2020-03-08 NOTE — PROGRESS NOTES
Psychiatric Progress Note    Krystal Jones  [id number]  [birth year]    CHIEF COMPLAINT: unchanged    HPI: Krystal Jones is a 72 year old female who presented to the ER on 2/24/2020. She has not been taking any of her psychotropic medications since discharge from the [facility] on 2/3/2020 denying that she has a mental problem. Her sister was concerned about the patient's behaviors stating that she was getting into stranger's cars asking them to drive her to various places. She has been diagnosed with Alzheimer's dementia. Probate placed 3/5/2020, forced meds approved     Pt continues to show improvement redirectable and responding to medications.     During today's interview she was alert & oriented x 3. She denies SI/HI and AVH. She denies depression and anxiety. She states she is sleeping \"very good\" and that her appetite is \"better. \" Pt continues to display bizarre delusions and confusion. Pt does not understand her diagnosis and treatment plan. Pt noted she currently feels safe and comfortable on the unit.  Pt was in agreement with treatment team.  Pt was polite and cordial during the interview process. Has been taking medications and has been compliant with treatment. Tolerating medications without side effect complaints.     Review of the chart notes patient's speech pressured, tangential and circumstantial.    Allergies   Allergen Reactions    Floxin [Ofloxacin] Other (See Comments)     Made her \"feel weird\"       Medications Prior to Admission: ondansetron (ZOFRAN ODT) 4 MG disintegrating tablet, Take 1 tablet by mouth every 8 hours as needed for Nausea  carBAMazepine (TEGRETOL) 200 MG tablet, Take 1 tablet by mouth 2 times daily  traZODone (DESYREL) 50 MG tablet, Take 1 tablet by mouth nightly as needed for Sleep  metoprolol tartrate (LOPRESSOR) 25 MG tablet, Take 1 tablet by mouth 2 times daily  memantine (NAMENDA) 5 MG tablet, Take 1 tablet by mouth daily  amLODIPine (NORVASC) 10 MG tablet, Take 1 tablet by mouth daily  lisinopril (PRINIVIL;ZESTRIL) 5 MG tablet, Take 1 tablet by mouth daily  Incontinence Supplies MISC, Pull-ups size large  famotidine (PEPCID) 20 MG tablet, Take 1 tablet by mouth 2 times daily  atorvastatin (LIPITOR) 80 MG tablet, Take 1 tablet by mouth daily  fluticasone (FLONASE) 50 MCG/ACT nasal spray, 1 spray by Each Nostril route daily  Multiple Vitamins-Minerals (MULTIVITAMIN ADULT PO), Take by mouth    Past Medical History:   Diagnosis Date    Bronchitis     CAD (coronary artery disease)     Chest wall trauma     COPD (chronic obstructive pulmonary disease) ([address]     FH: CAD (coronary artery disease)     brother with cabg in his 45s    Hypertension     Kidney stone     Lumbar herniated disc     Restless leg syndrome     Spinal stenosis         Patient Active Problem List   Diagnosis    Essential hypertension    Lumbar herniated disc    Spinal stenosis    Restless leg syndrome    FH: CAD (coronary artery disease)    Ureteric stone    Family history of ischemic heart disease and other diseases of the circulatory system    Need for vaccination against Streptococcus pneumoniae using pneumococcal conjugate vaccine 13    At high risk for falls    Elevated alkaline phosphatase level    Diabetes mellitus type 2, diet-controlled (HCC)    Mixed hyperlipidemia    Urinary incontinence in female    Shoulder pain    Gastroesophageal reflux disease    Late onset Alzheimer's disease without behavioral disturbance ([address]    Alzheimer's dementia with behavioral disturbance ([address]    CAD (coronary artery disease)    COPD (chronic obstructive pulmonary disease) (HCC)    Adjustment disorder with mixed anxiety and depressed mood       Review of Systems    OBJECTIVE  Vital Signs:  Vitals:    03/08/20 0818   BP: 118/64   Pulse: 70   Resp: 16   Temp: 97.6 °F (36.4 °C)   SpO2: 96%       Labs:  No results found for this or any previous visit (from the past 48 hour(s)).     PSYCHIATRIC ASSESSMENT / MENTAL STATUS EXAM:   Vitals: Blood pressure 118/64, pulse 70, temperature 97.6 °F (36.4 °C), temperature source Temporal, resp. rate 16, height 5' 2\" (1.575 m), weight 150 lb (68 kg), SpO2 96 %, not currently breastfeeding. CONSTITUTIONAL:    Appearance: appears stated age. Alert and oriented to person, place. Pt unable to understand diagnosis or current date. No acute distress. Adequate grooming and hygiene. Good eye contact. No prominent physical abnormalities. Attitude: Manner is cooperative and pleasant  Motor: No psychomotor agitation, retardation or abnormal movements noted  Speech: Clearly articulated; normal rate, volume, tone & amount. Language: intact understanding and production  Mood: euthymic  Affect: euthymic, full range, non-labile, congruent with mood and content of speech  Thought Production: Spontaneous. Thought Form: Coherent, linear, illogical & goal-directed. Tangentiality and circumstantiality. No flight of ideas or loosening of associations. Thought Content/Perceptions: No [place], no AVH, voices delusion that others are persecuting her, and need to do financal chores and get back to work. Insight: impaired  Judgment: impaired  Memory: Immediate, recent, and remote appear intact, though not formally tested. Attention: maintained throughout interview  Fund of knowledge: Average  Gait/Balance: WNL/WNL    IMPRESSION:   No change in diagnosis        PLAN:  Psychiatric management: medication initiation and titration, group and individual therapy, safe and therapeutic environment. Status of problem/condition: Improving slowly  Medical co-morbidities: Management per [facility] group, appreciate assistance  Legal Status: Involuntary  Disposition:estimated LOS: Pending. The treatment team reviewed with the patient the diagnosis and treatment recommendations to include the risks, benefits, and side effects of chosen medications.   PT is currently pending probate   The patient

## 2020-03-08 NOTE — GROUP NOTE
Group Therapy Note    Date: 3/8/2020    Group Start Time: 0900  Group End Time: 0930  Group Topic: [facility]    ISABELLA Granados LSW        Group Therapy Note    Attendees: 4/4     Patient's Goal:  \"Talk with the doctor\"    Notes:  Patient was active and participated in group. Patient rated her depression as 0, anxiety as 0, and agitation as 0. Patient stated she slept \"a good amount\" and is grateful for \"today\".     Status After Intervention:  Improved    Participation Level: Interactive    Participation Quality: Appropriate, Attentive and Sharing    Speech:  normal    Thought Process/Content: Linear    Affective Functioning: Congruent    Mood: elevated    Level of consciousness:  Alert and Attentive    Response to Learning: Capable of insight    Endings: None Reported    Modes of Intervention: Clarifying    Discipline Responsible: /Counselor      Signature:  ISABELLA Granados LSW

## 2020-03-08 NOTE — BH NOTE
Patient has been pleasant and directable today. She has been compliant with vital signs and medications. Free from complaints and is able to interact appropriately with other patients. Continued monitoring for falls and safety.

## 2020-03-08 NOTE — GROUP NOTE
Group Therapy Note    Date: 3/8/2020    Group Start Time: 1100  Group End Time: 1130  Group Topic: Recreational    [address] MSW, MELINDAW        Group Therapy Note    Attendees: 3/4    Patient's Goal:  5 Second Rule    Notes:  Patient was active and participated in group.     Status After Intervention:  Improved    Participation Level: Interactive    Participation Quality: Appropriate, Attentive, Sharing and Supportive    Speech:  normal    Thought Process/Content: Linear    Affective Functioning: Congruent    Mood: elevated    Level of consciousness:  Alert and Attentive    Response to Learning: Able to verbalize current knowledge/experience, Able to verbalize/acknowledge new learning, Able to retain information and Capable of insight    Endings: None Reported    Modes of Intervention: Socialization, Exploration and Activity    Discipline Responsible: /Counselor      Signature:  ISABELLA Rascon, KASEY

## 2020-03-08 NOTE — BH NOTE
Group Therapy Note    Date: 3/7/2020  Start Time: 1930  End Time:  2000  Number of Participants: 1      Type of Group: Nursing Education      Notes:  Educated on her medication. Asked what they were    Status After Intervention:  Improved    Participation Level:  Active Listener and Interactive    Participation Quality: Appropriate      Speech:  normal      Thought Process/Content: Logical      Affective Functioning: Congruent      Mood: euthymic      Level of consciousness:  Alert      Response to Learning: Able to retain information      Endings: None Reported    Modes of Intervention: Education      Discipline Responsible: Licensed Practical Nurse      Signature:  Hair Gonzalez LPN

## 2020-03-08 NOTE — BH NOTE
Patient has been in bed this whole shift. She has been pleasant and cooperative. Took all her medicine without any issues. VS were 97.8-78-/60-99% on room air. Lungs clear and bowel sounds present x 4. Denied pain, SI, HI, AVH. She has been in bed sleeping up to this point. Call light is within reach and rounding done to ensure safety.

## 2020-03-08 NOTE — PLAN OF CARE
Problem: Altered Mood, Deterioration in Function:  Goal: Ability to perform activities of daily living will improve  Description: Ability to perform activities of daily living will improve  3/7/2020 2134 by Cesilia French LPN  Outcome: Ongoing  3/7/2020 1022 by Alejandrina Isbell RN  Outcome: Ongoing  Goal: Able to verbalize reality based thinking  Description: Able to verbalize reality based thinking  3/7/2020 2134 by Cesilia French LPN  Outcome: Ongoing  3/7/2020 1022 by Alejandrina Isbell RN  Outcome: Ongoing  Goal: Skin appearance normal  Description: Skin appearance normal  3/7/2020 2134 by Cesilia French LPN  Outcome: Ongoing  3/7/2020 1022 by Alejandrina Isbell RN  Outcome: Ongoing  Goal: Maintenance of adequate nutrition will improve  Description: Maintenance of adequate nutrition will improve  3/7/2020 2134 by Cesilia French LPN  Outcome: Ongoing  3/7/2020 1022 by Alejandrina Isbell RN  Outcome: Ongoing  Goal: Ability to tolerate increased activity will improve  Description: Ability to tolerate increased activity will improve  3/7/2020 2134 by Cesilia French LPN  Outcome: Ongoing  3/7/2020 1022 by Alejandrina Isbell RN  Outcome: Ongoing  Goal: Participates in care planning  Description: Participates in care planning  3/7/2020 2134 by Cesilia French LPN  Outcome: Ongoing  3/7/2020 1022 by Alejandrina Isbell RN  Outcome: Ongoing  Goal: Patient specific goal  Description: Patient specific goal  3/7/2020 2134 by Cesilia French LPN  Outcome: Ongoing  3/7/2020 1022 by Alejandrina Isbell RN  Outcome: Ongoing

## 2020-03-08 NOTE — PLAN OF CARE
Problem: Altered Mood, Deterioration in Function:  Goal: Ability to perform activities of daily living will improve  Description: Ability to perform activities of daily living will improve  3/8/2020 1219 by Amanda Schuler RN  Outcome: Ongoing  3/7/2020 2134 by Gloria Sheehan LPN  Outcome: Ongoing  Goal: Able to verbalize reality based thinking  Description: Able to verbalize reality based thinking  3/8/2020 1219 by Amanda Schuler RN  Outcome: Ongoing  3/7/2020 2134 by Gloria Sheehan LPN  Outcome: Ongoing  Goal: Skin appearance normal  Description: Skin appearance normal  3/8/2020 1219 by Amanda Schuler RN  Outcome: Ongoing  3/7/2020 2134 by Gloria Sheehan LPN  Outcome: Ongoing  Goal: Maintenance of adequate nutrition will improve  Description: Maintenance of adequate nutrition will improve  3/8/2020 1219 by Amanda Schuler RN  Outcome: Ongoing  3/7/2020 2134 by Gloria Sheehan LPN  Outcome: Ongoing  Goal: Ability to tolerate increased activity will improve  Description: Ability to tolerate increased activity will improve  3/8/2020 1219 by Amanda Schuler RN  Outcome: Ongoing  3/7/2020 2134 by Gloria Sheehan LPN  Outcome: Ongoing  Goal: Participates in care planning  Description: Participates in care planning  3/8/2020 1219 by Amanda Schuler RN  Outcome: Ongoing  3/7/2020 2134 by Gloria Sheehan LPN  Outcome: Ongoing  Goal: Patient specific goal  Description: Patient specific goal  3/8/2020 1219 by Amanda Schuler RN  Outcome: Ongoing  3/7/2020 2134 by Gloria Sheehan LPN  Outcome: Ongoing

## 2020-03-09 PROCEDURE — 6370000000 HC RX 637 (ALT 250 FOR IP): Performed by: NURSE PRACTITIONER

## 2020-03-09 PROCEDURE — 1240000000 HC EMOTIONAL WELLNESS R&B

## 2020-03-09 PROCEDURE — 6370000000 HC RX 637 (ALT 250 FOR IP): Performed by: PSYCHIATRY & NEUROLOGY

## 2020-03-09 PROCEDURE — 99233 SBSQ HOSP IP/OBS HIGH 50: CPT | Performed by: NURSE PRACTITIONER

## 2020-03-09 RX ORDER — PANTOPRAZOLE SODIUM 40 MG/1
40 TABLET, DELAYED RELEASE ORAL
Status: DISCONTINUED | OUTPATIENT
Start: 2020-03-09 | End: 2020-03-14 | Stop reason: HOSPADM

## 2020-03-09 RX ADMIN — TRAZODONE HYDROCHLORIDE 100 MG: 100 TABLET ORAL at 20:23

## 2020-03-09 RX ADMIN — METOPROLOL TARTRATE 25 MG: 25 TABLET ORAL at 20:23

## 2020-03-09 RX ADMIN — DIVALPROEX SODIUM 750 MG: 500 TABLET, EXTENDED RELEASE ORAL at 08:20

## 2020-03-09 RX ADMIN — MULTIPLE VITAMINS W/ MINERALS TAB 1 TABLET: TAB at 08:21

## 2020-03-09 RX ADMIN — DONEPEZIL HYDROCHLORIDE 10 MG: 10 TABLET ORAL at 20:23

## 2020-03-09 RX ADMIN — METOPROLOL TARTRATE 25 MG: 25 TABLET ORAL at 08:21

## 2020-03-09 RX ADMIN — FLUTICASONE PROPIONATE 1 SPRAY: 50 SPRAY, METERED NASAL at 08:20

## 2020-03-09 RX ADMIN — LISINOPRIL 5 MG: 5 TABLET ORAL at 08:21

## 2020-03-09 RX ADMIN — FAMOTIDINE 20 MG: 20 TABLET ORAL at 08:21

## 2020-03-09 RX ADMIN — MEMANTINE 10 MG: 5 TABLET ORAL at 08:20

## 2020-03-09 RX ADMIN — RISPERIDONE 2 MG: 2 TABLET ORAL at 20:24

## 2020-03-09 RX ADMIN — AMLODIPINE BESYLATE 10 MG: 10 TABLET ORAL at 08:20

## 2020-03-09 RX ADMIN — ATORVASTATIN CALCIUM 80 MG: 40 TABLET, FILM COATED ORAL at 20:24

## 2020-03-09 ASSESSMENT — PAIN SCALES - GENERAL: PAINLEVEL_OUTOF10: 0

## 2020-03-09 NOTE — BH NOTE
Group Therapy Note    Date: 3/8/2020  Start Time: 2000  End Time:  2030  Number of Participants: 1      Type of Group: Wrap Up    Patient's Goal:  Talk with the doctor    Notes:  Has been pleasant    Status After Intervention:  Unchanged    Participation Level:  Active Listener    Participation Quality: Appropriate      Speech:  normal      Thought Process/Content: Logical      Affective Functioning: Exaggerated      Mood: euthymic      Level of consciousness:  Alert      Response to Learning: Able to retain information      Endings: None Reported    Modes of Intervention: Socialization      Discipline Responsible: Licensed Practical Nurse      Signature:  Yossi Gillette LPN

## 2020-03-09 NOTE — GROUP NOTE
Group Therapy Note    Date: 3/9/2020    Group Start Time: 0830  Group End Time: 0900    Number of Participants: 3/4    Type: Morning Goals Group/ Community Meeting    Group Topic/Objective: Set Goal For The Day and to review Unit Rules and Regulations. Patient's Goal:  Pt states \"Reach my sister. \"    Notes:  Pt presented as pleasant and cooperative during group. Pt noted to be more redirectable at this time and no Confucianist preoccupation noted. Pt remains focused on discharge, but more redirectable to prompts to take it one day at a time and to wait to talk to the doctor. Pt continues to struggle to provide numerical values to items. Pt expressed feeling \"good\" and is grateful for \"today. \"    Depression (0-10): 0    Anxiety (0-10): 0    Irritability/Aggitation (0-10): 0    Status After Intervention:  Improved    Participation Level:  Active Listener and Interactive    Participation Quality: Appropriate, Attentive and Sharing    Speech:  normal    Thought Process/Content: Logical    Affective Functioning: Congruent    Mood: Bright    Level of consciousness:  Alert and Attentive    Response to Learning: Able to verbalize current knowledge/experience    Endings: None Reported    Modes of Intervention: Education, Support and Socialization    Discipline Responsible: Certified Therapeutic Recreation Specialist     Electronically signed by Hyacinth Frances MA on 3/9/2020 at 9:27 AM

## 2020-03-09 NOTE — PROGRESS NOTES
Psychiatric Progress Note    Monster Jones  [id number]  [birth year]    CHIEF COMPLAINT: unchanged    HPI: Monster Jones is a 72 year old female who presented to the ER on 2/24/2020. She has not been taking any of her psychotropic medications since discharge from the [facility] on 2/3/2020 denying that she has a mental problem. Her sister was concerned about the patient's behaviors stating that she was getting into stranger's cars asking them to drive her to various places. She has been diagnosed with Alzheimer's dementia. Probate placed 3/5/2020, forced meds approved     Pt continues to show improvement redirectable and responding to medications.     During today's interview she was alert & oriented x 3. She denies SI/HI and AVH. She denies depression and anxiety. She states she is slept \"well\" and that her appetite is \"good. \" Speech remains tangential. Pt continues to display bizarre delusions and confusion. Pt does not understand her diagnosis and treatment plan. Pt noted she currently feels safe and comfortable on the unit.  Pt was in agreement with treatment team.  Pt was polite and cordial during the interview process. Has been taking medications and has been compliant with treatment. Tolerating medications without side effect complaints.     Allergies   Allergen Reactions    Floxin [Ofloxacin] Other (See Comments)     Made her \"feel weird\"       Medications Prior to Admission: ondansetron (ZOFRAN ODT) 4 MG disintegrating tablet, Take 1 tablet by mouth every 8 hours as needed for Nausea  carBAMazepine (TEGRETOL) 200 MG tablet, Take 1 tablet by mouth 2 times daily  traZODone (DESYREL) 50 MG tablet, Take 1 tablet by mouth nightly as needed for Sleep  metoprolol tartrate (LOPRESSOR) 25 MG tablet, Take 1 tablet by mouth 2 times daily  memantine (NAMENDA) 5 MG tablet, Take 1 tablet by mouth daily  amLODIPine (NORVASC) 10 MG tablet, Take 1 tablet by mouth daily  lisinopril (PRINIVIL;ZESTRIL) 5 MG tablet, Take 1 tablet by mouth daily  Incontinence Supplies MISC, Pull-ups size large  famotidine (PEPCID) 20 MG tablet, Take 1 tablet by mouth 2 times daily  atorvastatin (LIPITOR) 80 MG tablet, Take 1 tablet by mouth daily  fluticasone (FLONASE) 50 MCG/ACT nasal spray, 1 spray by Each Nostril route daily  Multiple Vitamins-Minerals (MULTIVITAMIN ADULT PO), Take by mouth    Past Medical History:   Diagnosis Date    Bronchitis     CAD (coronary artery disease)     Chest wall trauma     COPD (chronic obstructive pulmonary disease) ([address]     FH: CAD (coronary artery disease)     brother with cabg in his 45s    Hypertension     Kidney stone     Lumbar herniated disc     Restless leg syndrome     Spinal stenosis         Patient Active Problem List   Diagnosis    Essential hypertension    Lumbar herniated disc    Spinal stenosis    Restless leg syndrome    FH: CAD (coronary artery disease)    Ureteric stone    Family history of ischemic heart disease and other diseases of the circulatory system    Need for vaccination against Streptococcus pneumoniae using pneumococcal conjugate vaccine 13    At high risk for falls    Elevated alkaline phosphatase level    Diabetes mellitus type 2, diet-controlled (HCC)    Mixed hyperlipidemia    Urinary incontinence in female    Shoulder pain    Gastroesophageal reflux disease    Late onset Alzheimer's disease without behavioral disturbance ([address]    Alzheimer's dementia with behavioral disturbance ([address]    CAD (coronary artery disease)    COPD (chronic obstructive pulmonary disease) (HCC)    Adjustment disorder with mixed anxiety and depressed mood       Review of Systems    OBJECTIVE  Vital Signs:  Vitals:    03/09/20 0754   BP: (!) 158/67   Pulse: 87   Resp: 18   Temp: 98.1 °F (36.7 °C)   SpO2: 96%       Labs:  No results found for this or any previous visit (from the past 48 hour(s)).     PSYCHIATRIC ASSESSMENT / MENTAL STATUS EXAM:   Vitals: Blood pressure (!) 158/67, pulse 87, temperature 98.1 °F (36.7 °C), temperature source Temporal, resp. rate 18, height 5' 2\" (1.575 m), weight 150 lb (68 kg), SpO2 96 %, not currently breastfeeding. CONSTITUTIONAL:    Appearance: appears stated age. Alert and oriented to person, place. Pt unable to understand diagnosis or current date. No acute distress. Adequate grooming and hygiene. Good eye contact. No prominent physical abnormalities. Attitude: Manner is cooperative and pleasant  Motor: No psychomotor agitation, retardation or abnormal movements noted  Speech: Clearly articulated; normal rate, volume, tone & amount. Language: intact understanding and production  Mood: euthymic  Affect: euthymic, full range, non-labile, congruent with mood and content of speech  Thought Production: Spontaneous. Thought Form: Coherent, linear, illogical & goal-directed. Tangentiality and circumstantiality. No flight of ideas or loosening of associations. Thought Content/Perceptions: No [place], no AVH, voices delusion that others are persecuting her, and need to do financal chores and get back to work. Insight: impaired  Judgment: impaired  Memory: Immediate, recent, and remote appear intact, though not formally tested. Attention: maintained throughout interview  Fund of knowledge: Average  Gait/Balance: WNL/WNL    IMPRESSION:   No change in diagnosis        PLAN:  Psychiatric management: medication initiation and titration, group and individual therapy, safe and therapeutic environment. Status of problem/condition: Improving slowly  Medical co-morbidities: Management per [facility] group, appreciate assistance  Legal Status: Involuntary  Disposition:estimated LOS: Pending. The treatment team reviewed with the patient the diagnosis and treatment recommendations to include the risks, benefits, and side effects of chosen medications. PT is currently pending probate   The patient was encouraged to participate in groups.   Hold current phone privileges

## 2020-03-09 NOTE — PLAN OF CARE
Problem: Altered Mood, Deterioration in Function:  Goal: Ability to perform activities of daily living will improve  Description: Ability to perform activities of daily living will improve  3/8/2020 2102 by Clementine Ziegler LPN  Outcome: Ongoing  3/8/2020 1219 by Jessy Holman RN  Outcome: Ongoing  Goal: Able to verbalize reality based thinking  Description: Able to verbalize reality based thinking  3/8/2020 2102 by Clementine Ziegler LPN  Outcome: Ongoing  3/8/2020 1219 by Jessy Holman RN  Outcome: Ongoing  Goal: Skin appearance normal  Description: Skin appearance normal  3/8/2020 2102 by Clementine Ziegler LPN  Outcome: Ongoing  3/8/2020 1219 by Jessy Holman RN  Outcome: Ongoing  Goal: Maintenance of adequate nutrition will improve  Description: Maintenance of adequate nutrition will improve  3/8/2020 2102 by Clementine Ziegler LPN  Outcome: Ongoing  3/8/2020 1219 by Jessy Holman RN  Outcome: Ongoing  Goal: Ability to tolerate increased activity will improve  Description: Ability to tolerate increased activity will improve  3/8/2020 2102 by Clementine Ziegler LPN  Outcome: Ongoing  3/8/2020 1219 by Jessy Holman RN  Outcome: Ongoing  Goal: Participates in care planning  Description: Participates in care planning  3/8/2020 2102 by Clementine Ziegler LPN  Outcome: Ongoing  3/8/2020 1219 by Jessy Holman RN  Outcome: Ongoing  Goal: Patient specific goal  Description: Patient specific goal  3/8/2020 2102 by Clementine Ziegler LPN  Outcome: Ongoing  3/8/2020 1219 by Jessy Holman RN  Outcome: Ongoing

## 2020-03-09 NOTE — GROUP NOTE
Group Therapy Note    Date: 3/9/2020    Group Start Time: 1500  Group End Time: 8049  Group Topic: Psychoeducation    [address]        Group Therapy Note    Attendees: 4/4       Notes:  Pts participated in recreational therapy group; Reminiscence Through Music. Pts were each allowed to pick a song they could listen to that will help them relax and recall positive memories. Pts were encouraged to relax and socialize as the music was playing. Pt participated actively in the group. Pt struggled with reminiscence questions and often would change the topic. Status After Intervention:  Unchanged    Participation Level:  Active Listener and Interactive    Participation Quality: Appropriate, Attentive and Sharing      Speech:  normal      Thought Process/Content: Logical      Affective Functioning: Congruent      Mood: Bright      Level of consciousness:  Alert and Attentive      Response to Learning: Able to verbalize current knowledge/experience      Endings: None Reported    Modes of Intervention: Socialization, Exploration and Activity      Discipline Responsible: Certified Therapeutic Recreation Specialist       Signature:  Paty Weems, [address], [address] [facility] [place]

## 2020-03-09 NOTE — PLAN OF CARE
[facility]  Week 2 Interdisciplinary Treatment Plan Note     Review Date & Time: 03/09/20  0830    Admission Type:   Admission Type:  Involuntary    Reason for admission:  Reason for Admission: dementia w/behavioral disturbance    Patient Diagnosis: Alzheimer's dementia with behavioral disturbance ([facility]      PATIENT STRENGTHS:  Patient Strengths:Strengths: No significant Physical Illness, Connection to output provider  Patient Strengths and Limitations:Limitations: Difficulty problem solving/relies on others to help solve problems  Addictive Behavior:   Medical Problems:   Past Medical History:   Diagnosis Date    Bronchitis     CAD (coronary artery disease)     Chest wall trauma     COPD (chronic obstructive pulmonary disease) ([facility]     FH: CAD (coronary artery disease)     brother with cabg in his 45s    Hypertension     Kidney stone     Lumbar herniated disc     Restless leg syndrome     Spinal stenosis        Risk:  Fall RiskTotal: 84  Kash Scale Kash Scale Score: 21  BVC Total: 1    Status EXAM:   Status and Exam  Normal: Yes  Facial Expression: Brightened  Affect: Appropriate  Level of Consciousness: Alert  Mood:Normal: Yes  Mood: Elated  Motor Activity:Normal: Yes  Motor Activity: Increased  Interview Behavior: Cooperative  Preception: [place] to Person, [place] to Place, [place] to Situation  Attention:Normal: Yes  Attention: Distractible  Thought Processes: Circumstantial  Thought Content:Normal: Yes  Thought Content: Preoccupations  Hallucinations: None  Delusions: No  Delusions: Persecution  Memory:Normal: Yes  Memory: Poor Recent, Poor Remote  Insight and Judgment: No  Insight and Judgment: Unrealistic  Present Suicidal Ideation: No  Present Homicidal Ideation: No    Daily Assessment Last Entry:   Daily Sleep (WDL): Within Defined Limits  Patient Currently in Pain: Denies  Daily Nutrition (WDL): Within Defined Limits    Patient Monitoring:  Frequency of Checks: 4 times per

## 2020-03-10 PROCEDURE — 6370000000 HC RX 637 (ALT 250 FOR IP): Performed by: NURSE PRACTITIONER

## 2020-03-10 PROCEDURE — 1240000000 HC EMOTIONAL WELLNESS R&B

## 2020-03-10 PROCEDURE — 6370000000 HC RX 637 (ALT 250 FOR IP): Performed by: PSYCHIATRY & NEUROLOGY

## 2020-03-10 PROCEDURE — 99233 SBSQ HOSP IP/OBS HIGH 50: CPT | Performed by: NURSE PRACTITIONER

## 2020-03-10 RX ADMIN — METOPROLOL TARTRATE 25 MG: 25 TABLET ORAL at 20:07

## 2020-03-10 RX ADMIN — AMLODIPINE BESYLATE 10 MG: 10 TABLET ORAL at 07:43

## 2020-03-10 RX ADMIN — ATORVASTATIN CALCIUM 80 MG: 40 TABLET, FILM COATED ORAL at 20:06

## 2020-03-10 RX ADMIN — PANTOPRAZOLE SODIUM 40 MG: 40 TABLET, DELAYED RELEASE ORAL at 06:18

## 2020-03-10 RX ADMIN — METOPROLOL TARTRATE 25 MG: 25 TABLET ORAL at 07:43

## 2020-03-10 RX ADMIN — TRAZODONE HYDROCHLORIDE 100 MG: 100 TABLET ORAL at 20:07

## 2020-03-10 RX ADMIN — DIVALPROEX SODIUM 750 MG: 500 TABLET, EXTENDED RELEASE ORAL at 07:43

## 2020-03-10 RX ADMIN — MULTIPLE VITAMINS W/ MINERALS TAB 1 TABLET: TAB at 07:43

## 2020-03-10 RX ADMIN — MEMANTINE 10 MG: 5 TABLET ORAL at 07:43

## 2020-03-10 RX ADMIN — FLUTICASONE PROPIONATE 1 SPRAY: 50 SPRAY, METERED NASAL at 07:44

## 2020-03-10 RX ADMIN — RISPERIDONE 2 MG: 2 TABLET ORAL at 20:07

## 2020-03-10 RX ADMIN — FAMOTIDINE 20 MG: 20 TABLET ORAL at 07:44

## 2020-03-10 RX ADMIN — LISINOPRIL 5 MG: 5 TABLET ORAL at 07:44

## 2020-03-10 RX ADMIN — DONEPEZIL HYDROCHLORIDE 10 MG: 10 TABLET ORAL at 20:07

## 2020-03-10 ASSESSMENT — PAIN SCALES - GENERAL
PAINLEVEL_OUTOF10: 0
PAINLEVEL_OUTOF10: 0

## 2020-03-10 NOTE — GROUP NOTE
Group Therapy Note    Date: 3/10/2020    Group Start Time: 1500  Group End Time: 1768  Group Topic: Healthy Living/Wellness    [address]    JIM Galaviz, MA        Group Therapy Note    Attendees: 5/6       Notes:  Pts participated in recreational therapy group; Self-Care Discussion. Pts and therapist reviewed a handout that discussed ways to increase positive self-care. These tips included sleep hygiene, healthy eating, importance of exercise, taking time for themselves each day, speaking up for themselves, and the benefits of leisure. Discussion centered around how each self-care tip can help with their mental health . Pt participated actively in the group. Pt did require Mild prompting to allow others to speak and share their opinions. Pt was also at times off topic, but was easily redirected. Pt denies any need to work on self-care. Status After Intervention:  Improved    Participation Level:  Active Listener and Interactive    Participation Quality: Appropriate, Attentive and Sharing      Speech:  normal      Thought Process/Content: Logical      Affective Functioning: Congruent      Mood: Bright      Level of consciousness:  Alert and Attentive      Response to Learning: Able to verbalize current knowledge/experience      Endings: None Reported    Modes of Intervention: Education, Support and Socialization      Discipline Responsible: Certified Therapeutic Recreation Specialist      Signature:  April Asher [place], [place]

## 2020-03-10 NOTE — PROGRESS NOTES
Psychiatric Progress Note    Modesta Abbott  [id number]  [birth year]    CHIEF COMPLAINT: unchanged    HPI: Modesta Abbott is a 72 year old female who presented to the ER on 2/24/2020. She has not been taking any of her psychotropic medications since discharge from the [facility] on 2/3/2020 denying that she has a mental problem. Her sister was concerned about the patient's behaviors stating that she was getting into stranger's cars asking them to drive her to various places. She has been diagnosed with Alzheimer's dementia. Probate placed 3/5/2020, forced meds approved     Pt continues to show improvement redirectable and responding to medications.     During today's interview she was alert & oriented x 3. She denies SI/HI and AVH. She denies depression and anxiety. She states she is slept \"well\" and that her appetite is \"very good. \" Speech remains tangential. Pt continues to display bizarre delusions and confusion. Pt does not understand her diagnosis and treatment plan. Pt noted she currently feels safe and comfortable on the unit.  Pt was in agreement with treatment team.  Pt was polite and cordial during the interview process. Has been taking medications and has been compliant with treatment. Tolerating medications without side effect complaints. Anticipate discharge to home with sister on Friday.     Allergies   Allergen Reactions    Floxin [Ofloxacin] Other (See Comments)     Made her \"feel weird\"       Medications Prior to Admission: ondansetron (ZOFRAN ODT) 4 MG disintegrating tablet, Take 1 tablet by mouth every 8 hours as needed for Nausea  carBAMazepine (TEGRETOL) 200 MG tablet, Take 1 tablet by mouth 2 times daily  traZODone (DESYREL) 50 MG tablet, Take 1 tablet by mouth nightly as needed for Sleep  metoprolol tartrate (LOPRESSOR) 25 MG tablet, Take 1 tablet by mouth 2 times daily  memantine (NAMENDA) 5 MG tablet, Take 1 tablet by mouth daily  amLODIPine (NORVASC) 10 MG tablet, Take 1 tablet by mouth daily  lisinopril (PRINIVIL;ZESTRIL) 5 MG tablet, Take 1 tablet by mouth daily  Incontinence Supplies MISC, Pull-ups size large  famotidine (PEPCID) 20 MG tablet, Take 1 tablet by mouth 2 times daily  atorvastatin (LIPITOR) 80 MG tablet, Take 1 tablet by mouth daily  fluticasone (FLONASE) 50 MCG/ACT nasal spray, 1 spray by Each Nostril route daily  Multiple Vitamins-Minerals (MULTIVITAMIN ADULT PO), Take by mouth    Past Medical History:   Diagnosis Date    Bronchitis     CAD (coronary artery disease)     Chest wall trauma     COPD (chronic obstructive pulmonary disease) ([address]     FH: CAD (coronary artery disease)     brother with cabg in his 45s    Hypertension     Kidney stone     Lumbar herniated disc     Restless leg syndrome     Spinal stenosis         Patient Active Problem List   Diagnosis    Essential hypertension    Lumbar herniated disc    Spinal stenosis    Restless leg syndrome    FH: CAD (coronary artery disease)    Ureteric stone    Family history of ischemic heart disease and other diseases of the circulatory system    Need for vaccination against Streptococcus pneumoniae using pneumococcal conjugate vaccine 13    At high risk for falls    Elevated alkaline phosphatase level    Diabetes mellitus type 2, diet-controlled (HCC)    Mixed hyperlipidemia    Urinary incontinence in female    Shoulder pain    Gastroesophageal reflux disease    Late onset Alzheimer's disease without behavioral disturbance ([address]    Alzheimer's dementia with behavioral disturbance ([address]    CAD (coronary artery disease)    COPD (chronic obstructive pulmonary disease) (HCC)    Adjustment disorder with mixed anxiety and depressed mood       Review of Systems    OBJECTIVE  Vital Signs:  Vitals:    03/10/20 0815   BP: (!) 140/86   Pulse: 87   Resp: 16   Temp: 97.4 °F (36.3 °C)   SpO2: 98%       Labs:  No results found for this or any previous visit (from the past 48 hour(s)).     PSYCHIATRIC ASSESSMENT / MENTAL STATUS EXAM:   Vitals: Blood pressure (!) 140/86, pulse 87, temperature 97.4 °F (36.3 °C), temperature source Temporal, resp. rate 16, height 5' 2\" (1.575 m), weight 150 lb (68 kg), SpO2 98 %, not currently breastfeeding. CONSTITUTIONAL:    Appearance: appears stated age. Alert and oriented to person, place. Pt unable to understand diagnosis or current date. No acute distress. Adequate grooming and hygiene. Good eye contact. No prominent physical abnormalities. Attitude: Manner is cooperative and pleasant  Motor: No psychomotor agitation, retardation or abnormal movements noted  Speech: Clearly articulated; normal rate, volume, tone & amount. Language: intact understanding and production  Mood: euthymic  Affect: euthymic, full range, non-labile, congruent with mood and content of speech  Thought Production: Spontaneous. Thought Form: Coherent, linear, illogical & goal-directed. Tangentiality and circumstantiality. No flight of ideas or loosening of associations. Thought Content/Perceptions: No [place], no AVH, voices delusion that others are persecuting her, and need to do financal chores and get back to work. Insight: impaired  Judgment: impaired  Memory: Immediate, recent, and remote appear intact, though not formally tested. Attention: maintained throughout interview  Fund of knowledge: Average  Gait/Balance: WNL/WNL    IMPRESSION:   No change in diagnosis        PLAN:  Psychiatric management: medication initiation and titration, group and individual therapy, safe and therapeutic environment. Status of problem/condition: Improving slowly  Medical co-morbidities: Management per [facility] group, appreciate assistance  Legal Status: Involuntary  Disposition:estimated LOS: Pending. The treatment team reviewed with the patient the diagnosis and treatment recommendations to include the risks, benefits, and side effects of chosen medications.   PT is currently pending probate   The patient was

## 2020-03-10 NOTE — BH NOTE
Pt has rested most of shift, walked in the kee a couple of times. Confused to place when awakened, reorients easily. Pleasant, cooperative, denies SI. Medications taken without difficulty.

## 2020-03-10 NOTE — PLAN OF CARE
Problem: Altered Mood, Deterioration in Function:  Goal: Ability to perform activities of daily living will improve  Description: Ability to perform activities of daily living will improve  3/10/2020 0946 by Sarah Kennedy RN  Outcome: Ongoing  3/9/2020 2310 by Henry Delgado RN  Outcome: Ongoing  Goal: Able to verbalize reality based thinking  Description: Able to verbalize reality based thinking  3/10/2020 0946 by Sarah Kennedy RN  Outcome: Ongoing  3/9/2020 2310 by Henry Delgado RN  Outcome: Ongoing  Goal: Skin appearance normal  Description: Skin appearance normal  3/10/2020 0946 by Sarah Kennedy RN  Outcome: Ongoing  3/9/2020 2310 by Henry Delgado RN  Outcome: Ongoing  Goal: Maintenance of adequate nutrition will improve  Description: Maintenance of adequate nutrition will improve  3/10/2020 0946 by Sarah Kennedy RN  Outcome: Ongoing  3/9/2020 2310 by Henry Delgado RN  Outcome: Ongoing  Goal: Ability to tolerate increased activity will improve  Description: Ability to tolerate increased activity will improve  3/10/2020 0946 by Sarah Kennedy RN  Outcome: Ongoing  3/9/2020 2310 by Henry Delgado RN  Outcome: Ongoing  Goal: Participates in care planning  Description: Participates in care planning  3/10/2020 0946 by Sarah Kennedy RN  Outcome: Ongoing  3/9/2020 2310 by Henry Delgado RN  Outcome: Ongoing  Goal: Patient specific goal  Description: Patient specific goal  3/10/2020 0946 by Sarah Kennedy RN  Outcome: Ongoing  3/9/2020 2310 by Henry Delgado RN  Outcome: Ongoing

## 2020-03-10 NOTE — GROUP NOTE
Group Therapy Note    Date: 3/10/2020    Group Start Time: 0830  Group End Time: 0900    Number of Participants: 5/5    Type: Morning Goals Group/ Community Meeting    Group Topic/Objective: Set Goal For The Day and to review Unit Rules and Regulations. Patient's Goal:  Pt states her goal is \"Meet and talk with friends. \"    Notes:  Pt presented as pleasant and cooperative during group. Pt continues to think she is being discharged daily, but is easily redirectable now back to the topic of conversation. Pt expressed feeling \"wonderful\" and is grateful for staff. Pt struggles to provide numerical values for things AEB reporting she slept well, but when asked how many hours was only able to state \"A normal amount. \"  Pt noted to be attempting to comfort new pt. Depression (0-10): 0    Anxiety (0-10): 0    Irritability/Aggitation (0-10): 0    Status After Intervention:  Improved    Participation Level:  Active Listener and Interactive    Participation Quality: Appropriate, Attentive and Sharing    Speech:  normal    Thought Process/Content: Logical    Affective Functioning: Congruent    Mood: Bright    Level of consciousness:  Alert and Attentive    Response to Learning: Able to verbalize current knowledge/experience    Endings: None Reported    Modes of Intervention: Education, Support and Socialization    Discipline Responsible: Certified Therapeutic Recreation Specialist     Electronically signed by Jacqui Rooney MA on 3/10/2020 at 9:55 AM

## 2020-03-11 PROCEDURE — 1240000000 HC EMOTIONAL WELLNESS R&B

## 2020-03-11 PROCEDURE — 99233 SBSQ HOSP IP/OBS HIGH 50: CPT | Performed by: NURSE PRACTITIONER

## 2020-03-11 PROCEDURE — 6370000000 HC RX 637 (ALT 250 FOR IP): Performed by: NURSE PRACTITIONER

## 2020-03-11 PROCEDURE — 6370000000 HC RX 637 (ALT 250 FOR IP): Performed by: PSYCHIATRY & NEUROLOGY

## 2020-03-11 RX ADMIN — AMLODIPINE BESYLATE 10 MG: 10 TABLET ORAL at 09:05

## 2020-03-11 RX ADMIN — FLUTICASONE PROPIONATE 1 SPRAY: 50 SPRAY, METERED NASAL at 09:04

## 2020-03-11 RX ADMIN — DONEPEZIL HYDROCHLORIDE 10 MG: 10 TABLET ORAL at 20:31

## 2020-03-11 RX ADMIN — METOPROLOL TARTRATE 25 MG: 25 TABLET ORAL at 20:31

## 2020-03-11 RX ADMIN — PANTOPRAZOLE SODIUM 40 MG: 40 TABLET, DELAYED RELEASE ORAL at 06:15

## 2020-03-11 RX ADMIN — MEMANTINE 10 MG: 5 TABLET ORAL at 09:05

## 2020-03-11 RX ADMIN — ATORVASTATIN CALCIUM 80 MG: 40 TABLET, FILM COATED ORAL at 20:31

## 2020-03-11 RX ADMIN — METOPROLOL TARTRATE 25 MG: 25 TABLET ORAL at 09:05

## 2020-03-11 RX ADMIN — FAMOTIDINE 20 MG: 20 TABLET ORAL at 09:05

## 2020-03-11 RX ADMIN — LISINOPRIL 5 MG: 5 TABLET ORAL at 09:05

## 2020-03-11 RX ADMIN — DIVALPROEX SODIUM 750 MG: 500 TABLET, EXTENDED RELEASE ORAL at 09:05

## 2020-03-11 RX ADMIN — RISPERIDONE 2 MG: 2 TABLET ORAL at 20:31

## 2020-03-11 RX ADMIN — TRAZODONE HYDROCHLORIDE 100 MG: 100 TABLET ORAL at 20:31

## 2020-03-11 RX ADMIN — MULTIPLE VITAMINS W/ MINERALS TAB 1 TABLET: TAB at 09:05

## 2020-03-11 ASSESSMENT — PAIN SCALES - GENERAL: PAINLEVEL_OUTOF10: 0

## 2020-03-11 NOTE — PROGRESS NOTES
Assisted pt with shower this morning. Pt able to do her shower on her own with minimal assist, mainly cueing. Very pleasant during care. Dentures brushed per pt without any cueing or assistance. Clothing washed and dried. Complete linen change done.

## 2020-03-11 NOTE — BH NOTE
Group Therapy Note    Date: 3/10/2020  Start Time: 1930  End Time:  2000  Number of Participants: 1      Type of Group: Nursing Education      Notes:  Educated pt on current medication regime uses and side effects. Status After Intervention:  Improved    Participation Level: Active Listener    Participation Quality: Appropriate, Attentive and Sharing      Speech:  normal      Thought Process/Content: Logical      Affective Functioning: Congruent      Mood: elevated      Level of consciousness:  Alert and Attentive      Response to Learning: Able to verbalize current knowledge/experience      Endings: None Reported    Modes of Intervention: Education      Discipline Responsible: Licensed Practical Nurse      Signature:   Jeff Rodriguez LPN

## 2020-03-11 NOTE — BH NOTE
Pt alert & oriented, elevated mood. Pt has occasional confusion. Pt compliant with HS medication. Pt denies SI/HI, & AVH. Pt also denies pain. Pt expresses feeling \"so happy\", and \"ready to go home Friday to be with her sister. \" pt ambulates on unit without assistive devices. Pt was up and down most of the night, pt also used the bathroom independently and changed pull ups approx 5x due to urine accidents. Will continue to monitor.

## 2020-03-11 NOTE — GROUP NOTE
Group Therapy Note    Date: 3/11/2020    Group Start Time: 1500  Group End Time: 8905  Group Topic: Recreational    [address] [place], [place]        Group Therapy Note    Attendees: 4/5      Notes:  Pts participated in recreational therapy group; Bead Bracelet Making. Purpose was to encourage decision making, understanding how leisure can help with mood, and increase socialization. Pt participated actively in the group. Pt socialized well with peers and expressed enjoyment in the activity. Pt expressed excitement about being naga to give this to her sister when she is discharged. Status After Intervention:  Improved    Participation Level:  Active Listener and Interactive    Participation Quality: Appropriate, Attentive and Sharing      Speech:  normal      Thought Process/Content: Logical      Affective Functioning: Congruent      Mood: Bright      Level of consciousness:  Alert and Attentive      Response to Learning: Able to verbalize current knowledge/experience and Able to verbalize/acknowledge new learning      Endings: None Reported    Modes of Intervention: Socialization and Activity      Discipline Responsible: Certified Therapeutic Recreation Specialist       Signature:  Henrik Atkins, [address] [place] [address]

## 2020-03-11 NOTE — GROUP NOTE
Group Therapy Note    Date: 3/11/2020    Group Start Time: 0830  Group End Time: 0900    Number of Participants: 5/6    Type: Morning Goals Group/ Community Meeting    Group Topic/Objective: Set Goal For The Day and to review Unit Rules and Regulations. Patient's Goal:  Pt states her goal is to \"Talk to my sister. \"    Notes:  Pt presented as pleasant and cooperative during the group. Pt did require x1 redirection for appropriate conversation topics. Pt expressed feeling \"wonderful\" and \"getting to go home pretty soon. \"    Depression (0-10): 0    Anxiety (0-10): 0    Irritability/Aggitation (0-10): 0    Status After Intervention:  Improved    Participation Level:  Active Listener and Interactive    Participation Quality: Appropriate, Attentive and Sharing    Speech:  normal    Thought Process/Content: Logical    Affective Functioning: Congruent    Mood: Bright    Level of consciousness:  Alert and Attentive    Response to Learning: Able to verbalize current knowledge/experience    Endings: None Reported    Modes of Intervention: Education, Support and Socialization    Discipline Responsible: Certified Therapeutic Recreation Specialist     Electronically signed by Mynor Rossi MA on 3/11/2020 at 9:47 AM

## 2020-03-11 NOTE — PROGRESS NOTES
Psychiatric Progress Note    Payam Groves  [id number]  [birth year]    CHIEF COMPLAINT: unchanged    HPI: Payam Groves is a 72 year old female who presented to the ER on 2/24/2020. She has not been taking any of her psychotropic medications since discharge from the [facility] on 2/3/2020 denying that she has a mental problem. Her sister was concerned about the patient's behaviors stating that she was getting into stranger's cars asking them to drive her to various places. She has been diagnosed with Alzheimer's dementia. Probate placed 3/5/2020, forced meds approved     Pt continues to show improvement redirectable and responding to medications.     During today's interview she was alert & oriented x 3. She denies SI/HI and AVH. She denies depression and anxiety. She states she is slept \"well\" and that her appetite is \"very good. \" Speech no longer tangential. Pt does not understand her diagnosis and treatment plan. She is participating in group Psychotherapy sessions appropriately. Pt noted she currently feels safe and comfortable on the unit.  Pt was in agreement with treatment team.  Pt was polite and cordial during the interview process. Has been taking medications and has been compliant with treatment. Tolerating medications without side effect complaints. Anticipate discharge to home with sister on Friday.     Allergies   Allergen Reactions    Floxin [Ofloxacin] Other (See Comments)     Made her \"feel weird\"       Medications Prior to Admission: ondansetron (ZOFRAN ODT) 4 MG disintegrating tablet, Take 1 tablet by mouth every 8 hours as needed for Nausea  carBAMazepine (TEGRETOL) 200 MG tablet, Take 1 tablet by mouth 2 times daily  traZODone (DESYREL) 50 MG tablet, Take 1 tablet by mouth nightly as needed for Sleep  metoprolol tartrate (LOPRESSOR) 25 MG tablet, Take 1 tablet by mouth 2 times daily  memantine (NAMENDA) 5 MG tablet, Take 1 tablet by mouth daily  amLODIPine (NORVASC) 10 MG tablet, Take 1 tablet by mouth daily  lisinopril (PRINIVIL;ZESTRIL) 5 MG tablet, Take 1 tablet by mouth daily  Incontinence Supplies MISC, Pull-ups size large  famotidine (PEPCID) 20 MG tablet, Take 1 tablet by mouth 2 times daily  atorvastatin (LIPITOR) 80 MG tablet, Take 1 tablet by mouth daily  fluticasone (FLONASE) 50 MCG/ACT nasal spray, 1 spray by Each Nostril route daily  Multiple Vitamins-Minerals (MULTIVITAMIN ADULT PO), Take by mouth    Past Medical History:   Diagnosis Date    Bronchitis     CAD (coronary artery disease)     Chest wall trauma     COPD (chronic obstructive pulmonary disease) ([address]     FH: CAD (coronary artery disease)     brother with cabg in his 45s    Hypertension     Kidney stone     Lumbar herniated disc     Restless leg syndrome     Spinal stenosis         Patient Active Problem List   Diagnosis    Essential hypertension    Lumbar herniated disc    Spinal stenosis    Restless leg syndrome    FH: CAD (coronary artery disease)    Ureteric stone    Family history of ischemic heart disease and other diseases of the circulatory system    Need for vaccination against Streptococcus pneumoniae using pneumococcal conjugate vaccine 13    At high risk for falls    Elevated alkaline phosphatase level    Diabetes mellitus type 2, diet-controlled (HCC)    Mixed hyperlipidemia    Urinary incontinence in female    Shoulder pain    Gastroesophageal reflux disease    Late onset Alzheimer's disease without behavioral disturbance ([address]    Alzheimer's dementia with behavioral disturbance ([address]    CAD (coronary artery disease)    COPD (chronic obstructive pulmonary disease) (HCC)    Adjustment disorder with mixed anxiety and depressed mood       Review of Systems    OBJECTIVE  Vital Signs:  Vitals:    03/11/20 0830   BP: (!) 146/83   Pulse: 81   Resp: 16   Temp: 97.7 °F (36.5 °C)   SpO2:        Labs:  No results found for this or any previous visit (from the past 48 hour(s)).     PSYCHIATRIC ASSESSMENT / MENTAL STATUS EXAM:   Vitals: Blood pressure (!) 146/83, pulse 81, temperature 97.7 °F (36.5 °C), temperature source Temporal, resp. rate 16, height 5' 2\" (1.575 m), weight 150 lb (68 kg), SpO2 99 %, not currently breastfeeding. CONSTITUTIONAL:    Appearance: appears stated age. Alert and oriented to person, place. Pt unable to understand diagnosis or current date. No acute distress. Adequate grooming and hygiene. Good eye contact. No prominent physical abnormalities. Attitude: Manner is cooperative and pleasant  Motor: No psychomotor agitation, retardation or abnormal movements noted  Speech: Clearly articulated; normal rate, volume, tone & amount. Language: intact understanding and production  Mood: euthymic  Affect: euthymic, full range, non-labile, congruent with mood and content of speech  Thought Production: Spontaneous. Thought Form: Coherent, linear, illogical & goal-directed. Tangentiality and circumstantiality. No flight of ideas or loosening of associations. Thought Content/Perceptions: No [place], no AVH, voices delusion that others are persecuting her, and need to do financal chores and get back to work. Insight: impaired  Judgment: impaired  Memory: Immediate, recent, and remote appear intact, though not formally tested. Attention: maintained throughout interview  Fund of knowledge: Average  Gait/Balance: WNL/WNL    IMPRESSION:   No change in diagnosis        PLAN:  Psychiatric management: medication initiation and titration, group and individual therapy, safe and therapeutic environment. Status of problem/condition: Improving slowly  Medical co-morbidities: Management per [facility] group, appreciate assistance  Legal Status: Involuntary  Disposition:estimated LOS: Pending. The treatment team reviewed with the patient the diagnosis and treatment recommendations to include the risks, benefits, and side effects of chosen medications.   PT is currently pending probate   The patient was

## 2020-03-11 NOTE — BH NOTE
Group Therapy Note    Date: 3/10/2020  Start Time: 2000  End Time:  2030  Number of Participants: 1    Type of Group: Wrap-Up      Patient's Goal:  \"meet & talk with friends\"    Notes:  Pt pleasant & had an elevated mood, pt reports feeling happy and being grateful for getting help, and going home on Friday. Pt denies depression, anxiety, and irritability. Status After Intervention:  Improved    Participation Level: Active Listener and Interactive    Participation Quality: Appropriate, Attentive and Sharing      Speech:  normal      Thought Process/Content: Logical      Affective Functioning: Congruent      Mood: elevated      Level of consciousness:  Alert and Attentive      Response to Learning: Able to verbalize current knowledge/experience      Endings: None Reported    Modes of Intervention: Support and Socialization      Discipline Responsible: Licensed Practical Nurse      Signature:   Reinier Reynolds LPN

## 2020-03-12 PROCEDURE — 1240000000 HC EMOTIONAL WELLNESS R&B

## 2020-03-12 PROCEDURE — 6370000000 HC RX 637 (ALT 250 FOR IP): Performed by: PSYCHIATRY & NEUROLOGY

## 2020-03-12 PROCEDURE — 99233 SBSQ HOSP IP/OBS HIGH 50: CPT | Performed by: NURSE PRACTITIONER

## 2020-03-12 PROCEDURE — 6370000000 HC RX 637 (ALT 250 FOR IP): Performed by: NURSE PRACTITIONER

## 2020-03-12 RX ORDER — DOXEPIN HYDROCHLORIDE 10 MG/1
10 CAPSULE ORAL NIGHTLY
Status: DISCONTINUED | OUTPATIENT
Start: 2020-03-12 | End: 2020-03-14 | Stop reason: HOSPADM

## 2020-03-12 RX ADMIN — ATORVASTATIN CALCIUM 80 MG: 40 TABLET, FILM COATED ORAL at 21:04

## 2020-03-12 RX ADMIN — METOPROLOL TARTRATE 25 MG: 25 TABLET ORAL at 07:47

## 2020-03-12 RX ADMIN — LISINOPRIL 5 MG: 5 TABLET ORAL at 07:49

## 2020-03-12 RX ADMIN — MEMANTINE 10 MG: 5 TABLET ORAL at 07:49

## 2020-03-12 RX ADMIN — PANTOPRAZOLE SODIUM 40 MG: 40 TABLET, DELAYED RELEASE ORAL at 07:49

## 2020-03-12 RX ADMIN — FAMOTIDINE 20 MG: 20 TABLET ORAL at 07:49

## 2020-03-12 RX ADMIN — DOXEPIN HYDROCHLORIDE 10 MG: 10 CAPSULE ORAL at 21:04

## 2020-03-12 RX ADMIN — METOPROLOL TARTRATE 25 MG: 25 TABLET ORAL at 21:04

## 2020-03-12 RX ADMIN — FLUTICASONE PROPIONATE 1 SPRAY: 50 SPRAY, METERED NASAL at 07:54

## 2020-03-12 RX ADMIN — AMLODIPINE BESYLATE 10 MG: 10 TABLET ORAL at 07:49

## 2020-03-12 RX ADMIN — RISPERIDONE 2 MG: 2 TABLET ORAL at 21:04

## 2020-03-12 RX ADMIN — MULTIPLE VITAMINS W/ MINERALS TAB 1 TABLET: TAB at 07:49

## 2020-03-12 RX ADMIN — DIVALPROEX SODIUM 750 MG: 500 TABLET, EXTENDED RELEASE ORAL at 07:48

## 2020-03-12 RX ADMIN — DONEPEZIL HYDROCHLORIDE 10 MG: 10 TABLET ORAL at 21:04

## 2020-03-12 NOTE — PROGRESS NOTES
Psychiatric Progress Note    Payam Groves  [id number]  [birth year]    CHIEF COMPLAINT: unchanged    HPI: Payam Groves is a 72 year old female who presented to the ER on 2/24/2020. She has not been taking any of her psychotropic medications since discharge from the [facility] on 2/3/2020 denying that she has a mental problem. Her sister was concerned about the patient's behaviors stating that she was getting into stranger's cars asking them to drive her to various places. She has been diagnosed with Alzheimer's dementia. Probate placed 3/5/2020, forced meds approved     Pt continues to show improvement redirectable and responding to medications.     During today's interview she was alert & oriented x 3. She denies SI/HI and AVH. She denies depression and anxiety. She states she is slept \"not good\" and that her appetite is \"very good. \" Speech no longer tangential. Pt does not understand her diagnosis and treatment plan. She is participating in group Psychotherapy sessions appropriately. Pt noted she currently feels safe and comfortable on the unit.  Pt was in agreement with treatment team.  Pt was polite and cordial during the interview process. Has been taking medications and has been compliant with treatment. Tolerating medications without side effect complaints. Anticipate discharge to home with sister on Friday.     Allergies   Allergen Reactions    Floxin [Ofloxacin] Other (See Comments)     Made her \"feel weird\"       Medications Prior to Admission: ondansetron (ZOFRAN ODT) 4 MG disintegrating tablet, Take 1 tablet by mouth every 8 hours as needed for Nausea  carBAMazepine (TEGRETOL) 200 MG tablet, Take 1 tablet by mouth 2 times daily  traZODone (DESYREL) 50 MG tablet, Take 1 tablet by mouth nightly as needed for Sleep  metoprolol tartrate (LOPRESSOR) 25 MG tablet, Take 1 tablet by mouth 2 times daily  memantine (NAMENDA) 5 MG tablet, Take 1 tablet by mouth daily  amLODIPine (NORVASC) 10 MG tablet, Take 1 tablet by mouth daily  lisinopril (PRINIVIL;ZESTRIL) 5 MG tablet, Take 1 tablet by mouth daily  Incontinence Supplies MISC, Pull-ups size large  famotidine (PEPCID) 20 MG tablet, Take 1 tablet by mouth 2 times daily  atorvastatin (LIPITOR) 80 MG tablet, Take 1 tablet by mouth daily  fluticasone (FLONASE) 50 MCG/ACT nasal spray, 1 spray by Each Nostril route daily  Multiple Vitamins-Minerals (MULTIVITAMIN ADULT PO), Take by mouth    Past Medical History:   Diagnosis Date    Bronchitis     CAD (coronary artery disease)     Chest wall trauma     COPD (chronic obstructive pulmonary disease) ([address]     FH: CAD (coronary artery disease)     brother with cabg in his 45s    Hypertension     Kidney stone     Lumbar herniated disc     Restless leg syndrome     Spinal stenosis         Patient Active Problem List   Diagnosis    Essential hypertension    Lumbar herniated disc    Spinal stenosis    Restless leg syndrome    FH: CAD (coronary artery disease)    Ureteric stone    Family history of ischemic heart disease and other diseases of the circulatory system    Need for vaccination against Streptococcus pneumoniae using pneumococcal conjugate vaccine 13    At high risk for falls    Elevated alkaline phosphatase level    Diabetes mellitus type 2, diet-controlled (HCC)    Mixed hyperlipidemia    Urinary incontinence in female    Shoulder pain    Gastroesophageal reflux disease    Late onset Alzheimer's disease without behavioral disturbance ([address]    Alzheimer's dementia with behavioral disturbance ([address]    CAD (coronary artery disease)    COPD (chronic obstructive pulmonary disease) (HCC)    Adjustment disorder with mixed anxiety and depressed mood       Review of Systems    OBJECTIVE  Vital Signs:  Vitals:    03/12/20 0747   BP: (!) 146/95   Pulse: 84   Resp:    Temp:    SpO2:        Labs:  No results found for this or any previous visit (from the past 48 hour(s)).     PSYCHIATRIC ASSESSMENT / MENTAL STATUS participate in groups. Hold current phone privileges due to exacerbation of diagnosis and instablility  Medical records, Labs, Diagnotic tests reviewed  q15 min safety checks for safety  Interval History.   Review current labs  Continue current medications - DC trazodone, start Doxepin for sleep  Supportive Therapy Provided  Pt had an opportunity to ask questions and address concerns  Pt encouraged to continue therapy group or individual.       Electronically signed by EVELYN Paul CNP on 3/12/2020 at 9:21 AM

## 2020-03-12 NOTE — GROUP NOTE
Group Therapy Note    Date: 3/12/2020    Group Start Time: 8352  Group End Time: 1600  Group Topic: Psychoeducation    [address]    JIM Chang MA        Group Therapy Note    Attendees: 3/4       Notes:  Pts participated in recreational therapy group; [employer]. Pts answered questions r/t random cards they picked off the topic of the discussion. Discussion centered around topics for reminiscence. Pt participated actively in the group. Pt required constant prompting on instructions and struggled to follow 1 step directions. Pt's responses to reminiscence questions were often vague or pt would begin talking about being discharged tomorrow.      Status After Intervention:  Unchanged    Participation Level: Interactive    Participation Quality: Sharing      Speech:  normal      Thought Process/Content: Vague      Affective Functioning: Congruent      Mood: Bright      Level of consciousness:  Preoccupied      Response to Learning: Able to verbalize current knowledge/experience      Endings: None Reported    Modes of Intervention: Socialization, Exploration and Activity      Discipline Responsible: Certified Therapeutic Recreation Specialist       Signature:  Amaris Chang [place]

## 2020-03-12 NOTE — GROUP NOTE
Group Therapy Note    Date: 3/12/2020    Group Start Time: 1320  Group End Time: 8754    Number of Participants: 4/4    Type: Exercise/Recreation Group    Group Topic/Objective: Balloon Noodle Toss    Notes:  Pt participated actively in the group, but noted to require Mod prompting to allow others to participate. Pt would often just hit the balloon to herself or would interfere with others attempting to hit the balloon.      Status After Intervention:  Unchanged    Participation Level: Monopolizing    Participation Quality: Intrusive    Speech:  normal    Thought Process/Content: Logical    Affective Functioning: Congruent    Mood: Bright    Level of consciousness:  Inattentive    Response to Learning: Able to verbalize current knowledge/experience    Endings: None Reported    Modes of Intervention: Activity and Movement    Discipline Responsible: Certified Therapeutic Recreation Specialist     Electronically signed by Lizbet Powell MA on 3/12/2020 at 4:26 PM

## 2020-03-13 VITALS
HEIGHT: 62 IN | BODY MASS INDEX: 27.6 KG/M2 | SYSTOLIC BLOOD PRESSURE: 134 MMHG | HEART RATE: 77 BPM | TEMPERATURE: 97.1 F | OXYGEN SATURATION: 98 % | WEIGHT: 150 LBS | DIASTOLIC BLOOD PRESSURE: 74 MMHG | RESPIRATION RATE: 16 BRPM

## 2020-03-13 PROBLEM — G30.9 ALZHEIMER'S DEMENTIA WITHOUT BEHAVIORAL DISTURBANCE (HCC): Chronic | Status: ACTIVE | Noted: 2020-02-25

## 2020-03-13 PROBLEM — F02.80 ALZHEIMER'S DEMENTIA WITHOUT BEHAVIORAL DISTURBANCE (HCC): Chronic | Status: ACTIVE | Noted: 2020-02-25

## 2020-03-13 PROBLEM — F43.23 ADJUSTMENT DISORDER WITH MIXED ANXIETY AND DEPRESSED MOOD: Status: RESOLVED | Noted: 2020-02-27 | Resolved: 2020-03-13

## 2020-03-13 PROCEDURE — 99239 HOSP IP/OBS DSCHRG MGMT >30: CPT | Performed by: NURSE PRACTITIONER

## 2020-03-13 PROCEDURE — 6370000000 HC RX 637 (ALT 250 FOR IP): Performed by: PSYCHIATRY & NEUROLOGY

## 2020-03-13 PROCEDURE — 6370000000 HC RX 637 (ALT 250 FOR IP): Performed by: NURSE PRACTITIONER

## 2020-03-13 RX ORDER — DONEPEZIL HYDROCHLORIDE 10 MG/1
10 TABLET, FILM COATED ORAL NIGHTLY
Qty: 30 TABLET | Refills: 1 | Status: SHIPPED | OUTPATIENT
Start: 2020-03-13 | End: 2020-04-14 | Stop reason: SDUPTHER

## 2020-03-13 RX ORDER — DIVALPROEX SODIUM 250 MG/1
750 TABLET, EXTENDED RELEASE ORAL DAILY
Qty: 90 TABLET | Refills: 1 | Status: ON HOLD
Start: 2020-03-14 | End: 2020-04-02 | Stop reason: HOSPADM

## 2020-03-13 RX ORDER — DOXEPIN HYDROCHLORIDE 10 MG/1
10 CAPSULE ORAL NIGHTLY
Qty: 30 CAPSULE | Refills: 1 | Status: ON HOLD
Start: 2020-03-13 | End: 2020-04-02 | Stop reason: HOSPADM

## 2020-03-13 RX ORDER — MEMANTINE HYDROCHLORIDE 10 MG/1
10 TABLET ORAL DAILY
Qty: 30 TABLET | Refills: 3 | Status: SHIPPED | OUTPATIENT
Start: 2020-03-14 | End: 2020-04-14 | Stop reason: SDUPTHER

## 2020-03-13 RX ADMIN — PANTOPRAZOLE SODIUM 40 MG: 40 TABLET, DELAYED RELEASE ORAL at 08:07

## 2020-03-13 RX ADMIN — ATORVASTATIN CALCIUM 80 MG: 40 TABLET, FILM COATED ORAL at 20:36

## 2020-03-13 RX ADMIN — METOPROLOL TARTRATE 25 MG: 25 TABLET ORAL at 08:07

## 2020-03-13 RX ADMIN — MULTIPLE VITAMINS W/ MINERALS TAB 1 TABLET: TAB at 08:07

## 2020-03-13 RX ADMIN — METOPROLOL TARTRATE 25 MG: 25 TABLET ORAL at 20:36

## 2020-03-13 RX ADMIN — FLUTICASONE PROPIONATE 1 SPRAY: 50 SPRAY, METERED NASAL at 08:06

## 2020-03-13 RX ADMIN — DIVALPROEX SODIUM 750 MG: 500 TABLET, EXTENDED RELEASE ORAL at 08:06

## 2020-03-13 RX ADMIN — DOXEPIN HYDROCHLORIDE 10 MG: 10 CAPSULE ORAL at 20:36

## 2020-03-13 RX ADMIN — DONEPEZIL HYDROCHLORIDE 10 MG: 10 TABLET ORAL at 20:36

## 2020-03-13 RX ADMIN — RISPERIDONE 2 MG: 2 TABLET ORAL at 20:36

## 2020-03-13 RX ADMIN — FAMOTIDINE 20 MG: 20 TABLET ORAL at 08:07

## 2020-03-13 RX ADMIN — LISINOPRIL 5 MG: 5 TABLET ORAL at 08:07

## 2020-03-13 RX ADMIN — AMLODIPINE BESYLATE 10 MG: 10 TABLET ORAL at 08:07

## 2020-03-13 RX ADMIN — MEMANTINE 10 MG: 5 TABLET ORAL at 08:07

## 2020-03-13 ASSESSMENT — PAIN SCALES - GENERAL: PAINLEVEL_OUTOF10: 0

## 2020-03-13 NOTE — GROUP NOTE
Group Therapy Note    Date: 3/13/2020    Group Start Time: 8504  Group End Time: 6274  Group Topic: Psychoeducation    [address]    JIM Knowles, [place]        Group Therapy Note    Attendees: 1/4       Notes:  Pts participated in recreational therapy group; Leisure After Discharge Discussion. Pts and therapist discussed leisure activities they plan to participate in after discharge. Pt participated actively in the group. Pt discussed wanting to go to [place] to visit a friend and spend time with her sister watching movies and gardening. Pt also discussed understanding that she is having memory issues and shared that the reason she was upset about the diagnosis was her grandfather was diagnosised with the same thing and her only clear memory of him after his diagnosis is him threatening to kill his wife. Status After Intervention:  Improved    Participation Level:  Active Listener and Interactive    Participation Quality: Appropriate, Attentive and Sharing      Speech:  normal      Thought Process/Content: Logical      Affective Functioning: Congruent      Mood: Bright      Level of consciousness:  Alert and Attentive      Response to Learning: Able to verbalize current knowledge/experience and Able to verbalize/acknowledge new learning      Endings: None Reported    Modes of Intervention: Education, Support and Socialization      Discipline Responsible: Certified Therapeutic Recreation Specialist       Signature:  Yadira Garcia, [place], [place]

## 2020-03-13 NOTE — GROUP NOTE
Group Therapy Note    Date: 3/13/2020    Group Start Time: 1400  Group End Time: 1430    Number of Participants: 1/4    Type: Exercise/Recreation Group    Group Topic/Objective: Chair Exercises    Notes:  Pt encouraged to attend, pt declined.      Discipline Responsible: Certified Therapeutic Recreation Specialist     Electronically signed by Claude Vincent [address] [place] [address] on 3/13/2020 at 2:52 PM

## 2020-03-13 NOTE — DISCHARGE INSTR - COC
of Feeding: {CHP DME Other Feedings:532439909}  Liquids: {Slp liquid thickness:64987}  Daily Fluid Restriction: {CHP DME Yes amt example:053078062}  Last Modified Barium Swallow with Video (Video Swallowing Test): {Done Not Done LSZW:470142543}    Treatments at the Time of Hospital Discharge:   Respiratory Treatments: ***  Oxygen Therapy:  {Therapy; copd oxygen:76925}  Ventilator:    {[facility] Vent CRSF:202028291}    Rehab Therapies: {THERAPEUTIC INTERVENTION:1405361409}  Weight Bearing Status/Restrictions: {[facility] Weight Bearin}  Other Medical Equipment (for information only, NOT a DME order):  {EQUIPMENT:955133377}  Other Treatments: ***    Patient's personal belongings (please select all that are sent with patient):  {CHP DME Belongings:327476669}    RN SIGNATURE:  {Esignature:535634620}    CASE MANAGEMENT/SOCIAL WORK SECTION    Inpatient Status Date: ***    Readmission Risk Assessment Score:  Readmission Risk              Risk of Unplanned Readmission:        26           Discharging to Facility/ Agency   · Name:   · Address:  · Phone:  · Fax:    Dialysis Facility (if applicable)   · Name:  · Address:  · Dialysis Schedule:  · Phone:  · Fax:    / signature: {Esignature:[id number]}    PHYSICIAN SECTION    Prognosis: Good    Condition at Discharge: Stable    Rehab Potential (if transferring to Rehab): Good    Recommended Labs or Other Treatments After Discharge: as recommended by PCP    Physician Certification: I certify the above information and transfer of Raymon Kohler  is necessary for the continuing treatment of the diagnosis listed and that she requires Home Care for greater 30 days.      Update Admission H&P: No change in H&P    PHYSICIAN SIGNATURE:  Electronically signed by EVELYN Blankenship CNP on 3/13/20 at 10:07 AM EDT
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
## 2020-03-13 NOTE — CARE COORDINATION
Patient follow-up appointments set. Patient has a new patient appointment for counseling with Shelby Gordillo on 03/17/20 at 8 AM.  Patient has a med management appointment with Warren Kinsey on 04/07/20 at 4 PM.  AVS updated to reflect. 12:30 PM  Discharge Summary faxed to Shelby Gordillo at [phone].    12:50 PM  Notification of discharge faxed to Kash French ([phone]) and Azul Giles ([phone]). 3:10 PM  Notice of discharge dropped off to [facility].

## 2020-03-13 NOTE — PROGRESS NOTES
Discharge instructions reviewed with pt, pt states understanding. Medications reviewed and highlighted what still needs to be taken at bedtime tonight. Again, pt states understanding.

## 2020-03-13 NOTE — PROGRESS NOTES
Pt alert but sleepy during the day. Per night shift report, pt did not sleep well overnight. Pt awake and compliant with medications this am, had breakfast but returned to bed shortly after. Pt easily awakened and was up for lunch. Pt pleasant and looking forward to discharging home with sister today. Pt denies SI, HI or AVH. Pt states she is appreciative of all of the help she received during her stay.

## 2020-03-14 NOTE — BH NOTE
Patient discharged home with sister by private vehicle. All belongings returned. Patient escorted off of unit by security @ 1572.

## 2020-03-20 ENCOUNTER — TELEPHONE (OUTPATIENT)
Dept: INTERNAL MEDICINE CLINIC | Age: 70
End: 2020-03-20

## 2020-03-26 ENCOUNTER — APPOINTMENT (OUTPATIENT)
Dept: GENERAL RADIOLOGY | Age: 70
DRG: 057 | End: 2020-03-26
Payer: MEDICARE

## 2020-03-26 ENCOUNTER — APPOINTMENT (OUTPATIENT)
Dept: CT IMAGING | Age: 70
DRG: 057 | End: 2020-03-26
Payer: MEDICARE

## 2020-03-26 ENCOUNTER — HOSPITAL ENCOUNTER (INPATIENT)
Age: 70
LOS: 7 days | Discharge: HOME | DRG: 057 | End: 2020-04-02
Attending: EMERGENCY MEDICINE | Admitting: PSYCHIATRY & NEUROLOGY
Payer: MEDICARE

## 2020-03-26 LAB
ALBUMIN SERPL-MCNC: 3.8 GM/DL (ref 3.4–5)
ALP BLD-CCNC: 131 IU/L (ref 40–129)
ALT SERPL-CCNC: 38 U/L (ref 10–40)
ANION GAP SERPL CALCULATED.3IONS-SCNC: 12 MMOL/L (ref 4–16)
AST SERPL-CCNC: 39 IU/L (ref 15–37)
BACTERIA: ABNORMAL /HPF
BASOPHILS ABSOLUTE: 0 K/CU MM
BASOPHILS RELATIVE PERCENT: 0.7 % (ref 0–1)
BILIRUB SERPL-MCNC: 0.2 MG/DL (ref 0–1)
BILIRUBIN URINE: NEGATIVE MG/DL
BLOOD, URINE: NEGATIVE
BUN BLDV-MCNC: 12 MG/DL (ref 6–23)
CALCIUM SERPL-MCNC: 9 MG/DL (ref 8.3–10.6)
CAST TYPE: NEGATIVE /HPF
CHLORIDE BLD-SCNC: 107 MMOL/L (ref 99–110)
CLARITY: ABNORMAL
CO2: 27 MMOL/L (ref 21–32)
COLOR: YELLOW
CREAT SERPL-MCNC: 0.9 MG/DL (ref 0.6–1.1)
CRYSTAL TYPE: NEGATIVE /HPF
DIFFERENTIAL TYPE: ABNORMAL
EOSINOPHILS ABSOLUTE: 0.2 K/CU MM
EOSINOPHILS RELATIVE PERCENT: 3.4 % (ref 0–3)
EPITHELIAL CELLS, UA: ABNORMAL /HPF
GFR AFRICAN AMERICAN: >60 ML/MIN/1.73M2
GFR NON-AFRICAN AMERICAN: >60 ML/MIN/1.73M2
GLUCOSE BLD-MCNC: 128 MG/DL (ref 70–99)
GLUCOSE, URINE: NEGATIVE MG/DL
HCT VFR BLD CALC: 37.6 % (ref 37–47)
HEMOGLOBIN: 11.8 GM/DL (ref 12.5–16)
IMMATURE NEUTROPHIL %: 1 % (ref 0–0.43)
KETONES, URINE: NEGATIVE MG/DL
LACTATE: 1.5 MMOL/L (ref 0.4–2)
LEUKOCYTE ESTERASE, URINE: NEGATIVE
LYMPHOCYTES ABSOLUTE: 1.6 K/CU MM
LYMPHOCYTES RELATIVE PERCENT: 27.3 % (ref 24–44)
MCH RBC QN AUTO: 31.6 PG (ref 27–31)
MCHC RBC AUTO-ENTMCNC: 31.4 % (ref 32–36)
MCV RBC AUTO: 100.8 FL (ref 78–100)
MONOCYTES ABSOLUTE: 0.6 K/CU MM
MONOCYTES RELATIVE PERCENT: 9.3 % (ref 0–4)
NITRITE URINE, QUANTITATIVE: NEGATIVE
PDW BLD-RTO: 12.3 % (ref 11.7–14.9)
PH, URINE: 8.5 (ref 5–8)
PLATELET # BLD: 194 K/CU MM (ref 140–440)
PMV BLD AUTO: 10.6 FL (ref 7.5–11.1)
POTASSIUM SERPL-SCNC: 5.3 MMOL/L (ref 3.5–5.1)
PROTEIN UA: NEGATIVE MG/DL
RBC # BLD: 3.73 M/CU MM (ref 4.2–5.4)
RBC URINE: NEGATIVE /HPF (ref 0–6)
SEGMENTED NEUTROPHILS ABSOLUTE COUNT: 3.4 K/CU MM
SEGMENTED NEUTROPHILS RELATIVE PERCENT: 58.3 % (ref 36–66)
SODIUM BLD-SCNC: 146 MMOL/L (ref 135–145)
SPECIFIC GRAVITY UA: 1.02 (ref 1–1.03)
TOTAL IMMATURE NEUTOROPHIL: 0.06 K/CU MM
TOTAL PROTEIN: 6.1 GM/DL (ref 6.4–8.2)
TROPONIN T: <0.01 NG/ML
UROBILINOGEN, URINE: 0.2 MG/DL (ref 0.2–1)
WBC # BLD: 5.9 K/CU MM (ref 4–10.5)
WBC UA: NEGATIVE /HPF (ref 0–5)

## 2020-03-26 PROCEDURE — 71045 X-RAY EXAM CHEST 1 VIEW: CPT

## 2020-03-26 PROCEDURE — 70450 CT HEAD/BRAIN W/O DYE: CPT

## 2020-03-26 PROCEDURE — 80053 COMPREHEN METABOLIC PANEL: CPT

## 2020-03-26 PROCEDURE — 85025 COMPLETE CBC W/AUTO DIFF WBC: CPT

## 2020-03-26 PROCEDURE — 81001 URINALYSIS AUTO W/SCOPE: CPT

## 2020-03-26 PROCEDURE — 84484 ASSAY OF TROPONIN QUANT: CPT

## 2020-03-26 PROCEDURE — 93010 ELECTROCARDIOGRAM REPORT: CPT | Performed by: INTERNAL MEDICINE

## 2020-03-26 PROCEDURE — 99285 EMERGENCY DEPT VISIT HI MDM: CPT

## 2020-03-26 PROCEDURE — 1240000000 HC EMOTIONAL WELLNESS R&B

## 2020-03-26 PROCEDURE — 87040 BLOOD CULTURE FOR BACTERIA: CPT

## 2020-03-26 PROCEDURE — 93005 ELECTROCARDIOGRAM TRACING: CPT | Performed by: EMERGENCY MEDICINE

## 2020-03-26 PROCEDURE — 83605 ASSAY OF LACTIC ACID: CPT

## 2020-03-26 RX ORDER — ACETAMINOPHEN 325 MG/1
650 TABLET ORAL EVERY 4 HOURS PRN
Status: DISCONTINUED | OUTPATIENT
Start: 2020-03-26 | End: 2020-03-27

## 2020-03-26 RX ORDER — HALOPERIDOL 5 MG/ML
5 INJECTION INTRAMUSCULAR EVERY 4 HOURS PRN
Status: DISCONTINUED | OUTPATIENT
Start: 2020-03-26 | End: 2020-04-02 | Stop reason: HOSPADM

## 2020-03-26 RX ORDER — LORAZEPAM 1 MG/1
1 TABLET ORAL EVERY 4 HOURS PRN
Status: DISCONTINUED | OUTPATIENT
Start: 2020-03-26 | End: 2020-04-02 | Stop reason: HOSPADM

## 2020-03-26 RX ORDER — LORAZEPAM 2 MG/ML
1 INJECTION INTRAMUSCULAR EVERY 4 HOURS PRN
Status: DISCONTINUED | OUTPATIENT
Start: 2020-03-26 | End: 2020-04-02 | Stop reason: HOSPADM

## 2020-03-26 RX ORDER — DONEPEZIL HYDROCHLORIDE 10 MG/1
10 TABLET, FILM COATED ORAL NIGHTLY
Status: DISCONTINUED | OUTPATIENT
Start: 2020-03-26 | End: 2020-04-02 | Stop reason: HOSPADM

## 2020-03-26 RX ORDER — HALOPERIDOL 5 MG
5 TABLET ORAL EVERY 4 HOURS PRN
Status: DISCONTINUED | OUTPATIENT
Start: 2020-03-26 | End: 2020-04-02 | Stop reason: HOSPADM

## 2020-03-26 RX ORDER — MEMANTINE HYDROCHLORIDE 5 MG/1
10 TABLET ORAL DAILY
Status: DISCONTINUED | OUTPATIENT
Start: 2020-03-27 | End: 2020-04-02 | Stop reason: HOSPADM

## 2020-03-26 RX ORDER — POLYETHYLENE GLYCOL 3350 17 G/17G
17 POWDER, FOR SOLUTION ORAL DAILY PRN
Status: DISCONTINUED | OUTPATIENT
Start: 2020-03-26 | End: 2020-04-02 | Stop reason: HOSPADM

## 2020-03-26 ASSESSMENT — SLEEP AND FATIGUE QUESTIONNAIRES
DO YOU USE A SLEEP AID: NO
AVERAGE NUMBER OF SLEEP HOURS: 8
DO YOU HAVE DIFFICULTY SLEEPING: NO

## 2020-03-26 ASSESSMENT — LIFESTYLE VARIABLES: HISTORY_ALCOHOL_USE: NO

## 2020-03-26 ASSESSMENT — PAIN SCALES - GENERAL: PAINLEVEL_OUTOF10: 0

## 2020-03-26 NOTE — ED NOTES
Patient ambulated to the from the restroom without difficulty - returned to bed and placed in position of comfort. No concerns or needs at this time. Patients sister at bedside.      Samuel Lawton RN  03/26/20 1940

## 2020-03-26 NOTE — ED PROVIDER NOTES
Final Result   1. No acute intracranial abnormality. 2. Chronic microvascular ischemic disease. ED Course and MDM:  Patient's work-up is negative here in the emergency department she has slightly elevated potassium. She is medically cleared. I discussed her case with Dr. Jey Teague. He has agreed to accept this patient for admission at the [facility]. I discussed the case with Trang Max as well and she advised me that the sister never filled the prescription medications that were ordered when she was released from [facility] however she and the patient adamantly claimed that she is taking the medications as prescribed. She will be admitted to the [facility]. Final Impression:  1. Dementia with behavioral disturbance, unspecified dementia type ([facility] [address]    2. Medication reaction, initial encounter      DISPOSITION Decision To Admit    Patient referred to: No follow-up provider specified.   Discharge medications:  New Prescriptions    No medications on file     (Please note that portions of this note may have been completed with a voice recognition program. Efforts were made to edit the dictations but occasionally words are mis-transcribed.)    Jony Dietrich DO, [address] [facility]  Board certified in [facility], [address] [place] [address]  03/26/20 Bettye Ruiz

## 2020-03-27 PROBLEM — G30.1 LATE ONSET ALZHEIMER'S DISEASE WITH BEHAVIORAL DISTURBANCE (HCC): Chronic | Status: ACTIVE | Noted: 2020-03-27

## 2020-03-27 PROBLEM — F02.818 LATE ONSET ALZHEIMER'S DISEASE WITH BEHAVIORAL DISTURBANCE (HCC): Chronic | Status: ACTIVE | Noted: 2020-03-27

## 2020-03-27 PROBLEM — F03.918 DEMENTIA WITH BEHAVIORAL DISTURBANCE: Status: ACTIVE | Noted: 2020-03-27

## 2020-03-27 PROCEDURE — 6370000000 HC RX 637 (ALT 250 FOR IP): Performed by: INTERNAL MEDICINE

## 2020-03-27 PROCEDURE — 6370000000 HC RX 637 (ALT 250 FOR IP): Performed by: PSYCHIATRY & NEUROLOGY

## 2020-03-27 PROCEDURE — 99223 1ST HOSP IP/OBS HIGH 75: CPT | Performed by: NURSE PRACTITIONER

## 2020-03-27 PROCEDURE — 1240000000 HC EMOTIONAL WELLNESS R&B

## 2020-03-27 RX ORDER — ACETAMINOPHEN 325 MG/1
325 TABLET ORAL EVERY 4 HOURS PRN
Status: DISCONTINUED | OUTPATIENT
Start: 2020-03-27 | End: 2020-04-02 | Stop reason: HOSPADM

## 2020-03-27 RX ORDER — SODIUM POLYSTYRENE SULFONATE 15 G/60ML
15 SUSPENSION ORAL; RECTAL ONCE
Status: COMPLETED | OUTPATIENT
Start: 2020-03-27 | End: 2020-03-27

## 2020-03-27 RX ORDER — ACETAMINOPHEN 325 MG/1
650 TABLET ORAL EVERY 4 HOURS PRN
Status: DISCONTINUED | OUTPATIENT
Start: 2020-03-27 | End: 2020-04-02 | Stop reason: HOSPADM

## 2020-03-27 RX ORDER — ATORVASTATIN CALCIUM 40 MG/1
80 TABLET, FILM COATED ORAL NIGHTLY
Status: DISCONTINUED | OUTPATIENT
Start: 2020-03-27 | End: 2020-04-02 | Stop reason: HOSPADM

## 2020-03-27 RX ORDER — LISINOPRIL 5 MG/1
5 TABLET ORAL DAILY
Status: DISCONTINUED | OUTPATIENT
Start: 2020-03-27 | End: 2020-03-31

## 2020-03-27 RX ORDER — ACETAMINOPHEN 500 MG
500 TABLET ORAL EVERY 4 HOURS PRN
Status: DISCONTINUED | OUTPATIENT
Start: 2020-03-27 | End: 2020-04-02 | Stop reason: HOSPADM

## 2020-03-27 RX ORDER — AMLODIPINE BESYLATE 10 MG/1
10 TABLET ORAL DAILY
Status: DISCONTINUED | OUTPATIENT
Start: 2020-03-27 | End: 2020-04-02 | Stop reason: HOSPADM

## 2020-03-27 RX ORDER — TRAZODONE HYDROCHLORIDE 50 MG/1
50 TABLET ORAL NIGHTLY PRN
Status: DISCONTINUED | OUTPATIENT
Start: 2020-03-27 | End: 2020-03-29

## 2020-03-27 RX ORDER — FLUTICASONE PROPIONATE 50 MCG
1 SPRAY, SUSPENSION (ML) NASAL DAILY
Status: DISCONTINUED | OUTPATIENT
Start: 2020-03-27 | End: 2020-04-02 | Stop reason: HOSPADM

## 2020-03-27 RX ADMIN — METOPROLOL TARTRATE 25 MG: 25 TABLET, FILM COATED ORAL at 20:19

## 2020-03-27 RX ADMIN — LISINOPRIL 5 MG: 5 TABLET ORAL at 13:54

## 2020-03-27 RX ADMIN — MEMANTINE HYDROCHLORIDE 10 MG: 5 TABLET ORAL at 08:10

## 2020-03-27 RX ADMIN — DONEPEZIL HYDROCHLORIDE 10 MG: 10 TABLET, FILM COATED ORAL at 20:19

## 2020-03-27 RX ADMIN — METOPROLOL TARTRATE 25 MG: 25 TABLET, FILM COATED ORAL at 13:54

## 2020-03-27 RX ADMIN — SODIUM POLYSTYRENE SULFONATE 15 G: 15 SUSPENSION ORAL; RECTAL at 13:54

## 2020-03-27 RX ADMIN — ATORVASTATIN CALCIUM 80 MG: 40 TABLET, FILM COATED ORAL at 20:21

## 2020-03-27 RX ADMIN — DONEPEZIL HYDROCHLORIDE 10 MG: 10 TABLET, FILM COATED ORAL at 00:35

## 2020-03-27 RX ADMIN — FLUTICASONE PROPIONATE 1 SPRAY: 50 SPRAY, METERED NASAL at 13:54

## 2020-03-27 RX ADMIN — DIVALPROEX SODIUM 750 MG: 500 TABLET, EXTENDED RELEASE ORAL at 08:10

## 2020-03-27 RX ADMIN — AMLODIPINE BESYLATE 10 MG: 10 TABLET ORAL at 13:54

## 2020-03-27 ASSESSMENT — PAIN SCALES - GENERAL
PAINLEVEL_OUTOF10: 0
PAINLEVEL_OUTOF10: 0

## 2020-03-27 NOTE — PROGRESS NOTES
Pt seen by therapist from approximately [phone] to complete pt's leisure/recreation assessment. Pt states reason for admission is d/t \"I run out of medicine and they said they were out of it so I went behind the counter. \"  Pt reports now that she has taken her medicine she does not need to be here. Pt noted with rapid, tangential speech and required constant prompting to focus on therapist's questions. Pt denies need for goal for admission.      Electronically signed by JIM Jackman MA on 3/27/2020 at 4:15 PM
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
## 2020-03-27 NOTE — CONSULTS
[address]     Hospitalist Consult Note      Name:  Kaveh Seay /Age/Sex: 1950  (68 y.o. female)   MRN & CSN:  [id number] & [id number] Admission Date/Time: 3/26/2020  3:27 PM   Location:  [address] [place] [address] PCP: Lina Chandler MD       Hospital Day: 2    Assessment and Plan:   Kaveh Seay is a 68 y.o.  female  who presents with dementia with behavioral disturbance and admitted to the [facility] behavioral health unit and Hospitalist Team consulted for: Medical Management     Dementia with behavioral disturbance    Management per primary team  Maintain fall precautions    Hyperkalemia -mild -K-5.3 will give Kayexalate, check CMP in a.m. Hypernatremia -mild -Na 146 -currently asymptomatic, will repeat BMP in a.m. Hypertension -elevated BP - resume home Lopressor, amlodipine, lisinopril    Hyperlipidemia -restart home Lipitor    Diet DIET GENERAL;   DVT Prophylaxis [] Lovenox, []  Heparin, [] SCDs, [] No VTE prophylaxis, patient ambulating   GI Prophylaxis [] PPI, [] H2 Blocker, [] No GI prophylaxis, patient is receiving diet/Tube Feeds   Code Status Full Code   Disposition Patient required Consultation due to Medical management for hypertension, hyperkalemia   MDM [] Low, [x] Moderate,[]  High  Patient's risk as above due to      History of Present Illness:     Patient seen and examined at the bedside  This is a 73-year-old lady with past medical history of hypertension, hyperlipidemia, Alzheimer's dementia who presents with dementia with behavioral disturbance and admitted to the senior behavioral health unit and Hospitalist Team consulted for: Medical Management    Patient states that she is doing well and feeling better at this time and wants to go home. She had no nausea or vomiting. She has no diarrhea. Denies any chest pain or shortness of breath.   She had no urinary symptoms    Ten point ROS reviewed negative, unless as noted above    Past Medical History:      Past Medical History:   Diagnosis Date 1130  Last data filed at 3/27/2020 0830  Gross per 24 hour   Intake 360 ml   Output --   Net 360 ml      Vitals:   Vitals:    03/27/20 0715   BP: (!) 171/94   Pulse: 77   Resp: 16   Temp: 97.8 °F (36.6 °C)   SpO2: 99%     Physical Exam:    GEN Awake female, sitting upright in bed in no apparent distress. Appears given age. EYES Pupils are equally round. No scleral erythema, discharge, or conjunctivitis. HENT Mucous membranes are moist. Oral pharynx without exudates, no evidence of thrush. NECK Supple, no apparent thyromegaly or masses. RESP Clear to auscultation, no wheezes, rales or rhonchi. Symmetric chest movement while on room air. CARDIO/VASC S1/S2 auscultated. Regular rate without appreciable murmurs, rubs, or gallops. No JVD or carotid bruits. Peripheral pulses equal bilaterally and palpable. No peripheral edema. GI Abdomen is soft without significant tenderness, masses, or guarding. Bowel sounds are normoactive. Rectal exam deferred.  No costovertebral angle tenderness. Normal appearing external genitalia. Whitfield catheter is not present. HEME/LYMPH No palpable cervical lymphadenopathy and no hepatosplenomegaly. No petechiae or ecchymoses. MSK No gross joint deformities. SKIN Normal coloration, warm, dry. NEURO Cranial nerves appear grossly intact, normal speech, no lateralizing weakness. PSYCH Awake, alert, oriented x 4. Affect appropriate. Medications:     Prior to Admission medications    Medication Sig Start Date End Date Taking?  Authorizing Provider   divalproex (DEPAKOTE ER) 250 MG extended release tablet Take 3 tablets by mouth daily 3/14/20   Faustino Blackwell APRN - CNP   doxepin [facility]) 10 MG capsule Take 1 capsule by mouth nightly 3/13/20   Faustino Blackwell APRN - CNP   donepezil (ARICEPT) 10 MG tablet Take 1 tablet by mouth nightly 3/13/20   Faustino Blackwell, APRN - CNP   memantine [facility]) 10 MG tablet Take 1 tablet by mouth daily 3/14/20   Faustino Blackwell APRN - CNP   ondansetron

## 2020-03-28 LAB
ALBUMIN SERPL-MCNC: 4 GM/DL (ref 3.4–5)
ALP BLD-CCNC: 124 IU/L (ref 40–129)
ALT SERPL-CCNC: 38 U/L (ref 10–40)
ANION GAP SERPL CALCULATED.3IONS-SCNC: 16 MMOL/L (ref 4–16)
AST SERPL-CCNC: 33 IU/L (ref 15–37)
BASOPHILS ABSOLUTE: 0.1 K/CU MM
BASOPHILS RELATIVE PERCENT: 0.5 % (ref 0–1)
BILIRUB SERPL-MCNC: 0.2 MG/DL (ref 0–1)
BUN BLDV-MCNC: 12 MG/DL (ref 6–23)
CALCIUM SERPL-MCNC: 9.8 MG/DL (ref 8.3–10.6)
CHLORIDE BLD-SCNC: 100 MMOL/L (ref 99–110)
CO2: 26 MMOL/L (ref 21–32)
CREAT SERPL-MCNC: 0.8 MG/DL (ref 0.6–1.1)
DIFFERENTIAL TYPE: ABNORMAL
EOSINOPHILS ABSOLUTE: 0.1 K/CU MM
EOSINOPHILS RELATIVE PERCENT: 1.3 % (ref 0–3)
GFR AFRICAN AMERICAN: >60 ML/MIN/1.73M2
GFR NON-AFRICAN AMERICAN: >60 ML/MIN/1.73M2
GLUCOSE BLD-MCNC: 174 MG/DL (ref 70–99)
HCT VFR BLD CALC: 40.3 % (ref 37–47)
HEMOGLOBIN: 13 GM/DL (ref 12.5–16)
IMMATURE NEUTROPHIL %: 0.8 % (ref 0–0.43)
LYMPHOCYTES ABSOLUTE: 1.4 K/CU MM
LYMPHOCYTES RELATIVE PERCENT: 14.3 % (ref 24–44)
MCH RBC QN AUTO: 32.2 PG (ref 27–31)
MCHC RBC AUTO-ENTMCNC: 32.3 % (ref 32–36)
MCV RBC AUTO: 99.8 FL (ref 78–100)
MONOCYTES ABSOLUTE: 0.7 K/CU MM
MONOCYTES RELATIVE PERCENT: 7.6 % (ref 0–4)
PDW BLD-RTO: 12.3 % (ref 11.7–14.9)
PLATELET # BLD: 204 K/CU MM (ref 140–440)
PMV BLD AUTO: 10.3 FL (ref 7.5–11.1)
POTASSIUM SERPL-SCNC: 4.5 MMOL/L (ref 3.5–5.1)
RBC # BLD: 4.04 M/CU MM (ref 4.2–5.4)
SEGMENTED NEUTROPHILS ABSOLUTE COUNT: 7.3 K/CU MM
SEGMENTED NEUTROPHILS RELATIVE PERCENT: 75.5 % (ref 36–66)
SODIUM BLD-SCNC: 142 MMOL/L (ref 135–145)
TOTAL IMMATURE NEUTOROPHIL: 0.08 K/CU MM
TOTAL PROTEIN: 6.8 GM/DL (ref 6.4–8.2)
WBC # BLD: 9.7 K/CU MM (ref 4–10.5)

## 2020-03-28 PROCEDURE — 80053 COMPREHEN METABOLIC PANEL: CPT

## 2020-03-28 PROCEDURE — 97166 OT EVAL MOD COMPLEX 45 MIN: CPT

## 2020-03-28 PROCEDURE — 97530 THERAPEUTIC ACTIVITIES: CPT

## 2020-03-28 PROCEDURE — 85025 COMPLETE CBC W/AUTO DIFF WBC: CPT

## 2020-03-28 PROCEDURE — 36415 COLL VENOUS BLD VENIPUNCTURE: CPT

## 2020-03-28 PROCEDURE — 6370000000 HC RX 637 (ALT 250 FOR IP): Performed by: PSYCHIATRY & NEUROLOGY

## 2020-03-28 PROCEDURE — 99232 SBSQ HOSP IP/OBS MODERATE 35: CPT | Performed by: PSYCHIATRY & NEUROLOGY

## 2020-03-28 PROCEDURE — 1240000000 HC EMOTIONAL WELLNESS R&B

## 2020-03-28 PROCEDURE — 6370000000 HC RX 637 (ALT 250 FOR IP): Performed by: INTERNAL MEDICINE

## 2020-03-28 RX ADMIN — METOPROLOL TARTRATE 25 MG: 25 TABLET, FILM COATED ORAL at 08:20

## 2020-03-28 RX ADMIN — LISINOPRIL 5 MG: 5 TABLET ORAL at 08:19

## 2020-03-28 RX ADMIN — AMLODIPINE BESYLATE 10 MG: 10 TABLET ORAL at 08:19

## 2020-03-28 RX ADMIN — ATORVASTATIN CALCIUM 80 MG: 40 TABLET, FILM COATED ORAL at 19:45

## 2020-03-28 RX ADMIN — FLUTICASONE PROPIONATE 1 SPRAY: 50 SPRAY, METERED NASAL at 08:20

## 2020-03-28 RX ADMIN — MEMANTINE HYDROCHLORIDE 10 MG: 5 TABLET ORAL at 08:19

## 2020-03-28 RX ADMIN — METOPROLOL TARTRATE 25 MG: 25 TABLET, FILM COATED ORAL at 19:45

## 2020-03-28 RX ADMIN — DIVALPROEX SODIUM 750 MG: 500 TABLET, EXTENDED RELEASE ORAL at 08:19

## 2020-03-28 RX ADMIN — DONEPEZIL HYDROCHLORIDE 10 MG: 10 TABLET, FILM COATED ORAL at 19:45

## 2020-03-28 ASSESSMENT — PAIN SCALES - GENERAL
PAINLEVEL_OUTOF10: 0

## 2020-03-28 NOTE — GROUP NOTE
Group Therapy Note    Date: 3/28/2020    Group Start Time: 1115  Group End Time: 1200  Group Topic: Psychoeducation    Eddy Jacome, ISABELLA, MELINDAW    Group Therapy Note    Attendees: 2/6       Patient's Goal: \"go to my sister's house\"    Notes: Patient actively participated in group. Participated in a game of [place] and open discussion about socialization and mental health with peers.      Status After Intervention:  Improved    Participation Level: Interactive    Participation Quality: Appropriate, Attentive, Sharing and Supportive    Speech:  normal    Thought Process/Content: Linear    Affective Functioning: Congruent    Mood: elevated    Level of consciousness:  Alert and Attentive    Response to Learning: Able to verbalize current knowledge/experience    Endings: None Reported    Modes of Intervention: Education, Socialization and Activity    Discipline Responsible: /Counselor    Signature: ISABELLA Eisenberg, KASEY

## 2020-03-28 NOTE — PROGRESS NOTES
No    Subjective   General  Chart Reviewed: Yes  Patient assessed for rehabilitation services?: Yes  Family / Caregiver Present: No  Subjective  Subjective: Pt is pleasent and talkative today. Pain Assessment  Pain Level: 0  Patient Observation  Observations: Pt was eating lunch at common table upon therapist arrival. Pt friendly, agreeable to therapy. Social/Functional History  Social/Functional History  Lives With: Family(Sister)  Type of Home: House  Home Layout: One level, Performs ADL's on one level  Home Access: Level entry  Bathroom Shower/Tub: Tub/Shower unit  Bathroom Toilet: Standard  Bathroom Equipment: Grab bars in shower, Grab bars around toilet  Bathroom Accessibility: Accessible  Receives Help From: Family  ADL Assistance: Independent  Homemaking Assistance: Independent  Homemaking Responsibilities: Yes(Pt reports she helps sister with homemaking tasks)  Ambulation Assistance: Independent  Transfer Assistance: Independent  Active : No  Patient's  Info: sister provides transportation.    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[address], planting flowers  Additional Comments: Obtained partially from patient and chart           Objective   Vision: Impaired  Vision Exceptions: Wears glasses for reading;Cataracts  Hearing: Within functional limits    Orientation  Overall Orientation Status: (Pt oriented to name and place)     Balance  Sitting Balance: Independent  Standing Balance: Independent  Toilet Transfers  Toilet Transfer: Independent        ADL  Feeding: Setup  UE Bathing: (did not attempt but pt does have the AROM to do this and can simulate the actions)  LE Dressing: Stand by assistance (socks)    Tone RUE  RUE Tone: Normotonic  Tone LUE  LUE Tone: Normotonic  Coordination  Movements Are Fluid And Coordinated: Yes     Bed mobility  Bridging: Independent  Supine to Sit: Independent  Sit to Supine: Independent  Transfers  Sit to stand: Independent  Stand to sit: Independent     Cognition  Overall Cognitive Status: Exceptions  Arousal/Alertness: Appropriate responses to stimuli  Following Commands: Follows one step commands consistently  Attention Span: Attends with cues to redirect  Memory: Decreased recall of recent events;Decreased short term memory  Safety Judgement: Decreased awareness of need for safety  Problem Solving: Assistance required to generate solutions;Assistance required to implement solutions;Assistance required to correct errors made  Insights: Decreased awareness of deficits  Initiation: Requires cues for some  Sequencing: Requires cues for some        Sensation  Overall Sensation Status: WFL           LUE Strength  Gross LUE Strength: WFL  RUE Strength  Gross RUE Strength: WFL                   Plan   Plan  Times per week: 1+ (mon-sat)  Current Treatment Recommendations: Self-Care / ADL, Patient/Caregiver Education & Training, Cognitive/Perceptual Training, Safety Education & Training, Endurance Training    AM-PAC Score    AM-PAC 6 click short form for inpatient daily activity:   How much help from another person does the patient currently need. .. Unable  Dep A Lot  Max A A Lot   Mod A A Little  Min A A Little   CGA  SBA None   Mod I  Indep  Sup   1. Putting on and taking off regular lower body clothing? [] 1    [] 2   [] 2   [x] 3   [] 3   [] 4      2. Bathing (including washing, rinsing, drying)? [] 1   [x] 2   [] 2 [] 3 [] 3 [] 4   3. Toileting, which includes using toilet, bedpan, or urinal? [] 1    [] 2   [] 2   [] 3   [x] 3   [] 4     4. Putting on and taking off regular upper body clothing? [] 1   [] 2   [] 2   [] 3   [x] 3    [] 4      5. Taking care of personal grooming such as brushing teeth? [] 1   [] 2    [] 2 [] 3    [x] 3   [] 4      6. Eating meals?    [] 1   [] 2   [] 2   [] 3   [] 3   [x] 4      Raw Score: 18/24    [24=0% impaired(CH), 23=1-19%(CI), 20-22=20-39%(CJ), 15-19=40-59%(CK), 10-14=60-79%(CL), 7-9=80-99%(CM), 6=100%(CN)]            Goals  Short term goals  Time Frame for Short term goals: until discharge  Short term goal 1: Pt will be able to bathe herself with SBA/S with min vc for steps(i.e. wash your face, then your body, then privates and legs)  Short term goal 2: Pt will be able to dress herself with 1 layer of clothing with possible sweatshirt or sweater if cold  Short term goal 3: When given different scenarios, pt will be able to identify unsafe situations  Short term goal 4: Pt will be able to keep/follow a simple calendar or schedule of things to do and approximate times to do them  Patient Goals   Patient goals : to go home       Therapy Time   Individual Concurrent Group Co-treatment   Time In 1220         Time Out  1245         Minutes  25 min                 BROOKS Kang, OTR/L

## 2020-03-28 NOTE — GROUP NOTE
Group Therapy Note    Date: 3/28/2020    Group Start Time: 0830  Group End Time: 0900  Group Topic: Community Meeting/ AM [address] [facility]    Lucianne Cabot, MSW, KASEY    Group Therapy Note    Attendees: 6/6       Patient's Goal: \"go to my sister's house\"    Notes: Patient actively participated in group. Patient stated that she was feeling \"doing good\" and grateful for \"God gave me another day\". Patient reported getting 8 to 9 hours of restful sleep last night. Patient rated her depression, anxiety, irritability/agitation as a 0, on a scale of 0 to 10, with 0 being not at all.      Status After Intervention:  Unchanged    Participation Level: Interactive    Participation Quality: Appropriate and Sharing    Speech:  normal    Thought Process/Content: Linear    Affective Functioning: Congruent    Mood: elevated    Level of consciousness:  Alert and Attentive    Response to Learning: Able to verbalize current knowledge/experience    Endings: None Reported    Modes of Intervention: Exploration and Clarifying    Discipline Responsible: /Counselor    Signature: Lucianne Cabot, MSW, KASEY

## 2020-03-28 NOTE — GROUP NOTE
Group Therapy Note    Date: 3/28/2020    Group Start Time: 8169  Group End Time: 2887  Group Topic: Healthy Living/Wellness/ Exercise Group    [address]    ISABELLA Rapp LSW    Group Therapy Note    Attendees: 4/6       Patient's Goal: \"go to my sister's house\"    Notes: Patient actively participated in group. Completed exercises in an exercise card game with peers.      Status After Intervention:  Improved    Participation Level: Interactive    Participation Quality: Appropriate    Speech:  normal    Thought Process/Content: Linear    Affective Functioning: Congruent    Mood: elevated    Level of consciousness:  Alert and Attentive    Response to Learning: Able to verbalize current knowledge/experience    Endings: None Reported    Modes of Intervention: Activity and Movement    Discipline Responsible: /Counselor    Signature: ISABELLA Rapp, KASEY

## 2020-03-28 NOTE — BH NOTE
Pt awake in bed for most of 11p to 7am shift. Pt did come down the hallway a couple of times requesting to speak to the Dr about wanting to discharge today. Advised pt that she will be able to speak to the Dr in the morning. Pt easily redirected to bed. Pt denies SI, AVH. Pt was pleasant, cooperative, and med compliant.

## 2020-03-29 PROCEDURE — 6370000000 HC RX 637 (ALT 250 FOR IP): Performed by: PSYCHIATRY & NEUROLOGY

## 2020-03-29 PROCEDURE — 6370000000 HC RX 637 (ALT 250 FOR IP): Performed by: INTERNAL MEDICINE

## 2020-03-29 PROCEDURE — 1240000000 HC EMOTIONAL WELLNESS R&B

## 2020-03-29 PROCEDURE — 99232 SBSQ HOSP IP/OBS MODERATE 35: CPT | Performed by: PSYCHIATRY & NEUROLOGY

## 2020-03-29 RX ORDER — TRAZODONE HYDROCHLORIDE 100 MG/1
100 TABLET ORAL NIGHTLY
Status: DISCONTINUED | OUTPATIENT
Start: 2020-03-29 | End: 2020-04-02 | Stop reason: HOSPADM

## 2020-03-29 RX ADMIN — METOPROLOL TARTRATE 25 MG: 25 TABLET, FILM COATED ORAL at 21:20

## 2020-03-29 RX ADMIN — FLUTICASONE PROPIONATE 1 SPRAY: 50 SPRAY, METERED NASAL at 08:20

## 2020-03-29 RX ADMIN — METOPROLOL TARTRATE 25 MG: 25 TABLET, FILM COATED ORAL at 08:20

## 2020-03-29 RX ADMIN — TRAZODONE HYDROCHLORIDE 100 MG: 100 TABLET ORAL at 21:20

## 2020-03-29 RX ADMIN — DIVALPROEX SODIUM 750 MG: 500 TABLET, EXTENDED RELEASE ORAL at 08:20

## 2020-03-29 RX ADMIN — AMLODIPINE BESYLATE 10 MG: 10 TABLET ORAL at 08:20

## 2020-03-29 RX ADMIN — DONEPEZIL HYDROCHLORIDE 10 MG: 10 TABLET, FILM COATED ORAL at 21:20

## 2020-03-29 RX ADMIN — ATORVASTATIN CALCIUM 80 MG: 40 TABLET, FILM COATED ORAL at 21:20

## 2020-03-29 RX ADMIN — MEMANTINE HYDROCHLORIDE 10 MG: 5 TABLET ORAL at 08:20

## 2020-03-29 RX ADMIN — LISINOPRIL 5 MG: 5 TABLET ORAL at 08:21

## 2020-03-29 ASSESSMENT — PAIN SCALES - GENERAL
PAINLEVEL_OUTOF10: 0
PAINLEVEL_OUTOF10: 0

## 2020-03-29 NOTE — PROGRESS NOTES
pop\"        OBJECTIVE  Vital Signs:  Vitals:    03/29/20 0745   BP: (!) 143/72   Pulse: 68   Resp: 16   Temp: 97.1 °F (36.2 °C)   SpO2: 98%       Labs:  Recent Results (from the past 48 hour(s))   Comprehensive Metabolic Panel w/ Reflex to MG    Collection Time: 03/28/20  8:20 AM   Result Value Ref Range    Sodium 142 135 - 145 MMOL/L    Potassium 4.5 3.5 - 5.1 MMOL/L    Chloride 100 99 - 110 mMol/L    CO2 26 21 - 32 MMOL/L    BUN 12 6 - 23 MG/DL    CREATININE 0.8 0.6 - 1.1 MG/DL    Glucose 174 (H) 70 - 99 MG/DL    Calcium 9.8 8.3 - 10.6 MG/DL    Alb 4.0 3.4 - 5.0 GM/DL    Total Protein 6.8 6.4 - 8.2 GM/DL    Total Bilirubin 0.2 0.0 - 1.0 MG/DL    ALT 38 10 - 40 U/L    AST 33 15 - 37 IU/L    Alkaline Phosphatase 124 40 - 129 IU/L    GFR Non-African American >60 >60 mL/min/1.73m2    GFR African American >60 >60 mL/min/1.73m2    Anion Gap 16 4 - 16   CBC auto differential    Collection Time: 03/28/20  8:20 AM   Result Value Ref Range    WBC 9.7 4.0 - 10.5 K/CU MM    RBC 4.04 (L) 4.2 - 5.4 M/CU MM    Hemoglobin 13.0 12.5 - 16.0 GM/DL    Hematocrit 40.3 37 - 47 %    MCV 99.8 78 - 100 FL    MCH 32.2 (H) 27 - 31 PG    MCHC 32.3 32.0 - 36.0 %    RDW 12.3 11.7 - 14.9 %    Platelets 673 187 - 750 K/CU MM    MPV 10.3 7.5 - 11.1 FL    Differential Type AUTOMATED DIFFERENTIAL     Segs Relative 75.5 (H) 36 - 66 %    Lymphocytes % 14.3 (L) 24 - 44 %    Monocytes % 7.6 (H) 0 - 4 %    Eosinophils % 1.3 0 - 3 %    Basophils % 0.5 0 - 1 %    Segs Absolute 7.3 K/CU MM    Lymphocytes Absolute 1.4 K/CU MM    Monocytes Absolute 0.7 K/CU MM    Eosinophils Absolute 0.1 K/CU MM    Basophils Absolute 0.1 K/CU MM    Immature Neutrophil % 0.8 (H) 0 - 0.43 %    Total Immature Neutrophil 0.08 K/CU MM       Review of Systems:  Reports of no current cardiovascular, respiratory, gastrointestinal, genitourinary, integumentary, neurological, musculoskeletal, or immunological symptoms today. PSYCHIATRIC: See HPI above.     Neurologic examination    Mental status: The patient is alert, attentive, and oriented. Speech is clear and fluent with good repetition, comprehension, and naming. Cranial nerves:  CN II: Visual fields are full to confrontation. Pupils are 4 mm and briskly reactive to light. CN III, IV, VI: At primary gaze, there is no eye deviation. EOMs intact. CN V: Facial sensation is intact to pinprick in all 3 divisions bilaterally. Corneal responses are intact. CN VII: Face is symmetric with normal eye closure and smile. CN VII: Hearing is normal to rubbing fingers, vibratory sense intact    CN IX, X: Palate elevates symmetrically. Phonation is normal.    CN XI: Head turning and shoulder shrug are intact    CN XII: Tongue is midline with normal movements and no atrophy. Motor: There is no pronator drift of out-stretched arms. Muscle bulk and tone are normal. Strength is full bilaterally. Reflexes:  Reflexes are 2+ and symmetric at the biceps, triceps, knees, and ankles. Plantar responses are flexor. Sensory:  Light touch, pinprick, position sense, and vibration sense are intact in fingers. Coordination:  Rapid alternating movements and fine finger movements are intact. There is no dysmetria on finger-to-nose and heel-knee-shin. There are no abnormal or extraneous movements. Romberg is absent. Gait/Stance:  Posture is normal. Gait is steady with normal steps, base, arm swing, and turning.  Heel and toe walking are normal.      PSYCHIATRIC EXAMINATION / MENTAL STATUS EXAM    CONSTITUTIONAL:    Vitals:   Vitals:    03/29/20 0745   BP: (!) 143/72   Pulse: 68   Resp: 16   Temp: 97.1 °F (36.2 °C)   SpO2: 98%      General appearance: [x] appears age, []  appears older than stated age,               [x]  adequately dressed and groomed, [] disheveled,               [x]  in no acute distress, [] appears mildly distressed, [] other           MUSCULOSKELETAL:   Gait:   [x] normal, [] antalgic, [] unsteady, [] gait not

## 2020-03-30 PROCEDURE — 1240000000 HC EMOTIONAL WELLNESS R&B

## 2020-03-30 PROCEDURE — 6370000000 HC RX 637 (ALT 250 FOR IP): Performed by: PSYCHIATRY & NEUROLOGY

## 2020-03-30 PROCEDURE — 97530 THERAPEUTIC ACTIVITIES: CPT

## 2020-03-30 PROCEDURE — 6370000000 HC RX 637 (ALT 250 FOR IP): Performed by: INTERNAL MEDICINE

## 2020-03-30 RX ADMIN — AMLODIPINE BESYLATE 10 MG: 10 TABLET ORAL at 07:59

## 2020-03-30 RX ADMIN — METOPROLOL TARTRATE 25 MG: 25 TABLET, FILM COATED ORAL at 07:59

## 2020-03-30 RX ADMIN — METOPROLOL TARTRATE 25 MG: 25 TABLET, FILM COATED ORAL at 20:15

## 2020-03-30 RX ADMIN — DIVALPROEX SODIUM 750 MG: 500 TABLET, EXTENDED RELEASE ORAL at 07:59

## 2020-03-30 RX ADMIN — MEMANTINE HYDROCHLORIDE 10 MG: 5 TABLET ORAL at 08:00

## 2020-03-30 RX ADMIN — ATORVASTATIN CALCIUM 80 MG: 40 TABLET, FILM COATED ORAL at 20:15

## 2020-03-30 RX ADMIN — LISINOPRIL 5 MG: 5 TABLET ORAL at 07:59

## 2020-03-30 RX ADMIN — FLUTICASONE PROPIONATE 1 SPRAY: 50 SPRAY, METERED NASAL at 08:00

## 2020-03-30 RX ADMIN — TRAZODONE HYDROCHLORIDE 100 MG: 100 TABLET ORAL at 20:15

## 2020-03-30 RX ADMIN — DONEPEZIL HYDROCHLORIDE 10 MG: 10 TABLET, FILM COATED ORAL at 20:15

## 2020-03-30 ASSESSMENT — PAIN SCALES - GENERAL
PAINLEVEL_OUTOF10: 0
PAINLEVEL_OUTOF10: 0

## 2020-03-30 NOTE — GROUP NOTE
Group Therapy Note    Date: 3/30/2020    Group Start Time: 0830  Group End Time: 0720    Number of Participants: 6/6    Type: Morning Goals Group/ Community Meeting    Group Topic/Objective: Set Goal For The Day and to review Unit Rules and Regulations. Patient's Goal:  Pt states her goal is \"Enjoy the day. \"    Notes:  Pt presented as focused on discharge during group. Pt denies need to be on the unit and noted to have no insight into her mental health needs. Pt reports she slept well, but unable to provide hours she slept. Depression (0-10): 0    Anxiety (0-10): 0    Irritability/Aggitation (0-10): 0    Status After Intervention:  Improved    Participation Level: Active Listener and Interactive    Participation Quality: Attentive and Sharing    Speech:  normal    Thought Process/Content: Pt unable to demonstrate insight into mental health needs.      Affective Functioning: Exaggerated    Mood: elevated    Level of consciousness:  Alert and Attentive    Response to Learning: Able to verbalize current knowledge/experience    Endings: None Reported    Modes of Intervention: Education, Support and Socialization    Discipline Responsible: Certified Therapeutic Recreation Specialist     Electronically signed by JIM Pierre MA on 3/30/2020 at 10:20 AM

## 2020-03-30 NOTE — GROUP NOTE
Group Therapy Note    Date: 3/30/2020    Group Start Time: 1600  Group End Time: 9225  Group Topic: Recreational    [address] [facility]        Group Therapy Note    Attendees: 3/6    Game Played Bingo: Continuation of Teddy [address] RT bingo group. Patients wanted to continue playing so this STNA took over and we played an additional 45 minutes. Notes: As the game continued the patient caught on to how to play. She was able to recognize the letters and number and looked to this STNA for confirmation that she was right.        Discipline Responsible: Behavorial Health Tech      Signature:  Helio Ramirez

## 2020-03-30 NOTE — GROUP NOTE
Group Therapy Note    Date: 3/30/2020    Group Start Time: 1300  Group End Time: 1330    Number of Participants: 5/6    Type: Exercise/Recreation Group    Group Topic/Objective: Chair Exercses    Notes:  Pt participated actively in the group. Pt expressed enjoyment in the group and stated \"This really helps my back. \"    Status After Intervention:  Improved    Participation Level:  Active Listener and Interactive    Participation Quality: Appropriate, Attentive and Sharing    Speech:  normal    Thought Process/Content: Logical    Affective Functioning: Congruent    Mood: Bright    Level of consciousness:  Alert and Attentive    Response to Learning: Able to verbalize current knowledge/experience and Able to verbalize/acknowledge new learning    Endings: None Reported    Modes of Intervention: Activity and Movement    Discipline Responsible: Certified Therapeutic Recreation Specialist     Electronically signed by Sarthak Byers MA on 3/30/2020 at 1:59 PM

## 2020-03-30 NOTE — GROUP NOTE
Group Therapy Note    Date: 3/30/2020    Group Start Time: 1430  Group End Time: 1500  Group Topic: Psychoeducation    [address] [facility]    S[place]I PierreS, [place]        Group Therapy Note    Attendees: 5/6       Notes:  Pts participated in recreational therapy group; 15 Things Happy People Do Differently Discussion. Pts and therapist reviewed a worksheet that discussed various ways pts could help improve positive thinking. Pt attended group and attempted to engage in the discussion. Pt's conversation was often related to how others should engage in theses activities, but denied need to \"because I'm happy. \"    Status After Intervention:  Unchanged    Participation Level: Interactive    Participation Quality: Attentive      Speech:  normal      Thought Process/Content: Logical      Affective Functioning: Congruent      Mood: Bright      Level of consciousness:  Alert      Response to Learning: Able to verbalize current knowledge/experience      Endings: None Reported    Modes of Intervention: Education, Support and Socialization      Discipline Responsible: Certified Therapeutic Recreation Specialist       Signature:  Kendall Rizo

## 2020-03-30 NOTE — PROGRESS NOTES
Psychiatric Progress Note    Eddie Urrutia  [id number]  03/30/20    CHIEF COMPLAINT: unchanged    HPI: Mic Gallegos is a 68year old female admitted to [facility] from the ER on 3/26/2020 with c/o incontinence of stool and bowel. The patient had had some medications changes in February- March 2020. They were started on March 14. She has been having confusion regarding not knowing where she is in the house and paranoid someone is chasing her, according to her sister. Her sister also reports the patient's behavior has been erratic at night and she would move furniture around in the dark. She denies her sister has had violent behavior. Met with patient today who was alert and oriented x3. She continues to deny SI/HI along with auditory and visual hallucinations. She is sleeping \"great\" the whole night. Per nursing she slept 9 hours. Her appetite is very good and she is finishing her meals. She denies depression and anxiety, stating she is feeling good and excited to go home. Bizarre delusions and confusion are noticed. The patient does not understand her diagnosis or treatment plan, secondary to [place]. She enjoyed being on the unit and was observed helping other people. She expressed feeling safe and comfortable here. Patient in agreement with treatment team. She is compliant with her medications and states she is feeling better being consistent with taking her medications. She was polite and cordial during the interview. Denies any Side effects regarding her medications.            Allergies   Allergen Reactions    Floxin [Ofloxacin] Other (See Comments)     Made her \"feel weird\"       Medications Prior to Admission: divalproex (DEPAKOTE ER) 250 MG extended release tablet, Take 3 tablets by mouth daily  doxepin (SINEQUAN) 10 MG capsule, Take 1 capsule by mouth nightly  donepezil (ARICEPT) 10 MG tablet, Take 1 tablet by mouth nightly  memantine (NAMENDA) 10 MG tablet, Take 1 tablet by mouth daily  ondansetron (ZOFRAN ODT) 4 MG disintegrating tablet, Take 1 tablet by mouth every 8 hours as needed for Nausea  metoprolol tartrate (LOPRESSOR) 25 MG tablet, Take 1 tablet by mouth 2 times daily  amLODIPine (NORVASC) 10 MG tablet, Take 1 tablet by mouth daily  lisinopril (PRINIVIL;ZESTRIL) 5 MG tablet, Take 1 tablet by mouth daily  Incontinence Supplies MISC, Pull-ups size large  famotidine (PEPCID) 20 MG tablet, Take 1 tablet by mouth 2 times daily  atorvastatin (LIPITOR) 80 MG tablet, Take 1 tablet by mouth daily  fluticasone (FLONASE) 50 MCG/ACT nasal spray, 1 spray by Each Nostril route daily  Multiple Vitamins-Minerals (MULTIVITAMIN ADULT PO), Take by mouth    Past Medical History:   Diagnosis Date    Bronchitis     CAD (coronary artery disease)     Chest wall trauma     COPD (chronic obstructive pulmonary disease) ([address]     FH: CAD (coronary artery disease)     brother with cabg in his 45s    Hypertension     Kidney stone     Lumbar herniated disc     Restless leg syndrome     Spinal stenosis         Patient Active Problem List   Diagnosis    Essential hypertension    Lumbar herniated disc    Spinal stenosis    Restless leg syndrome    FH: CAD (coronary artery disease)    Ureteric stone    Family history of ischemic heart disease and other diseases of the circulatory system    Need for vaccination against Streptococcus pneumoniae using pneumococcal conjugate vaccine 13    At high risk for falls    Elevated alkaline phosphatase level    Diabetes mellitus type 2, diet-controlled (HCC)    Mixed hyperlipidemia    Urinary incontinence in female    Shoulder pain    Gastroesophageal reflux disease    Late onset Alzheimer's disease without behavioral disturbance ([address]    Alzheimer's dementia without behavioral disturbance ([address]    CAD (coronary artery disease)    COPD (chronic obstructive pulmonary disease) ([address]    Late onset Alzheimer's disease with behavioral disturbance ([address] Review of Systems    OBJECTIVE  Vital Signs:  Vitals:    03/30/20 0745   BP: (!) 149/77   Pulse: 75   Resp: 16   Temp: 97.3 °F (36.3 °C)   SpO2: 98%       Labs:  No results found for this or any previous visit (from the past 48 hour(s)). PSYCHIATRIC ASSESSMENT / MENTAL STATUS EXAM:   Vitals: Blood pressure (!) 149/77, pulse 75, temperature 97.3 °F (36.3 °C), temperature source Temporal, resp. rate 16, height 5' 3\" (1.6 m), weight 150 lb (68 kg), SpO2 98 %, not currently breastfeeding. CONSTITUTIONAL:    Appearance: appears stated age. alert and oriented to person, place, time & situation. no acute distress. Adequate grooming and hygeine. Some odor of urine noticed. Good eye contact. No prominent physical abnormalities. Attitude: Manner is cooperative and pleasant  Motor: No psychomotor agitation, retardation or abnormal movements noted  Speech: Clearly articulated; normal rate, volume, tone & amount. Language: intact understanding and production  Mood: excited to be going home soon  Affect: euthymic, full range, non-labile, congruent with mood and content of speech  Thought Production: Spontaneous. Thought Form: Coherent, linear, logical & goal-directed. No tangentiality or circumstantiality. No flight of ideas or loosening of associations. Thought Content/Perceptions: No [place], no AVH,   Insight: Impaired  Judgment: Impaired  Memory: Immediate, recent, and remote appear intact, though not formally tested. Attention: maintained throughout interview  Fund of knowledge: Average  Gait/Balance: WNL/WNL         IMPRESSION:   No change in diagnosis          PLAN:  Psychiatric management:medication initiation and titration, group and individual therapy, safe and theraputic environment. Status of problem/condition: ?Improving  Medical co-morbidities: Management per [place] [facility] group, appreciate assistance  Legal Status:Pending  Disposition:estimated LOS: Pending.   The treatment team reviewed with the

## 2020-03-31 LAB
CULTURE: NORMAL
CULTURE: NORMAL
Lab: NORMAL
Lab: NORMAL
SPECIMEN: NORMAL
SPECIMEN: NORMAL

## 2020-03-31 PROCEDURE — 6370000000 HC RX 637 (ALT 250 FOR IP): Performed by: INTERNAL MEDICINE

## 2020-03-31 PROCEDURE — 97161 PT EVAL LOW COMPLEX 20 MIN: CPT

## 2020-03-31 PROCEDURE — 6370000000 HC RX 637 (ALT 250 FOR IP): Performed by: PSYCHIATRY & NEUROLOGY

## 2020-03-31 PROCEDURE — 1240000000 HC EMOTIONAL WELLNESS R&B

## 2020-03-31 RX ORDER — LISINOPRIL 10 MG/1
20 TABLET ORAL DAILY
Status: DISCONTINUED | OUTPATIENT
Start: 2020-04-01 | End: 2020-04-02 | Stop reason: HOSPADM

## 2020-03-31 RX ADMIN — MEMANTINE HYDROCHLORIDE 10 MG: 5 TABLET ORAL at 07:53

## 2020-03-31 RX ADMIN — ATORVASTATIN CALCIUM 80 MG: 40 TABLET, FILM COATED ORAL at 20:25

## 2020-03-31 RX ADMIN — TRAZODONE HYDROCHLORIDE 100 MG: 100 TABLET ORAL at 20:25

## 2020-03-31 RX ADMIN — METOPROLOL TARTRATE 25 MG: 25 TABLET, FILM COATED ORAL at 20:25

## 2020-03-31 RX ADMIN — FLUTICASONE PROPIONATE 1 SPRAY: 50 SPRAY, METERED NASAL at 07:53

## 2020-03-31 RX ADMIN — DIVALPROEX SODIUM 750 MG: 500 TABLET, EXTENDED RELEASE ORAL at 07:52

## 2020-03-31 RX ADMIN — METOPROLOL TARTRATE 25 MG: 25 TABLET, FILM COATED ORAL at 07:53

## 2020-03-31 RX ADMIN — AMLODIPINE BESYLATE 10 MG: 10 TABLET ORAL at 07:53

## 2020-03-31 RX ADMIN — DONEPEZIL HYDROCHLORIDE 10 MG: 10 TABLET, FILM COATED ORAL at 20:25

## 2020-03-31 RX ADMIN — LISINOPRIL 5 MG: 5 TABLET ORAL at 07:53

## 2020-03-31 ASSESSMENT — PAIN SCALES - GENERAL: PAINLEVEL_OUTOF10: 0

## 2020-03-31 NOTE — GROUP NOTE
Group Therapy Note    Date: 3/31/2020    Group Start Time: 1215  Group End Time: 4357  Group Topic: Psychoeducation    [address]    Abhi Grider, CTRS, MA        Group Therapy Note    Attendees: 4/7       Notes:  Pts participated in recreational therapy group; Relaxation Through Music. Pts were each allowed to pick a song they could listen to that will help them relax. Pts were encouraged to relax and socialize as the music was playing. Pt participated actively in the group. Pt noted to laugh and dance around the group room as songs were played. Pt reports improved mood from the activity. Status After Intervention:  Improved    Participation Level:  Active Listener and Interactive    Participation Quality: Appropriate, Attentive and Sharing      Speech:  normal      Thought Process/Content: Logical      Affective Functioning: Congruent      Mood: Bright      Level of consciousness:  Alert and Attentive      Response to Learning: Able to verbalize current knowledge/experience and Able to verbalize/acknowledge new learning      Endings: None Reported    Modes of Intervention: Socialization, Exploration and Activity      Discipline Responsible: Certified Therapeutic Recreation Specialist       Signature:  Abhi Grider, [address] [place] [address]

## 2020-03-31 NOTE — BH NOTE
Al Christine was visible on the milieu. She was polite on approach and cooperative with vitals. Medication reviewed and she was compliant. She sat at a table with female peers and engaged with them. She reported feeling \"Great\". Denied SI, depression, anxiety and agitation. Stated that she was grateful for all the peolpe here that has helped her, good food and fun times. She enjoyed playing [employer] [address]. Will continue to monitor for safety.
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
## 2020-03-31 NOTE — PROGRESS NOTES
co-morbidities: Management per [facility] group, appreciate assistance  Legal Status:Pending  Disposition:estimated LOS: Pending. The treatment team reviewed with the patient the diagnosis and treatment recommendations to include the risks, benefits, and side effects of chosen medications. The patient verbalized understanding and agreed with the treatment regimen as outlined above. The patient was encouraged to participate in groups. Medical records, Labs, Diagnotic tests reviewed  q15 min safety checks for safety  Interval History. Review current labs  Continue current medications  Supportive Therapy Provided  Pt had an opportunity to ask questions and address concerns  Pt encouraged to continue therapy group or individual.  Pt was in agreement with treatment plan. The risks benefits and side effects of medications were discussed with the patient, including alternatives and no treatment.     Electronically signed by EVELYN Casas CNP on 3/31/2020 at 10:15 AM

## 2020-03-31 NOTE — PROGRESS NOTES
/ Caregiver Present: No  Follows Commands: Within Functional Limits  Subjective  Subjective: \"It's cold in here\"  Pain Screening  Patient Currently in Pain: Denies  Vital Signs  Patient Currently in Pain: Denies       Orientation  Orientation  Overall Orientation Status: Within Functional Limits  Social/Functional History  Social/Functional History  Lives With: Family  Type of Home: House  Home Layout: One level, Performs ADL's on one level  Home Access: Level entry  Bathroom Shower/Tub: Tub/Shower unit  Bathroom Toilet: Standard  Bathroom Equipment: Grab bars in shower, Grab bars around toilet  Bathroom Accessibility: Accessible  Receives Help From: Family  ADL Assistance: [facility]: Independent  Homemaking Responsibilities: Yes  Ambulation Assistance: Independent  Transfer Assistance: Independent  Active : No  Patient's  Info: sister provides transportation. 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 Additional Comments: Obtained partially from patient and chart  Cognition   Cognition  Overall Cognitive Status: Exceptions  Arousal/Alertness: Appropriate responses to stimuli  Following Commands: Follows one step commands consistently  Attention Span: Attends with cues to redirect  Insights: Decreased awareness of deficits  Initiation: Does not require cues  Sequencing: Does not require cues    Objective     Observation/Palpation  Observation: P supine in bed.      PROM RLE (degrees)  RLE PROM: WFL  AROM RLE (degrees)  RLE AROM: WFL  PROM LLE (degrees)  LLE PROM: WFL  AROM LLE (degrees)  LLE AROM : WFL  Strength RLE  Strength RLE: Exception  R Hip Flexion: 4-/5  R Knee Extension: 4-/5  R Ankle Dorsiflexion: 4/5  Strength LLE  Strength LLE: Exception  L Hip Flexion:

## 2020-04-01 PROCEDURE — 6370000000 HC RX 637 (ALT 250 FOR IP): Performed by: INTERNAL MEDICINE

## 2020-04-01 PROCEDURE — 6370000000 HC RX 637 (ALT 250 FOR IP): Performed by: PSYCHIATRY & NEUROLOGY

## 2020-04-01 PROCEDURE — 1240000000 HC EMOTIONAL WELLNESS R&B

## 2020-04-01 RX ADMIN — METOPROLOL TARTRATE 25 MG: 25 TABLET, FILM COATED ORAL at 20:42

## 2020-04-01 RX ADMIN — METOPROLOL TARTRATE 25 MG: 25 TABLET, FILM COATED ORAL at 07:47

## 2020-04-01 RX ADMIN — FLUTICASONE PROPIONATE 1 SPRAY: 50 SPRAY, METERED NASAL at 07:47

## 2020-04-01 RX ADMIN — LISINOPRIL 20 MG: 10 TABLET ORAL at 08:00

## 2020-04-01 RX ADMIN — DONEPEZIL HYDROCHLORIDE 10 MG: 10 TABLET, FILM COATED ORAL at 20:42

## 2020-04-01 RX ADMIN — TRAZODONE HYDROCHLORIDE 100 MG: 100 TABLET ORAL at 20:42

## 2020-04-01 RX ADMIN — DIVALPROEX SODIUM 750 MG: 500 TABLET, EXTENDED RELEASE ORAL at 07:46

## 2020-04-01 RX ADMIN — AMLODIPINE BESYLATE 10 MG: 10 TABLET ORAL at 07:46

## 2020-04-01 RX ADMIN — ATORVASTATIN CALCIUM 80 MG: 40 TABLET, FILM COATED ORAL at 20:42

## 2020-04-01 RX ADMIN — MEMANTINE HYDROCHLORIDE 10 MG: 5 TABLET ORAL at 07:46

## 2020-04-01 ASSESSMENT — PAIN SCALES - GENERAL: PAINLEVEL_OUTOF10: 0

## 2020-04-01 NOTE — BH NOTE
Patient compliant with HS medications, denies depression and anxiety, denies SI/HI/AVH, denies pain. Patient rested quietly through the night, patient slept approximately 8 hours at this time.

## 2020-04-01 NOTE — CARE COORDINATION
Patient follow-ups set pending discharge tomorrow. Patient has a counseling appointment at [facility] on 04/07/20 at 8 AM and a phone med management appointment with Gege Bryant CNP on 04/07/20 at 4 PM.  AVS updated to reflect. 4:30 PM  LSW spoke with patient's sister Nellie Bell. She will be here at 1 PM tomorrow to pick her up.

## 2020-04-02 VITALS
SYSTOLIC BLOOD PRESSURE: 138 MMHG | WEIGHT: 150 LBS | BODY MASS INDEX: 26.58 KG/M2 | RESPIRATION RATE: 18 BRPM | OXYGEN SATURATION: 94 % | DIASTOLIC BLOOD PRESSURE: 72 MMHG | TEMPERATURE: 99 F | HEIGHT: 63 IN | HEART RATE: 90 BPM

## 2020-04-02 PROBLEM — G30.1 LATE ONSET ALZHEIMER'S DISEASE WITH BEHAVIORAL DISTURBANCE (HCC): Chronic | Status: RESOLVED | Noted: 2020-03-27 | Resolved: 2020-04-02

## 2020-04-02 PROBLEM — F02.818 LATE ONSET ALZHEIMER'S DISEASE WITH BEHAVIORAL DISTURBANCE (HCC): Chronic | Status: RESOLVED | Noted: 2020-03-27 | Resolved: 2020-04-02

## 2020-04-02 PROCEDURE — 99239 HOSP IP/OBS DSCHRG MGMT >30: CPT | Performed by: NURSE PRACTITIONER

## 2020-04-02 PROCEDURE — 6370000000 HC RX 637 (ALT 250 FOR IP): Performed by: INTERNAL MEDICINE

## 2020-04-02 PROCEDURE — 6370000000 HC RX 637 (ALT 250 FOR IP): Performed by: PSYCHIATRY & NEUROLOGY

## 2020-04-02 RX ORDER — DIVALPROEX SODIUM 250 MG/1
750 TABLET, EXTENDED RELEASE ORAL DAILY
Qty: 90 TABLET | Refills: 2 | Status: SHIPPED | OUTPATIENT
Start: 2020-04-03 | End: 2020-04-14 | Stop reason: SDUPTHER

## 2020-04-02 RX ORDER — TRAZODONE HYDROCHLORIDE 100 MG/1
100 TABLET ORAL NIGHTLY
Qty: 30 TABLET | Refills: 2 | Status: SHIPPED | OUTPATIENT
Start: 2020-04-02 | End: 2020-04-14 | Stop reason: SDUPTHER

## 2020-04-02 RX ORDER — LISINOPRIL 20 MG/1
20 TABLET ORAL DAILY
Qty: 30 TABLET | Refills: 2 | Status: SHIPPED | OUTPATIENT
Start: 2020-04-03 | End: 2020-06-10 | Stop reason: SDUPTHER

## 2020-04-02 RX ADMIN — FLUTICASONE PROPIONATE 1 SPRAY: 50 SPRAY, METERED NASAL at 08:13

## 2020-04-02 RX ADMIN — DIVALPROEX SODIUM 750 MG: 500 TABLET, EXTENDED RELEASE ORAL at 08:13

## 2020-04-02 RX ADMIN — MEMANTINE HYDROCHLORIDE 10 MG: 5 TABLET ORAL at 08:13

## 2020-04-02 RX ADMIN — METOPROLOL TARTRATE 25 MG: 25 TABLET, FILM COATED ORAL at 08:13

## 2020-04-02 RX ADMIN — LISINOPRIL 20 MG: 10 TABLET ORAL at 08:13

## 2020-04-02 RX ADMIN — AMLODIPINE BESYLATE 10 MG: 10 TABLET ORAL at 08:13

## 2020-04-02 NOTE — DISCHARGE SUMMARY
hospitalization and can be appropriately managed with community treatment.            Medication List      START taking these medications    traZODone 100 MG tablet  Commonly known as:  DESYREL  Take 1 tablet by mouth nightly        CHANGE how you take these medications    lisinopril 20 MG tablet  Commonly known as:  PRINIVIL;ZESTRIL  Take 1 tablet by mouth daily  Start taking on:  April 3, 2020  What changed:    · medication strength  · how much to take        CONTINUE taking these medications    amLODIPine 10 MG tablet  Commonly known as:  Norvasc  Take 1 tablet by mouth daily     atorvastatin 80 MG tablet  Commonly known as:  LIPITOR  Take 1 tablet by mouth daily     divalproex 250 MG extended release tablet  Commonly known as:  DEPAKOTE ER  Take 3 tablets by mouth daily  Start taking on:  April 3, 2020     donepezil 10 MG tablet  Commonly known as:  ARICEPT  Take 1 tablet by mouth nightly     famotidine 20 MG tablet  Commonly known as:  PEPCID  Take 1 tablet by mouth 2 times daily     fluticasone 50 MCG/ACT nasal spray  Commonly known as:  FLONASE  1 spray by Each Nostril route daily     Incontinence Supplies Misc  Pull-ups size large     memantine 10 MG tablet  Commonly known as:  NAMENDA  Take 1 tablet by mouth daily     metoprolol tartrate 25 MG tablet  Commonly known as:  LOPRESSOR  Take 1 tablet by mouth 2 times daily     MULTIVITAMIN ADULT PO        STOP taking these medications    doxepin 10 MG capsule  Commonly known as:  SINEQUAN     ondansetron 4 MG disintegrating tablet  Commonly known as:  Zofran ODT           Where to Get Your Medications      These medications were sent to [address] [place] [address]    Phone:  [phone]   · divalproex 250 MG extended release tablet  · lisinopril 20 MG tablet  · traZODone 100 MG tablet         Social History     Socioeconomic History    Marital status: Single Sister     Heart Disease Brother     Coronary Art Dis Brother         stents    No Known Problems Sister     No Known Problems Sister     Alcohol Abuse Brother         Medications Prior to Admission: donepezil (ARICEPT) 10 MG tablet, Take 1 tablet by mouth nightly  memantine (NAMENDA) 10 MG tablet, Take 1 tablet by mouth daily  metoprolol tartrate (LOPRESSOR) 25 MG tablet, Take 1 tablet by mouth 2 times daily  amLODIPine (NORVASC) 10 MG tablet, Take 1 tablet by mouth daily  Incontinence Supplies MISC, Pull-ups size large  famotidine (PEPCID) 20 MG tablet, Take 1 tablet by mouth 2 times daily  atorvastatin (LIPITOR) 80 MG tablet, Take 1 tablet by mouth daily  fluticasone (FLONASE) 50 MCG/ACT nasal spray, 1 spray by Each Nostril route daily  Multiple Vitamins-Minerals (MULTIVITAMIN ADULT PO), Take by mouth  [DISCONTINUED] divalproex (DEPAKOTE ER) 250 MG extended release tablet, Take 3 tablets by mouth daily  [DISCONTINUED] doxepin (SINEQUAN) 10 MG capsule, Take 1 capsule by mouth nightly  [DISCONTINUED] ondansetron (ZOFRAN ODT) 4 MG disintegrating tablet, Take 1 tablet by mouth every 8 hours as needed for Nausea  [DISCONTINUED] lisinopril (PRINIVIL;ZESTRIL) 5 MG tablet, Take 1 tablet by mouth daily  Allergies   Allergen Reactions    Floxin [Ofloxacin] Other (See Comments)     Made her \"feel weird\"        The patient verbalized understanding and agreement with the above information    LEGAL STATUS ON DISCHARGE:  Voluntary    DISCHARGE DISPOSITION:  Home    DISCHARGE CONDITION:  Stable for outpatient treatment    DISCHARGE DIET:  Resume previous home diet    ACTI VITES:  As tolerated    FOLLOWUP APPOINTMENT(S):  As noted on the After Visit Summary    CONSULTS:  [address] Directive POA was offered and reviewed with pt:      Talked to pts poa,went over patients benefits and other matters. Reveiwed financial options /programs etc....     CORE MEASURES  -Was the patient discharged on two or more Antipsychotics? NO  -If multiple Antipsychotic medications prescribed,is tapering recommended? NA    ? If yes, document plan: NA  ?  -If multiple Antipsychotic medications prescribed at discharge, is cross tapering in process at D/C? NA  -Have there been 3 or more failed attempts of mono therapy? NA?  -If yes, list at least 3 of the failed Antipsychotic medications with the reason why each one failed: ?NA    -Was Hemoglobin A1C or fasting Glucose measure done within the last 12 months? YES  -Lipid Panel measure done within the last 12 months? YES  -Pt was offered an FDA approved medication for Chemical Dependency: (offered refused/ordered) NA  -Pt was offered for discharged on tobacco/nicotine cessation and/or codependency cessation via medication assistance: (offered refused/ordered) NA    More than 30 mins was spent face to face with the patient to discuss the diagnosis, treatment recommendations, and prognosis. Safety planning was also reviewed. The patient agreed to go to the nearest ER or call 911 if they experienced an emergency    The patient was admitted to the psychiatric unit and monitored for stabilization. A multidisciplinary team met with the patient on a daily basis. The diagnosis, treatment recommendations, and prognosis were reviewed with the patient.       Objective:  Vital signs in last 24 hours:  Vitals:    04/02/20 0751   BP: 138/72   Pulse: 90   Resp: 18   Temp: 99 °F (37.2 °C)   SpO2: 94%       Labs:      Recent Results (from the past 504 hour(s))   Urinalysis with Microscopic    Collection Time: 03/26/20  3:45 PM   Result Value Ref Range    Color, UA YELLOW YELLOW    Clarity, UA CLOUDY CLEAR    Glucose, Urine NEGATIVE NEGATIVE MG/DL    Bilirubin Urine NEGATIVE NEGATIVE MG/DL    Ketones, Urine NEGATIVE NEGATIVE MG/DL    Specific Gravity, UA 1.020 1.001 - 1.035    Blood, Urine NEGATIVE NEGATIVE    pH, Urine 8.5 (HH) 5.0 - 8.0    Protein, UA NEGATIVE NEGATIVE MG/DL    Urobilinogen, Urine 0.2 0.2 - 1.0 MG/DL    Nitrite Urine, Quantitative NEGATIVE NEGATIVE    Leukocyte Esterase, Urine NEGATIVE NEGATIVE    RBC, UA NEGATIVE 0 - 6 /HPF    WBC, UA NEGATIVE 0 - 5 /HPF    Epithelial Cells, UA 0 TO 1 /HPF    Cast Type NEGATIVE (A) NO CAST FORMS SEEN /HPF    Bacteria, UA MANY NEGATIVE /HPF    Crystal Type NEGATIVE NEGATIVE /HPF   EKG 12 Lead    Collection Time: 03/26/20  3:55 PM   Result Value Ref Range    Ventricular Rate 79 BPM    Atrial Rate 79 BPM    P-R Interval 164 ms    QRS Duration 78 ms    Q-T Interval 372 ms    QTc Calculation (Bazett) 426 ms    P Axis 77 degrees    R Axis 29 degrees    T Axis 53 degrees    Diagnosis       Normal sinus rhythm  Normal ECG  When compared with ECG of 17-FEB-2020 11:48,  No significant change was found  Confirmed by [facility] ROMULO BUI ([id number]) on 3/26/2020 5:06:32 PM     CBC Auto Differential    Collection Time: 03/26/20  4:00 PM   Result Value Ref Range    WBC 5.9 4.0 - 10.5 K/CU MM    RBC 3.73 (L) 4.2 - 5.4 M/CU MM    Hemoglobin 11.8 (L) 12.5 - 16.0 GM/DL    Hematocrit 37.6 37 - 47 %    .8 (H) 78 - 100 FL    MCH 31.6 (H) 27 - 31 PG    MCHC 31.4 (L) 32.0 - 36.0 %    RDW 12.3 11.7 - 14.9 %    Platelets 971 258 - 255 K/CU MM    MPV 10.6 7.5 - 11.1 FL    Differential Type AUTOMATED DIFFERENTIAL     Segs Relative 58.3 36 - 66 %    Lymphocytes % 27.3 24 - 44 %    Monocytes % 9.3 (H) 0 - 4 %    Eosinophils % 3.4 (H) 0 - 3 %    Basophils % 0.7 0 - 1 %    Segs Absolute 3.4 K/CU MM    Lymphocytes Absolute 1.6 K/CU MM    Monocytes Absolute 0.6 K/CU MM    Eosinophils Absolute 0.2 K/CU MM    Basophils Absolute 0.0 K/CU MM    Immature Neutrophil % 1.0 (H) 0 - 0.43 %    Total Immature Neutrophil 0.06 K/CU MM   Comprehensive Metabolic Panel    Collection Time: 03/26/20  4:00 PM   Result Value Ref Range    Sodium 146 (H) 135 - 145 MMOL/L    Potassium 5.3 (H) 3.5 - 5.1 MMOL/L    Chloride 107 99 - 110 mMol/L    CO2 27 21 - 32 MMOL/L    BUN 12 6 - 23 MG/DL    CREATININE 0.9 0.6 - 1.1 MG/DL    Glucose 128 (H) 70 - 99 MG/DL    Calcium 9.0 8.3 - 10.6 MG/DL    Alb 3.8 3.4 - 5.0 GM/DL    Total Protein 6.1 (L) 6.4 - 8.2 GM/DL    Total Bilirubin 0.2 0.0 - 1.0 MG/DL    ALT 38 10 - 40 U/L    AST 39 (H) 15 - 37 IU/L    Alkaline Phosphatase 131 (H) 40 - 129 IU/L    GFR Non-African American >60 >60 mL/min/1.73m2    GFR African American >60 >60 mL/min/1.73m2    Anion Gap 12 4 - 16   Culture, Blood 1    Collection Time: 03/26/20  4:00 PM   Result Value Ref Range    Specimen BLOOD     Special Requests NONE     Culture NO GROWTH AT 5 DAYS    Lactic Acid, Plasma    Collection Time: 03/26/20  4:00 PM   Result Value Ref Range    Lactate 1.5 0.4 - 2.0 mMOL/L   Troponin    Collection Time: 03/26/20  4:00 PM   Result Value Ref Range    Troponin T <0.010 <0.01 NG/ML   Culture, Blood 2    Collection Time: 03/26/20  4:00 PM   Result Value Ref Range    Specimen BLOOD     Special Requests NONE     Culture NO GROWTH AT 5 DAYS    Comprehensive Metabolic Panel w/ Reflex to MG    Collection Time: 03/28/20  8:20 AM   Result Value Ref Range    Sodium 142 135 - 145 MMOL/L    Potassium 4.5 3.5 - 5.1 MMOL/L    Chloride 100 99 - 110 mMol/L    CO2 26 21 - 32 MMOL/L    BUN 12 6 - 23 MG/DL    CREATININE 0.8 0.6 - 1.1 MG/DL    Glucose 174 (H) 70 - 99 MG/DL    Calcium 9.8 8.3 - 10.6 MG/DL    Alb 4.0 3.4 - 5.0 GM/DL    Total Protein 6.8 6.4 - 8.2 GM/DL    Total Bilirubin 0.2 0.0 - 1.0 MG/DL    ALT 38 10 - 40 U/L    AST 33 15 - 37 IU/L    Alkaline Phosphatase 124 40 - 129 IU/L    GFR Non-African American >60 >60 mL/min/1.73m2    GFR African American >60 >60 mL/min/1.73m2    Anion Gap 16 4 - 16   CBC auto differential    Collection Time: 03/28/20  8:20 AM   Result Value Ref Range    WBC 9.7 4.0 - 10.5 K/CU MM    RBC 4.04 (L) 4.2 - 5.4 M/CU MM    Hemoglobin 13.0 12.5 - 16.0 GM/DL    Hematocrit 40.3 37 - 47 %    MCV 99.8 78 - 100 FL    MCH 32.2 (H) 27 - 31 PG    MCHC 32.3 32.0 - 36.0 %    RDW 12.3 11.7 - 14.9 %    Platelets 032 940 - 655 K/CU MM    MPV 10.3 7.5 - 11.1 FL    Differential Type AUTOMATED DIFFERENTIAL     Segs Relative 75.5 (H) 36 - 66 %    Lymphocytes % 14.3 (L) 24 - 44 %    Monocytes % 7.6 (H) 0 - 4 %    Eosinophils % 1.3 0 - 3 %    Basophils % 0.5 0 - 1 %    Segs Absolute 7.3 K/CU MM    Lymphocytes Absolute 1.4 K/CU MM    Monocytes Absolute 0.7 K/CU MM    Eosinophils Absolute 0.1 K/CU MM    Basophils Absolute 0.1 K/CU MM    Immature Neutrophil % 0.8 (H) 0 - 0.43 %    Total Immature Neutrophil 0.08 K/CU MM       40 Minutes    Electronically signed by EVELYN Roman CNP on 4/2/2020 at 12:17 PM

## 2020-04-02 NOTE — PROGRESS NOTES
every 8 hours as needed for Nausea  metoprolol tartrate (LOPRESSOR) 25 MG tablet, Take 1 tablet by mouth 2 times daily  amLODIPine (NORVASC) 10 MG tablet, Take 1 tablet by mouth daily  lisinopril (PRINIVIL;ZESTRIL) 5 MG tablet, Take 1 tablet by mouth daily  Incontinence Supplies MISC, Pull-ups size large  famotidine (PEPCID) 20 MG tablet, Take 1 tablet by mouth 2 times daily  atorvastatin (LIPITOR) 80 MG tablet, Take 1 tablet by mouth daily  fluticasone (FLONASE) 50 MCG/ACT nasal spray, 1 spray by Each Nostril route daily  Multiple Vitamins-Minerals (MULTIVITAMIN ADULT PO), Take by mouth    Past Medical History:   Diagnosis Date    Bronchitis     CAD (coronary artery disease)     Chest wall trauma     COPD (chronic obstructive pulmonary disease) ([address]     FH: CAD (coronary artery disease)     brother with cabg in his 45s    Hypertension     Kidney stone     Lumbar herniated disc     Restless leg syndrome     Spinal stenosis         Patient Active Problem List   Diagnosis    Essential hypertension    Lumbar herniated disc    Spinal stenosis    Restless leg syndrome    FH: CAD (coronary artery disease)    Ureteric stone    Family history of ischemic heart disease and other diseases of the circulatory system    Need for vaccination against Streptococcus pneumoniae using pneumococcal conjugate vaccine 13    At high risk for falls    Elevated alkaline phosphatase level    Diabetes mellitus type 2, diet-controlled (HCC)    Mixed hyperlipidemia    Urinary incontinence in female    Shoulder pain    Gastroesophageal reflux disease    Late onset Alzheimer's disease without behavioral disturbance ([address]    Alzheimer's dementia without behavioral disturbance ([address]    CAD (coronary artery disease)    COPD (chronic obstructive pulmonary disease) ([address]    Late onset Alzheimer's disease with behavioral disturbance ([address]       Review of Systems    OBJECTIVE  Vital Signs:  Vitals:    04/02/20 0751   BP:

## 2020-04-02 NOTE — DISCHARGE INSTR - COC
Feeding: {CHP DME Other Feedings:858610053}  Liquids: {Slp liquid thickness:34902}  Daily Fluid Restriction: {CHP DME Yes amt example:012203724}  Last Modified Barium Swallow with Video (Video Swallowing Test): {Done Not Done ZBKL:340183666}    Treatments at the Time of Hospital Discharge:   Respiratory Treatments: ***  Oxygen Therapy:  {Therapy; copd oxygen:69629}  Ventilator:    {[facility] Vent RYTK:249501026}    Rehab Therapies: {THERAPEUTIC INTERVENTION:0367165927}  Weight Bearing Status/Restrictions: {[facility] Weight Bearin}  Other Medical Equipment (for information only, NOT a DME order):  {EQUIPMENT:838000417}  Other Treatments: ***    Patient's personal belongings (please select all that are sent with patient):  {CHP DME Belongings:730202870}    RN SIGNATURE:  {Esignature:766588733}    CASE MANAGEMENT/SOCIAL WORK SECTION    Inpatient Status Date: ***    Readmission Risk Assessment Score:  Readmission Risk              Risk of Unplanned Readmission:        23           Discharging to Facility/ Agency   · Name:   · Address:  · Phone:  · Fax:    Dialysis Facility (if applicable)   · Name:  · Address:  · Dialysis Schedule:  · Phone:  · Fax:    / signature: {Esignature:[id number]}    PHYSICIAN SECTION    Prognosis: Good    Condition at Discharge: Stable    Rehab Potential (if transferring to Rehab): Good    Recommended Labs or Other Treatments After Discharge: as recommended by patient's PCP    Physician Certification: I certify the above information and transfer of Susanna Cai  is necessary for the continuing treatment of the diagnosis listed and that she requires Home Care for greater 30 days.      Update Admission H&P: No change in H&P    PHYSICIAN SIGNATURE:  Electronically signed by EVELYN Pineda CNP on 20 at 12:22 PM EDT

## 2020-04-02 NOTE — BH NOTE
Group Therapy Note    Date: 4/1/2020  Start Time: 1930  End Time:  2000  Number of Participants: 1      Type of Group: Nursing Education      Notes:  Education provided on discharge planning    Status After Intervention:  Improved    Participation Level: Interactive    Participation Quality: Appropriate      Speech:  normal      Thought Process/Content: Logical      Affective Functioning: Congruent      Mood: elevated      Level of consciousness:  Alert      Response to Learning: Progressing to goal      Endings: None Reported    Modes of Intervention: Education      Discipline Responsible: Registered Nurse      Signature:  Jenise Olivares RN

## 2020-04-03 ENCOUNTER — TELEPHONE (OUTPATIENT)
Dept: PSYCHIATRY | Age: 70
End: 2020-04-03

## 2020-04-03 LAB
EKG ATRIAL RATE: 79 BPM
EKG DIAGNOSIS: NORMAL
EKG P AXIS: 77 DEGREES
EKG P-R INTERVAL: 164 MS
EKG Q-T INTERVAL: 372 MS
EKG QRS DURATION: 78 MS
EKG QTC CALCULATION (BAZETT): 426 MS
EKG R AXIS: 29 DEGREES
EKG T AXIS: 53 DEGREES
EKG VENTRICULAR RATE: 79 BPM

## 2020-04-03 RX ORDER — FLUTICASONE PROPIONATE 50 MCG
1 SPRAY, SUSPENSION (ML) NASAL DAILY
Qty: 1 BOTTLE | Refills: 0 | Status: ON HOLD | OUTPATIENT
Start: 2020-04-03 | End: 2021-03-05

## 2020-04-03 RX ORDER — M-VIT,TX,IRON,MINS/CALC/FOLIC 27MG-0.4MG
1 TABLET ORAL DAILY
COMMUNITY
End: 2020-04-03 | Stop reason: SDUPTHER

## 2020-04-03 RX ORDER — M-VIT,TX,IRON,MINS/CALC/FOLIC 27MG-0.4MG
1 TABLET ORAL DAILY
Qty: 30 TABLET | Refills: 2 | Status: ON HOLD | OUTPATIENT
Start: 2020-04-03 | End: 2020-09-25 | Stop reason: HOSPADM

## 2020-04-14 ENCOUNTER — TELEPHONE (OUTPATIENT)
Dept: PSYCHIATRY | Age: 70
End: 2020-04-14

## 2020-04-14 RX ORDER — DONEPEZIL HYDROCHLORIDE 10 MG/1
10 TABLET, FILM COATED ORAL NIGHTLY
Qty: 30 TABLET | Refills: 1 | Status: SHIPPED | OUTPATIENT
Start: 2020-04-14 | End: 2020-07-10

## 2020-04-14 RX ORDER — MEMANTINE HYDROCHLORIDE 10 MG/1
10 TABLET ORAL DAILY
Qty: 30 TABLET | Refills: 3 | Status: SHIPPED | OUTPATIENT
Start: 2020-04-14 | End: 2020-07-10

## 2020-04-14 RX ORDER — DIVALPROEX SODIUM 250 MG/1
750 TABLET, EXTENDED RELEASE ORAL DAILY
Qty: 90 TABLET | Refills: 2 | Status: SHIPPED | OUTPATIENT
Start: 2020-04-14 | End: 2020-07-10

## 2020-04-14 RX ORDER — TRAZODONE HYDROCHLORIDE 100 MG/1
100 TABLET ORAL NIGHTLY
Qty: 30 TABLET | Refills: 2 | Status: SHIPPED | OUTPATIENT
Start: 2020-04-14 | End: 2020-07-10

## 2020-04-14 NOTE — TELEPHONE ENCOUNTER
Spoke with patient's sister, Chaya Guillaume who stated she had called to get a refill of Thelma Doles blood pressure medications. Explained that her PCP would need to refill these medications. Refills for her medications for Alzheimer's with behavioral disturbance were sent to her pharmacy electronically.     Problem List     Alzheimer's dementia without behavioral disturbance (HCC) - Primary (Chronic)    Relevant Medications    divalproex (DEPAKOTE ER) 250 MG extended release tablet    donepezil (ARICEPT) 10 MG tablet    memantine (NAMENDA) 10 MG tablet    traZODone (DESYREL) 100 MG tablet

## 2020-05-13 ENCOUNTER — HOSPITAL ENCOUNTER (EMERGENCY)
Age: 70
Discharge: HOME | End: 2020-05-14
Attending: EMERGENCY MEDICINE
Payer: MEDICARE

## 2020-05-13 ENCOUNTER — APPOINTMENT (OUTPATIENT)
Dept: GENERAL RADIOLOGY | Age: 70
End: 2020-05-13
Payer: MEDICARE

## 2020-05-13 ENCOUNTER — APPOINTMENT (OUTPATIENT)
Dept: CT IMAGING | Age: 70
End: 2020-05-13
Payer: MEDICARE

## 2020-05-13 LAB
ALBUMIN SERPL-MCNC: 4 GM/DL (ref 3.4–5)
ALP BLD-CCNC: 128 IU/L (ref 40–129)
ALT SERPL-CCNC: 32 U/L (ref 10–40)
ANION GAP SERPL CALCULATED.3IONS-SCNC: 16 MMOL/L (ref 4–16)
AST SERPL-CCNC: 28 IU/L (ref 15–37)
BASOPHILS ABSOLUTE: 0 K/CU MM
BASOPHILS RELATIVE PERCENT: 0.5 % (ref 0–1)
BILIRUB SERPL-MCNC: 0.1 MG/DL (ref 0–1)
BUN BLDV-MCNC: 13 MG/DL (ref 6–23)
CALCIUM SERPL-MCNC: 9.1 MG/DL (ref 8.3–10.6)
CHLORIDE BLD-SCNC: 93 MMOL/L (ref 99–110)
CO2: 23 MMOL/L (ref 21–32)
CREAT SERPL-MCNC: 0.9 MG/DL (ref 0.6–1.1)
DIFFERENTIAL TYPE: ABNORMAL
EOSINOPHILS ABSOLUTE: 0.1 K/CU MM
EOSINOPHILS RELATIVE PERCENT: 2.4 % (ref 0–3)
GFR AFRICAN AMERICAN: >60 ML/MIN/1.73M2
GFR NON-AFRICAN AMERICAN: >60 ML/MIN/1.73M2
GLUCOSE BLD-MCNC: 273 MG/DL (ref 70–99)
HCT VFR BLD CALC: 38.8 % (ref 37–47)
HEMOGLOBIN: 12.3 GM/DL (ref 12.5–16)
IMMATURE NEUTROPHIL %: 0.9 % (ref 0–0.43)
LIPASE: 38 IU/L (ref 13–60)
LYMPHOCYTES ABSOLUTE: 1.5 K/CU MM
LYMPHOCYTES RELATIVE PERCENT: 25.4 % (ref 24–44)
MCH RBC QN AUTO: 32.4 PG (ref 27–31)
MCHC RBC AUTO-ENTMCNC: 31.7 % (ref 32–36)
MCV RBC AUTO: 102.1 FL (ref 78–100)
MONOCYTES ABSOLUTE: 0.7 K/CU MM
MONOCYTES RELATIVE PERCENT: 11.9 % (ref 0–4)
NUCLEATED RBC %: 0 %
PDW BLD-RTO: 12.9 % (ref 11.7–14.9)
PLATELET # BLD: 184 K/CU MM (ref 140–440)
PMV BLD AUTO: 10.7 FL (ref 7.5–11.1)
POTASSIUM SERPL-SCNC: 4.1 MMOL/L (ref 3.5–5.1)
PRO-BNP: 151.7 PG/ML
RBC # BLD: 3.8 M/CU MM (ref 4.2–5.4)
SEGMENTED NEUTROPHILS ABSOLUTE COUNT: 3.4 K/CU MM
SEGMENTED NEUTROPHILS RELATIVE PERCENT: 58.9 % (ref 36–66)
SODIUM BLD-SCNC: 132 MMOL/L (ref 135–145)
TOTAL IMMATURE NEUTOROPHIL: 0.05 K/CU MM
TOTAL NUCLEATED RBC: 0 K/CU MM
TOTAL PROTEIN: 6.3 GM/DL (ref 6.4–8.2)
TROPONIN T: <0.01 NG/ML
WBC # BLD: 5.8 K/CU MM (ref 4–10.5)

## 2020-05-13 PROCEDURE — 84484 ASSAY OF TROPONIN QUANT: CPT

## 2020-05-13 PROCEDURE — 71045 X-RAY EXAM CHEST 1 VIEW: CPT

## 2020-05-13 PROCEDURE — 99285 EMERGENCY DEPT VISIT HI MDM: CPT

## 2020-05-13 PROCEDURE — 93005 ELECTROCARDIOGRAM TRACING: CPT | Performed by: PHYSICIAN ASSISTANT

## 2020-05-13 PROCEDURE — 83880 ASSAY OF NATRIURETIC PEPTIDE: CPT

## 2020-05-13 PROCEDURE — 82550 ASSAY OF CK (CPK): CPT

## 2020-05-13 PROCEDURE — 83735 ASSAY OF MAGNESIUM: CPT

## 2020-05-13 PROCEDURE — 83690 ASSAY OF LIPASE: CPT

## 2020-05-13 PROCEDURE — 85025 COMPLETE CBC W/AUTO DIFF WBC: CPT

## 2020-05-13 PROCEDURE — 80053 COMPREHEN METABOLIC PANEL: CPT

## 2020-05-13 RX ORDER — LORAZEPAM 0.5 MG/1
0.5 TABLET ORAL ONCE
Status: DISCONTINUED | OUTPATIENT
Start: 2020-05-14 | End: 2020-05-13

## 2020-05-13 RX ORDER — DIPHENHYDRAMINE HYDROCHLORIDE 50 MG/ML
25 INJECTION INTRAMUSCULAR; INTRAVENOUS ONCE
Status: COMPLETED | OUTPATIENT
Start: 2020-05-13 | End: 2020-05-14

## 2020-05-13 ASSESSMENT — PAIN SCALES - GENERAL: PAINLEVEL_OUTOF10: 4

## 2020-05-13 ASSESSMENT — PAIN SCALES - WONG BAKER: WONGBAKER_NUMERICALRESPONSE: 4

## 2020-05-14 VITALS
RESPIRATION RATE: 20 BRPM | SYSTOLIC BLOOD PRESSURE: 125 MMHG | HEIGHT: 63 IN | BODY MASS INDEX: 26.58 KG/M2 | HEART RATE: 74 BPM | TEMPERATURE: 98.8 F | OXYGEN SATURATION: 97 % | DIASTOLIC BLOOD PRESSURE: 56 MMHG | WEIGHT: 150 LBS

## 2020-05-14 LAB
BACTERIA: NEGATIVE /HPF
BILIRUBIN URINE: NEGATIVE MG/DL
BLOOD, URINE: NEGATIVE
CLARITY: CLEAR
COLOR: YELLOW
GLUCOSE, URINE: 150 MG/DL
KETONES, URINE: NEGATIVE MG/DL
LEUKOCYTE ESTERASE, URINE: NEGATIVE
MAGNESIUM: 1.9 MG/DL (ref 1.8–2.4)
MUCUS: ABNORMAL HPF
NITRITE URINE, QUANTITATIVE: NEGATIVE
PH, URINE: 5 (ref 5–8)
PROTEIN UA: NEGATIVE MG/DL
RBC URINE: ABNORMAL /HPF (ref 0–6)
SPECIFIC GRAVITY UA: 1.01 (ref 1–1.03)
SQUAMOUS EPITHELIAL: <1 /HPF
TOTAL CK: 104 IU/L (ref 26–140)
TRICHOMONAS: ABNORMAL /HPF
TROPONIN T: <0.01 NG/ML
UROBILINOGEN, URINE: NORMAL MG/DL (ref 0.2–1)
WBC UA: <1 /HPF (ref 0–5)

## 2020-05-14 PROCEDURE — 6360000002 HC RX W HCPCS: Performed by: EMERGENCY MEDICINE

## 2020-05-14 PROCEDURE — 93010 ELECTROCARDIOGRAM REPORT: CPT | Performed by: INTERNAL MEDICINE

## 2020-05-14 PROCEDURE — 84484 ASSAY OF TROPONIN QUANT: CPT

## 2020-05-14 PROCEDURE — 81001 URINALYSIS AUTO W/SCOPE: CPT

## 2020-05-14 PROCEDURE — 96374 THER/PROPH/DIAG INJ IV PUSH: CPT

## 2020-05-14 RX ORDER — DIPHENHYDRAMINE HYDROCHLORIDE 50 MG/ML
INJECTION INTRAMUSCULAR; INTRAVENOUS
Status: DISCONTINUED
Start: 2020-05-14 | End: 2020-05-14 | Stop reason: HOSPADM

## 2020-05-14 RX ADMIN — DIPHENHYDRAMINE HYDROCHLORIDE 25 MG: 50 INJECTION, SOLUTION INTRAMUSCULAR; INTRAVENOUS at 00:31

## 2020-05-14 NOTE — ED PROVIDER NOTES
Interval 340 ms    QTc Calculation (Bazett) 425 ms    P Axis 78 degrees    R Axis 63 degrees    T Axis 69 degrees    Diagnosis       Normal sinus rhythm  Nonspecific ST abnormality  Abnormal ECG  When compared with ECG of 26-MAR-2020 15:55,  Non-specific change in ST segment in Inferior leads  Confirmed by [facility] MD, [facility] ([id number]) on 5/14/2020 12:14:47 PM        Radiographs (if obtained):  [] The following radiograph was interpreted by myself in the absence of a radiologist:   [] Radiologist's Report Reviewed:  CT Head WO Contrast   Final Result   No acute intracranial abnormality. XR CHEST PORTABLE   Final Result   Clear lungs. EKG (if obtained):   Please See Note of attending physician for EKG interpretation. Chart review shows recent radiograph(s):  Xr Chest Portable    Result Date: 5/13/2020  EXAMINATION: ONE XRAY VIEW OF THE CHEST 5/13/2020 10:42 pm COMPARISON: None. HISTORY: ORDERING SYSTEM PROVIDED HISTORY: chest pain TECHNOLOGIST PROVIDED HISTORY: Reason for exam:->chest pain Reason for Exam: chest pain Acuity: Acute Type of Exam: Initial FINDINGS: No infiltrate or consolidation or effusion is identified. The heart size is normal.     Clear lungs. MDM:   Patient presents to the emergency department with complaints including chest pain. Patient's heart score is3   Patient's initial troponin is negative. Repeat 3 hour delta troponin is negative. Patient's EKG shows no acute changes no evidence of ischemia, necrosis, other. Please see note of attending physician for official EKG interpretation. There are no electrolyte abnormalities to account for patient's symptoms or chest pain. With patient's low heart score as well as 2 negative delta troponins and patient being relatively low risk for acute coronary event in addition to negative trop ekg and repeat trop patient is a good candidate for OP cardiology follow up.      *The HEART Score is a prospectively studied scoring system to help emergency physicians risk-stratify chest pain patients who are at risk for all-cause mortality, myocardial infarction, or coronary revascularization in the next 6 weeks. Low risk patients have a score 0-3 and have a less than 2% risk of major event at 6 weeks. *     Plan of care explained to patient. Discussed that there is a 1% miss rate for acute coronary events despite 2 negative troponins with patient. Patient agrees to follow up with her primary care physician or cardiologist within the next 72 hours. Concerning signs and symptoms warranting a return visit to the Emergency Department were explained in detail. All questions and concerns were addressed to the patient's satisfaction. Patient understood and agreed with plan. Had a tremor today to the ED. She has had a similar tremor in the past.  No history of Parkinson's. No history of alcoholism. I do not believe there is an any organic obvious etiology to patient's tremor. It does resolve just with some simple Benadryl. Patient is educated on proper return precautions for that. CT scan is negative. The likelihood of other entities in the differential is insufficient to justify any further testing for them. This was explained to the patient. The patient was advised that persistent or worsening symptoms would requirefurther evaluation. I estimate there is LOW risk for ACUTE CORONARY SYNDROME, PULMONARY EMBOLISM, PNEUMOTHORAX, RUPTURED ESOPHAGUS OR THORACIC AORTIC DISSECTION, thus I consider the discharge disposition reasonable. I manage patient in conjunction with attending physician Dr. Elzbieta Mai      BP (!) 125/56   Pulse 74   Temp 98.8 °F (37.1 °C) (Oral)   Resp 20   Ht 5' 3\" (1.6 m)   Wt 150 lb (68 kg)   SpO2 97%   BMI 26.57 kg/m²       Clinical Impression:  1. Chest pain, unspecified type    2. Alzheimer's dementia without behavioral disturbance, unspecified timing of dementia onset ([facility] [address]    3.  Shortness of breath

## 2020-05-14 NOTE — ED PROVIDER NOTES
chest pain or abdominal pain denies history of DVT PE or AAA. She has no headache. Her neuro exam is otherwise normal she has no sensory deficit or cranial nerve deficit she has normal finger-to-nose. She does have some intermittent upper body tremors in her arms it is rhythmical nature she is awake and talking during these episodes it appears to be psychogenic I gave her Benadryl. She does stop when she is not focused on this. She has no clonus, negative Babinski 2+ patellar reflexes bilaterally she has otherwise no abnormality patient observed she has no chest pain complains of shortness of breath but her vital signs are stable x-rays negative labs negative second troponin is negative EKG is negative. At this time I think patient's okay to go home she was observed here for over 5 hours she did well no seizure activity I do not see any signs of stroke patient stable for discharge patient sister is here agrees with plan patient discharged given return precautions and follow-up with patient stable. No signs of tardive dyskinesia or seizure. 12 lead EKG per my interpretation:  Normal Sinus Rhythm 94  Axis is   Normal  QTc is  425  There is no specific T wave changes appreciated. There is no specific ST wave changes appreciated. Prior EKG to compare with was not available       All diagnostic, treatment, and disposition decisions were made by myself in conjunction with the Advanced Practice Provider. For all further details of the patient's emergency department visit, please see the Advanced Practice Provider's documentation.         Mk King DO  05/14/20 1952

## 2020-05-14 NOTE — ED TRIAGE NOTES
Patient complaining of chest pain/pressure feels like an elephant on her chest. Also complaining of shaking like seizures per EMS but patient alert during continual shaking episodes. Sister states she has recently been diagnosed with alzheimer's.

## 2020-05-14 NOTE — ED NOTES
Sister Sidra Rooney and Yumiko Rya EDT at bedside during straight cath for witness     Jeffery Alas, CLIVE  05/14/20 0111

## 2020-05-19 LAB
EKG ATRIAL RATE: 94 BPM
EKG DIAGNOSIS: NORMAL
EKG P AXIS: 78 DEGREES
EKG P-R INTERVAL: 142 MS
EKG Q-T INTERVAL: 340 MS
EKG QRS DURATION: 86 MS
EKG QTC CALCULATION (BAZETT): 425 MS
EKG R AXIS: 63 DEGREES
EKG T AXIS: 69 DEGREES
EKG VENTRICULAR RATE: 94 BPM

## 2020-05-25 ENCOUNTER — HOSPITAL ENCOUNTER (EMERGENCY)
Age: 70
Discharge: HOME | End: 2020-05-25
Attending: EMERGENCY MEDICINE
Payer: MEDICARE

## 2020-05-25 VITALS
DIASTOLIC BLOOD PRESSURE: 136 MMHG | HEART RATE: 82 BPM | TEMPERATURE: 98.7 F | SYSTOLIC BLOOD PRESSURE: 149 MMHG | WEIGHT: 130 LBS | HEIGHT: 62 IN | RESPIRATION RATE: 16 BRPM | BODY MASS INDEX: 23.92 KG/M2 | OXYGEN SATURATION: 98 %

## 2020-05-25 PROCEDURE — 99283 EMERGENCY DEPT VISIT LOW MDM: CPT

## 2020-05-26 ENCOUNTER — CARE COORDINATION (OUTPATIENT)
Dept: CARE COORDINATION | Age: 70
End: 2020-05-26

## 2020-05-26 ASSESSMENT — ENCOUNTER SYMPTOMS
GASTROINTESTINAL NEGATIVE: 1
EYES NEGATIVE: 1
SHORTNESS OF BREATH: 0
VISUAL CHANGE: 0
VOMITING: 0
RESPIRATORY NEGATIVE: 1

## 2020-05-26 NOTE — ED NOTES
Assessment- pt reports that a few weeks ago she was admitted at [facility] for tremors and shortness of breath. Pt denies feeling SOB when she doesn't have the tremors. Pt is unsure of the cause of tremors. Pt denies feeling anxious. She reports that while she was in [place] put a stent in my vagina, they wanted to make sure I didn't have an infection\".       Beronica Pillai RN  05/25/20 2030

## 2020-05-26 NOTE — ED PROVIDER NOTES
The history is provided by the patient. Neurologic Problem   Primary symptoms include no focal weakness, no loss of sensation, no loss of balance, no speech change, no memory loss, no movement disorder, no visual change, no auditory change, and no dizziness. This is a chronic problem. The problem has been resolved. There was no focality noted. Pertinent negatives include no shortness of breath, no chest pain, no vomiting, no altered mental status, no confusion and no headaches. Associated symptoms comments: Patient wanted checked out for this ongoing problem. Review of Systems   Constitutional: Negative. HENT: Negative. Eyes: Negative. Respiratory: Negative. Negative for shortness of breath. Cardiovascular: Negative. Negative for chest pain. Gastrointestinal: Negative. Negative for vomiting. Genitourinary: Negative. Musculoskeletal: Negative. Skin: Negative. Neurological: Negative. Negative for dizziness, speech change, focal weakness, headaches and loss of balance. Psychiatric/Behavioral: Negative for confusion and memory loss. All other systems reviewed and are negative.       Family History   Problem Relation Age of Onset    No Known Problems Mother     No Known Problems Sister     Heart Disease Brother     Coronary Art Dis Brother         stents    No Known Problems Sister     No Known Problems Sister     Alcohol Abuse Brother      Social History     Socioeconomic History    Marital status: Single     Spouse name: Not on file    Number of children: 0    Years of education: 9    Highest education level: 9th grade   Occupational History    Not on file   Social Needs    Financial resource strain: Not on file    Food insecurity     Worry: Not on file     Inability: Not on file   [employer] needs     Medical: Not on file     Non-medical: Not on file   Tobacco Use    Smoking status: Never Smoker    Smokeless tobacco: Never Used   Substance and Sexual

## 2020-05-27 NOTE — CARE COORDINATION
Talked with patient and sister Gamaliel Vogel on speaker phone. Pt does not have a HIPPA on file, pt gave verbal consent to have her sister on the phone to discuss reason for the call. Sister will call PCP for appt and f/u from ED visit and complete HIPPA form. Patient contacted regarding recent discharge and COVID-19 risk. Discussed COVID-19 related testing which was not done at this time. Test results were not done. Patient informed of results, if available? No     Care Transition Nurse/ Ambulatory Care Manager contacted the patient by telephone to perform post discharge assessment. Verified name and  with patient as identifiers. Patient has following risk factors of: COPD, diabetes and Alzheimers. CTN/ACM reviewed discharge instructions, medical action plan and red flags related to discharge diagnosis. Reviewed and educated them on any new and changed medications related to discharge diagnosis. Advised obtaining a 90-day supply of all daily and as-needed medications. Education provided regarding infection prevention, and signs and symptoms of COVID-19 and when to seek medical attention with patient who verbalized understanding. Discussed exposure protocols and quarantine from [address] you at higher risk for severe illness  and given an opportunity for questions and concerns. The patient agrees to contact the COVID-19 hotline [phone] or PCP office for questions related to their healthcare. CTN/ACM provided contact information for future reference. From CDC: Are you at higher risk for severe illness?  Wash your hands often.  Avoid close contact (6 feet, which is about two arm lengths) with people who are sick.  Put distance between yourself and other people if COVID-19 is spreading in your community.  Clean and disinfect frequently touched surfaces.  Avoid all cruise travel and non-essential air travel.    Call your healthcare professional if you have concerns about COVID-19 and your underlying condition or if you are sick. For more information on steps you can take to protect yourself, see CDC's How to [place] for follow-up call in 7-14 days based on severity of symptoms and risk factors.

## 2020-06-02 ENCOUNTER — CARE COORDINATION (OUTPATIENT)
Dept: CARE COORDINATION | Age: 70
End: 2020-06-02

## 2020-06-09 ENCOUNTER — CARE COORDINATION (OUTPATIENT)
Dept: CARE COORDINATION | Age: 70
End: 2020-06-09

## 2020-06-10 ENCOUNTER — OFFICE VISIT (OUTPATIENT)
Dept: INTERNAL MEDICINE CLINIC | Age: 70
End: 2020-06-10
Payer: MEDICARE

## 2020-06-10 VITALS
DIASTOLIC BLOOD PRESSURE: 78 MMHG | OXYGEN SATURATION: 98 % | TEMPERATURE: 98.5 F | HEART RATE: 83 BPM | BODY MASS INDEX: 27.6 KG/M2 | WEIGHT: 150 LBS | SYSTOLIC BLOOD PRESSURE: 130 MMHG | HEIGHT: 62 IN

## 2020-06-10 PROCEDURE — 4040F PNEUMOC VAC/ADMIN/RCVD: CPT | Performed by: INTERNAL MEDICINE

## 2020-06-10 PROCEDURE — 99214 OFFICE O/P EST MOD 30 MIN: CPT | Performed by: INTERNAL MEDICINE

## 2020-06-10 PROCEDURE — 1123F ACP DISCUSS/DSCN MKR DOCD: CPT | Performed by: INTERNAL MEDICINE

## 2020-06-10 PROCEDURE — 3017F COLORECTAL CA SCREEN DOC REV: CPT | Performed by: INTERNAL MEDICINE

## 2020-06-10 PROCEDURE — G8400 PT W/DXA NO RESULTS DOC: HCPCS | Performed by: INTERNAL MEDICINE

## 2020-06-10 PROCEDURE — 1036F TOBACCO NON-USER: CPT | Performed by: INTERNAL MEDICINE

## 2020-06-10 PROCEDURE — 1090F PRES/ABSN URINE INCON ASSESS: CPT | Performed by: INTERNAL MEDICINE

## 2020-06-10 PROCEDURE — G8417 CALC BMI ABV UP PARAM F/U: HCPCS | Performed by: INTERNAL MEDICINE

## 2020-06-10 PROCEDURE — G8427 DOCREV CUR MEDS BY ELIG CLIN: HCPCS | Performed by: INTERNAL MEDICINE

## 2020-06-10 RX ORDER — AMLODIPINE BESYLATE 10 MG/1
10 TABLET ORAL DAILY
Qty: 30 TABLET | Refills: 0 | Status: SHIPPED | OUTPATIENT
Start: 2020-06-10 | End: 2020-07-10 | Stop reason: SDUPTHER

## 2020-06-10 RX ORDER — ATORVASTATIN CALCIUM 80 MG/1
80 TABLET, FILM COATED ORAL DAILY
Qty: 30 TABLET | Refills: 0 | Status: SHIPPED | OUTPATIENT
Start: 2020-06-10 | End: 2020-07-10 | Stop reason: SDUPTHER

## 2020-06-10 RX ORDER — LISINOPRIL 20 MG/1
20 TABLET ORAL DAILY
Qty: 30 TABLET | Refills: 0 | Status: SHIPPED | OUTPATIENT
Start: 2020-06-10 | End: 2020-07-10 | Stop reason: SDUPTHER

## 2020-06-10 ASSESSMENT — PATIENT HEALTH QUESTIONNAIRE - PHQ9
SUM OF ALL RESPONSES TO PHQ QUESTIONS 1-9: 0
SUM OF ALL RESPONSES TO PHQ9 QUESTIONS 1 & 2: 0
2. FEELING DOWN, DEPRESSED OR HOPELESS: 0
SUM OF ALL RESPONSES TO PHQ QUESTIONS 1-9: 0
1. LITTLE INTEREST OR PLEASURE IN DOING THINGS: 0
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
## 2020-07-09 ENCOUNTER — HOSPITAL ENCOUNTER (EMERGENCY)
Age: 70
Discharge: HOME | End: 2020-07-09
Attending: EMERGENCY MEDICINE
Payer: MEDICARE

## 2020-07-09 VITALS
DIASTOLIC BLOOD PRESSURE: 75 MMHG | OXYGEN SATURATION: 100 % | WEIGHT: 145 LBS | TEMPERATURE: 97.8 F | BODY MASS INDEX: 25.69 KG/M2 | HEART RATE: 82 BPM | RESPIRATION RATE: 16 BRPM | SYSTOLIC BLOOD PRESSURE: 161 MMHG | HEIGHT: 63 IN

## 2020-07-09 PROCEDURE — 99283 EMERGENCY DEPT VISIT LOW MDM: CPT

## 2020-07-09 PROCEDURE — 2500000003 HC RX 250 WO HCPCS: Performed by: EMERGENCY MEDICINE

## 2020-07-09 PROCEDURE — 4500000027

## 2020-07-09 PROCEDURE — 6370000000 HC RX 637 (ALT 250 FOR IP): Performed by: EMERGENCY MEDICINE

## 2020-07-09 RX ORDER — ONDANSETRON 4 MG/1
4 TABLET, ORALLY DISINTEGRATING ORAL ONCE
Status: COMPLETED | OUTPATIENT
Start: 2020-07-09 | End: 2020-07-09

## 2020-07-09 RX ORDER — OXYMETAZOLINE HYDROCHLORIDE 0.05 G/100ML
2 SPRAY NASAL ONCE
Status: COMPLETED | OUTPATIENT
Start: 2020-07-09 | End: 2020-07-09

## 2020-07-09 RX ORDER — ECHINACEA PURPUREA EXTRACT 125 MG
TABLET ORAL
Qty: 1 BOTTLE | Refills: 0 | Status: ON HOLD | OUTPATIENT
Start: 2020-07-09 | End: 2020-09-25 | Stop reason: HOSPADM

## 2020-07-09 RX ORDER — TRANEXAMIC ACID 100 MG/ML
10 INJECTION, SOLUTION INTRAVENOUS ONCE
Status: COMPLETED | OUTPATIENT
Start: 2020-07-09 | End: 2020-07-09

## 2020-07-09 RX ADMIN — OXYMETAZOLINE HYDROCHLORIDE 2 SPRAY: 0.05 SPRAY NASAL at 02:49

## 2020-07-09 RX ADMIN — TRANEXAMIC ACID 10 MG: 100 INJECTION, SOLUTION INTRAVENOUS at 04:48

## 2020-07-09 RX ADMIN — ONDANSETRON 4 MG: 4 TABLET, ORALLY DISINTEGRATING ORAL at 04:57

## 2020-07-09 NOTE — ED NOTES
Dr Thuan Barryos in to check with pt and reviews discharge instructions with pt and sister.      Loco Torres, RN  07/09/20 6977

## 2020-07-09 NOTE — ED TRIAGE NOTES
Pt was sitting watching television and nose started bleeding about 5 mins before arriving here at the ER. Clamp applied to nose and cleaned up hands and face.

## 2020-07-09 NOTE — ED NOTES
Again tried to go to car but returns to the lobby with bleeding. Brought back to room 3.      Bradford Alex RN  07/09/20 5881
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
## 2020-07-09 NOTE — ED NOTES
Back in t the waiting room D/T nose bleeding again after getting to car. Sat in the lobby for a few with clamp on. No bleeding noted.      Odilia Medeiros RN  07/09/20 1465

## 2020-07-09 NOTE — ED NOTES
Had pt blow nose and large clot came out. 2 squirts of afrin to Rt nostril and clamp reapplied.      Chantel Pan RN  07/09/20 4958

## 2020-07-10 ENCOUNTER — OFFICE VISIT (OUTPATIENT)
Dept: INTERNAL MEDICINE CLINIC | Age: 70
End: 2020-07-10
Payer: MEDICARE

## 2020-07-10 VITALS
SYSTOLIC BLOOD PRESSURE: 132 MMHG | DIASTOLIC BLOOD PRESSURE: 84 MMHG | WEIGHT: 145 LBS | TEMPERATURE: 97.3 F | HEIGHT: 63 IN | OXYGEN SATURATION: 98 % | HEART RATE: 73 BPM | BODY MASS INDEX: 25.69 KG/M2 | RESPIRATION RATE: 17 BRPM

## 2020-07-10 PROCEDURE — G8417 CALC BMI ABV UP PARAM F/U: HCPCS | Performed by: INTERNAL MEDICINE

## 2020-07-10 PROCEDURE — 99214 OFFICE O/P EST MOD 30 MIN: CPT | Performed by: INTERNAL MEDICINE

## 2020-07-10 PROCEDURE — 4040F PNEUMOC VAC/ADMIN/RCVD: CPT | Performed by: INTERNAL MEDICINE

## 2020-07-10 PROCEDURE — 1123F ACP DISCUSS/DSCN MKR DOCD: CPT | Performed by: INTERNAL MEDICINE

## 2020-07-10 PROCEDURE — G8400 PT W/DXA NO RESULTS DOC: HCPCS | Performed by: INTERNAL MEDICINE

## 2020-07-10 PROCEDURE — G8427 DOCREV CUR MEDS BY ELIG CLIN: HCPCS | Performed by: INTERNAL MEDICINE

## 2020-07-10 PROCEDURE — 1090F PRES/ABSN URINE INCON ASSESS: CPT | Performed by: INTERNAL MEDICINE

## 2020-07-10 PROCEDURE — 1036F TOBACCO NON-USER: CPT | Performed by: INTERNAL MEDICINE

## 2020-07-10 PROCEDURE — 3017F COLORECTAL CA SCREEN DOC REV: CPT | Performed by: INTERNAL MEDICINE

## 2020-07-10 RX ORDER — ATORVASTATIN CALCIUM 80 MG/1
80 TABLET, FILM COATED ORAL DAILY
Qty: 30 TABLET | Refills: 0 | Status: SHIPPED | OUTPATIENT
Start: 2020-07-10 | End: 2020-08-10 | Stop reason: SDUPTHER

## 2020-07-10 RX ORDER — AMLODIPINE BESYLATE 10 MG/1
10 TABLET ORAL DAILY
Qty: 30 TABLET | Refills: 0 | Status: SHIPPED | OUTPATIENT
Start: 2020-07-10 | End: 2020-08-10 | Stop reason: SDUPTHER

## 2020-07-10 RX ORDER — FAMOTIDINE 20 MG/1
20 TABLET, FILM COATED ORAL 2 TIMES DAILY
Qty: 60 TABLET | Refills: 0 | Status: SHIPPED | OUTPATIENT
Start: 2020-07-10 | End: 2020-08-10 | Stop reason: SDUPTHER

## 2020-07-10 RX ORDER — LISINOPRIL 20 MG/1
20 TABLET ORAL DAILY
Qty: 30 TABLET | Refills: 0 | Status: SHIPPED | OUTPATIENT
Start: 2020-07-10 | End: 2020-08-10 | Stop reason: SDUPTHER

## 2020-07-10 ASSESSMENT — PATIENT HEALTH QUESTIONNAIRE - PHQ9
1. LITTLE INTEREST OR PLEASURE IN DOING THINGS: 0
SUM OF ALL RESPONSES TO PHQ9 QUESTIONS 1 & 2: 0
2. FEELING DOWN, DEPRESSED OR HOPELESS: 0
SUM OF ALL RESPONSES TO PHQ QUESTIONS 1-9: 0
SUM OF ALL RESPONSES TO PHQ QUESTIONS 1-9: 0
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
## 2020-07-13 ENCOUNTER — CARE COORDINATION (OUTPATIENT)
Dept: CARE COORDINATION | Age: 70
End: 2020-07-13

## 2020-07-16 ENCOUNTER — CARE COORDINATION (OUTPATIENT)
Dept: CARE COORDINATION | Age: 70
End: 2020-07-16

## 2020-07-16 NOTE — CARE COORDINATION
Patient contacted regarding recent discharge and COVID-19 risk. Discussed COVID-19 related testing which was not done at this time. Test results were not done. Patient informed of results, if available? No     Care Transition Nurse/ Ambulatory Care Manager contacted the patient by telephone to perform post discharge assessment. Verified name and  with patient as identifiers. Patient has following risk factors of: COPD, diabetes and Alzheimers. CTN/ACM reviewed discharge instructions, medical action plan and red flags related to discharge diagnosis. Reviewed and educated them on any new and changed medications related to discharge diagnosis. Advised obtaining a 90-day supply of all daily and as-needed medications. Education provided regarding infection prevention, and signs and symptoms of COVID-19 and when to seek medical attention with patient who verbalized understanding. Discussed exposure protocols and quarantine from [address] you at higher risk for severe illness  and given an opportunity for questions and concerns. The patient agrees to contact the COVID-19 hotline [phone] or PCP office for questions related to their healthcare. CTN/ACM provided contact information for future reference. From CDC: Are you at higher risk for severe illness?  Wash your hands often.  Avoid close contact (6 feet, which is about two arm lengths) with people who are sick.  Put distance between yourself and other people if COVID-19 is spreading in your community.  Clean and disinfect frequently touched surfaces.  Avoid all cruise travel and non-essential air travel.  Call your healthcare professional if you have concerns about COVID-19 and your underlying condition or if you are sick. For more information on steps you can take to protect yourself, see CDC's How to Protect Yourself    Talked with sister with pt approval. ACM has talked with pt last ED visit.  PT and family feel they have resources needed at this time. based on severity of symptoms and risk factors.

## 2020-08-07 ENCOUNTER — TELEPHONE (OUTPATIENT)
Dept: INTERNAL MEDICINE CLINIC | Age: 70
End: 2020-08-07

## 2020-08-07 NOTE — TELEPHONE ENCOUNTER
Patient's sister contacted office stating she had received a call from this office. Reviewed record and did not see any outstanding messages, however, patient has an appointment on Monday 8/10/20 at 9:15. Explained to Edyta Chao that it may have been an appointment reminder. Edyta Chao also wanted to inform the provider that her sister is in need of a larger quantity of depends as she is going through them quickly. Informed Edyta Chao that she could discuss this on Monday with Dr. Patricia Horton so we could ensure patient has enough supplies to last her each month.

## 2020-08-08 ENCOUNTER — APPOINTMENT (OUTPATIENT)
Dept: GENERAL RADIOLOGY | Age: 70
End: 2020-08-08
Payer: MEDICARE

## 2020-08-08 ENCOUNTER — HOSPITAL ENCOUNTER (EMERGENCY)
Age: 70
Discharge: HOME | End: 2020-08-08
Attending: EMERGENCY MEDICINE
Payer: MEDICARE

## 2020-08-08 VITALS
TEMPERATURE: 97.7 F | BODY MASS INDEX: 25.76 KG/M2 | SYSTOLIC BLOOD PRESSURE: 174 MMHG | OXYGEN SATURATION: 100 % | HEIGHT: 68 IN | DIASTOLIC BLOOD PRESSURE: 79 MMHG | HEART RATE: 60 BPM | WEIGHT: 170 LBS | RESPIRATION RATE: 16 BRPM

## 2020-08-08 PROCEDURE — 6370000000 HC RX 637 (ALT 250 FOR IP): Performed by: EMERGENCY MEDICINE

## 2020-08-08 PROCEDURE — 71045 X-RAY EXAM CHEST 1 VIEW: CPT

## 2020-08-08 PROCEDURE — 99283 EMERGENCY DEPT VISIT LOW MDM: CPT

## 2020-08-08 RX ORDER — AMLODIPINE BESYLATE 5 MG/1
10 TABLET ORAL DAILY
Status: DISCONTINUED | OUTPATIENT
Start: 2020-08-08 | End: 2020-08-08 | Stop reason: HOSPADM

## 2020-08-08 RX ORDER — ACETAMINOPHEN 500 MG
1000 TABLET ORAL ONCE
Status: COMPLETED | OUTPATIENT
Start: 2020-08-08 | End: 2020-08-08

## 2020-08-08 RX ORDER — LISINOPRIL 10 MG/1
20 TABLET ORAL ONCE
Status: COMPLETED | OUTPATIENT
Start: 2020-08-08 | End: 2020-08-08

## 2020-08-08 RX ADMIN — AMLODIPINE BESYLATE 10 MG: 5 TABLET ORAL at 10:02

## 2020-08-08 RX ADMIN — METOPROLOL TARTRATE 25 MG: 25 TABLET, FILM COATED ORAL at 09:54

## 2020-08-08 RX ADMIN — LISINOPRIL 20 MG: 10 TABLET ORAL at 09:54

## 2020-08-08 RX ADMIN — ACETAMINOPHEN 1000 MG: 500 TABLET ORAL at 09:54

## 2020-08-08 ASSESSMENT — PAIN SCALES - GENERAL
PAINLEVEL_OUTOF10: 0
PAINLEVEL_OUTOF10: 0
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
## 2020-08-08 NOTE — ED NOTES
Discharge instructions were reviewed with the sister and they will follow up with the PCP.       Saida Dumont RN  08/08/20 8031
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
## 2020-08-10 ENCOUNTER — OFFICE VISIT (OUTPATIENT)
Dept: INTERNAL MEDICINE CLINIC | Age: 70
End: 2020-08-10
Payer: MEDICARE

## 2020-08-10 ENCOUNTER — CARE COORDINATION (OUTPATIENT)
Dept: CARE COORDINATION | Age: 70
End: 2020-08-10

## 2020-08-10 VITALS
TEMPERATURE: 97.5 F | HEART RATE: 96 BPM | BODY MASS INDEX: 22.43 KG/M2 | DIASTOLIC BLOOD PRESSURE: 80 MMHG | OXYGEN SATURATION: 98 % | SYSTOLIC BLOOD PRESSURE: 140 MMHG | WEIGHT: 148 LBS | HEIGHT: 68 IN

## 2020-08-10 PROCEDURE — 1036F TOBACCO NON-USER: CPT | Performed by: INTERNAL MEDICINE

## 2020-08-10 PROCEDURE — 3017F COLORECTAL CA SCREEN DOC REV: CPT | Performed by: INTERNAL MEDICINE

## 2020-08-10 PROCEDURE — 0509F URINE INCON PLAN DOCD: CPT | Performed by: INTERNAL MEDICINE

## 2020-08-10 PROCEDURE — 4040F PNEUMOC VAC/ADMIN/RCVD: CPT | Performed by: INTERNAL MEDICINE

## 2020-08-10 PROCEDURE — 1123F ACP DISCUSS/DSCN MKR DOCD: CPT | Performed by: INTERNAL MEDICINE

## 2020-08-10 PROCEDURE — G8420 CALC BMI NORM PARAMETERS: HCPCS | Performed by: INTERNAL MEDICINE

## 2020-08-10 PROCEDURE — G8400 PT W/DXA NO RESULTS DOC: HCPCS | Performed by: INTERNAL MEDICINE

## 2020-08-10 PROCEDURE — G8427 DOCREV CUR MEDS BY ELIG CLIN: HCPCS | Performed by: INTERNAL MEDICINE

## 2020-08-10 PROCEDURE — 99214 OFFICE O/P EST MOD 30 MIN: CPT | Performed by: INTERNAL MEDICINE

## 2020-08-10 PROCEDURE — 1090F PRES/ABSN URINE INCON ASSESS: CPT | Performed by: INTERNAL MEDICINE

## 2020-08-10 RX ORDER — LISINOPRIL 20 MG/1
20 TABLET ORAL DAILY
Qty: 30 TABLET | Refills: 2 | Status: SHIPPED | OUTPATIENT
Start: 2020-08-10 | End: 2020-10-09 | Stop reason: SDUPTHER

## 2020-08-10 RX ORDER — ATORVASTATIN CALCIUM 80 MG/1
80 TABLET, FILM COATED ORAL DAILY
Qty: 30 TABLET | Refills: 2 | Status: SHIPPED | OUTPATIENT
Start: 2020-08-10 | End: 2020-10-09

## 2020-08-10 RX ORDER — FAMOTIDINE 20 MG/1
20 TABLET, FILM COATED ORAL 2 TIMES DAILY
Qty: 60 TABLET | Refills: 2 | Status: SHIPPED | OUTPATIENT
Start: 2020-08-10 | End: 2020-10-09

## 2020-08-10 RX ORDER — AMLODIPINE BESYLATE 10 MG/1
10 TABLET ORAL DAILY
Qty: 30 TABLET | Refills: 2 | Status: SHIPPED | OUTPATIENT
Start: 2020-08-10 | End: 2020-10-09 | Stop reason: SDUPTHER

## 2020-08-10 ASSESSMENT — PATIENT HEALTH QUESTIONNAIRE - PHQ9
1. LITTLE INTEREST OR PLEASURE IN DOING THINGS: 0
SUM OF ALL RESPONSES TO PHQ QUESTIONS 1-9: 0
2. FEELING DOWN, DEPRESSED OR HOPELESS: 0
SUM OF ALL RESPONSES TO PHQ9 QUESTIONS 1 & 2: 0
SUM OF ALL RESPONSES TO PHQ QUESTIONS 1-9: 0
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
## 2020-08-11 ENCOUNTER — CARE COORDINATION (OUTPATIENT)
Dept: CARE COORDINATION | Age: 70
End: 2020-08-11

## 2020-08-22 ENCOUNTER — HOSPITAL ENCOUNTER (EMERGENCY)
Age: 70
Discharge: HOME | End: 2020-08-22
Attending: EMERGENCY MEDICINE
Payer: MEDICARE

## 2020-08-22 VITALS
HEIGHT: 63 IN | TEMPERATURE: 98.4 F | SYSTOLIC BLOOD PRESSURE: 140 MMHG | OXYGEN SATURATION: 95 % | WEIGHT: 150 LBS | BODY MASS INDEX: 26.58 KG/M2 | DIASTOLIC BLOOD PRESSURE: 80 MMHG | HEART RATE: 100 BPM | RESPIRATION RATE: 14 BRPM

## 2020-08-22 PROCEDURE — 6370000000 HC RX 637 (ALT 250 FOR IP): Performed by: EMERGENCY MEDICINE

## 2020-08-22 PROCEDURE — 99282 EMERGENCY DEPT VISIT SF MDM: CPT

## 2020-08-22 RX ORDER — DIPHENHYDRAMINE HCL 25 MG
50 CAPSULE ORAL ONCE
Status: COMPLETED | OUTPATIENT
Start: 2020-08-22 | End: 2020-08-22

## 2020-08-22 RX ADMIN — DIPHENHYDRAMINE HYDROCHLORIDE 50 MG: 25 CAPSULE ORAL at 17:03

## 2020-08-22 NOTE — ED PROVIDER NOTES
Triage Chief Complaint:   Insect Bite (Was stung multiple times this AM after opening the front door and disrupting a hive of bees, mostly on the hands which are swollen and red)      [place]:  Loretta Long is a 65 y.o. female that presents to the emergency department after she states she sustained multiple bee stings. States she open her front door and there was a beehive there. States she got stung multiple times on her hands. Has some mild edema to dorsal aspects of both hands. No angioedema. No tongue or lip swelling. No difficulty breathing. No stings to her face, trunk, legs. She does have a history of Alzheimer's disease and has had tremors many times in the past.  States she noticed some tremors again after the bee stings. She does appear anxious. Has not taken any medication prior to arrival.  No wheezing or stridor. .    Past Medical History:   Diagnosis Date    [facility] disease ([facility] [address]     Bronchitis     CAD (coronary artery disease)     Chest wall trauma     [facility]     FH: CAD (coronary artery disease)     brother with cabg in his 45s    Hypertension     Kidney stone     Lumbar herniated disc     Restless leg syndrome     Spinal stenosis      Past Surgical History:   Procedure Laterality Date    HYSTERECTOMY      URETER STENT PLACEMENT       Family History   Problem Relation Age of Onset    No Known Problems Mother     No Known Problems Sister     Heart Disease Brother     Coronary Art Dis Brother         stents    No Known Problems Sister     No Known Problems Sister     Alcohol Abuse Brother      Social History     Socioeconomic History    Marital status: Single     Spouse name: Not on file    Number of children: 0    Years of education: 9    Highest education level: 9th grade   Occupational History    Not on file   Social Needs    Financial resource strain: Not on file    Food insecurity     Worry: Not on file Inability: Not on file    Transportation needs     Medical: Not on file     Non-medical: Not on file   Tobacco Use    Smoking status: Never Smoker    Smokeless tobacco: Never Used   Substance and Sexual Activity    Alcohol use: No    Drug use: No    Sexual activity: Not on file   Lifestyle    Physical activity     Days per week: Not on file     Minutes per session: Not on file    Stress: Not on file   Relationships    Social connections     Talks on phone: Not on file     Gets together: Not on file     Attends Jew service: Not on file     Active member of club or organization: Not on file     Attends meetings of clubs or organizations: Not on file     Relationship status: Not on file    Intimate partner violence     Fear of current or ex partner: Not on file     Emotionally abused: Not on file     Physically abused: Not on file     Forced sexual activity: Not on file   Other Topics Concern    Not on file   Social History Narrative    Not on file     No current facility-administered medications for this encounter. Current Outpatient Medications   Medication Sig Dispense Refill    lisinopril (PRINIVIL;ZESTRIL) 20 MG tablet Take 1 tablet by mouth daily 30 tablet 2    amLODIPine (NORVASC) 10 MG tablet Take 1 tablet by mouth daily 30 tablet 2    atorvastatin (LIPITOR) 80 MG tablet Take 1 tablet by mouth daily 30 tablet 2    metoprolol tartrate (LOPRESSOR) 25 MG tablet Take 1 tablet by mouth 2 times daily 60 tablet 2    famotidine (PEPCID) 20 MG tablet Take 1 tablet by mouth 2 times daily 60 tablet 2    Incontinence Supplies MISC Pull-ups size large 240 each 2    sodium chloride (OCEAN) 0.65 % nasal spray 1-2 sprays in each nostril as needed.  1 Bottle 0    fluticasone (FLONASE) 50 MCG/ACT nasal spray 1 spray by Each Nostril route daily 1 Bottle 0    Multiple Vitamins-Minerals (THERAPEUTIC MULTIVITAMIN-MINERALS) tablet Take 1 tablet by mouth daily 30 tablet 2     Allergies   Allergen Reactions    Shellfish-Derived Products     Floxin [Ofloxacin] Other (See Comments)     Made her \"feel weird\"     Nursing Notes Reviewed    ROS:  At least 10 systems reviewed and otherwise negative except as in the [address] Physical Exam:  ED Triage Vitals   Enc Vitals Group      BP       Pulse       Resp       Temp       Temp src       SpO2       Weight       Height       Head Circumference       Peak Flow       Pain Score       Pain Loc       Pain Edu? Excl. in [address]? My pulse oximetry interpretation is which is within the normal range    GENERAL APPEARANCE: Awake and alert. Cooperative. No acute distress. HEAD: Normocephalic. Atraumatic. No swelling. EYES: EOM's grossly intact. Sclera anicteric. ENT: Mucous membranes are moist. Tolerates saliva. No trismus. No angioedema. NECK: Supple. No meningismus. Trachea midline. HEART: RRR. Radial pulses 2+. LUNGS: Respirations unlabored. CTAB. No respiratory distress. No wheezing or stridor. ABDOMEN: Soft. Non-tender. No guarding or rebound. EXTREMITIES: No acute deformities. SKIN: Warm and dry. Swelling to bilateral dorsal aspect of hands. Strong pulses. Sensation intact. Able to extend and flex all fingers. Good cap refill. NEUROLOGICAL: No gross facial drooping. Moves all 4 extremities spontaneously. PSYCHIATRIC: Normal mood. I have reviewed and interpreted all of the currently available lab results from this visit (if applicable):  No results found for this visit on 08/22/20. Radiographs:  [] Radiologist's Wet Read Report Reviewed:     [] Discussed with Radiologist:     [] The following radiograph was interpreted by myself in the absence of a radiologist:     EKG: (All EKG's are interpreted by myself in the absence of a cardiologist)      MDM:  Patient's vital signs are stable. Did order 50 mg of p.o. Benadryl. . Patient resting comfortably. Vital signs stable. No signs of anaphylaxis.   This did happen several hours prior to

## 2020-08-22 NOTE — ED NOTES
Ice packs applied to bilateral lower extremities to help with swelling.       Nakul Castro RN  08/22/20 [id number]

## 2020-09-19 ENCOUNTER — HOSPITAL ENCOUNTER (INPATIENT)
Age: 70
LOS: 6 days | Discharge: HOME | DRG: 885 | End: 2020-09-25
Attending: EMERGENCY MEDICINE | Admitting: PSYCHIATRY & NEUROLOGY
Payer: MEDICARE

## 2020-09-19 LAB
ACETAMINOPHEN LEVEL: <5 UG/ML (ref 15–30)
ALBUMIN SERPL-MCNC: 4.3 GM/DL (ref 3.4–5)
ALCOHOL SCREEN SERUM: <0.01 %WT/VOL
ALP BLD-CCNC: 155 IU/L (ref 40–129)
ALT SERPL-CCNC: 21 U/L (ref 10–40)
AMPHETAMINES: NEGATIVE
ANION GAP SERPL CALCULATED.3IONS-SCNC: 6 MMOL/L (ref 4–16)
AST SERPL-CCNC: 27 IU/L (ref 15–37)
BACTERIA: ABNORMAL /HPF
BARBITURATE SCREEN URINE: NEGATIVE
BASOPHILS ABSOLUTE: 0 K/CU MM
BASOPHILS RELATIVE PERCENT: 0.5 % (ref 0–1)
BENZODIAZEPINE SCREEN, URINE: NEGATIVE
BILIRUB SERPL-MCNC: 0.5 MG/DL (ref 0–1)
BILIRUBIN URINE: NEGATIVE MG/DL
BLOOD, URINE: NEGATIVE
BUN BLDV-MCNC: 20 MG/DL (ref 6–23)
CALCIUM SERPL-MCNC: 9.4 MG/DL (ref 8.3–10.6)
CANNABINOID SCREEN URINE: NEGATIVE
CAST TYPE: ABNORMAL /HPF
CHLORIDE BLD-SCNC: 101 MMOL/L (ref 99–110)
CLARITY: CLEAR
CO2: 31 MMOL/L (ref 21–32)
COCAINE METABOLITE: NEGATIVE
COLOR: YELLOW
CREAT SERPL-MCNC: 1.1 MG/DL (ref 0.6–1.1)
CRYSTAL TYPE: ABNORMAL /HPF
DIFFERENTIAL TYPE: ABNORMAL
DOSE AMOUNT: ABNORMAL
DOSE AMOUNT: ABNORMAL
DOSE TIME: ABNORMAL
DOSE TIME: ABNORMAL
EOSINOPHILS ABSOLUTE: 0.1 K/CU MM
EOSINOPHILS RELATIVE PERCENT: 1.4 % (ref 0–3)
EPITHELIAL CELLS, UA: ABNORMAL /HPF
GFR AFRICAN AMERICAN: 59 ML/MIN/1.73M2
GFR NON-AFRICAN AMERICAN: 49 ML/MIN/1.73M2
GLUCOSE BLD-MCNC: 128 MG/DL (ref 70–99)
GLUCOSE, URINE: NEGATIVE MG/DL
HCT VFR BLD CALC: 39.4 % (ref 37–47)
HEMOGLOBIN: 13.1 GM/DL (ref 12.5–16)
IMMATURE NEUTROPHIL %: 0.3 % (ref 0–0.43)
KETONES, URINE: NEGATIVE MG/DL
LEUKOCYTE ESTERASE, URINE: NEGATIVE
LYMPHOCYTES ABSOLUTE: 1.5 K/CU MM
LYMPHOCYTES RELATIVE PERCENT: 23.2 % (ref 24–44)
MCH RBC QN AUTO: 32 PG (ref 27–31)
MCHC RBC AUTO-ENTMCNC: 33.2 % (ref 32–36)
MCV RBC AUTO: 96.1 FL (ref 78–100)
MONOCYTES ABSOLUTE: 0.4 K/CU MM
MONOCYTES RELATIVE PERCENT: 6.4 % (ref 0–4)
MUCUS: NEGATIVE HPF
NITRITE URINE, QUANTITATIVE: NEGATIVE
OPIATES, URINE: NEGATIVE
OXYCODONE: NEGATIVE
PDW BLD-RTO: 11.3 % (ref 11.7–14.9)
PH, URINE: 5 (ref 5–8)
PHENCYCLIDINE, URINE: NEGATIVE
PLATELET # BLD: 246 K/CU MM (ref 140–440)
PMV BLD AUTO: 10.6 FL (ref 7.5–11.1)
POTASSIUM SERPL-SCNC: 4.9 MMOL/L (ref 3.5–5.1)
PROTEIN UA: NEGATIVE MG/DL
RBC # BLD: 4.1 M/CU MM (ref 4.2–5.4)
RBC URINE: ABNORMAL /HPF (ref 0–6)
SALICYLATE LEVEL: <0.3 MG/DL (ref 15–30)
SARS-COV-2, NAAT: NOT DETECTED
SEGMENTED NEUTROPHILS ABSOLUTE COUNT: 4.3 K/CU MM
SEGMENTED NEUTROPHILS RELATIVE PERCENT: 68.2 % (ref 36–66)
SODIUM BLD-SCNC: 138 MMOL/L (ref 135–145)
SOURCE: NORMAL
SPECIFIC GRAVITY UA: 1.02 (ref 1–1.03)
TOTAL IMMATURE NEUTOROPHIL: 0.02 K/CU MM
TOTAL PROTEIN: 6.9 GM/DL (ref 6.4–8.2)
UROBILINOGEN, URINE: 0.2 MG/DL (ref 0.2–1)
VOLUME, (UVOL): 12 ML (ref 10–12)
WBC # BLD: 6.4 K/CU MM (ref 4–10.5)
WBC UA: ABNORMAL /HPF (ref 0–5)

## 2020-09-19 PROCEDURE — 1240000000 HC EMOTIONAL WELLNESS R&B

## 2020-09-19 PROCEDURE — 80053 COMPREHEN METABOLIC PANEL: CPT

## 2020-09-19 PROCEDURE — G0480 DRUG TEST DEF 1-7 CLASSES: HCPCS

## 2020-09-19 PROCEDURE — 80307 DRUG TEST PRSMV CHEM ANLYZR: CPT

## 2020-09-19 PROCEDURE — 85025 COMPLETE CBC W/AUTO DIFF WBC: CPT

## 2020-09-19 PROCEDURE — 99285 EMERGENCY DEPT VISIT HI MDM: CPT

## 2020-09-19 PROCEDURE — 81001 URINALYSIS AUTO W/SCOPE: CPT

## 2020-09-19 PROCEDURE — U0002 COVID-19 LAB TEST NON-CDC: HCPCS

## 2020-09-19 RX ORDER — ACETAMINOPHEN 500 MG
500 TABLET ORAL EVERY 4 HOURS PRN
Status: DISCONTINUED | OUTPATIENT
Start: 2020-09-19 | End: 2020-09-25 | Stop reason: HOSPADM

## 2020-09-19 RX ORDER — HALOPERIDOL 5 MG
5 TABLET ORAL EVERY 4 HOURS PRN
Status: DISCONTINUED | OUTPATIENT
Start: 2020-09-19 | End: 2020-09-20

## 2020-09-19 RX ORDER — ACETAMINOPHEN 325 MG/1
650 TABLET ORAL EVERY 4 HOURS PRN
Status: DISCONTINUED | OUTPATIENT
Start: 2020-09-19 | End: 2020-09-25 | Stop reason: HOSPADM

## 2020-09-19 RX ORDER — ACETAMINOPHEN 325 MG/1
650 TABLET ORAL EVERY 4 HOURS PRN
Status: DISCONTINUED | OUTPATIENT
Start: 2020-09-19 | End: 2020-09-19

## 2020-09-19 RX ORDER — POLYETHYLENE GLYCOL 3350 17 G/17G
17 POWDER, FOR SOLUTION ORAL DAILY PRN
Status: DISCONTINUED | OUTPATIENT
Start: 2020-09-19 | End: 2020-09-25 | Stop reason: HOSPADM

## 2020-09-19 RX ORDER — LORAZEPAM 2 MG/ML
1 INJECTION INTRAMUSCULAR EVERY 4 HOURS PRN
Status: DISCONTINUED | OUTPATIENT
Start: 2020-09-19 | End: 2020-09-20

## 2020-09-19 RX ORDER — HALOPERIDOL 5 MG/ML
5 INJECTION INTRAMUSCULAR EVERY 4 HOURS PRN
Status: DISCONTINUED | OUTPATIENT
Start: 2020-09-19 | End: 2020-09-20

## 2020-09-19 RX ORDER — LORAZEPAM 1 MG/1
1 TABLET ORAL EVERY 4 HOURS PRN
Status: DISCONTINUED | OUTPATIENT
Start: 2020-09-19 | End: 2020-09-20

## 2020-09-19 RX ORDER — ACETAMINOPHEN 325 MG/1
325 TABLET ORAL EVERY 4 HOURS PRN
Status: DISCONTINUED | OUTPATIENT
Start: 2020-09-19 | End: 2020-09-25 | Stop reason: HOSPADM

## 2020-09-19 RX ORDER — TRAZODONE HYDROCHLORIDE 50 MG/1
50 TABLET ORAL NIGHTLY PRN
Status: DISCONTINUED | OUTPATIENT
Start: 2020-09-19 | End: 2020-09-25 | Stop reason: HOSPADM

## 2020-09-19 ASSESSMENT — SLEEP AND FATIGUE QUESTIONNAIRES
AVERAGE NUMBER OF SLEEP HOURS: 8
DO YOU HAVE DIFFICULTY SLEEPING: NO
DO YOU USE A SLEEP AID: NO

## 2020-09-19 ASSESSMENT — LIFESTYLE VARIABLES: HISTORY_ALCOHOL_USE: NO
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
## 2020-09-20 PROBLEM — F23 ACUTE PSYCHOSIS (HCC): Status: ACTIVE | Noted: 2020-09-20

## 2020-09-20 PROBLEM — F22 DELUSIONAL DISORDER (HCC): Status: ACTIVE | Noted: 2020-09-20

## 2020-09-20 LAB
ANION GAP SERPL CALCULATED.3IONS-SCNC: 6 MMOL/L (ref 4–16)
BASOPHILS ABSOLUTE: 0 K/CU MM
BASOPHILS RELATIVE PERCENT: 0.5 % (ref 0–1)
BUN BLDV-MCNC: 16 MG/DL (ref 6–23)
CALCIUM SERPL-MCNC: 8.8 MG/DL (ref 8.3–10.6)
CHLORIDE BLD-SCNC: 107 MMOL/L (ref 99–110)
CO2: 28 MMOL/L (ref 21–32)
CREAT SERPL-MCNC: 0.8 MG/DL (ref 0.6–1.1)
DIFFERENTIAL TYPE: ABNORMAL
EOSINOPHILS ABSOLUTE: 0.2 K/CU MM
EOSINOPHILS RELATIVE PERCENT: 2.8 % (ref 0–3)
ESTIMATED AVERAGE GLUCOSE: 140 MG/DL
GFR AFRICAN AMERICAN: >60 ML/MIN/1.73M2
GFR NON-AFRICAN AMERICAN: >60 ML/MIN/1.73M2
GLUCOSE BLD-MCNC: 141 MG/DL (ref 70–99)
HBA1C MFR BLD: 6.5 % (ref 4.2–6.3)
HCT VFR BLD CALC: 34.3 % (ref 37–47)
HEMOGLOBIN: 11.6 GM/DL (ref 12.5–16)
IMMATURE NEUTROPHIL %: 0.3 % (ref 0–0.43)
LYMPHOCYTES ABSOLUTE: 2 K/CU MM
LYMPHOCYTES RELATIVE PERCENT: 32.9 % (ref 24–44)
MCH RBC QN AUTO: 32.2 PG (ref 27–31)
MCHC RBC AUTO-ENTMCNC: 33.8 % (ref 32–36)
MCV RBC AUTO: 95.3 FL (ref 78–100)
MONOCYTES ABSOLUTE: 0.5 K/CU MM
MONOCYTES RELATIVE PERCENT: 8.3 % (ref 0–4)
PDW BLD-RTO: 11.4 % (ref 11.7–14.9)
PLATELET # BLD: 213 K/CU MM (ref 140–440)
PMV BLD AUTO: 10.2 FL (ref 7.5–11.1)
POTASSIUM SERPL-SCNC: 3.9 MMOL/L (ref 3.5–5.1)
RBC # BLD: 3.6 M/CU MM (ref 4.2–5.4)
SEGMENTED NEUTROPHILS ABSOLUTE COUNT: 3.3 K/CU MM
SEGMENTED NEUTROPHILS RELATIVE PERCENT: 55.2 % (ref 36–66)
SODIUM BLD-SCNC: 141 MMOL/L (ref 135–145)
TOTAL IMMATURE NEUTOROPHIL: 0.02 K/CU MM
WBC # BLD: 6 K/CU MM (ref 4–10.5)

## 2020-09-20 PROCEDURE — 6370000000 HC RX 637 (ALT 250 FOR IP): Performed by: NURSE PRACTITIONER

## 2020-09-20 PROCEDURE — 85025 COMPLETE CBC W/AUTO DIFF WBC: CPT

## 2020-09-20 PROCEDURE — 83036 HEMOGLOBIN GLYCOSYLATED A1C: CPT

## 2020-09-20 PROCEDURE — 93005 ELECTROCARDIOGRAM TRACING: CPT | Performed by: NURSE PRACTITIONER

## 2020-09-20 PROCEDURE — 6370000000 HC RX 637 (ALT 250 FOR IP): Performed by: INTERNAL MEDICINE

## 2020-09-20 PROCEDURE — 99223 1ST HOSP IP/OBS HIGH 75: CPT | Performed by: NURSE PRACTITIONER

## 2020-09-20 PROCEDURE — 36415 COLL VENOUS BLD VENIPUNCTURE: CPT

## 2020-09-20 PROCEDURE — 1240000000 HC EMOTIONAL WELLNESS R&B

## 2020-09-20 PROCEDURE — 6370000000 HC RX 637 (ALT 250 FOR IP): Performed by: PSYCHIATRY & NEUROLOGY

## 2020-09-20 PROCEDURE — 80048 BASIC METABOLIC PNL TOTAL CA: CPT

## 2020-09-20 RX ORDER — DIPHENHYDRAMINE HYDROCHLORIDE 50 MG/ML
50 INJECTION INTRAMUSCULAR; INTRAVENOUS EVERY 6 HOURS PRN
Status: DISCONTINUED | OUTPATIENT
Start: 2020-09-20 | End: 2020-09-25 | Stop reason: HOSPADM

## 2020-09-20 RX ORDER — FERROUS SULFATE 325(65) MG
325 TABLET ORAL 2 TIMES DAILY WITH MEALS
Status: DISCONTINUED | OUTPATIENT
Start: 2020-09-20 | End: 2020-09-25 | Stop reason: HOSPADM

## 2020-09-20 RX ORDER — FAMOTIDINE 20 MG/1
20 TABLET, FILM COATED ORAL 2 TIMES DAILY
Status: DISCONTINUED | OUTPATIENT
Start: 2020-09-20 | End: 2020-09-25 | Stop reason: HOSPADM

## 2020-09-20 RX ORDER — DONEPEZIL HYDROCHLORIDE 10 MG/1
10 TABLET, FILM COATED ORAL NIGHTLY
Status: DISCONTINUED | OUTPATIENT
Start: 2020-09-20 | End: 2020-09-25 | Stop reason: HOSPADM

## 2020-09-20 RX ORDER — M-VIT,TX,IRON,MINS/CALC/FOLIC 27MG-0.4MG
1 TABLET ORAL DAILY
Status: DISCONTINUED | OUTPATIENT
Start: 2020-09-20 | End: 2020-09-25 | Stop reason: HOSPADM

## 2020-09-20 RX ORDER — LORAZEPAM 1 MG/1
1 TABLET ORAL EVERY 6 HOURS PRN
Status: DISCONTINUED | OUTPATIENT
Start: 2020-09-20 | End: 2020-09-25 | Stop reason: HOSPADM

## 2020-09-20 RX ORDER — FLUTICASONE PROPIONATE 50 MCG
1 SPRAY, SUSPENSION (ML) NASAL DAILY
Status: DISCONTINUED | OUTPATIENT
Start: 2020-09-20 | End: 2020-09-25 | Stop reason: HOSPADM

## 2020-09-20 RX ORDER — LORAZEPAM 2 MG/ML
1 INJECTION INTRAMUSCULAR EVERY 6 HOURS PRN
Status: DISCONTINUED | OUTPATIENT
Start: 2020-09-20 | End: 2020-09-25 | Stop reason: HOSPADM

## 2020-09-20 RX ORDER — AMLODIPINE BESYLATE 10 MG/1
10 TABLET ORAL DAILY
Status: DISCONTINUED | OUTPATIENT
Start: 2020-09-20 | End: 2020-09-25 | Stop reason: HOSPADM

## 2020-09-20 RX ORDER — DIVALPROEX SODIUM 250 MG/1
250 TABLET, EXTENDED RELEASE ORAL DAILY
Status: DISCONTINUED | OUTPATIENT
Start: 2020-09-20 | End: 2020-09-25 | Stop reason: HOSPADM

## 2020-09-20 RX ORDER — LISINOPRIL 20 MG/1
20 TABLET ORAL DAILY
Status: DISCONTINUED | OUTPATIENT
Start: 2020-09-20 | End: 2020-09-25 | Stop reason: HOSPADM

## 2020-09-20 RX ORDER — HALOPERIDOL 5 MG
5 TABLET ORAL EVERY 6 HOURS PRN
Status: DISCONTINUED | OUTPATIENT
Start: 2020-09-20 | End: 2020-09-25 | Stop reason: HOSPADM

## 2020-09-20 RX ORDER — ATORVASTATIN CALCIUM 40 MG/1
80 TABLET, FILM COATED ORAL NIGHTLY
Status: DISCONTINUED | OUTPATIENT
Start: 2020-09-20 | End: 2020-09-25 | Stop reason: HOSPADM

## 2020-09-20 RX ORDER — MEMANTINE HYDROCHLORIDE 10 MG/1
10 TABLET ORAL DAILY
Status: DISCONTINUED | OUTPATIENT
Start: 2020-09-20 | End: 2020-09-25 | Stop reason: HOSPADM

## 2020-09-20 RX ORDER — HALOPERIDOL 5 MG/ML
5 INJECTION INTRAMUSCULAR EVERY 6 HOURS PRN
Status: DISCONTINUED | OUTPATIENT
Start: 2020-09-20 | End: 2020-09-25 | Stop reason: HOSPADM

## 2020-09-20 RX ORDER — TRAZODONE HYDROCHLORIDE 100 MG/1
100 TABLET ORAL NIGHTLY
Status: DISCONTINUED | OUTPATIENT
Start: 2020-09-20 | End: 2020-09-25 | Stop reason: HOSPADM

## 2020-09-20 RX ORDER — DIPHENHYDRAMINE HCL 50 MG
50 CAPSULE ORAL EVERY 6 HOURS PRN
Status: DISCONTINUED | OUTPATIENT
Start: 2020-09-20 | End: 2020-09-25 | Stop reason: HOSPADM

## 2020-09-20 RX ADMIN — AMLODIPINE BESYLATE 10 MG: 10 TABLET ORAL at 09:14

## 2020-09-20 RX ADMIN — DONEPEZIL HYDROCHLORIDE 10 MG: 10 TABLET, FILM COATED ORAL at 20:26

## 2020-09-20 RX ADMIN — FAMOTIDINE 20 MG: 20 TABLET, FILM COATED ORAL at 20:23

## 2020-09-20 RX ADMIN — FAMOTIDINE 20 MG: 20 TABLET, FILM COATED ORAL at 09:14

## 2020-09-20 RX ADMIN — ATORVASTATIN CALCIUM 80 MG: 40 TABLET, FILM COATED ORAL at 20:26

## 2020-09-20 RX ADMIN — MULTIPLE VITAMINS W/ MINERALS TAB 1 TABLET: TAB at 09:14

## 2020-09-20 RX ADMIN — FLUTICASONE PROPIONATE 1 SPRAY: 50 SPRAY, METERED NASAL at 09:14

## 2020-09-20 RX ADMIN — LISINOPRIL 20 MG: 20 TABLET ORAL at 09:14

## 2020-09-20 RX ADMIN — MEMANTINE 10 MG: 10 TABLET ORAL at 09:15

## 2020-09-20 RX ADMIN — METOPROLOL TARTRATE 25 MG: 25 TABLET, FILM COATED ORAL at 09:14

## 2020-09-20 RX ADMIN — FERROUS SULFATE TAB 325 MG (65 MG ELEMENTAL FE) 325 MG: 325 (65 FE) TAB at 17:10

## 2020-09-20 RX ADMIN — DONEPEZIL HYDROCHLORIDE 10 MG: 10 TABLET, FILM COATED ORAL at 00:49

## 2020-09-20 RX ADMIN — DIVALPROEX SODIUM 250 MG: 250 TABLET, EXTENDED RELEASE ORAL at 09:14

## 2020-09-20 RX ADMIN — TRAZODONE HYDROCHLORIDE 100 MG: 100 TABLET ORAL at 00:48

## 2020-09-20 RX ADMIN — TRAZODONE HYDROCHLORIDE 100 MG: 100 TABLET ORAL at 20:26

## 2020-09-20 RX ADMIN — METFORMIN HYDROCHLORIDE 500 MG: 500 TABLET ORAL at 17:10

## 2020-09-20 RX ADMIN — METOPROLOL TARTRATE 25 MG: 25 TABLET, FILM COATED ORAL at 20:23

## 2020-09-20 ASSESSMENT — SLEEP AND FATIGUE QUESTIONNAIRES
DO YOU HAVE DIFFICULTY SLEEPING: NO
DO YOU USE A SLEEP AID: NO

## 2020-09-20 ASSESSMENT — LIFESTYLE VARIABLES: HISTORY_ALCOHOL_USE: NO

## 2020-09-20 ASSESSMENT — PAIN SCALES - GENERAL
PAINLEVEL_OUTOF10: 0

## 2020-09-20 ASSESSMENT — PATIENT HEALTH QUESTIONNAIRE - PHQ9
SUM OF ALL RESPONSES TO PHQ QUESTIONS 1-9: 0
1. LITTLE INTEREST OR PLEASURE IN DOING THINGS: 0
2. FEELING DOWN, DEPRESSED OR HOPELESS: 0
SUM OF ALL RESPONSES TO PHQ9 QUESTIONS 1 & 2: 0
SUM OF ALL RESPONSES TO PHQ QUESTIONS 1-9: 0
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
## 2020-09-20 NOTE — PLAN OF CARE
9/20/20, 10:00 AM EDT    DISCHARGE BARRIERS    Reason for Referral: SBU initial assessment  Mental Status: Disoriented  Decision Making Ability: No  Family/Social/Home Environment: spoke with patient who states the patient currently resides with Family Members  in a  house and their support system includes  Family Members, Therapist  Current Services/ Equipment: ,  None  Patient Income source:Government aid, and Income meets expenses. Concerns or Barriers to Discharge: no  Patient and/or family verbalized understanding of the plan of care and contributed to goal setting. Social/ Functional History    Lives With:  Sister  Type of Home: House  Home Layout: Able to Live on Main level with bedroom/bathroom, Two level  Home Access: Level entry  Bathroom Shower/ Tub: Tub/Shower unit  Bathroom Toilet: Standard  Other Bathroom Assistive Devices:    Home Equipment[de-identified]    ADL Assistance: Independent  Homemaking Assistance: Needs assistance  Ambulation Assistance: Independent  Transfer Assistance: Independent  Additional Comments:      Anticipated Needs per Patient:     Potential Assistance Needed: Extended Care Placement   Type of Home Care Services: None  Patient expects to be discharged to: Home or ECF        Discharge Plan:  D/C to home or ECF, no needs. F/U CALDERON Cottrell, no transport needed.     Eli Shelley, MSW, LSW
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
## 2020-09-20 NOTE — CONSULTS
Hospitalist Consult Note      Name:  Pennie Heller /Age/Sex: 1950  (65 y.o. female)   MRN & CSN:  [id number] & [id number] Admission Date/Time: 2020  7:05 PM   Location:  [facility]6/1046-01 PCP: Dillon Fleming MD       Hospital Day: 2    Assessment and Plan:   Pennie Heller is a 65 y.o.  female  who presents with Delusional disorder [facility]  Hospitalist Team consulted for: Medical Management with a past medical history significant for hypertension, hyperlipidemia, diabetes type 2, restless leg syndrome, GERD, COPD and CAD. 1.  Hypertension: Patient currently on lisinopril 20 mg, amlodipine 10 mg and metoprolol tartrate 25 mg twice daily for same. Blood pressure 152/93. Needs improvement. Patient is already on 3 blood pressure medications at this time. Overnight 140/81. Patient having no systems a headache blurred vision or dizziness. No chest pain or palpitations. Continue to monitor at this time. If persistent will consider hydralazine 10 mg 3 times daily. Continue to monitor    2. Non-insulin-dependent diabetes: Hemoglobin A1c taken on intake 6.5. Patient currently not on any medications. Will initiate metformin 500 mg twice daily with meals. Continue to monitor. 3.  Hyperlipidemia: Lipid panel on 2020 demonstrates total cholesterol 222 with an  HDL 77 triglycerides 75. Patient on high-dose regimen of atorvastatin 80 mg at bedtime. 4.  GERD: Continue famotidine 20 mg daily    5. COPD: Patient not in exacerbation does not have any medications from home record recorded. Continue to monitor    6. History of restless leg: No medication on MAR. Patient does have mild anemia 11.6 with normal MCV possibly with a iron component. Recommend supplemental iron for anemia and restless leg improvement. 7.  Coronary artery disease by history: On cardiology records up to 5 years ago EKGs were the only records available.   Patient currently on Lopressor 25 mg twice daily, high dose statin regimen we will add aspirin for completion. Appreciate allowing us to dissipate patient's care will follow along as needed. Diet DIET GENERAL;   DVT Prophylaxis [] Lovenox, []  Heparin, [] SCDs, [x] No VTE prophylaxis, patient ambulating   GI Prophylaxis [] PPI, [x] H2 Blocker, [] No GI prophylaxis, patient is receiving diet/Tube Feeds   Code Status Full Code   Disposition Patient requires continued admission due to SBU   MDM [] Low, [x] Moderate,[]  High  Patient's risk as above due to history of uncontrolled hypertension, no therapy for diabetes, history of coronary artery disease and Alzheimer's     History of Present Illness:     Pt S&E.  75-year-old white female sitting in room no apparent distress. Patient pleasant. Denies any headache blurred vision dizziness. Denies any chest pain palpitation short of breath. Nuys any fever chills nausea or vomiting. Denies any difficulty urinating. No recent sick contacts reported. Patient alert participating in questioning and exam.  However, portions of the discussion are off base. She does know she has hypertension and hyperlipidemia. Not aware of diabetes cannot give history for coronary artery disease. Medical records reviewed Case discussed with NP from psychiatry    10-14 point ROS reviewed negative, unless as noted above    Objective: Intake/Output Summary (Last 24 hours) at 9/20/2020 1554  Last data filed at 9/20/2020 1300  Gross per 24 hour   Intake 600 ml   Output --   Net 600 ml      Vitals:   Vitals:    09/20/20 1100   BP: (!) 152/93   Pulse:    Resp:    Temp:    SpO2:      Physical Exam:    GEN Awake female, sitting upright in bed in no apparent distress. Appears given age. EYES Pupils are equally round. No scleral erythema, discharge, or conjunctivitis. HENT Mucous membranes are moist. Oral pharynx without exudates, no evidence of thrush. NECK Supple, no apparent thyromegaly or masses.   RESP Clear to auscultation, no wheezes, rales or rhonchi. Symmetric chest movement while on room air. CARDIO/VASC S1/S2 auscultated. Regular rate without appreciable murmurs, rubs, or gallops. No JVD or carotid bruits. Peripheral pulses equal bilaterally and palpable. No peripheral edema. GI Abdomen is soft without significant tenderness, masses, or guarding. Bowel sounds are normoactive. Rectal exam deferred.  No costovertebral angle tenderness. Normal appearing external genitalia. Whitfield catheter is not present. HEME/LYMPH No palpable cervical lymphadenopathy and no hepatosplenomegaly. No petechiae or ecchymoses. MSK No gross joint deformities. SKIN Normal coloration, warm, dry. NEURO Cranial nerves appear grossly intact, normal speech, no lateralizing weakness. PSYCH Awake, alert, Affect appropriate.     Medications:   Medications:    divalproex  250 mg Oral Daily    donepezil  10 mg Oral Nightly    memantine  10 mg Oral Daily    traZODone  100 mg Oral Nightly    lisinopril  20 mg Oral Daily    amLODIPine  10 mg Oral Daily    atorvastatin  80 mg Oral Nightly    famotidine  20 mg Oral BID    fluticasone  1 spray Each Nostril Daily    metoprolol tartrate  25 mg Oral BID    therapeutic multivitamin-minerals  1 tablet Oral Daily      Infusions:   PRN Meds: haloperidol lactate, 5 mg, Q6H PRN    Or  haloperidol, 5 mg, Q6H PRN  LORazepam, 1 mg, Q6H PRN    Or  LORazepam, 1 mg, Q6H PRN  diphenhydrAMINE, 50 mg, Q6H PRN    Or  diphenhydrAMINE, 50 mg, Q6H PRN  traZODone, 50 mg, Nightly PRN  polyethylene glycol, 17 g, Daily PRN  acetaminophen, 650 mg, Q4H PRN  acetaminophen, 325 mg, Q4H PRN  acetaminophen, 500 mg, Q4H PRN          Electronically signed by Sanford Mcardle, MD on 9/20/2020 at 3:54 PM

## 2020-09-20 NOTE — BH NOTE
Patient arrived with the following items    Plaid button down shirt  Black socks  Blue jeans  Green shoes  Blue coin purse with a pendant, 2 pennies and 1 nickel.
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
## 2020-09-20 NOTE — PROGRESS NOTES
2020 Clinician spoke with medical provider who recommends inpatient treatment. Patient has had several positive admissions to [facility] and medical provider wants clinician to find placement there. 2027 Spoke to Domo Roldan RN, at [facility]. She states she talked to patient's sister and patient is her own person (sister is not legal guardian). 2029 Call to [facility]. Spoke to Albania Simmons RN. Case initiated. 2113 Per encounter history for [facility], patient has been accepted to [facility].

## 2020-09-20 NOTE — PROGRESS NOTES
Assessment completed. Pt is alert and oriented to person, place, time and situation. Pt reports she slept well but only got 4-5 hours. Pt rates depression a 0 out of 10 with ten being the worst and anxiety a 0 out of ten on the same scale. Pt denies auditory or visual hallucinations. Pt has delusions. She talks of going to live in her old house where she has movers and a Epifanio Bollard coming to move her Monday. Pt denies SI. Pt denies HI.        1300 Pt out on the until for meals and some time in between. Pt has napped in room today but she did not get much sleep last night.

## 2020-09-20 NOTE — PROGRESS NOTES
[facility]  Admission Note     Admission Type:   Admission Type: Voluntary    Reason for admission:  Reason for Admission: Dementia with behavioral disturbance     PATIENT STRENGTHS:  Strengths: No significant Physical Illness    Patient Strengths and Limitations:  Limitations: Difficulty problem solving/relies on others to help solve problems    Addictive Behavior:   Addictive Behavior  In the past 3 months, have you felt or has someone told you that you have a problem with:  : None  Do you have a history of Chemical Use?: No  Do you have a history of Alcohol Use?: No  Do you have a history of Street Drug Abuse?: No  Histroy of Prescripton Drug Abuse?: No    Medical Problems:   Past Medical History:   Diagnosis Date    [facility] disease ([facility]     Bronchitis     CAD (coronary artery disease)     Chest wall trauma     COPD [facility]chronic obstructive pulmonary disease) ([facility]     FH: CAD (coronary artery disease)     brother with cabg in his 45s    Hypertension     Kidney stone     Lumbar herniated disc     Restless leg syndrome     Spinal stenosis        Status EXAM:  Status and Exam  Normal: Yes  Facial Expression: Elevated  Affect: Appropriate  Level of Consciousness: Alert  Mood:Normal: Yes  Motor Activity:Normal: Yes  Interview Behavior: Cooperative  Preception: Cecil to Person, Cecil to Time, Cecil to Place  Attention:Normal: No  Attention: Distractible  Thought Processes: Tangential  Thought Content:Normal: Yes  Hallucinations: None  Delusions: No  Memory:Normal: No  Memory: Confabulation, Poor Recent  Insight and Judgment: No  Insight and Judgment: Poor Judgment, Poor Insight, Unrealistic  Present Suicidal Ideation: No  Present Homicidal Ideation: No    Tobacco Screening:  Practical Counseling, on admission, joselyn X, if applicable and completed (first 3 are required if patient doesn't refuse):            ( )  Recognizing danger situations (included triggers and roadblocks) ( )  Coping skills (new ways to manage stress, exercise, relaxation techniques, changing routine, distraction)                                                           ( )  Basic information about quitting (benefits of quitting, techniques in how to quit, available resources  ( ) Referral for counseling faxed to [facility]                                           ( ) Patient refused counseling  (X ) Patient has not smoked in the last 30 days      Pt admitted with followings belongings:  Dentures: None  Vision - Corrective Lenses: None  Hearing Aid: None  Jewelry: None  Body Piercings Removed: N/A  Clothing: Footwear, Pants, Shirt, Socks  Were All Patient Medications Collected?: Not Applicable  Other Valuables: Purse(coin purse with 7 cents and a pendant )     Valuables sent home with N/A. Valuables placed in locker number: [id number] code [id number]. Patient's home medications were N/A. Patient oriented to surroundings and program expectations and copy of patient rights given. Patient offered Psychiatric Advanced Directive:refused). Received admission packet:  YES. Consents reviewed, signed: Voluntary signed, Patient states she is tired and will sign the rest in the morning. Patient verbalize understanding:  YES.     Patient education on precautions: Jose Barrett RN

## 2020-09-21 LAB — GLUCOSE BLD-MCNC: 155 MG/DL (ref 70–99)

## 2020-09-21 PROCEDURE — 6370000000 HC RX 637 (ALT 250 FOR IP): Performed by: INTERNAL MEDICINE

## 2020-09-21 PROCEDURE — 6370000000 HC RX 637 (ALT 250 FOR IP): Performed by: PSYCHIATRY & NEUROLOGY

## 2020-09-21 PROCEDURE — 99232 SBSQ HOSP IP/OBS MODERATE 35: CPT | Performed by: PSYCHIATRY & NEUROLOGY

## 2020-09-21 PROCEDURE — 82962 GLUCOSE BLOOD TEST: CPT

## 2020-09-21 PROCEDURE — 93010 ELECTROCARDIOGRAM REPORT: CPT | Performed by: INTERNAL MEDICINE

## 2020-09-21 PROCEDURE — 1240000000 HC EMOTIONAL WELLNESS R&B

## 2020-09-21 PROCEDURE — 6370000000 HC RX 637 (ALT 250 FOR IP): Performed by: NURSE PRACTITIONER

## 2020-09-21 RX ADMIN — METFORMIN HYDROCHLORIDE 500 MG: 500 TABLET ORAL at 08:53

## 2020-09-21 RX ADMIN — DONEPEZIL HYDROCHLORIDE 10 MG: 10 TABLET, FILM COATED ORAL at 20:28

## 2020-09-21 RX ADMIN — FERROUS SULFATE TAB 325 MG (65 MG ELEMENTAL FE) 325 MG: 325 (65 FE) TAB at 08:52

## 2020-09-21 RX ADMIN — METOPROLOL TARTRATE 25 MG: 25 TABLET, FILM COATED ORAL at 20:27

## 2020-09-21 RX ADMIN — ATORVASTATIN CALCIUM 80 MG: 40 TABLET, FILM COATED ORAL at 20:28

## 2020-09-21 RX ADMIN — TRAZODONE HYDROCHLORIDE 100 MG: 100 TABLET ORAL at 20:29

## 2020-09-21 RX ADMIN — FAMOTIDINE 20 MG: 20 TABLET, FILM COATED ORAL at 20:28

## 2020-09-21 RX ADMIN — MULTIPLE VITAMINS W/ MINERALS TAB 1 TABLET: TAB at 08:53

## 2020-09-21 RX ADMIN — FAMOTIDINE 20 MG: 20 TABLET, FILM COATED ORAL at 08:53

## 2020-09-21 RX ADMIN — AMLODIPINE BESYLATE 10 MG: 10 TABLET ORAL at 08:53

## 2020-09-21 RX ADMIN — METOPROLOL TARTRATE 25 MG: 25 TABLET, FILM COATED ORAL at 08:52

## 2020-09-21 RX ADMIN — LISINOPRIL 20 MG: 20 TABLET ORAL at 08:52

## 2020-09-21 RX ADMIN — MEMANTINE 10 MG: 10 TABLET ORAL at 08:53

## 2020-09-21 RX ADMIN — FLUTICASONE PROPIONATE 1 SPRAY: 50 SPRAY, METERED NASAL at 08:53

## 2020-09-21 RX ADMIN — FERROUS SULFATE TAB 325 MG (65 MG ELEMENTAL FE) 325 MG: 325 (65 FE) TAB at 16:52

## 2020-09-21 RX ADMIN — METFORMIN HYDROCHLORIDE 500 MG: 500 TABLET ORAL at 16:52

## 2020-09-21 RX ADMIN — DIVALPROEX SODIUM 250 MG: 250 TABLET, EXTENDED RELEASE ORAL at 08:53

## 2020-09-21 ASSESSMENT — PAIN SCALES - GENERAL
PAINLEVEL_OUTOF10: 0
PAINLEVEL_OUTOF10: 0

## 2020-09-21 NOTE — GROUP NOTE
Group Therapy Note    Date: 9/21/2020    Group Start Time: 0830  Group End Time: 0900    Number of Participants: 4/5    Type: Morning Goals Group/ Community Meeting    Group Topic/Objective: Set Goal For The Day and to review Unit Rules and Regulations. Patient's Goal:  Pt states her goal is \"Be able to go home. \"    Notes:  Pt presented as pleasant and cooperative for majority of group. Pt did require Mod prompting to not tell others how to feel about God. Pt states she is feeling \"wonderful\" and is grateful for \"everyone being kind. \"  Pt reports restful sleep, but unsure how long she slept.      Depression (0-10): 0    Anxiety (0-10): 0    Irritability/Aggitation (0-10): 0    Status After Intervention:  Improved    Participation Level: Interactive    Participation Quality: Sharing and Intrusive    Speech:  normal    Thought Process/Content: Religoiusly Preoccupied    Affective Functioning: Congruent    Mood: Bright    Level of consciousness:  Alert and Attentive    Response to Learning: Able to verbalize current knowledge/experience    Endings: None Reported    Modes of Intervention: Education, Support and Socialization    Discipline Responsible: Certified Therapeutic Recreation Specialist     Electronically signed by Debby Severance, MA on 9/21/2020 at 10:08 AM

## 2020-09-21 NOTE — PLAN OF CARE
Problem: Altered Mood, Psychotic Behavior:  Goal: Able to demonstrate trust by eating, participating in treatment and following staff's direction  Description: Able to demonstrate trust by eating, participating in treatment and following staff's direction  Outcome: Ongoing  Goal: Able to verbalize decrease in frequency and intensity of hallucinations  Description: Able to verbalize decrease in frequency and intensity of hallucinations  Outcome: Ongoing  Goal: Able to verbalize reality based thinking  Description: Able to verbalize reality based thinking  Outcome: Ongoing  Goal: Absence of self-harm  Description: Absence of self-harm  Outcome: Ongoing  Goal: Ability to achieve adequate nutritional intake will improve  Description: Ability to achieve adequate nutritional intake will improve  Outcome: Ongoing  Goal: Ability to interact with others will improve  Description: Ability to interact with others will improve  Outcome: Ongoing  Goal: Compliance with prescribed medication regimen will improve  Description: Compliance with prescribed medication regimen will improve  Outcome: Ongoing  Goal: Patient specific goal  Description: Patient specific goal  Outcome: Ongoing
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
## 2020-09-21 NOTE — PROGRESS NOTES
Initial Psychiatric History and Physical    Byron Baptiste  [id number]  9/19/2020 09/21/20    ID: Patient is a 65 yrs y.o. female    CC: \"I forget things and my sister gets mad. \"    HPI: Byron Baptiste is a 65year old female with PMHx of HTN, HLD,dementia (Alzemimers) with behavioral disturbances, delusional disorder and medication non compliance. She is well known to [address] [facility] related to several admissions for same. She presents to [facility] ED via police after found wandering. Patient has not been taking her medications and has an increase in her delusions. Patient admitted to [facility]. She was initially on a psychiatric hold but signed in voluntarily.     During today's interview she was alert & oriented x 3. She denies SI/HI and AVH. She denies depression and anxiety. She states she is sleeping \"very good\" and that her appetite is \"very good. \" Pt continues to display occasional bizarre delusions and confusion. Pt does not understand her diagnosis and treatment plan. Pt noted she currently feels safe and comfortable on the unit.  Pt was in agreement with treatment team.  Pt was polite and cordial during the interview process. Has been taking medications and has been compliant with treatment. Tolerating medications without side effect complaints.     Pt denied any hx of seizures, Hep C or HIV  Pt notes history of TBI  No TD noted, AIMS=0    Past Psychiatric History:   See note above    Family Psychiatric History:   See note above    Family Psychiatric History:   Family History   Problem Relation Age of Onset    No Known Problems Mother     No Known Problems Sister     Heart Disease Brother     Coronary Art Dis Brother         stents    No Known Problems Sister     No Known Problems Sister     Alcohol Abuse Brother         Allergies:   Allergies   Allergen Reactions    Shellfish-Derived Products     Floxin [Ofloxacin] Other (See Comments)     Made her \"feel weird\"        OBJECTIVE  Vital Signs:  Vitals:    09/21/20 0815   BP: (!) 146/83   Pulse: 82   Resp: 18   Temp: 97.8 °F (36.6 °C)   SpO2: 99%       Labs:  Recent Results (from the past 48 hour(s))   Urine Drug Screen    Collection Time: 09/19/20  7:21 PM   Result Value Ref Range    Cannabinoid Scrn, Ur NEGATIVE NEGATIVE    Amphetamines NEGATIVE NEGATIVE    Cocaine Metabolite NEGATIVE NEGATIVE    Benzodiazepine Screen, Urine NEGATIVE NEGATIVE    Barbiturate Screen, Ur NEGATIVE NEGATIVE    Opiates, Urine NEGATIVE NEGATIVE    Phencyclidine, Urine NEGATIVE NEGATIVE    Oxycodone NEGATIVE NEGATIVE   Urinalysis with Microscopic    Collection Time: 09/19/20  7:21 PM   Result Value Ref Range    Color, UA YELLOW YELLOW    Clarity, UA CLEAR CLEAR    Glucose, Urine NEGATIVE NEGATIVE MG/DL    Bilirubin Urine NEGATIVE NEGATIVE MG/DL    Ketones, Urine NEGATIVE NEGATIVE MG/DL    Specific Gravity, UA 1.020 1.001 - 1.035    Blood, Urine NEGATIVE NEGATIVE    pH, Urine 5.0 5.0 - 8.0    Protein, UA NEGATIVE NEGATIVE MG/DL    Urobilinogen, Urine 0.2 0.2 - 1.0 MG/DL    Nitrite Urine, Quantitative NEGATIVE NEGATIVE    Leukocyte Esterase, Urine NEGATIVE NEGATIVE    Volume, (UVOL) 12 10 - 12 ML    RBC, UA 0 TO 1 0 - 6 /HPF    WBC, UA 1 TO 2 0 - 5 /HPF    Epithelial Cells, UA 3 TO 4 /HPF    Cast Type NO CAST FORMS SEEN NO CAST FORMS SEEN /HPF    Bacteria, UA RARE (A) NEGATIVE /HPF    Crystal Type NONE SEEN NEGATIVE /HPF    Mucus, UA NEGATIVE NEGATIVE HPF   CBC Auto Differential    Collection Time: 09/19/20  7:54 PM   Result Value Ref Range    WBC 6.4 4.0 - 10.5 K/CU MM    RBC 4.10 (L) 4.2 - 5.4 M/CU MM    Hemoglobin 13.1 12.5 - 16.0 GM/DL    Hematocrit 39.4 37 - 47 %    MCV 96.1 78 - 100 FL    MCH 32.0 (H) 27 - 31 PG    MCHC 33.2 32.0 - 36.0 %    RDW 11.3 (L) 11.7 - 14.9 %    Platelets 028 640 - 967 K/CU MM    MPV 10.6 7.5 - 11.1 FL    Differential Type AUTOMATED DIFFERENTIAL     Segs Relative 68.2 (H) 36 - 66 %    Lymphocytes % 23.2 (L) 24 - 44 %    Monocytes % 6.4 (H) 0 - 4 %    Eosinophils % 1.4 0 - 3 %    Basophils % 0.5 0 - 1 %    Segs Absolute 4.3 K/CU MM    Lymphocytes Absolute 1.5 K/CU MM    Monocytes Absolute 0.4 K/CU MM    Eosinophils Absolute 0.1 K/CU MM    Basophils Absolute 0.0 K/CU MM    Immature Neutrophil % 0.3 0 - 0.43 %    Total Immature Neutrophil 0.02 K/CU MM   Comprehensive Metabolic Panel    Collection Time: 09/19/20  7:54 PM   Result Value Ref Range    Sodium 138 135 - 145 MMOL/L    Potassium 4.9 3.5 - 5.1 MMOL/L    Chloride 101 99 - 110 mMol/L    CO2 31 21 - 32 MMOL/L    BUN 20 6 - 23 MG/DL    CREATININE 1.1 0.6 - 1.1 MG/DL    Glucose 128 (H) 70 - 99 MG/DL    Calcium 9.4 8.3 - 10.6 MG/DL    Alb 4.3 3.4 - 5.0 GM/DL    Total Protein 6.9 6.4 - 8.2 GM/DL    Total Bilirubin 0.5 0.0 - 1.0 MG/DL    ALT 21 10 - 40 U/L    AST 27 15 - 37 IU/L    Alkaline Phosphatase 155 (H) 40 - 129 IU/L    GFR Non- 49 (L) >60 mL/min/1.73m2    GFR  59 (L) >60 mL/min/1.73m2    Anion Gap 6 4 - 16   Ethanol    Collection Time: 09/19/20  7:54 PM   Result Value Ref Range    Alcohol Scrn <0.01 <9.61 %WT/VOL   Salicylate    Collection Time: 09/19/20  7:54 PM   Result Value Ref Range    Salicylate Lvl <4.9 (L) 15 - 30 MG/DL    DOSE AMOUNT DOSE AMT. GIVEN - UNKNOWN     DOSE TIME DOSE TIME GIVEN - UNKNOWN    Acetaminophen Level    Collection Time: 09/19/20  7:54 PM   Result Value Ref Range    Acetaminophen Level <5.0 (L) 15 - 30 ug/ml    DOSE AMOUNT DOSE AMT.  GIVEN - UNKNOWN     DOSE TIME DOSE TIME GIVEN - UNKNOWN    COVID-19    Collection Time: 09/19/20  9:15 PM    Specimen: Nasopharyngeal Swab   Result Value Ref Range    Source NASOPHARYNGEAL SWAB     SARS-CoV-2, NAAT NOT DETECTED    Basic Metabolic Panel w/ Reflex to MG    Collection Time: 09/20/20  6:00 AM   Result Value Ref Range    Sodium 141 135 - 145 MMOL/L    Potassium 3.9 3.5 - 5.1 MMOL/L    Chloride 107 99 - 110 mMol/L    CO2 28 21 - 32 MMOL/L    Anion Gap 6 4 - 16    BUN 16 6 - 23 MG/DL    CREATININE 0.8 0.6 - 1.1 MG/DL    Glucose 141 (H) 70 - 99 MG/DL    Calcium 8.8 8.3 - 10.6 MG/DL    GFR Non-African American >60 >60 mL/min/1.73m2    GFR African American >60 >60 mL/min/1.73m2   Hemoglobin A1c    Collection Time: 09/20/20  6:00 AM   Result Value Ref Range    Hemoglobin A1C 6.5 (H) 4.2 - 6.3 %    eAG 140 mg/dL   CBC auto differential    Collection Time: 09/20/20  6:00 AM   Result Value Ref Range    WBC 6.0 4.0 - 10.5 K/CU MM    RBC 3.60 (L) 4.2 - 5.4 M/CU MM    Hemoglobin 11.6 (L) 12.5 - 16.0 GM/DL    Hematocrit 34.3 (L) 37 - 47 %    MCV 95.3 78 - 100 FL    MCH 32.2 (H) 27 - 31 PG    MCHC 33.8 32.0 - 36.0 %    RDW 11.4 (L) 11.7 - 14.9 %    Platelets 240 415 - 110 K/CU MM    MPV 10.2 7.5 - 11.1 FL    Differential Type AUTOMATED DIFFERENTIAL     Segs Relative 55.2 36 - 66 %    Lymphocytes % 32.9 24 - 44 %    Monocytes % 8.3 (H) 0 - 4 %    Eosinophils % 2.8 0 - 3 %    Basophils % 0.5 0 - 1 %    Segs Absolute 3.3 K/CU MM    Lymphocytes Absolute 2.0 K/CU MM    Monocytes Absolute 0.5 K/CU MM    Eosinophils Absolute 0.2 K/CU MM    Basophils Absolute 0.0 K/CU MM    Immature Neutrophil % 0.3 0 - 0.43 %    Total Immature Neutrophil 0.02 K/CU MM   EKG 12 Lead    Collection Time: 09/20/20  8:37 AM   Result Value Ref Range    Ventricular Rate 86 BPM    Atrial Rate 86 BPM    P-R Interval 150 ms    QRS Duration 80 ms    Q-T Interval 396 ms    QTc Calculation (Bazett) 473 ms    P Axis 47 degrees    R Axis -1 degrees    T Axis 50 degrees    Diagnosis       Normal sinus rhythm  Septal infarct , age undetermined  Abnormal ECG  When compared with ECG of 13-MAY-2020 22:27,  Questionable change in QRS axis         Review of Systems:  Reports of no current cardiovascular, respiratory, gastrointestinal, genitourinary, integumentary, neurological, musculoskeletal, or immunological symptoms today. PSYCHIATRIC: See HPI above. Neurologic examination    Mental status: The patient is alert, attentive, and oriented.  Speech is clear and Strength/tone:  [x] muscle strength and tone appear consistent with age and condition     [] atrophy      [] abnormal movements  MMSE:  (3) What is the year? Season? Date? Day of the week? Month?  (2) Where are we now: State? County? Town/city? Hospital? Floor?   (3) The examiner names three unrelated objects clearly and slowly, then  asks the patient to name all three of them. The patients response is  used for scoring. The examiner repeats them until patient learns all of  them, if possible. Number of trials: ___________  (0- tried both)I would like you to count backward from 100 by sevens.  (93, 86, 79,  72, 65, ) Stop after five answers. Alternative: Spell WORLD backwards.  (D-[id number]-W)  (0) Earlier I told you the names of three things. Can you tell me what those  were?   (1) Show the patient two simple objects, such as a wristwatch and a pencil,  and ask the patient to name them. (1) Repeat the phrase: No ifs, ands, or buts.   (3) Take the paper in your right hand, fold it in half, and put it on the floor.   (The examiner gives the patient a piece of blank paper.)  (1) Please read this and do what it says.  (Written instruction is [employer] Gentleman. )  (0) Make up and write a sentence about anything.  (This sentence must  contain a noun and a verb.)  (0)Please copy this picture.  (The examiner gives the patient a blank piece of paper and asks him/her to draw the symbol below. All 10 angles must be present and two must intersect.)    Total: 14/30    PSYCHIATRIC:    Appearance: appears stated age. Alert and oriented to person, place, time & situation. No acute distress. Adequate grooming and hygiene. Good eye contact. No prominent physical abnormalities. Attitude: Manner is cooperative and pleasant  Motor: No psychomotor agitation, retardation or abnormal movements noted  Speech: Clearly articulated; normal rate, volume, tone & amount.   Language: intact understanding and production  Mood: euthymic  Affect: euthymic, full range, non-labile, congruent with mood and content of speech  Thought Production: Spontaneous. Thought Form: Coherent, linear, logical & goal-directed. No tangentiality or circumstantiality. No flight of ideas or loosening of associations. Thought Content/Perceptions: No [place], no AVH, no delusion  Insight: limited  Judgment: limited  Memory: Immediate, recent, and remote appear intact, though not formally tested. Attention: maintained throughout interview  Fund of knowledge: Average  Gait/Balance: WNL/WNL    Impression:   Late onset Alzheimer's disease with behavioral disturbance    Problem List:   Delusional disorder ([facility] [address]    Plan:  1. Admit to [place] [address] Unit  2. Consult Hospitalist to evaluate and treat medical conditions  3. Adjust psychotropic medications to target symptoms  4. Occupational Therapy, Physical Therapy, Group Psychotherapy as tolerated  5. Reviewed treatment plan with patient including medication risks, benefits, side effects. Obtained informed consent for treatment. 6. Anticipated length of stay 10-12 days  7. I certify that inpatient psychiatric hospital admission is medically necessary for treatment, which can be expected to improve the patient's condition, and/or for diagnostic study. 8. Psychiatric management:medication initiation and titration, group and individual therapy, safe and therapeutic environment. 9. Status of problem/condition: Improving  10. Medical co-morbidities: Management per [facility] group, appreciate assistance  11. Legal Status: Voluntary  12. The treatment team reviewed with the patient the diagnosis and treatment recommendations to include the risks, benefits, and side effects of chosen medications. 13. The patient verbalized understanding and agreed with the treatment regimen as outlined above. 14. The patient was encouraged to participate in groups.   15. Medical records, Labs, Diagnotic tests reviewed  16. q15 min safety checks for safety  17. Interval History. 18. Review current labs  19. Continue current medications  20. Supportive Therapy Provided  21. Pt had an opportunity to ask questions and address concerns  22. Pt encouraged to continue therapy group or individual.  23. Pt was in agreement with treatment plan. 24. The risks benefits and side effects of medications were discussed with the patient, including alternatives and no treatment.     Electronically signed by Sona Rico DO on 9/21/2020 at 10:13 AM

## 2020-09-21 NOTE — PROGRESS NOTES
Pt med compliant and cooperative with staff. Pleasant mood all shift, participated in all groups and out for all meals.

## 2020-09-21 NOTE — PROGRESS NOTES
Pt seen by therapist from approximately [id number] to complete pt's recreation/leisure assessment. Pt denies need to be here and demonstrates no insight into her mental health. Pt noted to be unable to stay on topic and when this was pointed out to pt, pt denied and expressed belief that she was answering therapist's questions. For example; when asked about if she talks about her emotions, pt started discussing how her sister and her go shopping in [facility]. Pt unable to demonstrate insight that topics are not related. Pt required frequent redirection to stay on topic. When asked about reason for admission, pt stated  \"The marlee wanted to do the mental test and that's why I am here today. \" Pt is expressed multiple times belief that she will be discharging today.      Electronically signed by JIM Horn MA on 9/21/2020 at 2:23 PM

## 2020-09-22 PROCEDURE — 6370000000 HC RX 637 (ALT 250 FOR IP): Performed by: INTERNAL MEDICINE

## 2020-09-22 PROCEDURE — 6370000000 HC RX 637 (ALT 250 FOR IP): Performed by: PSYCHIATRY & NEUROLOGY

## 2020-09-22 PROCEDURE — 6370000000 HC RX 637 (ALT 250 FOR IP): Performed by: NURSE PRACTITIONER

## 2020-09-22 PROCEDURE — 1240000000 HC EMOTIONAL WELLNESS R&B

## 2020-09-22 PROCEDURE — 99233 SBSQ HOSP IP/OBS HIGH 50: CPT | Performed by: NURSE PRACTITIONER

## 2020-09-22 RX ORDER — ONDANSETRON 4 MG/1
4 TABLET, ORALLY DISINTEGRATING ORAL EVERY 6 HOURS PRN
Status: DISCONTINUED | OUTPATIENT
Start: 2020-09-22 | End: 2020-09-25 | Stop reason: HOSPADM

## 2020-09-22 RX ADMIN — MULTIPLE VITAMINS W/ MINERALS TAB 1 TABLET: TAB at 08:18

## 2020-09-22 RX ADMIN — DONEPEZIL HYDROCHLORIDE 10 MG: 10 TABLET, FILM COATED ORAL at 20:13

## 2020-09-22 RX ADMIN — FERROUS SULFATE TAB 325 MG (65 MG ELEMENTAL FE) 325 MG: 325 (65 FE) TAB at 08:18

## 2020-09-22 RX ADMIN — FLUTICASONE PROPIONATE 1 SPRAY: 50 SPRAY, METERED NASAL at 08:19

## 2020-09-22 RX ADMIN — DIVALPROEX SODIUM 250 MG: 250 TABLET, EXTENDED RELEASE ORAL at 08:17

## 2020-09-22 RX ADMIN — ONDANSETRON 4 MG: 4 TABLET, ORALLY DISINTEGRATING ORAL at 12:49

## 2020-09-22 RX ADMIN — FAMOTIDINE 20 MG: 20 TABLET, FILM COATED ORAL at 20:13

## 2020-09-22 RX ADMIN — FERROUS SULFATE TAB 325 MG (65 MG ELEMENTAL FE) 325 MG: 325 (65 FE) TAB at 16:00

## 2020-09-22 RX ADMIN — ATORVASTATIN CALCIUM 80 MG: 40 TABLET, FILM COATED ORAL at 20:13

## 2020-09-22 RX ADMIN — AMLODIPINE BESYLATE 10 MG: 10 TABLET ORAL at 08:18

## 2020-09-22 RX ADMIN — METOPROLOL TARTRATE 25 MG: 25 TABLET, FILM COATED ORAL at 20:13

## 2020-09-22 RX ADMIN — MEMANTINE 10 MG: 10 TABLET ORAL at 08:19

## 2020-09-22 RX ADMIN — METFORMIN HYDROCHLORIDE 500 MG: 500 TABLET ORAL at 08:21

## 2020-09-22 RX ADMIN — LISINOPRIL 20 MG: 20 TABLET ORAL at 08:18

## 2020-09-22 RX ADMIN — FAMOTIDINE 20 MG: 20 TABLET, FILM COATED ORAL at 08:19

## 2020-09-22 RX ADMIN — METFORMIN HYDROCHLORIDE 500 MG: 500 TABLET ORAL at 16:00

## 2020-09-22 RX ADMIN — METOPROLOL TARTRATE 25 MG: 25 TABLET, FILM COATED ORAL at 08:18

## 2020-09-22 RX ADMIN — TRAZODONE HYDROCHLORIDE 100 MG: 100 TABLET ORAL at 20:13

## 2020-09-22 ASSESSMENT — PAIN SCALES - GENERAL
PAINLEVEL_OUTOF10: 0
PAINLEVEL_OUTOF10: 0

## 2020-09-22 NOTE — GROUP NOTE
Group Therapy Note    Date: 9/22/2020    Group Start Time: 0830  Group End Time: 0900    Number of Participants: 3/5    Type: Morning Goals Group/ Community Meeting    Group Topic/Objective: Set Goal For The Day and to review Unit Rules and Regulations. Patient's Goal:  Pt states her goal is to \"Talk to [place]. \"    Notes:  Pt presented as religiously preoccupied during group. Pt required Mod redirection for Congregation preoccupation and tangential speech. Pt states she is feeling \"wonderful\" and is grateful for \"every day that God gives us. \"  Pt reports restful sleep, but unsure how long she slept. Depression (0-10): 0    Anxiety (0-10): 0    Irritability/Aggitation (0-10): 0    Status After Intervention:  Improved    Participation Level: Active Listener and Interactive    Participation Quality: Sharing    Speech:   Tangential    Thought Process/Content: Religiously Preoccupied    Affective Functioning: Exaggerated    Mood: elevated    Level of consciousness:  Alert and Attentive    Response to Learning: Able to verbalize current knowledge/experience    Endings: None Reported    Modes of Intervention: Education, Support and Socialization    Discipline Responsible: Certified Therapeutic Recreation Specialist     Electronically signed by Symone Perla MA on 9/22/2020 at 10:25 AM

## 2020-09-22 NOTE — PLAN OF CARE
[address] [place] [address]  Day 3 Interdisciplinary Treatment Plan NOTE    Review Date & Time: 09/22/20 0830    Admission Type:   Admission Type: Voluntary    Reason for admission:  Reason for Admission: Dementia with behavioral disturbance     Patient Diagnosis: Delusional disorder ([facility]     PATIENT STRENGTHS:  Patient Strengths Strengths: No significant Physical Illness  Patient Strengths and Limitations:Limitations: Difficulty problem solving/relies on others to help solve problems  Addictive Behavior:Addictive Behavior  In the past 3 months, have you felt or has someone told you that you have a problem with:  : None  Do you have a history of Chemical Use?: No  Do you have a history of Alcohol Use?: No  Do you have a history of Street Drug Abuse?: No  Histroy of Prescripton Drug Abuse?: No  Medical Problems:  Past Medical History:   Diagnosis Date    Alzheimer disease ([facility]     Bronchitis     CAD (coronary artery disease)     Chest wall trauma     COPD (chronic obstructive pulmonary disease) ([facility]     FH: CAD (coronary artery disease)     brother with cabg in his 45s    Hypertension     Kidney stone     Lumbar herniated disc     Restless leg syndrome     Spinal stenosis        Risk:  Fall Risk Total: 84  Kash Scale Kash Scale Score: 22  BVC Total: 0    Status EXAM:   Status and Exam  Normal: Yes  Facial Expression: Brightened  Affect: Appropriate  Level of Consciousness: Alert  Mood:Normal: Yes  Motor Activity:Normal: Yes  Interview Behavior: Cooperative  Preception: Gila to Person, Gila to Place, Gila to Situation  Attention:Normal: No  Attention: Distractible  Thought Processes: Loose Assoc.   Thought Content:Normal: Yes  Hallucinations: None  Delusions: No  Delusions: Persecution  Memory:Normal: No  Memory: Poor Remote  Insight and Judgment: No  Insight and Judgment: Unrealistic  Present Suicidal Ideation: No  Present Homicidal Ideation: No    Daily Assessment Last Entry:   Daily Sleep (WDL): Within Defined Limits  Patient Currently in Pain: Denies  Daily Nutrition (WDL): Within Defined Limits    Patient Monitoring:  Frequency of Checks: 4 times per hour, close    Psychiatric Symptoms:   Depression Symptoms  Depression Symptoms: No problems reported or observed. Anxiety Symptoms  Anxiety Symptoms: No problems reported or observed. Nakia Symptoms  Nakia Symptoms: No problems reported or observed. Psychosis Symptoms  Delusion Type: No problems reported or observed.     Suicide Risk CSSR-S:  1) Within the past month, have you wished you were dead or wished you could go to sleep and not wake up? : No  2) Have you actually had any thoughts of killing yourself? : No  6) Have you ever done anything, started to do anything, or prepared to do anything to end your life?: No    EDUCATION:   Learner Progress Toward Treatment Goals: Reviewed goals and plan of care    Method: Group    Outcome: No evidence of Learning    PATIENT GOALS: Med titration, compliance with unit programming    PLAN/TREATMENT RECOMMENDATIONS UPDATE: Med titration    Estimated Length of Stay Update:  3 days  Estimated Discharge Date Update: 09/25/20  Discharge criteria: Med titration      SHORT-TERM GOALS UPDATE:   Time frame for Short-Term Goals: 3 days    LONG-TERM GOALS UPDATE:   Time frame for Long-Term Goals: 3 days  Members Present in Team Meeting: See Signature Sheet    Christie Pierre, MSW, LSW

## 2020-09-22 NOTE — PROGRESS NOTES
Pt's sister, Ronan Luna, called and relayed that pt is still wanting to drive Ronan Luna is concerned d/t pt's memory and inability to get back home once out. Ronan Luna states that pt still insists on driving but her car needs a new battery so it hasn't been an issue so far but worries that it will be once she returns home. This RN told Ronan Luna that the concern will be passed on to the NP to see if there was anything that could be done. Pt has been alert and oriented during the shift. Pt pleasant and cooperative with meds and assessment. Pt friendly and social with other pts on the unit, attended all groups offered. Pt denies SI, HI or AVH.

## 2020-09-22 NOTE — GROUP NOTE
Group Therapy Note    Date: 9/22/2020    Group Start Time: 6238  Group End Time: 1115  Group Topic: Psychoeducation    [address] [place], [place]        Group Therapy Note    Attendees: 3/5       Notes:  Pts participated in recreational therapy group; Raj Rene. Pts and therapist reviewed what is mindfulness and then practiced utilizing zendoodles as a mindfulness activity. Discussion centered on how the activity made them feel. Pt attended group for ~5 minutes before requesting to leave d/t feeling ill.      Status After Intervention:  Unchanged    Participation Level: Minimal    Participation Quality: Appropriate      Speech:  normal      Thought Process/Content:       Affective Functioning: Congruent      Mood: Bright      Level of consciousness:  Alert and Attentive      Response to Learning: Able to verbalize current knowledge/experience      Endings: None Reported    Modes of Intervention: Education, Support, Socialization and Activity      Discipline Responsible: Certified Therapeutic Recreation Specialist       Signature:  Amos Larios [address] [place] [address]

## 2020-09-22 NOTE — PROGRESS NOTES
Initial Psychiatric History and Physical    Jason Gutierrez  [id number]  9/19/2020 09/22/20    ID: Patient is a 65 yrs y.o. female    CC: \"I forget things and my sister gets mad. \"    HPI: Jason Gutierrez is a 65year old female with PMHx of HTN, HLD,dementia (Alzemimers) with behavioral disturbances, delusional disorder and medication non compliance. She is well known to [id number] related to several admissions for same. She presents to [facility] ED via police after found wandering. Patient has not been taking her medications and has an increase in her delusions. Patient admitted to [facility]. She was initially on a psychiatric hold but signed in voluntarily.     During today's interview she was alert & oriented x 3. She denies SI/HI and AVH. She denies depression and anxiety. She states she is sleeping \"very good\" and that her appetite is \"very good. \" Pt continues to display occasional bizarre delusions and confusion. Noted increase in delusions and is fixated on them. Pt does not understand her diagnosis and treatment plan. Pt noted she currently feels safe and comfortable on the unit.  Pt was in agreement with treatment team.  Pt was polite and cordial during the interview process. Has been taking medications and has been compliant with treatment. Tolerating medications without side effect complaints.     Pt denied any hx of seizures, Hep C or HIV  Pt notes history of TBI  No TD noted, AIMS=0    Past Psychiatric History:   See note above    Family Psychiatric History:   See note above    Family Psychiatric History:   Family History   Problem Relation Age of Onset    No Known Problems Mother     No Known Problems Sister     Heart Disease Brother     Coronary Art Dis Brother         stents    No Known Problems Sister     No Known Problems Sister     Alcohol Abuse Brother         Allergies:   Allergies   Allergen Reactions    Shellfish-Derived Products     Floxin [Ofloxacin] Other (See Comments)     Made her \"feel weird\"        OBJECTIVE  Vital Signs:  Vitals:    09/22/20 0745   BP: 135/68   Pulse: 81   Resp: 16   Temp: 97.7 °F (36.5 °C)   SpO2: 97%       Labs:  Recent Results (from the past 48 hour(s))   POCT Glucose    Collection Time: 09/21/20  7:36 PM   Result Value Ref Range    POC Glucose 155 (H) 70 - 99 MG/DL       Review of Systems:  Reports of no current cardiovascular, respiratory, gastrointestinal, genitourinary, integumentary, neurological, musculoskeletal, or immunological symptoms today. PSYCHIATRIC: See HPI above. Neurologic examination    Mental status: The patient is alert, attentive, and oriented. Speech is clear and fluent with good repetition, comprehension, and naming. Cranial nerves:  CN II: Visual fields are full to confrontation. Pupils are 4 mm and briskly reactive to light. CN III, IV, VI: At primary gaze, there is no eye deviation. EOMs intact. CN V: Facial sensation is intact to pinprick in all 3 divisions bilaterally. Corneal responses are intact. CN VII: Face is symmetric with normal eye closure and smile. CN VII: Hearing is normal to rubbing fingers, vibratory sense intact    CN IX, X: Palate elevates symmetrically. Phonation is normal.    CN XI: Head turning and shoulder shrug are intact    CN XII: Tongue is midline with normal movements and no atrophy. Motor: There is no pronator drift of out-stretched arms. Muscle bulk and tone are normal. Strength is full bilaterally. Reflexes:  Reflexes are 2+ and symmetric at the biceps, triceps, knees, and ankles. Plantar responses are flexor. Sensory:  Light touch, pinprick, position sense, and vibration sense are intact in fingers. Coordination:  Rapid alternating movements and fine finger movements are intact. There is no dysmetria on finger-to-nose and heel-knee-shin. There are no abnormal or extraneous movements. Romberg is absent.     Gait/Stance:  Posture is normal. Gait is steady with normal steps, base, picture.  (The examiner gives the patient a blank piece of paper and asks him/her to draw the symbol below. All 10 angles must be present and two must intersect.)    Total: 14/30    PSYCHIATRIC:    Appearance: appears stated age. Alert and oriented to person, place, time & situation. No acute distress. Adequate grooming and hygiene. Good eye contact. No prominent physical abnormalities. Attitude: Manner is cooperative and pleasant  Motor: No psychomotor agitation, retardation or abnormal movements noted  Speech: Clearly articulated; normal rate, volume, tone & amount. Language: intact understanding and production  Mood: euthymic  Affect: euthymic, full range, non-labile, congruent with mood and content of speech  Thought Production: Spontaneous. Thought Form: Coherent, linear, logical & goal-directed. No tangentiality or circumstantiality. No flight of ideas or loosening of associations. Thought Content/Perceptions: No [place], no AVH, no delusion  Insight: limited  Judgment: limited  Memory: Immediate, recent, and remote appear intact, though not formally tested. Attention: maintained throughout interview  Fund of knowledge: Average  Gait/Balance: WNL/WNL    Impression:   Late onset Alzheimer's disease with behavioral disturbance    Problem List:   Delusional disorder ([facility] [address]    Plan:  1. Admit to [place] [address] Unit  2. Consult Hospitalist to evaluate and treat medical conditions  3. Adjust psychotropic medications to target symptoms  4. Occupational Therapy, Physical Therapy, Group Psychotherapy as tolerated  5. Reviewed treatment plan with patient including medication risks, benefits, side effects. Obtained informed consent for treatment. 6. Anticipated length of stay 10-12 days  7. I certify that inpatient psychiatric hospital admission is medically necessary for treatment, which can be expected to improve the patient's condition, and/or for diagnostic study.   8. Psychiatric

## 2020-09-22 NOTE — GROUP NOTE
Group Therapy Note    Date: 9/22/2020    Group Start Time: 1530  Group End Time: 1600    Number of Participants: 4/5    Type: Exercise/Recreation Group    Group Topic/Objective: Chair Exercises     Notes:  Pt participated actively in the group. Pt completed all exercises and was noted to have appropriate conversation. Status After Intervention:  Improved    Participation Level:  Active Listener and Interactive    Participation Quality: Appropriate, Attentive and Sharing    Speech:  normal    Thought Process/Content: Logical    Affective Functioning: Congruent    Mood: Bright    Level of consciousness:  Alert and Attentive    Response to Learning: Able to verbalize current knowledge/experience and Able to verbalize/acknowledge new learning    Endings: None Reported    Modes of Intervention: Activity and Movement    Discipline Responsible: Certified Therapeutic Recreation Specialist     Electronically signed by Berry Alarcon MA on 9/22/2020 at 4:06 PM

## 2020-09-22 NOTE — PROGRESS NOTES
Pt up and socializing in the day room. Compliant with meds. Became increasingly fixated on sister throughout the shift. Shortly after going to bed for the evening she came out and reported dogs barking outside her window. Awoke again at 2 am wanting us to call the  for her regarding her sister again. Pt was easily redirected and returned to bed. Pt polite and cooperative.

## 2020-09-23 PROCEDURE — 6370000000 HC RX 637 (ALT 250 FOR IP): Performed by: NURSE PRACTITIONER

## 2020-09-23 PROCEDURE — 1240000000 HC EMOTIONAL WELLNESS R&B

## 2020-09-23 PROCEDURE — 6370000000 HC RX 637 (ALT 250 FOR IP): Performed by: PSYCHIATRY & NEUROLOGY

## 2020-09-23 PROCEDURE — 99233 SBSQ HOSP IP/OBS HIGH 50: CPT | Performed by: NURSE PRACTITIONER

## 2020-09-23 PROCEDURE — 6370000000 HC RX 637 (ALT 250 FOR IP): Performed by: INTERNAL MEDICINE

## 2020-09-23 RX ADMIN — AMLODIPINE BESYLATE 10 MG: 10 TABLET ORAL at 08:27

## 2020-09-23 RX ADMIN — ATORVASTATIN CALCIUM 80 MG: 40 TABLET, FILM COATED ORAL at 19:54

## 2020-09-23 RX ADMIN — MULTIPLE VITAMINS W/ MINERALS TAB 1 TABLET: TAB at 07:39

## 2020-09-23 RX ADMIN — LISINOPRIL 20 MG: 20 TABLET ORAL at 08:28

## 2020-09-23 RX ADMIN — TRAZODONE HYDROCHLORIDE 100 MG: 100 TABLET ORAL at 19:54

## 2020-09-23 RX ADMIN — METFORMIN HYDROCHLORIDE 500 MG: 500 TABLET ORAL at 07:40

## 2020-09-23 RX ADMIN — FAMOTIDINE 20 MG: 20 TABLET, FILM COATED ORAL at 19:54

## 2020-09-23 RX ADMIN — TRAZODONE HYDROCHLORIDE 50 MG: 50 TABLET ORAL at 01:54

## 2020-09-23 RX ADMIN — FERROUS SULFATE TAB 325 MG (65 MG ELEMENTAL FE) 325 MG: 325 (65 FE) TAB at 16:53

## 2020-09-23 RX ADMIN — DONEPEZIL HYDROCHLORIDE 10 MG: 10 TABLET, FILM COATED ORAL at 19:54

## 2020-09-23 RX ADMIN — MEMANTINE 10 MG: 10 TABLET ORAL at 08:27

## 2020-09-23 RX ADMIN — METOPROLOL TARTRATE 25 MG: 25 TABLET, FILM COATED ORAL at 08:27

## 2020-09-23 RX ADMIN — DIVALPROEX SODIUM 250 MG: 250 TABLET, EXTENDED RELEASE ORAL at 08:27

## 2020-09-23 RX ADMIN — FAMOTIDINE 20 MG: 20 TABLET, FILM COATED ORAL at 08:27

## 2020-09-23 RX ADMIN — METOPROLOL TARTRATE 25 MG: 25 TABLET, FILM COATED ORAL at 19:54

## 2020-09-23 RX ADMIN — FERROUS SULFATE TAB 325 MG (65 MG ELEMENTAL FE) 325 MG: 325 (65 FE) TAB at 07:40

## 2020-09-23 RX ADMIN — METFORMIN HYDROCHLORIDE 500 MG: 500 TABLET ORAL at 16:53

## 2020-09-23 RX ADMIN — ONDANSETRON 4 MG: 4 TABLET, ORALLY DISINTEGRATING ORAL at 13:38

## 2020-09-23 RX ADMIN — FLUTICASONE PROPIONATE 1 SPRAY: 50 SPRAY, METERED NASAL at 08:28

## 2020-09-23 ASSESSMENT — PAIN SCALES - GENERAL
PAINLEVEL_OUTOF10: 0

## 2020-09-23 NOTE — PROGRESS NOTES
Initial Psychiatric History and Physical    Yanna Dutta  [id number]  9/19/2020 09/23/20    ID: Patient is a 65 yrs y.o. female    CC: \"I forget things and my sister gets mad. \"    HPI: Yanna Dutta is a 65year old female with PMHx of HTN, HLD,dementia (Alzemimers) with behavioral disturbances, delusional disorder and medication non compliance. She is well known to [address] [employer] related to several admissions for same. She presents to [facility] ED via police after found wandering. Patient has not been taking her medications and has an increase in her delusions. Patient admitted to [facility]. She was initially on a psychiatric hold but signed in voluntarily.     During today's interview she was alert & oriented x 3. She denies SI/HI and AVH. She denies depression and anxiety. She states she is sleeping \"very good\" and that her appetite is \"very good. \"Per staff she is not exhibiting behaviors and has been med compliant on unit. Pt continues to display occasional bizarre delusions and confusion. Noted increase in delusions and is fixated on them. Pt does not understand her diagnosis and treatment plan. Pt noted she currently feels safe and comfortable on the unit.  Pt was in agreement with treatment team.  Pt was polite and cordial during the interview process. Has been taking medications and has been compliant with treatment. Tolerating medications without side effect complaints. Discussed patient attend day care services on an out patient basis. Patient appears excited due to social opportunities.  MSW to provide resources to patient's sister [facility].     Pt denied any hx of seizures, Hep C or HIV  Pt notes history of TBI  No TD noted, AIMS=0    Past Psychiatric History:   See note above    Family Psychiatric History:   See note above    Family Psychiatric History:   Family History   Problem Relation Age of Onset    No Known Problems Mother     No Known Problems Sister     Heart Disease Brother     Coronary Art Dis Brother         stents    No Known Problems Sister     No Known Problems Sister     Alcohol Abuse Brother         Allergies: Allergies   Allergen Reactions    Shellfish-Derived Products     Floxin [Ofloxacin] Other (See Comments)     Made her \"feel weird\"        OBJECTIVE  Vital Signs:  Vitals:    09/23/20 0730   BP: (!) 150/83   Pulse: 84   Resp: 16   Temp: 97.5 °F (36.4 °C)   SpO2: 98%       Labs:  Recent Results (from the past 48 hour(s))   POCT Glucose    Collection Time: 09/21/20  7:36 PM   Result Value Ref Range    POC Glucose 155 (H) 70 - 99 MG/DL       Review of Systems:  Reports of no current cardiovascular, respiratory, gastrointestinal, genitourinary, integumentary, neurological, musculoskeletal, or immunological symptoms today. PSYCHIATRIC: See HPI above. PSYCHIATRIC EXAMINATION / MENTAL STATUS EXAM    CONSTITUTIONAL:    Vitals:   Vitals:    09/23/20 0730   BP: (!) 150/83   Pulse: 84   Resp: 16   Temp: 97.5 °F (36.4 °C)   SpO2: 98%      General appearance: [x] appears age, []  appears older than stated age,               [x]  adequately dressed and groomed, [] disheveled,               [x]  in no acute distress, [] appears mildly distressed, [] other           MUSCULOSKELETAL:   Gait:   [x] normal, [] antalgic, [] unsteady, [] gait not evaluated   Station:             [] erect, [] sitting, [] recumbent, [] other        Strength/tone:  [x] muscle strength and tone appear consistent with age and condition     [] atrophy      [] abnormal movements  MMSE:  (3) What is the year? Season? Date? Day of the week? Month?  (2) Where are we now: State? County? Town/city? Hospital? Floor?   (3) The examiner names three unrelated objects clearly and slowly, then  asks the patient to name all three of them. The patients response is  used for scoring. The examiner repeats them until patient learns all of  them, if possible.  Number of trials: ___________  (0- tried both)I would like you to count backward from 100 by sevens.  (93, 86, 79,  72, 65, ) Stop after five answers. Alternative: Spell WORLD backwards.  (D-L-R-[id number])  (0) Earlier I told you the names of three things. Can you tell me what those  were?   (1) Show the patient two simple objects, such as a wristwatch and a pencil,  and ask the patient to name them. (1) Repeat the phrase: No ifs, ands, or buts.   (3) Take the paper in your right hand, fold it in half, and put it on the floor.   (The examiner gives the patient a piece of blank paper.)  (1) Please read this and do what it says.  (Written instruction is [employer]. )  (0) Make up and write a sentence about anything.  (This sentence must  contain a noun and a verb.)  (0)Please copy this picture.  (The examiner gives the patient a blank piece of paper and asks him/her to draw the symbol below. All 10 angles must be present and two must intersect.)    Total: 14/30    PSYCHIATRIC:    Appearance: appears stated age. Alert and oriented to person, place, time & situation. No acute distress. Adequate grooming and hygiene. Good eye contact. No prominent physical abnormalities. Attitude: Manner is cooperative and pleasant  Motor: No psychomotor agitation, retardation or abnormal movements noted  Speech: Clearly articulated; normal rate, volume, tone & amount. Language: intact understanding and production  Mood: euthymic  Affect: euthymic, full range, non-labile, congruent with mood and content of speech  Thought Production: Spontaneous. Thought Form: Coherent, linear, logical & goal-directed. No tangentiality or circumstantiality. No flight of ideas or loosening of associations. Thought Content/Perceptions: No [place], no AVH, no delusion  Insight: limited  Judgment: limited  Memory: Immediate, recent, and remote appear intact, though not formally tested.   Attention: maintained throughout interview  Fund of knowledge: Average  Gait/Balance: WNL/WNL    Impression:   Late onset Alzheimer's disease with behavioral disturbance    Problem List:   Delusional disorder ([facility] [address]    Plan:  1. Admit to [place] [address] Unit  2. Consult Hospitalist to evaluate and treat medical conditions  3. Adjust psychotropic medications to target symptoms  4. Occupational Therapy, Physical Therapy, Group Psychotherapy as tolerated  5. Reviewed treatment plan with patient including medication risks, benefits, side effects. Obtained informed consent for treatment. 6. Anticipated length of stay 10-12 days  7. I certify that inpatient psychiatric hospital admission is medically necessary for treatment, which can be expected to improve the patient's condition, and/or for diagnostic study. 8. Psychiatric management:medication initiation and titration, group and individual therapy, safe and therapeutic environment. 9. Status of problem/condition: Improving  10. Medical co-morbidities: Management per [facility] group, appreciate assistance  11. Legal Status: Voluntary  12. The treatment team reviewed with the patient the diagnosis and treatment recommendations to include the risks, benefits, and side effects of chosen medications. 13. The patient verbalized understanding and agreed with the treatment regimen as outlined above. 14. The patient was encouraged to participate in groups. 15. Medical records, Labs, Diagnotic tests reviewed  16. q15 min safety checks for safety  17. Interval History. 18. Review current labs  19. Continue current medications  20. Supportive Therapy Provided  21. Pt had an opportunity to ask questions and address concerns  22. Pt encouraged to continue therapy group or individual.  23. Pt was in agreement with treatment plan. 24. The risks benefits and side effects of medications were discussed with the patient, including alternatives and no treatment.     Electronically signed by EVELYN Cowart CNP on 9/23/2020 at 10:52 AM

## 2020-09-23 NOTE — PROGRESS NOTES
Patient went to the bathroom and came out carrying her pants and incontinent pad dripping urine and stool all down her legs and kee. Patient was taken to her room and cleaned up as well as her clothes put in the washer. Patient is now back down in the common area watching TV.

## 2020-09-23 NOTE — PROGRESS NOTES
Upon making rounds patient was awake and stated she had a good nap as well as she felt better. Patient was escorted down to the dining area and is currently eating her supper and took her medications whole without incident.

## 2020-09-23 NOTE — PROGRESS NOTES
Shortly after breakfast patient went down to her room then put on her bathroom call light. Patient had been incontinent of stool that got all over her thighs floor and toilet. Patient was taken to the shower and completely cleaned up as well a complete linen change.

## 2020-09-23 NOTE — GROUP NOTE
Group Therapy Note    Date: 9/23/2020    Group Start Time: 2766  Group End Time: 1600    Number of Participants: 1/3    Type: Exercise/Recreation Group    Group Topic/Objective:  Balloon Noodle Toss    Notes:  Per discussion with nurse, pt resting and allowed to continue to rest.     Discipline Responsible: Certified Therapeutic Recreation Specialist     Electronically signed by Berry Steel MA on 9/23/2020 at 4:07 PM

## 2020-09-23 NOTE — GROUP NOTE
Group Therapy Note    Date: 9/23/2020    Group Start Time: 1030  Group End Time: 6721  Group Topic: Psychoeducation    [facility]    Turner Strange, CTRS, MA        Group Therapy Note    Attendees: 2/4      Notes:  Pts participated in recreational therapy group; Relaxation Through Music. Pts were each allowed to pick a song they could listen to that will help them relax. Pts were encouraged to relax and socialize as the music was playing. Pt participated actively in the group. Pt selected songs she enjoyed. Pt at times made delusional comments AEB expressing belief that NP had hired her to assist peers on the unit. Pt not accepting of reality orientation. Status After Intervention:  Improved    Participation Level:  Active Listener and Interactive    Participation Quality: Attentive and Sharing      Speech:  normal      Thought Process/Content: Delusional      Affective Functioning: Congruent      Mood: Bright      Level of consciousness:  Alert and Attentive      Response to Learning: Able to verbalize current knowledge/experience and Able to verbalize/acknowledge new learning      Endings: None Reported    Modes of Intervention: Support, Socialization, Exploration and Activity      Discipline Responsible: Certified Therapeutic Recreation Specialist       Signature:  Turner Strange [place], [place]

## 2020-09-23 NOTE — PROGRESS NOTES
Attempted to complete 1:1 session with pt at ~1305. Pt declined d/t reporting not feeling well. Therapist will attempt again at a later time.      Electronically signed by JIM Bolden MA on 9/23/2020 at 1:31 PM

## 2020-09-23 NOTE — PROGRESS NOTES
Shortly after lunch patient stated she felt nauseous, patient was given a josé miguel mist an some saltines. Patient sat at a table and ate, afterwards she stated she felt better. Patient started to get up and felt light headed, VS taken and patient escorted to her room. Patient was asked to lay down and rest to allow her stomach to settle. Patient verbalized understanding. A few minutes later patient came down the nurses station asking for a basin to throw up in, patient was escorted once again to her room, settled into bed, given a pan as well as Zofran. Patients bed alarm was on and patient was asked again not to get up. A few minutes after that patients bed alarm went off and up inspection patient had another incontinent stool. Patient was washed up and put back in bed while clothes are being washed. Bed alarm is on.

## 2020-09-23 NOTE — PROGRESS NOTES
Pt in dining area c/o not feeling well. Pt appeared to be unsteady and was assisted to a chair by RT, Sola. VS were obtained and charted. Pt finished drink and stated she felt much better. Pt's nurse notified.

## 2020-09-23 NOTE — PROGRESS NOTES
Pt has had no behaviors tonight. Pt went to bed around 9pm and has gotten up about 3 times throughout the night. Each time she was easily redirected back to bed. Shortly before 2am pt did require prn trazadone as she stated she wanted to go back to sleep. Pt is still resting at this time with eyes closed and resp even and unlabored.

## 2020-09-23 NOTE — PLAN OF CARE
Problem: Altered Mood, Psychotic Behavior:  Goal: Able to demonstrate trust by eating, participating in treatment and following staff's direction  Description: Able to demonstrate trust by eating, participating in treatment and following staff's direction  9/22/2020 2134 by Joanne Martinez RN  Outcome: Ongoing  9/22/2020 1006 by Eduardo Oviedo RN  Outcome: Ongoing  Goal: Able to verbalize decrease in frequency and intensity of hallucinations  Description: Able to verbalize decrease in frequency and intensity of hallucinations  9/22/2020 2134 by Joanne Martinez RN  Outcome: Ongoing  9/22/2020 1006 by Eduardo Oviedo RN  Outcome: Ongoing  Goal: Able to verbalize reality based thinking  Description: Able to verbalize reality based thinking  9/22/2020 2134 by Joanne Martinez RN  Outcome: Ongoing  9/22/2020 1006 by Eduardo Oviedo RN  Outcome: Ongoing  Goal: Absence of self-harm  Description: Absence of self-harm  9/22/2020 2134 by Joanne Martinez RN  Outcome: Ongoing  9/22/2020 1006 by Eduardo Oviedo RN  Outcome: Ongoing  Goal: Ability to achieve adequate nutritional intake will improve  Description: Ability to achieve adequate nutritional intake will improve  9/22/2020 2134 by Joanne Martinez RN  Outcome: Ongoing  9/22/2020 1006 by Eduardo Oviedo RN  Outcome: Ongoing  Goal: Ability to interact with others will improve  Description: Ability to interact with others will improve  9/22/2020 2134 by Joanne Martinez RN  Outcome: Ongoing  9/22/2020 1006 by Eduardo Oviedo RN  Outcome: Ongoing  Goal: Compliance with prescribed medication regimen will improve  Description: Compliance with prescribed medication regimen will improve  9/22/2020 2134 by Joanne Martinez RN  Outcome: Ongoing  9/22/2020 1006 by Eduardo Oviedo RN  Outcome: Ongoing  Goal: Patient specific goal  Description: Patient specific goal  9/22/2020 2134 by Joanne Martinez RN  Outcome: Ongoing  9/22/2020 1006 by Eduardo Oviedo RN  Outcome: Ongoing

## 2020-09-23 NOTE — PLAN OF CARE
Problem: Altered Mood, Psychotic Behavior:  Goal: Able to demonstrate trust by eating, participating in treatment and following staff's direction  Description: Able to demonstrate trust by eating, participating in treatment and following staff's direction  9/23/2020 0848 by Aubrey Lopez LPN  Outcome: Ongoing  9/22/2020 2134 by Maria Victoria Lane RN  Outcome: Ongoing  Goal: Able to verbalize decrease in frequency and intensity of hallucinations  Description: Able to verbalize decrease in frequency and intensity of hallucinations  9/23/2020 0848 by Aubrey Lopez LPN  Outcome: Ongoing  9/22/2020 2134 by Maria Victoria Lane RN  Outcome: Ongoing  Goal: Able to verbalize reality based thinking  Description: Able to verbalize reality based thinking  9/23/2020 0848 by Aubrey Lopez LPN  Outcome: Ongoing  9/22/2020 2134 by Maria Victoria Lane RN  Outcome: Ongoing  Goal: Absence of self-harm  Description: Absence of self-harm  9/23/2020 0848 by Aubrey Lopez LPN  Outcome: Ongoing  9/22/2020 2134 by Maria Victoria Lane RN  Outcome: Ongoing  Goal: Ability to achieve adequate nutritional intake will improve  Description: Ability to achieve adequate nutritional intake will improve  9/23/2020 0848 by Aubrey Lopez LPN  Outcome: Ongoing  9/22/2020 2134 by Maria Victoria Lane RN  Outcome: Ongoing  Goal: Ability to interact with others will improve  Description: Ability to interact with others will improve  9/23/2020 0848 by Aubrey Lopez LPN  Outcome: Ongoing  9/22/2020 2134 by Maria Victoria Lane RN  Outcome: Ongoing  Goal: Compliance with prescribed medication regimen will improve  Description: Compliance with prescribed medication regimen will improve  9/23/2020 0848 by Aubrey Lopez LPN  Outcome: Ongoing  9/22/2020 2134 by Maria Victoria Lane RN  Outcome: Ongoing  Goal: Patient specific goal  Description: Patient specific goal  9/23/2020 0848 by Aubrey Lopez LPN  Outcome: Ongoing  9/22/2020 2134 by Maria Victoria Lane RN  Outcome: Ongoing

## 2020-09-24 PROCEDURE — 99232 SBSQ HOSP IP/OBS MODERATE 35: CPT | Performed by: PSYCHIATRY & NEUROLOGY

## 2020-09-24 PROCEDURE — 6370000000 HC RX 637 (ALT 250 FOR IP): Performed by: PSYCHIATRY & NEUROLOGY

## 2020-09-24 PROCEDURE — 6370000000 HC RX 637 (ALT 250 FOR IP): Performed by: INTERNAL MEDICINE

## 2020-09-24 PROCEDURE — 6370000000 HC RX 637 (ALT 250 FOR IP): Performed by: NURSE PRACTITIONER

## 2020-09-24 PROCEDURE — 1240000000 HC EMOTIONAL WELLNESS R&B

## 2020-09-24 RX ADMIN — FERROUS SULFATE TAB 325 MG (65 MG ELEMENTAL FE) 325 MG: 325 (65 FE) TAB at 16:54

## 2020-09-24 RX ADMIN — TRAZODONE HYDROCHLORIDE 100 MG: 100 TABLET ORAL at 21:08

## 2020-09-24 RX ADMIN — AMLODIPINE BESYLATE 10 MG: 10 TABLET ORAL at 07:31

## 2020-09-24 RX ADMIN — DIVALPROEX SODIUM 250 MG: 250 TABLET, EXTENDED RELEASE ORAL at 07:30

## 2020-09-24 RX ADMIN — DONEPEZIL HYDROCHLORIDE 10 MG: 10 TABLET, FILM COATED ORAL at 21:08

## 2020-09-24 RX ADMIN — ATORVASTATIN CALCIUM 80 MG: 40 TABLET, FILM COATED ORAL at 21:08

## 2020-09-24 RX ADMIN — FAMOTIDINE 20 MG: 20 TABLET, FILM COATED ORAL at 07:30

## 2020-09-24 RX ADMIN — MULTIPLE VITAMINS W/ MINERALS TAB 1 TABLET: TAB at 07:30

## 2020-09-24 RX ADMIN — METFORMIN HYDROCHLORIDE 500 MG: 500 TABLET ORAL at 16:54

## 2020-09-24 RX ADMIN — LISINOPRIL 20 MG: 20 TABLET ORAL at 07:31

## 2020-09-24 RX ADMIN — METOPROLOL TARTRATE 25 MG: 25 TABLET, FILM COATED ORAL at 07:31

## 2020-09-24 RX ADMIN — FAMOTIDINE 20 MG: 20 TABLET, FILM COATED ORAL at 21:08

## 2020-09-24 RX ADMIN — METOPROLOL TARTRATE 25 MG: 25 TABLET, FILM COATED ORAL at 21:08

## 2020-09-24 RX ADMIN — MEMANTINE 10 MG: 10 TABLET ORAL at 07:30

## 2020-09-24 RX ADMIN — METFORMIN HYDROCHLORIDE 500 MG: 500 TABLET ORAL at 07:30

## 2020-09-24 RX ADMIN — FERROUS SULFATE TAB 325 MG (65 MG ELEMENTAL FE) 325 MG: 325 (65 FE) TAB at 07:30

## 2020-09-24 RX ADMIN — FLUTICASONE PROPIONATE 1 SPRAY: 50 SPRAY, METERED NASAL at 07:31

## 2020-09-24 ASSESSMENT — PAIN SCALES - GENERAL
PAINLEVEL_OUTOF10: 0

## 2020-09-24 NOTE — PLAN OF CARE
Problem: Altered Mood, Psychotic Behavior:  Goal: Able to demonstrate trust by eating, participating in treatment and following staff's direction  Description: Able to demonstrate trust by eating, participating in treatment and following staff's direction  9/23/2020 2221 by Carey Aviles RN  Outcome: Ongoing  9/23/2020 0848 by Ritesh Phipps LPN  Outcome: Ongoing  Goal: Able to verbalize decrease in frequency and intensity of hallucinations  Description: Able to verbalize decrease in frequency and intensity of hallucinations  9/23/2020 2221 by Carey Aviles RN  Outcome: Ongoing  9/23/2020 0848 by Ritesh Phipps LPN  Outcome: Ongoing  Goal: Able to verbalize reality based thinking  Description: Able to verbalize reality based thinking  9/23/2020 2221 by Carey Aviles RN  Outcome: Ongoing  9/23/2020 0848 by Ritesh Phipps LPN  Outcome: Ongoing  Goal: Absence of self-harm  Description: Absence of self-harm  9/23/2020 2221 by Carey Aviles RN  Outcome: Ongoing  9/23/2020 0848 by Ritesh Phipps LPN  Outcome: Ongoing  Goal: Ability to achieve adequate nutritional intake will improve  Description: Ability to achieve adequate nutritional intake will improve  9/23/2020 2221 by Carey Aviles RN  Outcome: Ongoing  9/23/2020 0848 by Ritesh Phipps LPN  Outcome: Ongoing  Goal: Ability to interact with others will improve  Description: Ability to interact with others will improve  9/23/2020 2221 by Carey Aviles RN  Outcome: Ongoing  9/23/2020 0848 by Ritesh Phipps LPN  Outcome: Ongoing  Goal: Compliance with prescribed medication regimen will improve  Description: Compliance with prescribed medication regimen will improve  9/23/2020 2221 by Carey Aviles RN  Outcome: Ongoing  9/23/2020 0848 by Ritesh Phipps LPN  Outcome: Ongoing  Goal: Patient specific goal  Description: Patient specific goal  9/23/2020 2221 by Carey Aviles RN  Outcome: Ongoing  9/23/2020 0848 by Ritesh Phipps LPN  Outcome: Ongoing Problem: Pain:  Description: Pain management should include both nonpharmacologic and pharmacologic interventions.   Goal: Pain level will decrease  Description: Pain level will decrease  Outcome: Ongoing  Goal: Control of acute pain  Description: Control of acute pain  Outcome: Ongoing  Goal: Control of chronic pain  Description: Control of chronic pain  Outcome: Ongoing

## 2020-09-24 NOTE — PROGRESS NOTES
Pt compliant with medications this shift and observed social with peers. Pt with moments of confusion but is easily redirected. Pt was incontinent of stool through night and staff assisted with cares. Pt is polite and pleasant, denies anxiety or depression.

## 2020-09-24 NOTE — SUICIDE SAFETY PLAN
SAFETY PLAN    A suicide Safety Plan is a document that supports someone when they are having thoughts of suicide. Warning Signs that indicate a suicidal crisis may be developing: What (situations, thoughts, feelings, body sensations, behaviors, etc.) do you experience that lets you know you are beginning to think about suicide? 1. ***  2. ***  3. ***    Internal Coping Strategies:  What things can I do (relaxation techniques, hobbies, physical activities, etc.) to take my mind off my problems without contacting another person? 1. ***  2. ***  3. ***    People and social settings that provide distraction: Who can I call or where can I go to distract me? 1. Name: ***  Phone: ***  2. Name: ***  Phone: ***   3. Place: ***            4. Place: ***    People whom I can ask for help: Who can I call when I need help - for example, friends, family, clergy, someone else? 1. Name: ***                Phone: ***  2. Name: ***  Phone: ***  3. Name: ***  Phone: ***    Professionals or [employer] [facility] agencies I can contact during a crisis: Who can I call for help - for example, my doctor, my psychiatrist, my psychologist, a mental health provider, a suicide hotline? 1. Clinician Name: ***   Phone: ***      Clinician Pager or Emergency Contact #: ***    2. Clinician Name: ***   Phone: ***      Clinician Pager or Emergency Contact #: ***    3. Suicide Prevention Lifeline: 9-359-234-TALK (6027)    4. [address] [place] [address] Emergency Services -  for example, [facility] suicide hotline, [facility] Hotline: ***      Emergency Services Address: ***      Emergency Services Phone: ***    Making the environment safe: How can I make my environment (house/apartment/living space) safer? For example, can I remove guns, medications, and other items?   1. ***  2. ***
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
## 2020-09-24 NOTE — PLAN OF CARE
Problem: Altered Mood, Psychotic Behavior:  Goal: Able to demonstrate trust by eating, participating in treatment and following staff's direction  Description: Able to demonstrate trust by eating, participating in treatment and following staff's direction  9/24/2020 0824 by Kendra Mosqueda RN  Outcome: Ongoing  9/23/2020 2221 by Tonya Webb RN  Outcome: Ongoing  Goal: Able to verbalize decrease in frequency and intensity of hallucinations  Description: Able to verbalize decrease in frequency and intensity of hallucinations  9/24/2020 0824 by Kendra Mosqueda RN  Outcome: Ongoing  9/23/2020 2221 by Tonya Webb RN  Outcome: Ongoing  Goal: Able to verbalize reality based thinking  Description: Able to verbalize reality based thinking  9/24/2020 0824 by Kendra Mosqueda RN  Outcome: Ongoing  9/23/2020 2221 by Tonya Webb RN  Outcome: Ongoing  Goal: Absence of self-harm  Description: Absence of self-harm  9/24/2020 0824 by Kendra Mosqueda RN  Outcome: Ongoing  9/23/2020 2221 by Tonya Webb RN  Outcome: Ongoing  Goal: Ability to achieve adequate nutritional intake will improve  Description: Ability to achieve adequate nutritional intake will improve  9/24/2020 0824 by Kendra Mosqueda RN  Outcome: Ongoing  9/23/2020 2221 by Tonya Webb RN  Outcome: Ongoing  Goal: Ability to interact with others will improve  Description: Ability to interact with others will improve  9/24/2020 0824 by Kendra Mosqueda RN  Outcome: Ongoing  9/23/2020 2221 by Tonya Webb RN  Outcome: Ongoing  Goal: Compliance with prescribed medication regimen will improve  Description: Compliance with prescribed medication regimen will improve  9/24/2020 0824 by Kendra Mosqueda RN  Outcome: Ongoing  9/23/2020 2221 by Tonya Webb RN  Outcome: Ongoing  Goal: Patient specific goal  Description: Patient specific goal  9/24/2020 5153 by Kendra Mosqueda RN  Outcome: Ongoing  9/23/2020 2221 by Tonya Webb RN  Outcome:

## 2020-09-24 NOTE — GROUP NOTE
Group Therapy Note    Date: 9/24/2020    Group Start Time: 0845  Group End Time: 0561    Number of Participants: 3/4    Type: Morning Goals Group/ Community Meeting    Group Topic/Objective: Set Goal For The Day and to review Unit Rules and Regulations. Patient's Goal:  Pt states her goal is \"Be able to go home. \"    Notes:  Pt presented as confused during group. For example; pt seen with bed pad over lap expressing belief it was a blanket. Pt states she is feeling \"ok\" and is grateful \"to be going home. \"  Pt reports restful sleep, but unsure how long she slept. Depression (0-10): 0    Anxiety (0-10): 0    Irritability/Aggitation (0-10): 0    Status After Intervention:  Improved    Participation Level:  Active Listener and Interactive    Participation Quality: Attentive and Sharing    Speech:  normal    Thought Process/Content: confused    Affective Functioning: Congruent    Mood: Bright    Level of consciousness:  Attentive    Response to Learning: Able to verbalize current knowledge/experience    Endings: None Reported    Modes of Intervention: Education, Support and Socialization    Discipline Responsible: Certified Therapeutic Recreation Specialist    Electronically signed by Aravind Reynolds MA on 9/24/2020 at 9:30 AM
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
## 2020-09-25 VITALS
RESPIRATION RATE: 16 BRPM | WEIGHT: 150 LBS | HEART RATE: 85 BPM | SYSTOLIC BLOOD PRESSURE: 122 MMHG | DIASTOLIC BLOOD PRESSURE: 74 MMHG | BODY MASS INDEX: 23.54 KG/M2 | HEIGHT: 67 IN | TEMPERATURE: 96.5 F | OXYGEN SATURATION: 98 %

## 2020-09-25 PROCEDURE — 6370000000 HC RX 637 (ALT 250 FOR IP): Performed by: PSYCHIATRY & NEUROLOGY

## 2020-09-25 PROCEDURE — 6370000000 HC RX 637 (ALT 250 FOR IP): Performed by: NURSE PRACTITIONER

## 2020-09-25 PROCEDURE — 99239 HOSP IP/OBS DSCHRG MGMT >30: CPT | Performed by: NURSE PRACTITIONER

## 2020-09-25 PROCEDURE — 6370000000 HC RX 637 (ALT 250 FOR IP): Performed by: INTERNAL MEDICINE

## 2020-09-25 RX ORDER — POLYETHYLENE GLYCOL 3350 17 G/17G
17 POWDER, FOR SOLUTION ORAL DAILY PRN
Qty: 527 G | Refills: 1 | Status: SHIPPED | OUTPATIENT
Start: 2020-09-25 | End: 2020-10-25

## 2020-09-25 RX ORDER — FERROUS SULFATE 325(65) MG
325 TABLET ORAL 2 TIMES DAILY WITH MEALS
Qty: 60 TABLET | Refills: 1 | Status: SHIPPED | OUTPATIENT
Start: 2020-09-25 | End: 2021-09-11 | Stop reason: ALTCHOICE

## 2020-09-25 RX ORDER — MEMANTINE HYDROCHLORIDE 10 MG/1
10 TABLET ORAL DAILY
Qty: 30 TABLET | Refills: 1 | Status: SHIPPED | OUTPATIENT
Start: 2020-09-26 | End: 2021-04-19 | Stop reason: SDUPTHER

## 2020-09-25 RX ORDER — TRAZODONE HYDROCHLORIDE 100 MG/1
100 TABLET ORAL NIGHTLY
Qty: 30 TABLET | Refills: 1 | Status: ON HOLD | OUTPATIENT
Start: 2020-09-25 | End: 2021-02-10 | Stop reason: HOSPADM

## 2020-09-25 RX ORDER — M-VIT,TX,IRON,MINS/CALC/FOLIC 27MG-0.4MG
1 TABLET ORAL DAILY
Qty: 30 TABLET | Refills: 1 | Status: SHIPPED | OUTPATIENT
Start: 2020-09-26

## 2020-09-25 RX ORDER — DONEPEZIL HYDROCHLORIDE 10 MG/1
10 TABLET, FILM COATED ORAL NIGHTLY
Qty: 30 TABLET | Refills: 1 | Status: SHIPPED | OUTPATIENT
Start: 2020-09-25 | End: 2021-04-19 | Stop reason: SDUPTHER

## 2020-09-25 RX ORDER — DIVALPROEX SODIUM 250 MG/1
250 TABLET, EXTENDED RELEASE ORAL DAILY
Qty: 30 TABLET | Refills: 1 | Status: ON HOLD | OUTPATIENT
Start: 2020-09-26 | End: 2021-02-10 | Stop reason: HOSPADM

## 2020-09-25 RX ADMIN — DIVALPROEX SODIUM 250 MG: 250 TABLET, EXTENDED RELEASE ORAL at 07:33

## 2020-09-25 RX ADMIN — METFORMIN HYDROCHLORIDE 500 MG: 500 TABLET ORAL at 07:33

## 2020-09-25 RX ADMIN — METOPROLOL TARTRATE 25 MG: 25 TABLET, FILM COATED ORAL at 07:33

## 2020-09-25 RX ADMIN — AMLODIPINE BESYLATE 10 MG: 10 TABLET ORAL at 07:33

## 2020-09-25 RX ADMIN — LISINOPRIL 20 MG: 20 TABLET ORAL at 07:32

## 2020-09-25 RX ADMIN — FERROUS SULFATE TAB 325 MG (65 MG ELEMENTAL FE) 325 MG: 325 (65 FE) TAB at 07:33

## 2020-09-25 RX ADMIN — FLUTICASONE PROPIONATE 1 SPRAY: 50 SPRAY, METERED NASAL at 07:33

## 2020-09-25 RX ADMIN — MULTIPLE VITAMINS W/ MINERALS TAB 1 TABLET: TAB at 07:33

## 2020-09-25 RX ADMIN — MEMANTINE 10 MG: 10 TABLET ORAL at 07:33

## 2020-09-25 RX ADMIN — FAMOTIDINE 20 MG: 20 TABLET, FILM COATED ORAL at 07:32

## 2020-09-25 NOTE — PLAN OF CARE
Problem: Altered Mood, Psychotic Behavior:  Goal: Able to demonstrate trust by eating, participating in treatment and following staff's direction  Description: Able to demonstrate trust by eating, participating in treatment and following staff's direction  9/24/2020 2230 by Davide Rice RN  Outcome: Ongoing  Goal: Able to verbalize decrease in frequency and intensity of hallucinations  Description: Able to verbalize decrease in frequency and intensity of hallucinations  9/24/2020 2230 by Davide Rice RN  Outcome: Ongoing  Goal: Able to verbalize reality based thinking  Description: Able to verbalize reality based thinking  9/24/2020 2230 by Davide Rice RN  Outcome: Ongoing  Goal: Absence of self-harm  Description: Absence of self-harm  9/24/2020 2230 by Davide Rice RN  Outcome: Ongoing  Goal: Ability to achieve adequate nutritional intake will improve  Description: Ability to achieve adequate nutritional intake will improve  9/24/2020 2230 by Davide Rice RN  Outcome: Ongoing  Goal: Ability to interact with others will improve  Description: Ability to interact with others will improve  9/24/2020 2230 by Davide Rice RN  Outcome: Ongoing  Goal: Compliance with prescribed medication regimen will improve  Description: Compliance with prescribed medication regimen will improve  9/24/2020 2230 by Davide Rice RN  Outcome: Ongoing  Goal: Patient specific goal  Description: Patient specific goal  9/24/2020 2230 by Davide Rice RN  Outcome: Ongoing     Problem: Pain:  Goal: Pain level will decrease  Description: Pain level will decrease  9/24/2020 2230 by Davide Rice RN  Outcome: Ongoing  Goal: Control of acute pain  Description: Control of acute pain  9/24/2020 2230 by Davide Rice RN  Outcome: Ongoing  Goal: Control of chronic pain  Description: Control of chronic pain  9/24/2020 2230 by Davide Rice RN  Outcome: Ongoing

## 2020-09-25 NOTE — GROUP NOTE
Group Therapy Note    Date: 9/25/2020    Group Start Time: 9507  Group End Time: 9416    Number of Participants: 4/5    Type: Spirituality    Group Topic/Objective: Religous discussion with [employer]. Notes:  Pt participated actively in the group. Pt noted to require Mild prompting/redirection for appropriate conversation topics. Pt expressed enjoyment in group. Status After Intervention:  Improved    Participation Level:  Active Listener and Interactive    Participation Quality: Appropriate, Attentive and Sharing    Speech:  normal    Thought Process/Content: Logical    Affective Functioning: Congruent    Mood: Bright    Level of consciousness:  Alert and Attentive    Response to Learning: Able to verbalize current knowledge/experience and Able to verbalize/acknowledge new learning    Endings: None Reported    Modes of Intervention: Education, Support and Socialization    Discipline Responsible: Certified Therapeutic Recreation Specialist     Electronically signed by Selene Fontenot MA on 9/25/2020 at 11:14 AM

## 2020-09-25 NOTE — PLAN OF CARE
Problem: Altered Mood, Psychotic Behavior:  Goal: Able to demonstrate trust by eating, participating in treatment and following staff's direction  Description: Able to demonstrate trust by eating, participating in treatment and following staff's direction  Outcome: Ongoing  Goal: Able to verbalize decrease in frequency and intensity of hallucinations  Description: Able to verbalize decrease in frequency and intensity of hallucinations  Outcome: Ongoing  Goal: Able to verbalize reality based thinking  Description: Able to verbalize reality based thinking  Outcome: Ongoing  Goal: Absence of self-harm  Description: Absence of self-harm  Outcome: Ongoing  Goal: Ability to achieve adequate nutritional intake will improve  Description: Ability to achieve adequate nutritional intake will improve  Outcome: Ongoing  Goal: Ability to interact with others will improve  Description: Ability to interact with others will improve  Outcome: Ongoing  Goal: Compliance with prescribed medication regimen will improve  Description: Compliance with prescribed medication regimen will improve  Outcome: Ongoing  Goal: Patient specific goal  Description: Patient specific goal  Outcome: Ongoing     Problem: Pain:  Description: Pain management should include both nonpharmacologic and pharmacologic interventions.   Goal: Pain level will decrease  Description: Pain level will decrease  Outcome: Ongoing  Goal: Control of acute pain  Description: Control of acute pain  Outcome: Ongoing  Goal: Control of chronic pain  Description: Control of chronic pain  Outcome: Ongoing

## 2020-09-25 NOTE — BH NOTE
Pt compliant with medications, pleasant and cooperative with staff. Pt denies any complaints. Pt awoke few times through night to use restroom but otherwise slept.

## 2020-09-25 NOTE — DISCHARGE INSTR - COC
Continuity of Care Form    Patient Name: Martínez Johnson   :  1950  MRN:  [id number]    Admit date:  2020  Discharge date:  ***    Code Status Order: Full Code   Advance Directives:   Advance Care Flowsheet Documentation       Date/Time Healthcare Directive Type of Healthcare Directive Copy in [address] Agent's Name Healthcare Agent's Phone Number    20 1044  No, patient does not have an advance directive for healthcare treatment -- -- -- -- --    20 0005  No, patient does not have an advance directive for healthcare treatment -- -- -- -- --            Admitting Physician:  Tiffani Keith DO  PCP: Lu Mandujano MD    Discharging Nurse: [facility] Unit/Room#: 2286/9913-04  Discharging Unit Phone Number: ***    Emergency Contact:   Extended Emergency Contact Information  Primary Emergency Contact: [facility] [address] Phone: [phone]  Relation: Brother/Sister    Past Surgical History:  Past Surgical History:   Procedure Laterality Date    HYSTERECTOMY      URETER STENT PLACEMENT         Immunization History:   Immunization History   Administered Date(s) Administered    Influenza, Triv, inactivated, subunit, adjuvanted, IM (Fluad 65 yrs and older) 10/22/2019    Pneumococcal Conjugate 13-valent (Aleksandra Purpura) 10/25/2019       Active Problems:  Patient Active Problem List   Diagnosis Code    Essential hypertension I10    Lumbar herniated disc M51.26    Spinal stenosis M48.00    Restless leg syndrome G25.81    FH: CAD (coronary artery disease) Z82.49    Ureteric stone N20.1    Family history of ischemic heart disease and other diseases of the circulatory system Z82.49    Need for vaccination against Streptococcus pneumoniae using pneumococcal conjugate vaccine 13 Z23    At high risk for falls Z91.81    Elevated alkaline phosphatase level R74.8    Diabetes mellitus type 2, diet-controlled ([facility] [address] E11.9    Mixed hyperlipidemia E78.2    Urinary incontinence in female R32    Shoulder pain M25.519    Gastroesophageal reflux disease K21.9    Late onset Alzheimer's disease without behavioral disturbance (HCC) G30.1, F02.80    Dementia with behavioral disturbance (HCC) F03.91    Alzheimer's dementia without behavioral disturbance (HCC) G30.9, F02.80    CAD (coronary artery disease) I25.10    COPD (chronic obstructive pulmonary disease) (HCC) J44.9    Delusional disorder (HCC) F22    Acute psychosis ([facility] [address] F23       Isolation/Infection:   Isolation            No Isolation          Patient Infection Status       Infection Onset Added Last Indicated Last Indicated By Review Planned Expiration Resolved Resolved By    None active    Resolved    COVID-19 Rule Out 09/19/20 09/19/20 09/19/20 COVID-19 (Ordered)   09/19/20 Rule-Out Test Resulted            Nurse Assessment:  Last Vital Signs: /74   Pulse 85   Temp 96.5 °F (35.8 °C) (Temporal)   Resp 16   Ht 5' 7\" (1.702 m)   Wt 150 lb (68 kg)   SpO2 98%   BMI 23.49 kg/m²     Last documented pain score (0-10 scale): Pain Level: 0  Last Weight:   Wt Readings from Last 1 Encounters:   09/19/20 150 lb (68 kg)     Mental Status:  {IP PT MENTAL STATUS:58623:::0}    IV Access:  { JADA IV ACCESS:748634451:::0}    Nursing Mobility/ADLs:  Walking   {CHP DME ADLs:919050338:::0}  Transfer  {CHP DME ADLs:378421246:::0}  Bathing  {CHP DME ADLs:985400012:::0}  Dressing  {CHP DME ADLs:136740795:::0}  Toileting  {CHP DME ADLs:374636735:::0}  Feeding  {CHP DME ADLs:401299729:::0}  Med Admin  {CHP DME ADLs:789507672:::0}  Med Delivery   { JADA MED Delivery:614244608:::0}    Wound Care Documentation and Therapy:        Elimination:  Continence:    Bowel: {YES / IV:02650}  Bladder: {YES / WV:94029}  Urinary Catheter: {Urinary Catheter:145494960:::0}   Colostomy/Ileostomy/Ileal Conduit: {YES / HE:05244}       Date of Last BM: ***    Intake/Output Summary (Last 24 hours) at 9/25/2020 0950  Last data the continuing treatment of the diagnosis listed and that she requires Home Care for greater 30 days.      Update Admission H&P: No change in H&P    PHYSICIAN SIGNATURE:  Electronically signed by EVELYN Grant CNP on 9/25/20 at 9:51 AM EDT

## 2020-09-25 NOTE — DISCHARGE SUMMARY
Department of Psychiatry    Discharge Summary    Darin Dickens  [id number]    Admission date:   9/19/2020    Discharge:   Date: 09/25/20  Location[facility] Critical access hospital    Inpatient Provider: Angela Singer D.O. and TATIANA FernandezBC  Unit: [facility]    Diagnosis on Discharge: Active Hospital Problems    Diagnosis Date Noted    Delusional disorder ([facility] [address] [F22] 09/20/2020     Priority: High     Class: Acute    Dementia with behavioral disturbance ([facility] [address] [F03.91] 02/24/2020     Priority: High    Acute psychosis ([facility] [address] [F23] 09/20/2020       Reason for Admission:  Delusional disorder  Dementia with behavioral disturbance      Hospital Course:   Patient was seen and evaluated by a multidisciplinary treatment team, in this evaluation patient was able to contribute freely. Patient was able to provide informed consent and outline the risks and benefits of medications. Darin Dickens was  compliant with medications. Pt noted a significant reduction in her depression and anxiety during her  stay on [facility]. Pt noted that she slowly improved to the point that she   was comfortable and safe to return home. Pt noted she felt her medications were  working well and denied any current side effects. Treatment team encouraged  patient to stay out of bed and try to find activities to do, verbalized  understanding. Patient reported that she felt safe on the unit and comfortable  for discharge. Pt denied suicidal/homicidal ideations, denied any problems  or concerns with medications or side effects. patient voiced progression towards  treatment goals and was offered a copy of updated treatment plan completed  during visit today. Denied any immediate needs or concerns. Pt throughout her stay on [facility] pt felt like her medications were working and  felt comfortable being discharged on these medications.   Pt was advised to take  all medications as prescribed, follow up with all scheduled appointments and  abstain from any alcohol or illicit substances. Pt was in agreement. Pt felt  safe and comfortable to be discharge and to follow up with outpt mental health. Pt was very optimistic   about her D/C and returning to her home. Pt stated that she   was doing \"good,\" today. Pt stated that she slept \"about 8 hours,\" last night. Pt stated that her appetite is \"good. \"  Pt stated that she rates her depression a  \"0,\" on a scale of 0-10 with 10 being the worst and 0 being none. Pt stated  that she rates her anxiety an \"0/10,\" on the same scale. Pt denies any auditory  or visual hallucinations. Pt denied any thoughts to harm herself or anyone else. Pt felt safe and comfortable for D/C. The Pt was educated primarily by verbal means about her diagnoses and their  manifestations in her life. The option for treatment including group individual  therapy programming was offered to her and the use of medications with all their  potential risks, benefits, and side-effects were discussed with the pt at  length. Pt was given the opportunity to ask questions and she participated in the  treatment and planning process. Pt felt ready and eager to be discharged from  the from the [facility] unit. Pt felt she was safe for this disposition. Pt was considered to be able to  participate in informed consent and decision-making with respect to medical,  legal and financial issues at the time of her discharge from the [facility]. Complications: none       Treatment options, medications and alternatives reviewed with Portia Espinoza and they agree with the plan to discharge. Discharge on regular  diet, continue activity as tolerated. Patient appears to be in stable condition and close to their baseline functioning. The patient denies suicidal or homicidal ideations and is showing future orientation. Patient no longer presented an imminent risk of danger to themselves and/or others.  At the time of discharge it appears that the patient has received the maximum medical benefit from this hospitalization and can be appropriately managed with community treatment.            Medication List      START taking these medications    divalproex 250 MG extended release tablet  Commonly known as:  DEPAKOTE ER  Take 1 tablet by mouth daily  Start taking on:  September 26, 2020     donepezil 10 MG tablet  Commonly known as:  ARICEPT  Take 1 tablet by mouth nightly     ferrous sulfate 325 (65 Fe) MG tablet  Commonly known as:  IRON 325  Take 1 tablet by mouth 2 times daily (with meals)     memantine 10 MG tablet  Commonly known as:  NAMENDA  Take 1 tablet by mouth daily  Start taking on:  September 26, 2020     metFORMIN 500 MG tablet  Commonly known as:  GLUCOPHAGE  Take 1 tablet by mouth 2 times daily (with meals)     polyethylene glycol 17 g packet  Commonly known as:  GLYCOLAX  Take 17 g by mouth daily as needed for Constipation     traZODone 100 MG tablet  Commonly known as:  DESYREL  Take 1 tablet by mouth nightly        CONTINUE taking these medications    amLODIPine 10 MG tablet  Commonly known as:  Norvasc  Take 1 tablet by mouth daily     atorvastatin 80 MG tablet  Commonly known as:  LIPITOR  Take 1 tablet by mouth daily     famotidine 20 MG tablet  Commonly known as:  PEPCID  Take 1 tablet by mouth 2 times daily     fluticasone 50 MCG/ACT nasal spray  Commonly known as:  FLONASE  1 spray by Each Nostril route daily     Incontinence Supplies Misc  Pull-ups size large     lisinopril 20 MG tablet  Commonly known as:  PRINIVIL;ZESTRIL  Take 1 tablet by mouth daily     metoprolol tartrate 25 MG tablet  Commonly known as:  LOPRESSOR  Take 1 tablet by mouth 2 times daily     therapeutic multivitamin-minerals tablet  Take 1 tablet by mouth daily  Start taking on:  September 26, 2020        STOP taking these medications    sodium chloride 0.65 % nasal spray  Commonly known as:  OCEAN           Where to Get Your Medications      [facility] [address] [facility] [address]    Phone:  [phone]   · divalproex 250 MG extended release tablet  · donepezil 10 MG tablet  · ferrous sulfate 325 (65 Fe) MG tablet  · memantine 10 MG tablet  · metFORMIN 500 MG tablet  · polyethylene glycol 17 g packet  · therapeutic multivitamin-minerals tablet  · traZODone 100 MG tablet         Social History     Socioeconomic History    Marital status: Single     Spouse name: Not on file    Number of children: 0    Years of education: 9    Highest education level: 9th grade   Occupational History    Not on file   Social Needs    Financial resource strain: Not on file    Food insecurity     Worry: Not on file     Inability: Not on file   [employer] needs     Medical: Not on file     Non-medical: Not on file   Tobacco Use    Smoking status: Never Smoker    Smokeless tobacco: Never Used   Substance and Sexual Activity    Alcohol use: No    Drug use: No    Sexual activity: Not on file   Lifestyle    Physical activity     Days per week: Not on file     Minutes per session: Not on file    Stress: Not on file   Relationships    Social connections     Talks on phone: Not on file     Gets together: Not on file     Attends Jewish service: Not on file     Active member of club or organization: Not on file     Attends meetings of clubs or organizations: Not on file     Relationship status: Not on file    Intimate partner violence     Fear of current or ex partner: Not on file     Emotionally abused: Not on file     Physically abused: Not on file     Forced sexual activity: Not on file   Other Topics Concern    Not on file   Social History Narrative    Not on file       Past Medical History:   Diagnosis Date    Alzheimer disease ([facility] [address]     Bronchitis     CAD (coronary artery disease)     Chest wall trauma     [facility]     FH: CAD (coronary artery disease)     brother with cabg in his 45s    Hypertension     Kidney stone     Lumbar herniated disc     Restless leg syndrome     Spinal stenosis       Past Surgical History:   Procedure Laterality Date    HYSTERECTOMY      URETER STENT PLACEMENT        Social History     Tobacco Use    Smoking status: Never Smoker    Smokeless tobacco: Never Used   Substance Use Topics    Alcohol use: No      Family History   Problem Relation Age of Onset    No Known Problems Mother     No Known Problems Sister     Heart Disease Brother     Coronary Art Dis Brother         stents    No Known Problems Sister     No Known Problems Sister     Alcohol Abuse Brother         Medications Prior to Admission: lisinopril (PRINIVIL;ZESTRIL) 20 MG tablet, Take 1 tablet by mouth daily  amLODIPine (NORVASC) 10 MG tablet, Take 1 tablet by mouth daily  atorvastatin (LIPITOR) 80 MG tablet, Take 1 tablet by mouth daily  metoprolol tartrate (LOPRESSOR) 25 MG tablet, Take 1 tablet by mouth 2 times daily  famotidine (PEPCID) 20 MG tablet, Take 1 tablet by mouth 2 times daily  Incontinence Supplies MISC, Pull-ups size large  [DISCONTINUED] sodium chloride (OCEAN) 0.65 % nasal spray, 1-2 sprays in each nostril as needed.   fluticasone (FLONASE) 50 MCG/ACT nasal spray, 1 spray by Each Nostril route daily  [DISCONTINUED] Multiple Vitamins-Minerals (THERAPEUTIC MULTIVITAMIN-MINERALS) tablet, Take 1 tablet by mouth daily  Allergies   Allergen Reactions    Shellfish-Derived Products     Floxin [Ofloxacin] Other (See Comments)     Made her \"feel weird\"        he patient verbalized understanding and agreement with the above information  LEGAL STATUS ON DISCHARGE:  Voluntary    DISCHARGE DISPOSITION:  Home with sister    DISCHARGE CONDITION:  Stable for outpatient treatment  DISCHARGE DIET:  Resume previous home diet    ACTI VITES:  As tolerated    FOLLOWUP APPOINTMENT(S):  Per aftercare summary  CONSULTS:  [address] Directive POA was offered and reviewed with pt:      Talked to pts poa,went over patients benefits and other matters. Reveiwed financial options /programs ect. .. CORE MEASURES  -Was the patient discharged on two or more Antipsychotics? No    -If multiple Antipsychotic medications prescribed,is tapering recommended? na    ? If yes, document plan:  ?  -If multiple Antipsychotic medications prescribed at discharge, is cross tapering in process at D/C? No    -Have there been 3 or more failed attempts of mono therapy? ?no    -If yes, list at least 3 of the failed Antipsychotic medications with the reason why each one failed: ?NA    -Was Hemoglobin A1C or fasting Glucose measure done within the last 12 months? Yes    -Lipid Panel measure done within the last 12 months? Yes    -Pt was offered an FDA approved medication for Chemical Dependency: (offered refused/ordered)na    -Pt was offered for discharged on tobacco/nicotine cessation and/or codependency cessation via medication assistance: (offered refused/ordered)na    More than 30 mins was spent face to face with the patient to discuss the diagnosis, treatment recommendations, and prognosis. Safety planning was also reviewed. The patient agreed to go to the nearest ER or call 911 if they experienced an emergency        The patient was admitted to the psychiatric unit and monitored for stabilization. A multidisciplinary team met with the patient on a daily basis. The diagnosis, treatment recommendations, and prognosis were reviewed with the patient.       Objective:  Vital signs in last 24 hours:  Vitals:    09/25/20 0651   BP: 122/74   Pulse: 85   Resp: 16   Temp: 96.5 °F (35.8 °C)   SpO2: 98%       Labs:      Recent Results (from the past 504 hour(s))   Urine Drug Screen    Collection Time: 09/19/20  7:21 PM   Result Value Ref Range    Cannabinoid Scrn, Ur NEGATIVE NEGATIVE    Amphetamines NEGATIVE NEGATIVE    Cocaine Metabolite NEGATIVE NEGATIVE    Benzodiazepine Screen, Urine NEGATIVE NEGATIVE    Barbiturate Screen, Ur NEGATIVE NEGATIVE    Opiates, Urine NEGATIVE NEGATIVE    Phencyclidine, Urine NEGATIVE NEGATIVE    Oxycodone NEGATIVE NEGATIVE   Urinalysis with Microscopic    Collection Time: 09/19/20  7:21 PM   Result Value Ref Range    Color, UA YELLOW YELLOW    Clarity, UA CLEAR CLEAR    Glucose, Urine NEGATIVE NEGATIVE MG/DL    Bilirubin Urine NEGATIVE NEGATIVE MG/DL    Ketones, Urine NEGATIVE NEGATIVE MG/DL    Specific Gravity, UA 1.020 1.001 - 1.035    Blood, Urine NEGATIVE NEGATIVE    pH, Urine 5.0 5.0 - 8.0    Protein, UA NEGATIVE NEGATIVE MG/DL    Urobilinogen, Urine 0.2 0.2 - 1.0 MG/DL    Nitrite Urine, Quantitative NEGATIVE NEGATIVE    Leukocyte Esterase, Urine NEGATIVE NEGATIVE    Volume, (UVOL) 12 10 - 12 ML    RBC, UA 0 TO 1 0 - 6 /HPF    WBC, UA 1 TO 2 0 - 5 /HPF    Epithelial Cells, UA 3 TO 4 /HPF    Cast Type NO CAST FORMS SEEN NO CAST FORMS SEEN /HPF    Bacteria, UA RARE (A) NEGATIVE /HPF    Crystal Type NONE SEEN NEGATIVE /HPF    Mucus, UA NEGATIVE NEGATIVE HPF   CBC Auto Differential    Collection Time: 09/19/20  7:54 PM   Result Value Ref Range    WBC 6.4 4.0 - 10.5 K/CU MM    RBC 4.10 (L) 4.2 - 5.4 M/CU MM    Hemoglobin 13.1 12.5 - 16.0 GM/DL    Hematocrit 39.4 37 - 47 %    MCV 96.1 78 - 100 FL    MCH 32.0 (H) 27 - 31 PG    MCHC 33.2 32.0 - 36.0 %    RDW 11.3 (L) 11.7 - 14.9 %    Platelets 184 863 - 102 K/CU MM    MPV 10.6 7.5 - 11.1 FL    Differential Type AUTOMATED DIFFERENTIAL     Segs Relative 68.2 (H) 36 - 66 %    Lymphocytes % 23.2 (L) 24 - 44 %    Monocytes % 6.4 (H) 0 - 4 %    Eosinophils % 1.4 0 - 3 %    Basophils % 0.5 0 - 1 %    Segs Absolute 4.3 K/CU MM    Lymphocytes Absolute 1.5 K/CU MM    Monocytes Absolute 0.4 K/CU MM    Eosinophils Absolute 0.1 K/CU MM    Basophils Absolute 0.0 K/CU MM    Immature Neutrophil % 0.3 0 - 0.43 %    Total Immature Neutrophil 0.02 K/CU MM   Comprehensive Metabolic Panel    Collection Time: 09/19/20  7:54 PM Result Value Ref Range    Sodium 138 135 - 145 MMOL/L    Potassium 4.9 3.5 - 5.1 MMOL/L    Chloride 101 99 - 110 mMol/L    CO2 31 21 - 32 MMOL/L    BUN 20 6 - 23 MG/DL    CREATININE 1.1 0.6 - 1.1 MG/DL    Glucose 128 (H) 70 - 99 MG/DL    Calcium 9.4 8.3 - 10.6 MG/DL    Alb 4.3 3.4 - 5.0 GM/DL    Total Protein 6.9 6.4 - 8.2 GM/DL    Total Bilirubin 0.5 0.0 - 1.0 MG/DL    ALT 21 10 - 40 U/L    AST 27 15 - 37 IU/L    Alkaline Phosphatase 155 (H) 40 - 129 IU/L    GFR Non- 49 (L) >60 mL/min/1.73m2    GFR  59 (L) >60 mL/min/1.73m2    Anion Gap 6 4 - 16   Ethanol    Collection Time: 09/19/20  7:54 PM   Result Value Ref Range    Alcohol Scrn <0.01 <0.64 %WT/VOL   Salicylate    Collection Time: 09/19/20  7:54 PM   Result Value Ref Range    Salicylate Lvl <1.6 (L) 15 - 30 MG/DL    DOSE AMOUNT DOSE AMT. GIVEN - UNKNOWN     DOSE TIME DOSE TIME GIVEN - UNKNOWN    Acetaminophen Level    Collection Time: 09/19/20  7:54 PM   Result Value Ref Range    Acetaminophen Level <5.0 (L) 15 - 30 ug/ml    DOSE AMOUNT DOSE AMT.  GIVEN - UNKNOWN     DOSE TIME DOSE TIME GIVEN - UNKNOWN    COVID-19    Collection Time: 09/19/20  9:15 PM    Specimen: Nasopharyngeal Swab   Result Value Ref Range    Source NASOPHARYNGEAL SWAB     SARS-CoV-2, NAAT NOT DETECTED    Basic Metabolic Panel w/ Reflex to MG    Collection Time: 09/20/20  6:00 AM   Result Value Ref Range    Sodium 141 135 - 145 MMOL/L    Potassium 3.9 3.5 - 5.1 MMOL/L    Chloride 107 99 - 110 mMol/L    CO2 28 21 - 32 MMOL/L    Anion Gap 6 4 - 16    BUN 16 6 - 23 MG/DL    CREATININE 0.8 0.6 - 1.1 MG/DL    Glucose 141 (H) 70 - 99 MG/DL    Calcium 8.8 8.3 - 10.6 MG/DL    GFR Non-African American >60 >60 mL/min/1.73m2    GFR African American >60 >60 mL/min/1.73m2   Hemoglobin A1c    Collection Time: 09/20/20  6:00 AM   Result Value Ref Range    Hemoglobin A1C 6.5 (H) 4.2 - 6.3 %    eAG 140 mg/dL   CBC auto differential    Collection Time: 09/20/20  6:00 AM Result Value Ref Range    WBC 6.0 4.0 - 10.5 K/CU MM    RBC 3.60 (L) 4.2 - 5.4 M/CU MM    Hemoglobin 11.6 (L) 12.5 - 16.0 GM/DL    Hematocrit 34.3 (L) 37 - 47 %    MCV 95.3 78 - 100 FL    MCH 32.2 (H) 27 - 31 PG    MCHC 33.8 32.0 - 36.0 %    RDW 11.4 (L) 11.7 - 14.9 %    Platelets 264 511 - 101 K/CU MM    MPV 10.2 7.5 - 11.1 FL    Differential Type AUTOMATED DIFFERENTIAL     Segs Relative 55.2 36 - 66 %    Lymphocytes % 32.9 24 - 44 %    Monocytes % 8.3 (H) 0 - 4 %    Eosinophils % 2.8 0 - 3 %    Basophils % 0.5 0 - 1 %    Segs Absolute 3.3 K/CU MM    Lymphocytes Absolute 2.0 K/CU MM    Monocytes Absolute 0.5 K/CU MM    Eosinophils Absolute 0.2 K/CU MM    Basophils Absolute 0.0 K/CU MM    Immature Neutrophil % 0.3 0 - 0.43 %    Total Immature Neutrophil 0.02 K/CU MM   EKG 12 Lead    Collection Time: 09/20/20  8:37 AM   Result Value Ref Range    Ventricular Rate 86 BPM    Atrial Rate 86 BPM    P-R Interval 150 ms    QRS Duration 80 ms    Q-T Interval 396 ms    QTc Calculation (Bazett) 473 ms    P Axis 47 degrees    R Axis -1 degrees    T Axis 50 degrees    Diagnosis       Normal sinus rhythm  Septal infarct , age undetermined  Abnormal ECG  When compared with ECG of 13-MAY-2020 22:27,  Questionable change in QRS axis  Confirmed by [facility] Xiomy BUI ([id number]) on 9/21/2020 12:25:43 PM     POCT Glucose    Collection Time: 09/21/20  7:36 PM   Result Value Ref Range    POC Glucose 155 (H) 70 - 99 MG/DL       40 Minutes    Electronically signed by EVELYN Hook CNP on 9/25/2020 at 9:45 AM

## 2020-09-25 NOTE — CARE COORDINATION
Patient follow-up set with [address] [place] [address] for 09/29/20 at 8 AM.  Discharge summary to be sent to [phone]. AVS updated to reflect. 10:10 AM  Discharge summary faxed to [address] [place] [address].

## 2020-09-25 NOTE — PROGRESS NOTES
Patients sister just came over an hour and half from the arranged time. Patient was masked and ambulated to the front doors where discharge instructions were reviewed and all questions were answered.

## 2020-10-09 ENCOUNTER — OFFICE VISIT (OUTPATIENT)
Dept: INTERNAL MEDICINE CLINIC | Age: 70
End: 2020-10-09
Payer: MEDICARE

## 2020-10-09 VITALS
OXYGEN SATURATION: 98 % | WEIGHT: 147 LBS | HEART RATE: 78 BPM | DIASTOLIC BLOOD PRESSURE: 70 MMHG | SYSTOLIC BLOOD PRESSURE: 110 MMHG | TEMPERATURE: 98.7 F | HEIGHT: 63 IN | BODY MASS INDEX: 26.05 KG/M2

## 2020-10-09 PROCEDURE — 4040F PNEUMOC VAC/ADMIN/RCVD: CPT | Performed by: INTERNAL MEDICINE

## 2020-10-09 PROCEDURE — G8427 DOCREV CUR MEDS BY ELIG CLIN: HCPCS | Performed by: INTERNAL MEDICINE

## 2020-10-09 PROCEDURE — 1090F PRES/ABSN URINE INCON ASSESS: CPT | Performed by: INTERNAL MEDICINE

## 2020-10-09 PROCEDURE — 1111F DSCHRG MED/CURRENT MED MERGE: CPT | Performed by: INTERNAL MEDICINE

## 2020-10-09 PROCEDURE — G8417 CALC BMI ABV UP PARAM F/U: HCPCS | Performed by: INTERNAL MEDICINE

## 2020-10-09 PROCEDURE — 1123F ACP DISCUSS/DSCN MKR DOCD: CPT | Performed by: INTERNAL MEDICINE

## 2020-10-09 PROCEDURE — 99214 OFFICE O/P EST MOD 30 MIN: CPT | Performed by: INTERNAL MEDICINE

## 2020-10-09 PROCEDURE — G8400 PT W/DXA NO RESULTS DOC: HCPCS | Performed by: INTERNAL MEDICINE

## 2020-10-09 PROCEDURE — 0509F URINE INCON PLAN DOCD: CPT | Performed by: INTERNAL MEDICINE

## 2020-10-09 PROCEDURE — 1036F TOBACCO NON-USER: CPT | Performed by: INTERNAL MEDICINE

## 2020-10-09 PROCEDURE — G8484 FLU IMMUNIZE NO ADMIN: HCPCS | Performed by: INTERNAL MEDICINE

## 2020-10-09 PROCEDURE — 3017F COLORECTAL CA SCREEN DOC REV: CPT | Performed by: INTERNAL MEDICINE

## 2020-10-09 RX ORDER — LISINOPRIL 20 MG/1
20 TABLET ORAL DAILY
Qty: 30 TABLET | Refills: 3 | Status: ON HOLD | OUTPATIENT
Start: 2020-10-09 | End: 2021-02-10 | Stop reason: HOSPADM

## 2020-10-09 RX ORDER — SIMVASTATIN 20 MG
1 TABLET ORAL
Status: ON HOLD | COMMUNITY
End: 2021-02-10 | Stop reason: HOSPADM

## 2020-10-09 RX ORDER — ATORVASTATIN CALCIUM 80 MG/1
80 TABLET, FILM COATED ORAL DAILY
Qty: 30 TABLET | Refills: 3 | Status: ON HOLD | OUTPATIENT
Start: 2020-10-09 | End: 2021-02-10 | Stop reason: HOSPADM

## 2020-10-09 RX ORDER — TAMSULOSIN HYDROCHLORIDE 0.4 MG/1
CAPSULE ORAL
COMMUNITY
End: 2020-10-09

## 2020-10-09 RX ORDER — AMLODIPINE BESYLATE 10 MG/1
10 TABLET ORAL DAILY
Qty: 30 TABLET | Refills: 3 | Status: ON HOLD | OUTPATIENT
Start: 2020-10-09 | End: 2021-02-10 | Stop reason: HOSPADM

## 2020-10-09 ASSESSMENT — PATIENT HEALTH QUESTIONNAIRE - PHQ9
2. FEELING DOWN, DEPRESSED OR HOPELESS: 0
1. LITTLE INTEREST OR PLEASURE IN DOING THINGS: 0
SUM OF ALL RESPONSES TO PHQ QUESTIONS 1-9: 0
SUM OF ALL RESPONSES TO PHQ9 QUESTIONS 1 & 2: 0
SUM OF ALL RESPONSES TO PHQ QUESTIONS 1-9: 0
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
## 2020-10-20 LAB
EKG ATRIAL RATE: 86 BPM
EKG DIAGNOSIS: NORMAL
EKG P AXIS: 47 DEGREES
EKG P-R INTERVAL: 150 MS
EKG Q-T INTERVAL: 396 MS
EKG QRS DURATION: 80 MS
EKG QTC CALCULATION (BAZETT): 473 MS
EKG R AXIS: -1 DEGREES
EKG T AXIS: 50 DEGREES
EKG VENTRICULAR RATE: 86 BPM

## 2020-11-19 ENCOUNTER — HOSPITAL ENCOUNTER (EMERGENCY)
Age: 70
Discharge: HOME | End: 2020-11-20
Attending: EMERGENCY MEDICINE
Payer: MEDICARE

## 2020-11-19 LAB
ALBUMIN SERPL-MCNC: 4.3 GM/DL (ref 3.4–5)
ALP BLD-CCNC: 161 IU/L (ref 40–129)
ALT SERPL-CCNC: 12 U/L (ref 10–40)
ANION GAP SERPL CALCULATED.3IONS-SCNC: 7 MMOL/L (ref 4–16)
AST SERPL-CCNC: 23 IU/L (ref 15–37)
BACTERIA: NEGATIVE /HPF
BASOPHILS ABSOLUTE: 0 K/CU MM
BASOPHILS RELATIVE PERCENT: 0.5 % (ref 0–1)
BILIRUB SERPL-MCNC: 0.2 MG/DL (ref 0–1)
BILIRUBIN DIRECT: 0.2 MG/DL (ref 0–0.3)
BILIRUBIN URINE: NEGATIVE MG/DL
BILIRUBIN, INDIRECT: 0 MG/DL (ref 0–0.7)
BLOOD, URINE: NEGATIVE
BUN BLDV-MCNC: 17 MG/DL (ref 6–23)
CALCIUM SERPL-MCNC: 9.6 MG/DL (ref 8.3–10.6)
CAST TYPE: ABNORMAL /HPF
CHLORIDE BLD-SCNC: 105 MMOL/L (ref 99–110)
CLARITY: CLEAR
CO2: 30 MMOL/L (ref 21–32)
COLOR: YELLOW
CREAT SERPL-MCNC: 0.9 MG/DL (ref 0.6–1.1)
CRYSTAL TYPE: NEGATIVE /HPF
DIFFERENTIAL TYPE: ABNORMAL
EOSINOPHILS ABSOLUTE: 0.2 K/CU MM
EOSINOPHILS RELATIVE PERCENT: 3.9 % (ref 0–3)
EPITHELIAL CELLS, UA: 2 /HPF
GFR AFRICAN AMERICAN: >60 ML/MIN/1.73M2
GFR NON-AFRICAN AMERICAN: >60 ML/MIN/1.73M2
GLUCOSE BLD-MCNC: 127 MG/DL (ref 70–99)
GLUCOSE, URINE: NEGATIVE MG/DL
HCT VFR BLD CALC: 39.8 % (ref 37–47)
HEMOGLOBIN: 13.1 GM/DL (ref 12.5–16)
IMMATURE NEUTROPHIL %: 0.2 % (ref 0–0.43)
KETONES, URINE: NEGATIVE MG/DL
LEUKOCYTE ESTERASE, URINE: ABNORMAL
LYMPHOCYTES ABSOLUTE: 1.5 K/CU MM
LYMPHOCYTES RELATIVE PERCENT: 25.8 % (ref 24–44)
MCH RBC QN AUTO: 32.3 PG (ref 27–31)
MCHC RBC AUTO-ENTMCNC: 32.9 % (ref 32–36)
MCV RBC AUTO: 98.3 FL (ref 78–100)
MONOCYTES ABSOLUTE: 0.5 K/CU MM
MONOCYTES RELATIVE PERCENT: 8.4 % (ref 0–4)
NITRITE URINE, QUANTITATIVE: NEGATIVE
PDW BLD-RTO: 11.9 % (ref 11.7–14.9)
PH, URINE: 6 (ref 5–8)
PLATELET # BLD: 227 K/CU MM (ref 140–440)
PMV BLD AUTO: 10.3 FL (ref 7.5–11.1)
POTASSIUM SERPL-SCNC: 4.5 MMOL/L (ref 3.5–5.1)
PROTEIN UA: NEGATIVE MG/DL
RBC # BLD: 4.05 M/CU MM (ref 4.2–5.4)
RBC URINE: ABNORMAL /HPF (ref 0–6)
SARS-COV-2, NAAT: NOT DETECTED
SEGMENTED NEUTROPHILS ABSOLUTE COUNT: 3.6 K/CU MM
SEGMENTED NEUTROPHILS RELATIVE PERCENT: 61.2 % (ref 36–66)
SODIUM BLD-SCNC: 142 MMOL/L (ref 135–145)
SOURCE: NORMAL
SPECIFIC GRAVITY UA: 1.02 (ref 1–1.03)
TOTAL IMMATURE NEUTOROPHIL: 0.01 K/CU MM
TOTAL PROTEIN: 6.9 GM/DL (ref 6.4–8.2)
TSH HIGH SENSITIVITY: 2.43 UIU/ML (ref 0.27–4.2)
UROBILINOGEN, URINE: 0.2 MG/DL (ref 0.2–1)
WBC # BLD: 5.9 K/CU MM (ref 4–10.5)
WBC UA: 3 /HPF (ref 0–5)

## 2020-11-19 PROCEDURE — G0480 DRUG TEST DEF 1-7 CLASSES: HCPCS

## 2020-11-19 PROCEDURE — 80053 COMPREHEN METABOLIC PANEL: CPT

## 2020-11-19 PROCEDURE — U0002 COVID-19 LAB TEST NON-CDC: HCPCS

## 2020-11-19 PROCEDURE — 81001 URINALYSIS AUTO W/SCOPE: CPT

## 2020-11-19 PROCEDURE — 82248 BILIRUBIN DIRECT: CPT

## 2020-11-19 PROCEDURE — 99284 EMERGENCY DEPT VISIT MOD MDM: CPT

## 2020-11-19 PROCEDURE — 93005 ELECTROCARDIOGRAM TRACING: CPT | Performed by: EMERGENCY MEDICINE

## 2020-11-19 PROCEDURE — 80307 DRUG TEST PRSMV CHEM ANLYZR: CPT

## 2020-11-19 PROCEDURE — 85025 COMPLETE CBC W/AUTO DIFF WBC: CPT

## 2020-11-19 PROCEDURE — 84443 ASSAY THYROID STIM HORMONE: CPT

## 2020-11-20 ENCOUNTER — TELEPHONE (OUTPATIENT)
Dept: INTERNAL MEDICINE CLINIC | Age: 70
End: 2020-11-20

## 2020-11-20 VITALS
HEART RATE: 78 BPM | DIASTOLIC BLOOD PRESSURE: 80 MMHG | BODY MASS INDEX: 25.61 KG/M2 | SYSTOLIC BLOOD PRESSURE: 183 MMHG | HEIGHT: 64 IN | RESPIRATION RATE: 16 BRPM | WEIGHT: 150 LBS | TEMPERATURE: 98.5 F | OXYGEN SATURATION: 100 %

## 2020-11-20 LAB
ACETAMINOPHEN LEVEL: <5 UG/ML (ref 15–30)
ALCOHOL SCREEN SERUM: <0.01 %WT/VOL
AMPHETAMINES: NEGATIVE
BARBITURATE SCREEN URINE: NEGATIVE
BENZODIAZEPINE SCREEN, URINE: NEGATIVE
CANNABINOID SCREEN URINE: NEGATIVE
COCAINE METABOLITE: NEGATIVE
DOSE AMOUNT: ABNORMAL
DOSE AMOUNT: ABNORMAL
DOSE TIME: ABNORMAL
DOSE TIME: ABNORMAL
OPIATES, URINE: NEGATIVE
OXYCODONE: NEGATIVE
PHENCYCLIDINE, URINE: NEGATIVE
SALICYLATE LEVEL: <0.3 MG/DL (ref 15–30)

## 2020-11-20 PROCEDURE — 93010 ELECTROCARDIOGRAM REPORT: CPT | Performed by: INTERNAL MEDICINE

## 2020-11-20 PROCEDURE — 6360000002 HC RX W HCPCS: Performed by: EMERGENCY MEDICINE

## 2020-11-20 RX ORDER — HYDRALAZINE HYDROCHLORIDE 20 MG/ML
5 INJECTION INTRAMUSCULAR; INTRAVENOUS ONCE
Status: COMPLETED | OUTPATIENT
Start: 2020-11-20 | End: 2020-11-20

## 2020-11-20 RX ADMIN — HYDRALAZINE HYDROCHLORIDE 5 MG: 20 INJECTION INTRAMUSCULAR; INTRAVENOUS at 01:55
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
## 2020-11-20 NOTE — ED NOTES
Discharge instructions reviewed with pt and her sister and verbalizes understanding.      Elton Christensen RN  11/20/20 8549
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
## 2020-11-20 NOTE — ED NOTES
Dr Severo Antu talking to Lady Scanlon from [address] [place] [address].      Keila Connell RN  11/20/20 9550

## 2020-11-20 NOTE — ED NOTES
Lucas Gentile from [address] [place] [address] notified that we have an evaluation.      Bam Lloyd RN  11/20/20 4452

## 2020-11-20 NOTE — ED NOTES
Pt's sister decides to come to take pt home and she is on the way to pick her up.      Rafael Walton RN  11/20/20 6898

## 2020-11-20 NOTE — PROGRESS NOTES
Call received from [place] with [facility]. Family has presented and request to take patient home. Dr. Vitaliy Tena is in agreement with plan of care. Patient was reportedly discharged to the care of her family. [address] updated on change in patient status.

## 2020-11-20 NOTE — ED TRIAGE NOTES
Pt arrives ambulatory to ER c/o being brought here by friend. Pt states she wants to drive and isn't sure why people are wanting her to sell her car. She denies any complaints or pain. A&Ox4, calm and cooperative. Pt has been seen previously for similar issues related to dx of dementia/alzheimers.

## 2020-11-23 NOTE — GROUP NOTE
Group Therapy Note    Date: 2/29/2020    Group Start Time: 1300  Group End Time: 1400  Group Topic: Psychoeducation    ISABELLA Quiros LSW    Group Therapy Note    Attendees: 5/6       Patient's Goal: \"go home\"    Notes: Patient actively participated in group. Open discussion about socialization and mental health. Participated in a game of [employer] with peers.      Status After Intervention:  Improved    Participation Level: Interactive    Participation Quality: Appropriate and Sharing    Speech:  normal    Thought Process/Content: Linear    Affective Functioning: Congruent    Mood: elevated    Level of consciousness:  Alert and Attentive    Response to Learning: Able to verbalize current knowledge/experience    Endings: None Reported    Modes of Intervention: Education, Socialization and Activity    Discipline Responsible: /Counselor    Signature: ISABELLA Andersen, KASEY Chonodrocutaneous Helical Advancement Flap Text: The defect edges were debeveled with a #15 scalpel blade.  Given the location of the defect and the proximity to free margins a chondrocutaneous helical advancement flap was deemed most appropriate.  Using a sterile surgical marker, the appropriate advancement flap was drawn incorporating the defect and placing the expected incisions within the relaxed skin tension lines where possible.    The area thus outlined was incised deep to adipose tissue with a #15 scalpel blade.  The skin margins were undermined to an appropriate distance in all directions utilizing iris scissors.

## 2020-11-24 LAB
EKG ATRIAL RATE: 83 BPM
EKG DIAGNOSIS: NORMAL
EKG P AXIS: 64 DEGREES
EKG P-R INTERVAL: 128 MS
EKG Q-T INTERVAL: 378 MS
EKG QRS DURATION: 82 MS
EKG QTC CALCULATION (BAZETT): 444 MS
EKG R AXIS: -8 DEGREES
EKG T AXIS: 45 DEGREES
EKG VENTRICULAR RATE: 83 BPM

## 2021-01-18 ENCOUNTER — NURSE ONLY (OUTPATIENT)
Dept: INTERNAL MEDICINE CLINIC | Age: 71
End: 2021-01-18
Payer: MEDICARE

## 2021-01-18 ENCOUNTER — TELEPHONE (OUTPATIENT)
Dept: INTERNAL MEDICINE CLINIC | Age: 71
End: 2021-01-18

## 2021-01-18 DIAGNOSIS — R82.90 FOUL SMELLING URINE: Primary | ICD-10-CM

## 2021-01-18 LAB
BILIRUBIN, POC: NORMAL
BLOOD URINE, POC: NORMAL
CLARITY, POC: CLEAR
COLOR, POC: YELLOW
GLUCOSE URINE, POC: NORMAL
KETONES, POC: NORMAL
LEUKOCYTE EST, POC: NORMAL
NITRITE, POC: NORMAL
PH, POC: 5.5
PROTEIN, POC: NORMAL
SPECIFIC GRAVITY, POC: >=1.03
UROBILINOGEN, POC: 0.2

## 2021-01-18 PROCEDURE — 81002 URINALYSIS NONAUTO W/O SCOPE: CPT | Performed by: INTERNAL MEDICINE

## 2021-01-18 NOTE — TELEPHONE ENCOUNTER
Patient here today with sister for sister's appointment. Having foul smelling urine.  POCT UA ordered

## 2021-01-18 NOTE — TELEPHONE ENCOUNTER
Please inform patient's sister that the UA is unremarkable for a UTI. It does show that it is concentrated. This can explain the foul smell. She likely is dehydrated. Drink fluids.  No antibiotics needed

## 2021-01-21 ENCOUNTER — HOSPITAL ENCOUNTER (INPATIENT)
Age: 71
LOS: 21 days | Discharge: HOME | DRG: 057 | End: 2021-02-11
Attending: EMERGENCY MEDICINE | Admitting: PSYCHIATRY & NEUROLOGY
Payer: MEDICARE

## 2021-01-21 DIAGNOSIS — F02.818 LATE ONSET ALZHEIMER'S DISEASE WITH BEHAVIORAL DISTURBANCE (HCC): Primary | ICD-10-CM

## 2021-01-21 DIAGNOSIS — G30.1 LATE ONSET ALZHEIMER'S DISEASE WITH BEHAVIORAL DISTURBANCE (HCC): Primary | ICD-10-CM

## 2021-01-21 LAB
ANION GAP SERPL CALCULATED.3IONS-SCNC: 22 MMOL/L (ref 4–16)
BACTERIA: ABNORMAL /HPF
BASOPHILS ABSOLUTE: 0 K/CU MM
BASOPHILS RELATIVE PERCENT: 0.6 % (ref 0–1)
BILIRUBIN URINE: NEGATIVE MG/DL
BLOOD, URINE: NEGATIVE
BUN BLDV-MCNC: 18 MG/DL (ref 6–23)
CALCIUM SERPL-MCNC: 9.7 MG/DL (ref 8.3–10.6)
CAST TYPE: NEGATIVE /HPF
CHLORIDE BLD-SCNC: 103 MMOL/L (ref 99–110)
CLARITY: ABNORMAL
CO2: 14 MMOL/L (ref 21–32)
COLOR: YELLOW
CREAT SERPL-MCNC: 1 MG/DL (ref 0.6–1.1)
CRYSTAL TYPE: NEGATIVE /HPF
DIFFERENTIAL TYPE: ABNORMAL
EOSINOPHILS ABSOLUTE: 0.2 K/CU MM
EOSINOPHILS RELATIVE PERCENT: 2.2 % (ref 0–3)
EPITHELIAL CELLS, UA: ABNORMAL /HPF
GFR AFRICAN AMERICAN: >60 ML/MIN/1.73M2
GFR NON-AFRICAN AMERICAN: 55 ML/MIN/1.73M2
GLUCOSE BLD-MCNC: 133 MG/DL (ref 70–99)
GLUCOSE, URINE: NEGATIVE MG/DL
HCT VFR BLD CALC: 40.1 % (ref 37–47)
HEMOGLOBIN: 13.6 GM/DL (ref 12.5–16)
IMMATURE NEUTROPHIL %: 0.3 % (ref 0–0.43)
KETONES, URINE: NEGATIVE MG/DL
LEUKOCYTE ESTERASE, URINE: ABNORMAL
LYMPHOCYTES ABSOLUTE: 1.6 K/CU MM
LYMPHOCYTES RELATIVE PERCENT: 24 % (ref 24–44)
MCH RBC QN AUTO: 32.2 PG (ref 27–31)
MCHC RBC AUTO-ENTMCNC: 33.9 % (ref 32–36)
MCV RBC AUTO: 94.8 FL (ref 78–100)
MONOCYTES ABSOLUTE: 0.7 K/CU MM
MONOCYTES RELATIVE PERCENT: 9.6 % (ref 0–4)
NITRITE URINE, QUANTITATIVE: NEGATIVE
PDW BLD-RTO: 11.5 % (ref 11.7–14.9)
PH, URINE: 5 (ref 5–8)
PLATELET # BLD: 236 K/CU MM (ref 140–440)
PMV BLD AUTO: 9.9 FL (ref 7.5–11.1)
POTASSIUM SERPL-SCNC: 4.4 MMOL/L (ref 3.5–5.1)
PROTEIN UA: NEGATIVE MG/DL
RBC # BLD: 4.23 M/CU MM (ref 4.2–5.4)
RBC URINE: NEGATIVE /HPF (ref 0–6)
SARS-COV-2, NAAT: NOT DETECTED
SEGMENTED NEUTROPHILS ABSOLUTE COUNT: 4.3 K/CU MM
SEGMENTED NEUTROPHILS RELATIVE PERCENT: 63.3 % (ref 36–66)
SODIUM BLD-SCNC: 139 MMOL/L (ref 135–145)
SOURCE: NORMAL
SPECIFIC GRAVITY UA: 1.01 (ref 1–1.03)
TOTAL IMMATURE NEUTOROPHIL: 0.02 K/CU MM
UROBILINOGEN, URINE: 0.2 MG/DL (ref 0.2–1)
WBC # BLD: 6.8 K/CU MM (ref 4–10.5)
WBC UA: ABNORMAL /HPF (ref 0–5)

## 2021-01-21 PROCEDURE — 81001 URINALYSIS AUTO W/SCOPE: CPT

## 2021-01-21 PROCEDURE — 99283 EMERGENCY DEPT VISIT LOW MDM: CPT

## 2021-01-21 PROCEDURE — U0002 COVID-19 LAB TEST NON-CDC: HCPCS

## 2021-01-21 PROCEDURE — 85025 COMPLETE CBC W/AUTO DIFF WBC: CPT

## 2021-01-21 PROCEDURE — 51701 INSERT BLADDER CATHETER: CPT

## 2021-01-21 PROCEDURE — 80048 BASIC METABOLIC PNL TOTAL CA: CPT

## 2021-01-21 PROCEDURE — 1240000000 HC EMOTIONAL WELLNESS R&B

## 2021-01-21 RX ORDER — LORAZEPAM 2 MG/ML
1 INJECTION INTRAMUSCULAR EVERY 4 HOURS PRN
Status: DISCONTINUED | OUTPATIENT
Start: 2021-01-21 | End: 2021-01-22

## 2021-01-21 RX ORDER — ACETAMINOPHEN 500 MG
500 TABLET ORAL EVERY 4 HOURS PRN
Status: DISCONTINUED | OUTPATIENT
Start: 2021-01-21 | End: 2021-02-09 | Stop reason: ALTCHOICE

## 2021-01-21 RX ORDER — ACETAMINOPHEN 325 MG/1
650 TABLET ORAL EVERY 4 HOURS PRN
Status: DISCONTINUED | OUTPATIENT
Start: 2021-01-21 | End: 2021-02-09 | Stop reason: ALTCHOICE

## 2021-01-21 RX ORDER — ACETAMINOPHEN 325 MG/1
325 TABLET ORAL EVERY 4 HOURS PRN
Status: DISCONTINUED | OUTPATIENT
Start: 2021-01-21 | End: 2021-02-09 | Stop reason: ALTCHOICE

## 2021-01-21 RX ORDER — HALOPERIDOL 5 MG/ML
5 INJECTION INTRAMUSCULAR EVERY 4 HOURS PRN
Status: DISCONTINUED | OUTPATIENT
Start: 2021-01-21 | End: 2021-01-22

## 2021-01-21 RX ORDER — POLYETHYLENE GLYCOL 3350 17 G/17G
17 POWDER, FOR SOLUTION ORAL DAILY PRN
Status: DISCONTINUED | OUTPATIENT
Start: 2021-01-21 | End: 2021-02-11 | Stop reason: HOSPADM

## 2021-01-21 RX ORDER — HALOPERIDOL 5 MG
5 TABLET ORAL EVERY 4 HOURS PRN
Status: DISCONTINUED | OUTPATIENT
Start: 2021-01-21 | End: 2021-01-22

## 2021-01-21 RX ORDER — LORAZEPAM 1 MG/1
1 TABLET ORAL EVERY 4 HOURS PRN
Status: DISCONTINUED | OUTPATIENT
Start: 2021-01-21 | End: 2021-01-22

## 2021-01-21 RX ORDER — TRAZODONE HYDROCHLORIDE 50 MG/1
50 TABLET ORAL NIGHTLY PRN
Status: DISCONTINUED | OUTPATIENT
Start: 2021-01-21 | End: 2021-01-31

## 2021-01-21 ASSESSMENT — LIFESTYLE VARIABLES: HISTORY_ALCOHOL_USE: NO

## 2021-01-21 ASSESSMENT — SLEEP AND FATIGUE QUESTIONNAIRES: DO YOU USE A SLEEP AID: NO
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
## 2021-01-21 NOTE — ED NOTES
Dede Jha from [facility] consulted and states he will call pt sister regarding situation     Bryce Mcqueen, RN  01/21/21 5015

## 2021-01-21 NOTE — ED NOTES
Pt sitting in chair continues to talk about past episodes, and the money she is spending      Jie Burrell, CLIVE  01/21/21 6838

## 2021-01-21 NOTE — ED NOTES
Called [facility] and was told to call [facility] telehealth .  Called [facility] and was told that it may be 2 hours before she can do the assessment     Luis Gilbert RN  01/21/21 3469

## 2021-01-21 NOTE — ED PROVIDER NOTES
file   Tobacco Use    Smoking status: Never Smoker    Smokeless tobacco: Never Used   Substance and Sexual Activity    Alcohol use: No    Drug use: No    Sexual activity: Not on file   Lifestyle    Physical activity     Days per week: Not on file     Minutes per session: Not on file    Stress: Not on file   Relationships    Social connections     Talks on phone: Not on file     Gets together: Not on file     Attends Alevism service: Not on file     Active member of club or organization: Not on file     Attends meetings of clubs or organizations: Not on file     Relationship status: Not on file    Intimate partner violence     Fear of current or ex partner: Not on file     Emotionally abused: Not on file     Physically abused: Not on file     Forced sexual activity: Not on file   Other Topics Concern    Not on file   Social History Narrative    Not on file     No current facility-administered medications for this encounter. Current Outpatient Medications   Medication Sig Dispense Refill    simvastatin (ZOCOR) 20 MG tablet Take 1 tablet by mouth      amLODIPine (NORVASC) 10 MG tablet Take 1 tablet by mouth daily 30 tablet 3    atorvastatin (LIPITOR) 80 MG tablet Take 1 tablet by mouth daily Indications: take at bedtime.  30 tablet 3    lisinopril (PRINIVIL;ZESTRIL) 20 MG tablet Take 1 tablet by mouth daily 30 tablet 3    Incontinence Supplies MISC Pull-ups size large 240 each 2    divalproex (DEPAKOTE ER) 250 MG extended release tablet Take 1 tablet by mouth daily 30 tablet 1    traZODone (DESYREL) 100 MG tablet Take 1 tablet by mouth nightly 30 tablet 1    metFORMIN (GLUCOPHAGE) 500 MG tablet Take 1 tablet by mouth 2 times daily (with meals) 60 tablet 1    ferrous sulfate (IRON 325) 325 (65 Fe) MG tablet Take 1 tablet by mouth 2 times daily (with meals) 60 tablet 1    donepezil (ARICEPT) 10 MG tablet Take 1 tablet by mouth nightly 30 tablet 1    Multiple Vitamins-Minerals (THERAPEUTIC Relative 63.3 36 - 66 %    Lymphocytes % 24.0 24 - 44 %    Monocytes % 9.6 (H) 0 - 4 %    Eosinophils % 2.2 0 - 3 %    Basophils % 0.6 0 - 1 %    Segs Absolute 4.3 K/CU MM    Lymphocytes Absolute 1.6 K/CU MM    Monocytes Absolute 0.7 K/CU MM    Eosinophils Absolute 0.2 K/CU MM    Basophils Absolute 0.0 K/CU MM    Immature Neutrophil % 0.3 0 - 0.43 %    Total Immature Neutrophil 0.02 K/CU MM   Basic Metabolic Panel w/ Reflex to MG   Result Value Ref Range    Sodium 139 135 - 145 MMOL/L    Potassium 4.4 3.5 - 5.1 MMOL/L    Chloride 103 99 - 110 mMol/L    CO2 14 (L) 21 - 32 MMOL/L    Anion Gap 22 (H) 4 - 16    BUN 18 6 - 23 MG/DL    CREATININE 1.0 0.6 - 1.1 MG/DL    Glucose 133 (H) 70 - 99 MG/DL    Calcium 9.7 8.3 - 10.6 MG/DL    GFR Non- 55 (L) >60 mL/min/1.73m2    GFR African American >60 >60 mL/min/1.73m2   Urinalysis, reflex to microscopic   Result Value Ref Range    Color, UA YELLOW YELLOW    Clarity, UA HAZY CLEAR    Glucose, Urine NEGATIVE NEGATIVE MG/DL    Bilirubin Urine NEGATIVE NEGATIVE MG/DL    Ketones, Urine NEGATIVE NEGATIVE MG/DL    Specific Gravity, UA 1.015 1.001 - 1.035    Blood, Urine NEGATIVE NEGATIVE    pH, Urine 5.0 5.0 - 8.0    Protein, UA NEGATIVE NEGATIVE MG/DL    Urobilinogen, Urine 0.2 0.2 - 1.0 MG/DL    Nitrite Urine, Quantitative NEGATIVE NEGATIVE    Leukocyte Esterase, Urine TRACE NEGATIVE    RBC, UA NEGATIVE 0 - 6 /HPF    WBC, UA 0 TO 2 0 - 5 /HPF    Epithelial Cells, UA 0 TO 2 /HPF    Cast Type NEGATIVE (A) NO CAST FORMS SEEN /HPF    Bacteria, UA FEW NEGATIVE /HPF    Crystal Type NEGATIVE NEGATIVE /HPF   COVID-19    Specimen: Nasopharyngeal Swab   Result Value Ref Range    Source THROAT     SARS-CoV-2, NAAT NOT DETECTED       Radiographs (if obtained):  [] The following radiograph wasinterpreted by myself in the absence of a radiologist:   [] Radiologist's Report Reviewed:  No orders to display         EKG (if obtained): (All EKG's are interpreted by myself in the absence
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
## 2021-01-21 NOTE — ED NOTES
Pt friendly, calm but continues to speak about her sister, states I do not have dementia and I am Paul Dawn7, RN  01/21/21 6439

## 2021-01-22 PROBLEM — G30.9 MAJOR NEUROCOGNITIVE DISORDER DUE TO ALZHEIMER'S DISEASE, WITH BEHAVIORAL DISTURBANCE (HCC): Status: ACTIVE | Noted: 2021-01-21

## 2021-01-22 PROCEDURE — 6370000000 HC RX 637 (ALT 250 FOR IP): Performed by: PSYCHIATRY & NEUROLOGY

## 2021-01-22 PROCEDURE — 99223 1ST HOSP IP/OBS HIGH 75: CPT | Performed by: NURSE PRACTITIONER

## 2021-01-22 PROCEDURE — 1240000000 HC EMOTIONAL WELLNESS R&B

## 2021-01-22 PROCEDURE — 6370000000 HC RX 637 (ALT 250 FOR IP): Performed by: NURSE PRACTITIONER

## 2021-01-22 RX ORDER — ATORVASTATIN CALCIUM 80 MG/1
80 TABLET, FILM COATED ORAL DAILY
Status: DISCONTINUED | OUTPATIENT
Start: 2021-01-22 | End: 2021-01-22

## 2021-01-22 RX ORDER — RISPERIDONE 0.5 MG/1
0.5 TABLET, FILM COATED ORAL DAILY
Status: DISCONTINUED | OUTPATIENT
Start: 2021-01-22 | End: 2021-01-25

## 2021-01-22 RX ORDER — HALOPERIDOL 5 MG
5 TABLET ORAL EVERY 6 HOURS PRN
Status: DISCONTINUED | OUTPATIENT
Start: 2021-01-22 | End: 2021-02-11 | Stop reason: HOSPADM

## 2021-01-22 RX ORDER — DIPHENHYDRAMINE HYDROCHLORIDE 50 MG/ML
25 INJECTION INTRAMUSCULAR; INTRAVENOUS EVERY 6 HOURS PRN
Status: DISCONTINUED | OUTPATIENT
Start: 2021-01-22 | End: 2021-02-11 | Stop reason: HOSPADM

## 2021-01-22 RX ORDER — DIVALPROEX SODIUM 250 MG/1
250 TABLET, EXTENDED RELEASE ORAL DAILY
Status: DISCONTINUED | OUTPATIENT
Start: 2021-01-22 | End: 2021-01-24

## 2021-01-22 RX ORDER — AMLODIPINE BESYLATE 10 MG/1
10 TABLET ORAL DAILY
Status: DISCONTINUED | OUTPATIENT
Start: 2021-01-22 | End: 2021-02-11 | Stop reason: HOSPADM

## 2021-01-22 RX ORDER — DIPHENHYDRAMINE HCL 25 MG
25 CAPSULE ORAL EVERY 6 HOURS PRN
Status: DISCONTINUED | OUTPATIENT
Start: 2021-01-22 | End: 2021-02-09 | Stop reason: ALTCHOICE

## 2021-01-22 RX ORDER — DONEPEZIL HYDROCHLORIDE 10 MG/1
10 TABLET, FILM COATED ORAL NIGHTLY
Status: DISCONTINUED | OUTPATIENT
Start: 2021-01-22 | End: 2021-02-11 | Stop reason: HOSPADM

## 2021-01-22 RX ORDER — MEMANTINE HYDROCHLORIDE 10 MG/1
10 TABLET ORAL DAILY
Status: DISCONTINUED | OUTPATIENT
Start: 2021-01-22 | End: 2021-02-11 | Stop reason: HOSPADM

## 2021-01-22 RX ORDER — TRAZODONE HYDROCHLORIDE 100 MG/1
100 TABLET ORAL NIGHTLY
Status: DISCONTINUED | OUTPATIENT
Start: 2021-01-22 | End: 2021-01-29

## 2021-01-22 RX ORDER — M-VIT,TX,IRON,MINS/CALC/FOLIC 27MG-0.4MG
1 TABLET ORAL DAILY
Status: DISCONTINUED | OUTPATIENT
Start: 2021-01-22 | End: 2021-02-09 | Stop reason: ALTCHOICE

## 2021-01-22 RX ORDER — LORAZEPAM 2 MG/ML
1 INJECTION INTRAMUSCULAR EVERY 6 HOURS PRN
Status: DISCONTINUED | OUTPATIENT
Start: 2021-01-22 | End: 2021-02-11 | Stop reason: HOSPADM

## 2021-01-22 RX ORDER — LORAZEPAM 1 MG/1
1 TABLET ORAL EVERY 6 HOURS PRN
Status: DISCONTINUED | OUTPATIENT
Start: 2021-01-22 | End: 2021-02-11 | Stop reason: HOSPADM

## 2021-01-22 RX ORDER — HALOPERIDOL 5 MG/ML
5 INJECTION INTRAMUSCULAR EVERY 6 HOURS PRN
Status: DISCONTINUED | OUTPATIENT
Start: 2021-01-22 | End: 2021-02-11 | Stop reason: HOSPADM

## 2021-01-22 RX ORDER — FERROUS SULFATE 325(65) MG
325 TABLET ORAL 2 TIMES DAILY WITH MEALS
Status: DISCONTINUED | OUTPATIENT
Start: 2021-01-22 | End: 2021-01-31

## 2021-01-22 RX ORDER — LISINOPRIL 20 MG/1
20 TABLET ORAL DAILY
Status: DISCONTINUED | OUTPATIENT
Start: 2021-01-22 | End: 2021-02-11 | Stop reason: HOSPADM

## 2021-01-22 RX ORDER — ATORVASTATIN CALCIUM 80 MG/1
80 TABLET, FILM COATED ORAL NIGHTLY
Status: DISCONTINUED | OUTPATIENT
Start: 2021-01-22 | End: 2021-02-11 | Stop reason: HOSPADM

## 2021-01-22 RX ORDER — FLUTICASONE PROPIONATE 50 MCG
1 SPRAY, SUSPENSION (ML) NASAL DAILY
Status: DISCONTINUED | OUTPATIENT
Start: 2021-01-22 | End: 2021-02-11 | Stop reason: HOSPADM

## 2021-01-22 RX ADMIN — METOPROLOL TARTRATE 25 MG: 25 TABLET, FILM COATED ORAL at 09:07

## 2021-01-22 RX ADMIN — DIVALPROEX SODIUM 250 MG: 250 TABLET, EXTENDED RELEASE ORAL at 09:08

## 2021-01-22 RX ADMIN — AMLODIPINE BESYLATE 10 MG: 10 TABLET ORAL at 09:07

## 2021-01-22 RX ADMIN — LISINOPRIL 20 MG: 20 TABLET ORAL at 09:08

## 2021-01-22 RX ADMIN — DONEPEZIL HYDROCHLORIDE 10 MG: 10 TABLET, FILM COATED ORAL at 20:07

## 2021-01-22 RX ADMIN — FERROUS SULFATE TAB 325 MG (65 MG ELEMENTAL FE) 325 MG: 325 (65 FE) TAB at 09:09

## 2021-01-22 RX ADMIN — TRAZODONE HYDROCHLORIDE 100 MG: 100 TABLET ORAL at 20:07

## 2021-01-22 RX ADMIN — FERROUS SULFATE TAB 325 MG (65 MG ELEMENTAL FE) 325 MG: 325 (65 FE) TAB at 16:57

## 2021-01-22 RX ADMIN — METFORMIN HYDROCHLORIDE 500 MG: 500 TABLET ORAL at 09:09

## 2021-01-22 RX ADMIN — TRAZODONE HYDROCHLORIDE 50 MG: 50 TABLET ORAL at 20:10

## 2021-01-22 RX ADMIN — RISPERIDONE 0.5 MG: 0.5 TABLET ORAL at 14:07

## 2021-01-22 RX ADMIN — MEMANTINE 10 MG: 10 TABLET ORAL at 09:07

## 2021-01-22 RX ADMIN — MULTIPLE VITAMINS W/ MINERALS TAB 1 TABLET: TAB at 09:08

## 2021-01-22 RX ADMIN — METFORMIN HYDROCHLORIDE 500 MG: 500 TABLET ORAL at 16:57

## 2021-01-22 RX ADMIN — ATORVASTATIN CALCIUM 80 MG: 80 TABLET, FILM COATED ORAL at 20:07

## 2021-01-22 RX ADMIN — FLUTICASONE PROPIONATE 1 SPRAY: 50 SPRAY, METERED NASAL at 09:07

## 2021-01-22 ASSESSMENT — PAIN SCALES - GENERAL: PAINLEVEL_OUTOF10: 0

## 2021-01-22 NOTE — PROGRESS NOTES
Pt did not sleep tonight. Pleasant, refused sleeping aid. Extremely talkative. Speech is rapid. States shes ready to leave tomorrow to run her errands. Refers to this place as a nice hotel sometimes and other times she knows she's in a hospital. Refuses to give up her clothes to be washed. Pt is pleasantly confused.

## 2021-01-22 NOTE — PROGRESS NOTES
Pt alert and oriented to self, time and place during the shift. Pt does at times forget where she is and does not know why she is here. Pt has been med compliant and pleasant. Pt denies SI, HI and AVH along with anxiety and depression. Pt states that she loves the [place] Greg and is a happy person. Pt does ask to talk to Heladio Cline so that she can come pick her up but has been reoriented that she is here for evaluation and is not discharging today. Pt did talk with Heladio Cline earlier in the shift. Pt has been continent, refused a shower but did wash up at the sink in room. Pt is tolerating meals and fluids w/o difficulty.
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
## 2021-01-22 NOTE — PLAN OF CARE
Problem: Altered Mood, Depressive Behavior:  Goal: Able to verbalize acceptance of life and situations over which he or she has no control  Description: Able to verbalize acceptance of life and situations over which he or she has no control  Outcome: Ongoing  Goal: Able to verbalize and/or display a decrease in depressive symptoms  Description: Able to verbalize and/or display a decrease in depressive symptoms  Outcome: Ongoing  Goal: Ability to disclose and discuss suicidal ideas will improve  Description: Ability to disclose and discuss suicidal ideas will improve  Outcome: Ongoing  Goal: Able to verbalize support systems  Description: Able to verbalize support systems  Outcome: Ongoing  Goal: Absence of self-harm  Description: Absence of self-harm  Outcome: Ongoing  Goal: Patient specific goal  Description: Patient specific goal  Outcome: Ongoing  Goal: Participates in care planning  Description: Participates in care planning  Outcome: Ongoing     Problem: Depressive Behavior With or Without Suicide Precautions:  Goal: Able to verbalize acceptance of life and situations over which he or she has no control  Description: Able to verbalize acceptance of life and situations over which he or she has no control  Outcome: Ongoing  Goal: Able to verbalize and/or display a decrease in depressive symptoms  Description: Able to verbalize and/or display a decrease in depressive symptoms  Outcome: Ongoing  Goal: Ability to disclose and discuss suicidal ideas will improve  Description: Ability to disclose and discuss suicidal ideas will improve  Outcome: Ongoing  Goal: Able to verbalize support systems  Description: Able to verbalize support systems  Outcome: Ongoing  Goal: Absence of self-harm  Description: Absence of self-harm  Outcome: Ongoing  Goal: Patient specific goal  Description: Patient specific goal  Outcome: Ongoing  Goal: Participates in care planning  Description: Participates in care planning  Outcome: Ongoing     Problem: Altered Mood, Deterioration in Function:  Goal: Ability to perform activities of daily living will improve  Description: Ability to perform activities of daily living will improve  Outcome: Ongoing  Goal: Able to verbalize reality based thinking  Description: Able to verbalize reality based thinking  Outcome: Ongoing  Goal: Skin appearance normal  Description: Skin appearance normal  Outcome: Ongoing  Goal: Maintenance of adequate nutrition will improve  Description: Maintenance of adequate nutrition will improve  Outcome: Ongoing  Goal: Ability to tolerate increased activity will improve  Description: Ability to tolerate increased activity will improve  Outcome: Ongoing  Goal: Participates in care planning  Description: Participates in care planning  Outcome: Ongoing  Goal: Patient specific goal  Description: Patient specific goal  Outcome: Ongoing     Problem: Altered Mood, Manic Behavior:  Goal: Able to sleep  Description: Able to sleep  Outcome: Ongoing  Goal: Able to verbalize decrease in frequency and intensity of racing thoughts  Description: Able to verbalize decrease in frequency and intensity of racing thoughts  Outcome: Ongoing  Goal: Ability to disclose and discuss suicidal ideas will improve  Description: Ability to disclose and discuss suicidal ideas will improve  Outcome: Ongoing  Goal: Absence of self-harm  Description: Absence of self-harm  Outcome: Ongoing  Goal: Ability to achieve adequate nutritional intake will improve  Description: Ability to achieve adequate nutritional intake will improve  Outcome: Ongoing  Goal: Ability to interact with others will improve  Description: Ability to interact with others will improve  Outcome: Ongoing  Goal: Ability to demonstrate self-control will improve  Description: Ability to demonstrate self-control will improve  Outcome: Ongoing  Goal: Mood stable  Description: Mood stable  Outcome: Ongoing
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
## 2021-01-22 NOTE — PROGRESS NOTES
Pt seen to complete recreation/leisure assessment. Pt states reason for admission is d/t The lady wants to keep in contact with me. \" r/t NP. Pt oriented to self and location, but not month. Reports belief it is October. Pt not receptive to reality orientation. Pt has limited insight into leisure. Therapist encouraged pt to attend groups.      Electronically signed by JIM Hagen MA on 1/22/2021 at 3:26 PM
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
## 2021-01-22 NOTE — GROUP NOTE
Group Therapy Note    Date: 1/22/2021    Group Start Time: 1530  Group End Time: 1600    Number of Participants: 2/7    Type: Exercise/Recreation Group    Group Topic/Objective: Self-Care Discussion    Notes:  Pt sleeping and allowed to continue to rest.     Discipline Responsible: Certified Therapeutic Recreation Specialist     Electronically signed by Yolanda Gifford MA on 1/22/2021 at 4:20 PM

## 2021-01-22 NOTE — GROUP NOTE
Group Therapy Note    Date: 1/22/2021    Group Start Time: 0830  Group End Time: 0900    Number of Participants: 5/7    Type: Morning Goals Group/ Community Meeting    Group Topic/Objective: Set Goal For The Day and to review Unit Rules and Regulations. Patient's Goal:  Pt states her goal is \"Be able to put God's hand over everyone in the world. \"    Notes:  Pt presented with rapid speech and Adventism preoccupation. Pt not accepting of redirection. Pt states she is feeling \"pretty good\" and is grateful for \"breathing. \"  Pt reports restful sleep of approximately 8-9 hours.      Depression (0-10): 0    Anxiety (0-10): 0    Irritability/Aggitation (0-10): 0    Status After Intervention:  Unchanged    Participation Level: Interactive    Participation Quality: Intrusive    Speech:  pressured and rapid    Thought Process/Content: Synagogue Preoccupation    Affective Functioning: Exaggerated    Mood: elevated    Level of consciousness:  Preoccupied    Response to Learning: Able to verbalize current knowledge/experience    Endings: None Reported    Modes of Intervention: Education, Support and Socialization    Discipline Responsible: Certified Therapeutic Recreation Specialist     Electronically signed by Berry Bunch MA on 1/22/2021 at 9:55 AM

## 2021-01-22 NOTE — CARE COORDINATION
DISCHARGE BARRIERS     Reason for Referral: [facility] initial assessment  Mental Status: alert   Decision Making Ability: No  Family/Social/Home Environment: lives with sister  Current Services/ Equipment: none  Patient Income source:   SSI  Concerns or Barriers to Discharge: None  Patient and/or family verbalized understanding of the plan of care and contributed to goal setting.      Social/ Functional History  Lives With: sister  Type of Home:  house  Home Layout: one level  Home Access: level entrance  Bathroom Shower/ Tub: tub/shower unit  Bathroom Toilet: standard  Other Bathroom Assistive Devices: none  Home Equipment: none  ADL Assistance: independent  Homemaking Assistance: shares homemaking with sister  Ambulation Assistance: independent  Transfer Assistance: independent  Additional Comments:     Anticipated Needs per Patient:   Potential Assistance Needed: f/u psychiatric services  Type of Home Care Services: n/a  Patient expects to be discharged to: home with sister     Discharge Plan:  Discharge home with sister     [place], [place]

## 2021-01-23 LAB
CHOLESTEROL, FASTING: 300 MG/DL
ESTIMATED AVERAGE GLUCOSE: 137 MG/DL
FOLATE: 15.9 NG/ML (ref 3.1–17.5)
HBA1C MFR BLD: 6.4 % (ref 4.2–6.3)
HDLC SERPL-MCNC: 88 MG/DL
LDL CHOLESTEROL DIRECT: 200 MG/DL
TRIGLYCERIDE, FASTING: 113 MG/DL
VITAMIN B-12: 376.5 PG/ML (ref 211–911)
VITAMIN D 25-HYDROXY: 23.92 NG/ML

## 2021-01-23 PROCEDURE — 99232 SBSQ HOSP IP/OBS MODERATE 35: CPT | Performed by: PSYCHIATRY & NEUROLOGY

## 2021-01-23 PROCEDURE — 6370000000 HC RX 637 (ALT 250 FOR IP): Performed by: PSYCHIATRY & NEUROLOGY

## 2021-01-23 PROCEDURE — 36415 COLL VENOUS BLD VENIPUNCTURE: CPT

## 2021-01-23 PROCEDURE — 6370000000 HC RX 637 (ALT 250 FOR IP): Performed by: NURSE PRACTITIONER

## 2021-01-23 PROCEDURE — 82306 VITAMIN D 25 HYDROXY: CPT

## 2021-01-23 PROCEDURE — 83036 HEMOGLOBIN GLYCOSYLATED A1C: CPT

## 2021-01-23 PROCEDURE — 80061 LIPID PANEL: CPT

## 2021-01-23 PROCEDURE — 1240000000 HC EMOTIONAL WELLNESS R&B

## 2021-01-23 PROCEDURE — 82746 ASSAY OF FOLIC ACID SERUM: CPT

## 2021-01-23 PROCEDURE — 82607 VITAMIN B-12: CPT

## 2021-01-23 RX ORDER — ONDANSETRON 4 MG/1
4 TABLET, ORALLY DISINTEGRATING ORAL EVERY 8 HOURS PRN
Status: DISCONTINUED | OUTPATIENT
Start: 2021-01-23 | End: 2021-02-11 | Stop reason: HOSPADM

## 2021-01-23 RX ADMIN — LISINOPRIL 20 MG: 20 TABLET ORAL at 09:41

## 2021-01-23 RX ADMIN — ONDANSETRON 4 MG: 4 TABLET, ORALLY DISINTEGRATING ORAL at 04:39

## 2021-01-23 RX ADMIN — TRAZODONE HYDROCHLORIDE 50 MG: 50 TABLET ORAL at 20:45

## 2021-01-23 RX ADMIN — MEMANTINE 10 MG: 10 TABLET ORAL at 09:40

## 2021-01-23 RX ADMIN — METFORMIN HYDROCHLORIDE 500 MG: 500 TABLET ORAL at 09:41

## 2021-01-23 RX ADMIN — FERROUS SULFATE TAB 325 MG (65 MG ELEMENTAL FE) 325 MG: 325 (65 FE) TAB at 17:02

## 2021-01-23 RX ADMIN — MULTIPLE VITAMINS W/ MINERALS TAB 1 TABLET: TAB at 10:02

## 2021-01-23 RX ADMIN — DONEPEZIL HYDROCHLORIDE 10 MG: 10 TABLET, FILM COATED ORAL at 20:44

## 2021-01-23 RX ADMIN — AMLODIPINE BESYLATE 10 MG: 10 TABLET ORAL at 09:40

## 2021-01-23 RX ADMIN — TRAZODONE HYDROCHLORIDE 100 MG: 100 TABLET ORAL at 20:44

## 2021-01-23 RX ADMIN — RISPERIDONE 0.5 MG: 0.5 TABLET ORAL at 09:40

## 2021-01-23 RX ADMIN — FERROUS SULFATE TAB 325 MG (65 MG ELEMENTAL FE) 325 MG: 325 (65 FE) TAB at 09:40

## 2021-01-23 RX ADMIN — METOPROLOL TARTRATE 25 MG: 25 TABLET, FILM COATED ORAL at 09:41

## 2021-01-23 RX ADMIN — METFORMIN HYDROCHLORIDE 500 MG: 500 TABLET ORAL at 17:02

## 2021-01-23 RX ADMIN — ATORVASTATIN CALCIUM 80 MG: 80 TABLET, FILM COATED ORAL at 20:44

## 2021-01-23 RX ADMIN — ONDANSETRON 4 MG: 4 TABLET, ORALLY DISINTEGRATING ORAL at 21:34

## 2021-01-23 RX ADMIN — DIVALPROEX SODIUM 250 MG: 250 TABLET, EXTENDED RELEASE ORAL at 09:40

## 2021-01-23 RX ADMIN — FLUTICASONE PROPIONATE 1 SPRAY: 50 SPRAY, METERED NASAL at 09:42

## 2021-01-23 RX ADMIN — METOPROLOL TARTRATE 25 MG: 25 TABLET, FILM COATED ORAL at 20:44

## 2021-01-23 NOTE — PROGRESS NOTES
Patient slept in this morning. Woke up and denies nausea. Ate most of breakfast and lunch. Compliant with meds. Sat in dining kee talking with peers. Alert and oriented to person and place. Denies any depression or anxiety. Needs direction for ADLs. [place] asleep at dining table. Assisted to bed. Sister Hermelinda Kirkpatrick called while patient taking a nap. Patient to call her when she wakes up. No obvious hallucinations or delusions noted while awake. Will continue to monitor for safety. Patient up for dinner. Ate little dinner. Spoke to sister Hermelinda Kirkpatrick on the phone and became agitated. Insisted she was leaving today and that she isn't a prisoner of war. Phone call ended and pt was easily redirected. Patient was advised she will be here a few days until the doctor feels her medications are correct and her thoughts were more appropriate. Pt said \"well that's fine\". Assisted to TV area where she remained seated and calm. Patient calm for a bit but is starting to become agitated again. She states she is going to have to get an  because she could have . She believes she was given medicine that she is allergic to. She was reassured this did not happen. She then told another patient to stop staring at her. She returned back to the counter and wanted us to call the [place] police because her clothes are missing. She was adamant that she came in jeans. I advised her that I would check the paperwork of her belongings and let her know. I then showed her the belonging sheet of where she came in with the pants and shirt that she has on. She thanked me and remained seated. She is sitting in the TV area talking to peers at this time.

## 2021-01-23 NOTE — CONSULTS
Hospitalist Consult Note      Name:  Gaye Lim /Age/Sex: 1950  (65 y.o. female)   MRN & CSN:  [id number] & [id number] Admission Date/Time: 2021  2:56 PM   Location:  1052/1052-01 PCP: Shruthi Fischer MD       Hospital Day: 3    Assessment and Plan:   Gaye Lim is a 65 y.o.  female  who presents with Major neurocognitive disorder due to Alzheimer's disease, with behavioral disturbance [facility])  Hospitalist Team consulted for: Medical Management    1. Hypertension: 140/52 this a.m. Prior to meds. Patient currently on amlodipine 10 mg daily, lisinopril 20 mg daily and metoprolol tartrate 25 mg daily. Yesterday after medications blood pressure 100/58 sitting. Asymptomatic but will put parameters for same. 2.  Non-insulin-dependent diabetes: Pending hemoglobin A1c. Currently on Metformin continue same. 3.  Hyperlipidemia: Continue atorvastatin 80 mg nightly. 4.  Medical noncompliance: Appreciate nursing calling pharmacy for med rec. It appears that the patient has not been taking any medications over the last 3 months. Medications recorded below. Appreciate allowing us participate in patient's care. We will follow as needed        Diet DIET GENERAL;   DVT Prophylaxis [] Lovenox, []  Heparin, [] SCDs, [] No VTE prophylaxis, patient ambulating   GI Prophylaxis [] PPI, [] H2 Blocker, [] No GI prophylaxis, patient is receiving diet/Tube Feeds   Code Status Full Code   Disposition Patient requires continued admission due to an S[facility]   MDM [] Low, [x] Moderate,[]  High  Patient's risk as above due to as above     History of Present Illness:     Pt S&E. Patient sitting in bed room discussing intake with psychiatric nurse practitioner. Patient admitted due to combative posture at home.   Past medical history significant for hypertension, non-insulin-dependent diabetes and hyperlipidemia, Alzheimer's dementia with behavioral disturbance admitted to [facility] due to noncompliance of medications and delusional disorder. On discussion of patient is pleasant denies any headache blurry vision or dizziness. Denies any chest pain palpitations or shortness of breath. Denies any fever chills nausea vomiting diarrhea. Denies overall joint aches or overall muscle ache but states occasionally right knee causes problems. Patient very hard to redirect going off in tangents. The pleasant and stable    10-14 point ROS reviewed negative, unless as noted above    Objective: Intake/Output Summary (Last 24 hours) at 1/23/2021 1239  Last data filed at 1/23/2021 0943  Gross per 24 hour   Intake 480 ml   Output --   Net 480 ml      Vitals:   Vitals:    01/23/21 0806   BP: (!) 140/52   Pulse: 100   Resp: 17   Temp: 98.8 °F (37.1 °C)   SpO2: 98%     Physical Exam:    GEN Awake female, sitting upright in bed in no apparent distress. Appears given age. EYES Pupils are equally round. No scleral erythema, discharge, or conjunctivitis. HENT Mucous membranes are moist. Oral pharynx without exudates, no evidence of thrush. NECK Supple, no apparent thyromegaly or masses. RESP Clear to auscultation, no wheezes, rales or rhonchi. Symmetric chest movement while on room air. CARDIO/VASC S1/S2 auscultated. Regular rate without appreciable murmurs, rubs, or gallops. No JVD or carotid bruits. Peripheral pulses equal bilaterally and palpable. No peripheral edema. GI Abdomen is soft without significant tenderness, masses, or guarding. Bowel sounds are normoactive. Rectal exam deferred.  No costovertebral angle tenderness. Normal appearing external genitalia. [place]field catheter is not present. HEME/LYMPH No palpable cervical lymphadenopathy and no hepatosplenomegaly. No petechiae or ecchymoses. MSK No gross joint deformities. SKIN Normal coloration, warm, dry. NEURO Cranial nerves appear grossly intact, normal speech, no lateralizing weakness. PSYCH Awake, alert, oriented x 4.       Medications:   Medications:  amLODIPine  10 mg Oral Daily    divalproex  250 mg Oral Daily    donepezil  10 mg Oral Nightly    ferrous sulfate  325 mg Oral BID WC    fluticasone  1 spray Each Nostril Daily    lisinopril  20 mg Oral Daily    memantine  10 mg Oral Daily    metFORMIN  500 mg Oral BID WC    metoprolol tartrate  25 mg Oral BID    therapeutic multivitamin-minerals  1 tablet Oral Daily    traZODone  100 mg Oral Nightly    risperiDONE  0.5 mg Oral Daily    atorvastatin  80 mg Oral Nightly      Infusions:   PRN Meds:     ondansetron, 4 mg, Q8H PRN      haloperidol lactate, 5 mg, Q6H PRN    Or      haloperidol, 5 mg, Q6H PRN      LORazepam, 1 mg, Q6H PRN    Or      LORazepam, 1 mg, Q6H PRN      diphenhydrAMINE, 25 mg, Q6H PRN    Or      diphenhydrAMINE, 25 mg, Q6H PRN      acetaminophen, 650 mg, Q4H PRN      polyethylene glycol, 17 g, Daily PRN      traZODone, 50 mg, Nightly PRN      acetaminophen, 500 mg, Q4H PRN      acetaminophen, 325 mg, Q4H PRN          Electronically signed by Sherin Oshea MD on 1/23/2021 at 12:39 PM

## 2021-01-23 NOTE — PLAN OF CARE
RN  Outcome: Ongoing  1/22/2021 1044 by Danny Alexandra RN  Outcome: Ongoing  Goal: Able to verbalize and/or display a decrease in depressive symptoms  Description: Able to verbalize and/or display a decrease in depressive symptoms  1/22/2021 2231 by Malik Kohler RN  Outcome: Ongoing  1/22/2021 1044 by Danny Alexandra RN  Outcome: Ongoing  Goal: Ability to disclose and discuss suicidal ideas will improve  Description: Ability to disclose and discuss suicidal ideas will improve  1/22/2021 2231 by Malik Kohler RN  Outcome: Ongoing  1/22/2021 1044 by Danny Alexandra RN  Outcome: Ongoing  Goal: Able to verbalize support systems  Description: Able to verbalize support systems  1/22/2021 2231 by Malik Kohler RN  Outcome: Ongoing  1/22/2021 1044 by Danny Alexandra RN  Outcome: Ongoing  Goal: Absence of self-harm  Description: Absence of self-harm  1/22/2021 2231 by Malik Kohler RN  Outcome: Ongoing  1/22/2021 1044 by Danny Alexandra RN  Outcome: Ongoing  Goal: Patient specific goal  Description: Patient specific goal  1/22/2021 2231 by Malik Kohler RN  Outcome: Ongoing  1/22/2021 1044 by Danny Alexandra RN  Outcome: Ongoing  Goal: Participates in care planning  Description: Participates in care planning  1/22/2021 2231 by Malik Kohler RN  Outcome: Ongoing  1/22/2021 1044 by Danny Alexandra RN  Outcome: Ongoing     Problem: Altered Mood, Deterioration in Function:  Goal: Ability to perform activities of daily living will improve  Description: Ability to perform activities of daily living will improve  1/22/2021 2231 by Malik Kohler RN  Outcome: Ongoing  1/22/2021 1044 by Danny Alexandra RN  Outcome: Ongoing  Goal: Able to verbalize reality based thinking  Description: Able to verbalize reality based thinking  1/22/2021 2231 by Malik Kohler RN  Outcome: Ongoing  1/22/2021 1044 by Danny Alexandra RN  Outcome: Ongoing  Goal: Skin appearance normal  Description: Skin appearance normal  1/22/2021 2231 by Juan Carlos Siddiqi RN  Outcome: Ongoing  1/22/2021 1044 by Sheila Owen RN  Outcome: Ongoing  Goal: Maintenance of adequate nutrition will improve  Description: Maintenance of adequate nutrition will improve  1/22/2021 2231 by Juan Carlos Siddiqi RN  Outcome: Ongoing  1/22/2021 1044 by Shelia Owen RN  Outcome: Ongoing  Goal: Ability to tolerate increased activity will improve  Description: Ability to tolerate increased activity will improve  1/22/2021 2231 by Juan Carlos Siddiqi RN  Outcome: Ongoing  1/22/2021 1044 by Sheila Owen RN  Outcome: Ongoing  Goal: Participates in care planning  Description: Participates in care planning  1/22/2021 2231 by Juan Carlos Siddiqi RN  Outcome: Ongoing  1/22/2021 1044 by Sheila Owen RN  Outcome: Ongoing  Goal: Patient specific goal  Description: Patient specific goal  1/22/2021 2231 by Juan Carlos Siddiqi RN  Outcome: Ongoing  1/22/2021 1044 by Sheila Owen RN  Outcome: Ongoing     Problem: Risk of Harm:  Goal: Ability to remain free from injury will improve  Description: Ability to remain free from injury will improve  1/22/2021 2231 by Juan Carlos Siddiqi RN  Outcome: Ongoing  1/22/2021 1044 by Sheila Owen RN  Outcome: Ongoing     Problem: Altered Mood, Manic Behavior:  Goal: Able to sleep  Description: Able to sleep  1/22/2021 2231 by Juan Carlos Siddiqi RN  Outcome: Ongoing  1/22/2021 1044 by Sheila Owen RN  Outcome: Ongoing  Goal: Able to verbalize decrease in frequency and intensity of racing thoughts  Description: Able to verbalize decrease in frequency and intensity of racing thoughts  1/22/2021 2231 by Juan Carlos Siddiqi RN  Outcome: Ongoing  1/22/2021 1044 by Sheila Owen RN  Outcome: Ongoing  Goal: Ability to disclose and discuss suicidal ideas will improve  Description: Ability to disclose and discuss suicidal ideas will improve  1/22/2021 2231 by Juan Carlos Siddiqi RN  Outcome: Ongoing  1/22/2021 1044 by Sheila Owen RN  Outcome: Ongoing  Goal: Absence of self-harm  Description: Absence of self-harm  1/22/2021 2231 by Chuy Goins RN  Outcome: Ongoing  1/22/2021 1044 by Larissa Ziegler RN  Outcome: Ongoing  Goal: Ability to achieve adequate nutritional intake will improve  Description: Ability to achieve adequate nutritional intake will improve  1/22/2021 2231 by Chuy Goins RN  Outcome: Ongoing  1/22/2021 1044 by Larissa Ziegler RN  Outcome: Ongoing  Goal: Ability to interact with others will improve  Description: Ability to interact with others will improve  1/22/2021 2231 by Chuy Goins RN  Outcome: Ongoing  1/22/2021 1044 by Larissa Ziegler RN  Outcome: Ongoing  Goal: Ability to demonstrate self-control will improve  Description: Ability to demonstrate self-control will improve  1/22/2021 2231 by Chuy Goins RN  Outcome: Ongoing  1/22/2021 1044 by Larissa Ziegler RN  Outcome: Ongoing  Goal: Mood stable  Description: Mood stable  1/22/2021 2231 by Chuy Goins RN  Outcome: Ongoing  1/22/2021 1044 by Larissa Ziegler RN  Outcome: Ongoing

## 2021-01-23 NOTE — PROGRESS NOTES
Progress Note Psychiatry    Kianna Walden  [id number]  1/21/2021 01/23/21    ID: Patient is a 65 yrs y.o. female    CC: \"I stopped my meds. \"    HPI: Kianna Walden is a 65year old female with PMHx of HTN, HLD,dementia (Alzemimers) with behavioral disturbances, delusional disorder and medication non compliance. She is well known to [facility] [employer] related to several admissions for same. She [facility] ED via police after found wandering. Her sister called them when she was unable to locate the patient. Patient has not been taking her medications and has an increase in her delusions and paranoia. Patient admitted to [address] [place] [address]  Per Fabian Shah RN on [facility], patient has not been taking any medications from [facility] for three months.     During today's interview she was alert & oriented x 3. Pt denied any thoughts to harm herself or anyone else. Pt Denied any auditory or visual Hallucinations. She denies depression and anxiety. She states she is sleeping \"very good\" and that her appetite is \"very good. \"Per staff she continues to not exhibit behaviors and has been med compliant on unit. Pt continues to display occasional bizarre delusions and confusion. Continues to note fixed delusions and is fixated on them. Pt is unable to understand her diagnosis and treatment plan. Pt noted she currently feels safe and comfortable on the unit.  Pt was in agreement with treatment team.  Pt was polite and cordial during the interview process. Has been taking medications and has been compliant with treatment.       Pt denied any hx of seizures, Hep C or HIV  Pt notes history of TBI  No TD noted, AIMS=0    Past Psychiatric History:   See note above    Family Psychiatric History:   See note above    Family Psychiatric History:   Family History   Problem Relation Age of Onset    No Known Problems Mother     No Known Problems Sister     Heart Disease Brother     Coronary Art Dis Brother         stents    No Known Problems Sister     No Known Problems Sister     Alcohol Abuse Brother         Allergies:   Allergies   Allergen Reactions    Shellfish-Derived Products     Floxin [Ofloxacin] Other (See Comments)     Made her \"feel weird\"        OBJECTIVE  Vital Signs:  Vitals:    01/23/21 0806   BP: (!) 140/52   Pulse: 100   Resp: 17   Temp: 98.8 °F (37.1 °C)   SpO2: 98%       Labs:  Recent Results (from the past 48 hour(s))   CBC Auto Differential    Collection Time: 01/21/21  3:15 PM   Result Value Ref Range    WBC 6.8 4.0 - 10.5 K/CU MM    RBC 4.23 4.2 - 5.4 M/CU MM    Hemoglobin 13.6 12.5 - 16.0 GM/DL    Hematocrit 40.1 37 - 47 %    MCV 94.8 78 - 100 FL    MCH 32.2 (H) 27 - 31 PG    MCHC 33.9 32.0 - 36.0 %    RDW 11.5 (L) 11.7 - 14.9 %    Platelets 241 050 - 239 K/CU MM    MPV 9.9 7.5 - 11.1 FL    Differential Type AUTOMATED DIFFERENTIAL     Segs Relative 63.3 36 - 66 %    Lymphocytes % 24.0 24 - 44 %    Monocytes % 9.6 (H) 0 - 4 %    Eosinophils % 2.2 0 - 3 %    Basophils % 0.6 0 - 1 %    Segs Absolute 4.3 K/CU MM    Lymphocytes Absolute 1.6 K/CU MM    Monocytes Absolute 0.7 K/CU MM    Eosinophils Absolute 0.2 K/CU MM    Basophils Absolute 0.0 K/CU MM    Immature Neutrophil % 0.3 0 - 0.43 %    Total Immature Neutrophil 0.02 K/CU MM   Basic Metabolic Panel w/ Reflex to MG    Collection Time: 01/21/21  3:15 PM   Result Value Ref Range    Sodium 139 135 - 145 MMOL/L    Potassium 4.4 3.5 - 5.1 MMOL/L    Chloride 103 99 - 110 mMol/L    CO2 14 (L) 21 - 32 MMOL/L    Anion Gap 22 (H) 4 - 16    BUN 18 6 - 23 MG/DL    CREATININE 1.0 0.6 - 1.1 MG/DL    Glucose 133 (H) 70 - 99 MG/DL    Calcium 9.7 8.3 - 10.6 MG/DL    GFR Non- 55 (L) >60 mL/min/1.73m2    GFR African American >60 >60 mL/min/1.73m2   Urinalysis, reflex to microscopic    Collection Time: 01/21/21  3:15 PM   Result Value Ref Range    Color, UA YELLOW YELLOW    Clarity, UA HAZY CLEAR    Glucose, Urine NEGATIVE NEGATIVE MG/DL    Bilirubin Urine NEGATIVE NEGATIVE MG/DL    Ketones, Urine NEGATIVE NEGATIVE MG/DL    Specific Gravity, UA 1.015 1.001 - 1.035    Blood, Urine NEGATIVE NEGATIVE    pH, Urine 5.0 5.0 - 8.0    Protein, UA NEGATIVE NEGATIVE MG/DL    Urobilinogen, Urine 0.2 0.2 - 1.0 MG/DL    Nitrite Urine, Quantitative NEGATIVE NEGATIVE    Leukocyte Esterase, Urine TRACE NEGATIVE    RBC, UA NEGATIVE 0 - 6 /HPF    WBC, UA 0 TO 2 0 - 5 /HPF    Epithelial Cells, UA 0 TO 2 /HPF    Cast Type NEGATIVE (A) NO CAST FORMS SEEN /HPF    Bacteria, UA FEW NEGATIVE /HPF    Crystal Type NEGATIVE NEGATIVE /HPF   COVID-19    Collection Time: 01/21/21  3:15 PM    Specimen: Nasopharyngeal Swab   Result Value Ref Range    Source THROAT     SARS-CoV-2, NAAT NOT DETECTED    Lipid, Fasting    Collection Time: 01/23/21 10:45 AM   Result Value Ref Range    Triglyceride, Fasting 113 <150 MG/DL    Cholesterol, Fasting 300 (H) <200 MG/DL    HDL 88 >40 MG/DL    LDL Direct 200 (H) <100 MG/DL   Vitamin D 25 hydroxy    Collection Time: 01/23/21 10:45 AM   Result Value Ref Range    Vit D, 25-Hydroxy 23.92 >20 NG/ML   Vitamin B12 & folate    Collection Time: 01/23/21 10:45 AM   Result Value Ref Range    Vitamin B-12 376.5 211 - 911 pg/ml    Folate 15.9 3.1 - 17.5 NG/ML   Hemoglobin A1c    Collection Time: 01/23/21 10:50 AM   Result Value Ref Range    Hemoglobin A1C 6.4 (H) 4.2 - 6.3 %    eAG 137 mg/dL       Review of Systems:  Reports of no current cardiovascular, respiratory, gastrointestinal, genitourinary, integumentary, neurological, musculoskeletal, or immunological symptoms today. PSYCHIATRIC: See HPI above.          PSYCHIATRIC EXAMINATION / MENTAL STATUS EXAM    CONSTITUTIONAL:    Vitals:   Vitals:    01/23/21 0806   BP: (!) 140/52   Pulse: 100   Resp: 17   Temp: 98.8 °F (37.1 °C)   SpO2: 98%      General appearance: [x] appears age, []  appears older than stated age,               [x]  adequately dressed and groomed, [] disheveled,               [x]  in no acute distress, [] appears mildly distressed, [] other           MUSCULOSKELETAL:   Gait:   [x] normal, [] antalgic, [] unsteady, [] gait not evaluated   Station:             [] erect, [] sitting, [] recumbent, [] other        Strength/tone:  [x] muscle strength and tone appear consistent with age and condition     [] atrophy      [] abnormal movements       PSYCHIATRIC:    Appearance: appears stated age. Alert and oriented to person, place, time & situation. No acute distress. Adequate grooming and hygiene. Good eye contact. No prominent physical abnormalities. Attitude: Manner is cooperative and pleasant  Motor: No psychomotor agitation, retardation or abnormal movements noted  Speech: Clearly articulated; normal rate, volume, tone & amount. Language: intact understanding and production  Mood: euthymic  Affect: euthymic, full range, non-labile, congruent with mood and content of speech  Thought Production: Spontaneous. Thought Form: Coherent, linear, logical & goal-directed. No tangentiality or circumstantiality. No flight of ideas or loosening of associations. Thought Content/Perceptions: No [place], no AVH, no delusion  Insight: limited  Judgment: limited  Memory: Immediate, recent, and remote appear intact, though not formally tested. Attention: maintained throughout interview  Fund of knowledge: Average  Gait/Balance: WNL/WNL    Impression:   Late onset Alzheimer's disease with behavioral disturbance    Problem List:   Major neurocognitive disorder due to Alzheimer's disease, with behavioral disturbance ([facility] [address]    Plan:  1. Admit to [place] [id number] Unit  2. Consult Hospitalist to evaluate and treat medical conditions  3. Adjust psychotropic medications to target symptoms  4. Occupational Therapy, Physical Therapy, Group Psychotherapy as tolerated  5. Reviewed treatment plan with patient including medication risks, benefits, side effects. Obtained informed consent for treatment.   6. Anticipated length of stay 10-12

## 2021-01-23 NOTE — PROGRESS NOTES
Pt up wandering unit. Staff directs Pt back to room. Pt reported \"feeling sick. \" Pt assisted to BR. Pt had small amount of loose stool and moderate amount of emesis. Pt coughing and clear drainage from nose, afebrile. Emesis was mostly liquid, tan in color and had small amount of undigested food. Pt assisted back to bed and offered fluids. Night hospitalist contacted by phone and order received for zofran 4mg every 8 hrs PRN. Medication administered. Pt in bed with HOB elevated 90 degrees and given a basin. Pt reports \"feeling a little better\" and is now resting in bed eyes closed.

## 2021-01-23 NOTE — PROGRESS NOTES
Pt up several times confused, wandering unit in a brief and no pants. Directed Pt back to room and to the bathroom. Pt is polite and friendly but is unable to follow simple commands without physical prompting. While assisting Pt to get dressed, she begins to take off what was just put on. This nurse pointed to Pt bed and asked if she would get back in bed. Pt stated \"yes\" but began to wander in the opposite direction. Once in bed Pt looks at empty corner of room and began yelling for her sister Mary Noonan.  Reminded Pt her sister was not here and encouraged her to rest.

## 2021-01-23 NOTE — PROGRESS NOTES
Pt up again wandering unit and c/o \"feeling sick. \" Pt directed to BR and she was continent of med formed BM. Pt belching and spitting into basin, but no emesis. Pt needs reminded to wipe self after using BR and directed back to bed. Bed exit alarm armed.

## 2021-01-23 NOTE — PLAN OF CARE
Problem: Altered Mood, Depressive Behavior:  Goal: Able to verbalize acceptance of life and situations over which he or she has no control  Description: Able to verbalize acceptance of life and situations over which he or she has no control  Outcome: Ongoing  Goal: Able to verbalize and/or display a decrease in depressive symptoms  Description: Able to verbalize and/or display a decrease in depressive symptoms  Outcome: Ongoing  Goal: Ability to disclose and discuss suicidal ideas will improve  Description: Ability to disclose and discuss suicidal ideas will improve  Outcome: Ongoing  Goal: Able to verbalize support systems  Description: Able to verbalize support systems  Outcome: Ongoing  Goal: Absence of self-harm  Description: Absence of self-harm  Outcome: Ongoing  Goal: Patient specific goal  Description: Patient specific goal  Outcome: Ongoing  Goal: Participates in care planning  Description: Participates in care planning  Outcome: Ongoing     Problem: Depressive Behavior With or Without Suicide Precautions:  Goal: Able to verbalize acceptance of life and situations over which he or she has no control  Description: Able to verbalize acceptance of life and situations over which he or she has no control  Outcome: Ongoing  Goal: Able to verbalize and/or display a decrease in depressive symptoms  Description: Able to verbalize and/or display a decrease in depressive symptoms  Outcome: Ongoing  Goal: Ability to disclose and discuss suicidal ideas will improve  Description: Ability to disclose and discuss suicidal ideas will improve  Outcome: Ongoing  Goal: Able to verbalize support systems  Description: Able to verbalize support systems  Outcome: Ongoing  Goal: Absence of self-harm  Description: Absence of self-harm  Outcome: Ongoing  Goal: Patient specific goal  Description: Patient specific goal  Outcome: Ongoing  Goal: Participates in care planning  Description: Participates in care planning  Outcome: Ongoing     Problem: Altered Mood, Deterioration in Function:  Goal: Ability to perform activities of daily living will improve  Description: Ability to perform activities of daily living will improve  Outcome: Ongoing  Goal: Able to verbalize reality based thinking  Description: Able to verbalize reality based thinking  Outcome: Ongoing  Goal: Skin appearance normal  Description: Skin appearance normal  Outcome: Ongoing  Goal: Maintenance of adequate nutrition will improve  Description: Maintenance of adequate nutrition will improve  Outcome: Ongoing  Goal: Ability to tolerate increased activity will improve  Description: Ability to tolerate increased activity will improve  Outcome: Ongoing  Goal: Participates in care planning  Description: Participates in care planning  Outcome: Ongoing  Goal: Patient specific goal  Description: Patient specific goal  Outcome: Ongoing     Problem: Risk of Harm:  Goal: Ability to remain free from injury will improve  Description: Ability to remain free from injury will improve  Outcome: Ongoing     Problem: Altered Mood, Manic Behavior:  Goal: Able to sleep  Description: Able to sleep  Outcome: Ongoing  Goal: Able to verbalize decrease in frequency and intensity of racing thoughts  Description: Able to verbalize decrease in frequency and intensity of racing thoughts  Outcome: Ongoing  Goal: Ability to disclose and discuss suicidal ideas will improve  Description: Ability to disclose and discuss suicidal ideas will improve  Outcome: Ongoing  Goal: Absence of self-harm  Description: Absence of self-harm  Outcome: Ongoing  Goal: Ability to achieve adequate nutritional intake will improve  Description: Ability to achieve adequate nutritional intake will improve  Outcome: Ongoing  Goal: Ability to interact with others will improve  Description: Ability to interact with others will improve  Outcome: Ongoing  Goal: Ability to demonstrate self-control will improve  Description: Ability to demonstrate self-control will improve  Outcome: Ongoing  Goal: Mood stable  Description: Mood stable  Outcome: Ongoing

## 2021-01-24 PROCEDURE — 99232 SBSQ HOSP IP/OBS MODERATE 35: CPT | Performed by: PSYCHIATRY & NEUROLOGY

## 2021-01-24 PROCEDURE — 1240000000 HC EMOTIONAL WELLNESS R&B

## 2021-01-24 PROCEDURE — 6370000000 HC RX 637 (ALT 250 FOR IP): Performed by: NURSE PRACTITIONER

## 2021-01-24 PROCEDURE — 6370000000 HC RX 637 (ALT 250 FOR IP): Performed by: INTERNAL MEDICINE

## 2021-01-24 RX ORDER — DIVALPROEX SODIUM 500 MG/1
500 TABLET, EXTENDED RELEASE ORAL DAILY
Status: DISCONTINUED | OUTPATIENT
Start: 2021-01-25 | End: 2021-01-25

## 2021-01-24 RX ADMIN — TRAZODONE HYDROCHLORIDE 100 MG: 100 TABLET ORAL at 21:26

## 2021-01-24 RX ADMIN — DONEPEZIL HYDROCHLORIDE 10 MG: 10 TABLET, FILM COATED ORAL at 21:26

## 2021-01-24 RX ADMIN — FERROUS SULFATE TAB 325 MG (65 MG ELEMENTAL FE) 325 MG: 325 (65 FE) TAB at 17:10

## 2021-01-24 RX ADMIN — FLUTICASONE PROPIONATE 1 SPRAY: 50 SPRAY, METERED NASAL at 07:45

## 2021-01-24 RX ADMIN — METOPROLOL TARTRATE 25 MG: 25 TABLET, FILM COATED ORAL at 08:30

## 2021-01-24 RX ADMIN — MULTIPLE VITAMINS W/ MINERALS TAB 1 TABLET: TAB at 07:46

## 2021-01-24 RX ADMIN — ATORVASTATIN CALCIUM 80 MG: 80 TABLET, FILM COATED ORAL at 21:26

## 2021-01-24 RX ADMIN — METOPROLOL TARTRATE 25 MG: 25 TABLET, FILM COATED ORAL at 21:26

## 2021-01-24 RX ADMIN — METFORMIN HYDROCHLORIDE 500 MG: 500 TABLET ORAL at 07:43

## 2021-01-24 RX ADMIN — METFORMIN HYDROCHLORIDE 500 MG: 500 TABLET ORAL at 17:10

## 2021-01-24 RX ADMIN — FERROUS SULFATE TAB 325 MG (65 MG ELEMENTAL FE) 325 MG: 325 (65 FE) TAB at 07:43

## 2021-01-24 RX ADMIN — DIVALPROEX SODIUM 250 MG: 250 TABLET, EXTENDED RELEASE ORAL at 08:30

## 2021-01-24 RX ADMIN — MEMANTINE 10 MG: 10 TABLET ORAL at 07:47

## 2021-01-24 RX ADMIN — AMLODIPINE BESYLATE 10 MG: 10 TABLET ORAL at 07:44

## 2021-01-24 RX ADMIN — RISPERIDONE 0.5 MG: 0.5 TABLET ORAL at 07:46

## 2021-01-24 RX ADMIN — LISINOPRIL 20 MG: 20 TABLET ORAL at 07:45

## 2021-01-24 RX ADMIN — ONDANSETRON 4 MG: 4 TABLET, ORALLY DISINTEGRATING ORAL at 20:25

## 2021-01-24 NOTE — PLAN OF CARE
Problem: Altered Mood, Depressive Behavior:  Goal: Able to verbalize acceptance of life and situations over which he or she has no control  Description: Able to verbalize acceptance of life and situations over which he or she has no control  1/24/2021 0920 by Karlee Duncan RN  Outcome: Ongoing  1/23/2021 2251 by Kalpesh Portillo RN  Outcome: Ongoing  Goal: Able to verbalize and/or display a decrease in depressive symptoms  Description: Able to verbalize and/or display a decrease in depressive symptoms  1/24/2021 0920 by Karlee Duncan RN  Outcome: Ongoing  1/23/2021 2251 by Kalpesh Portillo RN  Outcome: Ongoing  Goal: Ability to disclose and discuss suicidal ideas will improve  Description: Ability to disclose and discuss suicidal ideas will improve  1/24/2021 0920 by Karlee Duncan RN  Outcome: Ongoing  1/23/2021 2251 by Kalpesh Portillo RN  Outcome: Ongoing  Goal: Able to verbalize support systems  Description: Able to verbalize support systems  1/24/2021 0920 by Karlee Duncan RN  Outcome: Ongoing  1/23/2021 2251 by Kalpesh Portillo RN  Outcome: Ongoing  Goal: Absence of self-harm  Description: Absence of self-harm  1/24/2021 0920 by Karlee Duncan RN  Outcome: Ongoing  1/23/2021 2251 by Kalpesh Portillo RN  Outcome: Ongoing  Goal: Patient specific goal  Description: Patient specific goal  1/24/2021 0920 by Karlee Duncan RN  Outcome: Ongoing  1/23/2021 2251 by Kalpesh Portillo RN  Outcome: Ongoing  Goal: Participates in care planning  Description: Participates in care planning  1/24/2021 0920 by Karlee Duncan RN  Outcome: Ongoing  1/23/2021 2251 by Kalpesh Portillo RN  Outcome: Ongoing     Problem: Depressive Behavior With or Without Suicide Precautions:  Goal: Able to verbalize acceptance of life and situations over which he or she has no control  Description: Able to verbalize acceptance of life and situations over which he or she has no control  1/24/2021 0920 by Karlee Duncan RN  Outcome: Ongoing  1/23/2021 2251 by Mumtaz Portillo RN  Outcome: Ongoing  Goal: Able to verbalize and/or display a decrease in depressive symptoms  Description: Able to verbalize and/or display a decrease in depressive symptoms  1/24/2021 0920 by Sheba Grimes RN  Outcome: Ongoing  1/23/2021 2251 by Mumtaz Portillo RN  Outcome: Ongoing  Goal: Ability to disclose and discuss suicidal ideas will improve  Description: Ability to disclose and discuss suicidal ideas will improve  1/24/2021 0920 by Sheba Grimes RN  Outcome: Ongoing  1/23/2021 2251 by Mumtaz Portillo RN  Outcome: Ongoing  Goal: Able to verbalize support systems  Description: Able to verbalize support systems  1/24/2021 0920 by Sheba Grimes RN  Outcome: Ongoing  1/23/2021 2251 by Mumtaz Portillo RN  Outcome: Ongoing  Goal: Absence of self-harm  Description: Absence of self-harm  1/24/2021 0920 by Sheba Grimes RN  Outcome: Ongoing  1/23/2021 2251 by Mumtaz Portillo RN  Outcome: Ongoing  Goal: Patient specific goal  Description: Patient specific goal  1/24/2021 0920 by Sheba Grimes RN  Outcome: Ongoing  1/23/2021 2251 by Mumtaz Portillo RN  Outcome: Ongoing  Goal: Participates in care planning  Description: Participates in care planning  1/24/2021 0920 by Sheba Grimes RN  Outcome: Ongoing  1/23/2021 2251 by Mumtaz Portillo RN  Outcome: Ongoing     Problem: Altered Mood, Deterioration in Function:  Goal: Ability to perform activities of daily living will improve  Description: Ability to perform activities of daily living will improve  1/24/2021 0920 by Sheba Grimes RN  Outcome: Ongoing  1/23/2021 2251 by Mumtaz Portillo RN  Outcome: Ongoing  Goal: Able to verbalize reality based thinking  Description: Able to verbalize reality based thinking  1/24/2021 0920 by Sheba Grimes RN  Outcome: Ongoing  1/23/2021 2251 by Mumtaz Portillo RN  Outcome: Ongoing  Goal: Skin appearance normal  Description: Skin appearance normal  1/24/2021 0920 by Catie Palencia Griselda Coward, RN  Outcome: Ongoing  1/23/2021 2251 by Jim Esposito RN  Outcome: Ongoing  Goal: Maintenance of adequate nutrition will improve  Description: Maintenance of adequate nutrition will improve  1/24/2021 0920 by Kobe Lau RN  Outcome: Ongoing  1/23/2021 2251 by Jim Esposito RN  Outcome: Ongoing  Goal: Ability to tolerate increased activity will improve  Description: Ability to tolerate increased activity will improve  1/24/2021 0920 by Kobe Lau RN  Outcome: Ongoing  1/23/2021 2251 by Jim Esposito RN  Outcome: Ongoing  Goal: Participates in care planning  Description: Participates in care planning  1/24/2021 0920 by Kobe Lau RN  Outcome: Ongoing  1/23/2021 2251 by Jim Esposito RN  Outcome: Ongoing  Goal: Patient specific goal  Description: Patient specific goal  1/24/2021 0920 by Kobe Lau RN  Outcome: Ongoing  1/23/2021 2251 by Jim Esposito RN  Outcome: Ongoing     Problem: Risk of Harm:  Goal: Ability to remain free from injury will improve  Description: Ability to remain free from injury will improve  1/24/2021 0920 by Kobe Lau RN  Outcome: Ongoing  1/23/2021 2251 by Jim Esposito RN  Outcome: Ongoing     Problem: Altered Mood, Manic Behavior:  Goal: Able to sleep  Description: Able to sleep  1/24/2021 0920 by Kobe Lau RN  Outcome: Ongoing  1/23/2021 2251 by Jim Esposito RN  Outcome: Ongoing  Goal: Able to verbalize decrease in frequency and intensity of racing thoughts  Description: Able to verbalize decrease in frequency and intensity of racing thoughts  1/24/2021 0920 by Kobe Lau RN  Outcome: Ongoing  1/23/2021 2251 by Jim Esposito RN  Outcome: Ongoing  Goal: Ability to disclose and discuss suicidal ideas will improve  Description: Ability to disclose and discuss suicidal ideas will improve  1/24/2021 0920 by Kobe Lau RN  Outcome: Ongoing  1/23/2021 2251 by Jim Esposito RN  Outcome: Ongoing  Goal: Absence of self-harm  Description: Absence of self-harm  1/24/2021 0920 by Karlee Duncan RN  Outcome: Ongoing  1/23/2021 2251 by Kalpesh Portillo RN  Outcome: Ongoing  Goal: Ability to achieve adequate nutritional intake will improve  Description: Ability to achieve adequate nutritional intake will improve  1/24/2021 0920 by Karlee Duncan RN  Outcome: Ongoing  1/23/2021 2251 by Kalpesh Portillo RN  Outcome: Ongoing  Goal: Ability to interact with others will improve  Description: Ability to interact with others will improve  1/24/2021 0920 by Karlee Duncan RN  Outcome: Ongoing  1/23/2021 2251 by Kalpesh Portillo RN  Outcome: Ongoing  Goal: Ability to demonstrate self-control will improve  Description: Ability to demonstrate self-control will improve  1/24/2021 0920 by Karlee Duncan RN  Outcome: Ongoing  1/23/2021 2251 by Kalpesh Portillo RN  Outcome: Ongoing  Goal: Mood stable  Description: Mood stable  1/24/2021 0920 by Karlee Duncan RN  Outcome: Ongoing  1/23/2021 2251 by Kalpesh Portillo RN  Outcome: Ongoing     Problem: Falls - Risk of:  Goal: Will remain free from falls  Description: Will remain free from falls  1/24/2021 0920 by Karlee Duncan RN  Outcome: Ongoing  1/23/2021 2251 by Kalpesh Portillo RN  Outcome: Ongoing  Goal: Absence of physical injury  Description: Absence of physical injury  1/24/2021 0920 by Karlee Duncan RN  Outcome: Ongoing  1/23/2021 2251 by Kalpesh Portillo RN  Outcome: Ongoing

## 2021-01-24 NOTE — PLAN OF CARE
Problem: Altered Mood, Depressive Behavior:  Goal: Able to verbalize acceptance of life and situations over which he or she has no control  Description: Able to verbalize acceptance of life and situations over which he or she has no control  1/23/2021 2251 by Loan Keller RN  Outcome: Ongoing  1/23/2021 1431 by Aggie Sanchez RN  Outcome: Ongoing  Goal: Able to verbalize and/or display a decrease in depressive symptoms  Description: Able to verbalize and/or display a decrease in depressive symptoms  1/23/2021 2251 by Loan Keller RN  Outcome: Ongoing  1/23/2021 1431 by Aggie Sanchez RN  Outcome: Ongoing  Goal: Ability to disclose and discuss suicidal ideas will improve  Description: Ability to disclose and discuss suicidal ideas will improve  1/23/2021 2251 by Loan Keller RN  Outcome: Ongoing  1/23/2021 1431 by Aggie Sanchez RN  Outcome: Ongoing  Goal: Able to verbalize support systems  Description: Able to verbalize support systems  1/23/2021 2251 by Loan Keller RN  Outcome: Ongoing  1/23/2021 1431 by Aggie Sanchez RN  Outcome: Ongoing  Goal: Absence of self-harm  Description: Absence of self-harm  1/23/2021 2251 by Loan Keller RN  Outcome: Ongoing  1/23/2021 1431 by Aggie Sanchez RN  Outcome: Ongoing  Goal: Patient specific goal  Description: Patient specific goal  1/23/2021 2251 by Loan Keller RN  Outcome: Ongoing  1/23/2021 1431 by Aggie Sanchez RN  Outcome: Ongoing  Goal: Participates in care planning  Description: Participates in care planning  1/23/2021 2251 by Loan Keller RN  Outcome: Ongoing  1/23/2021 1431 by Aggie Sanchez RN  Outcome: Ongoing     Problem: Depressive Behavior With or Without Suicide Precautions:  Goal: Able to verbalize acceptance of life and situations over which he or she has no control  Description: Able to verbalize acceptance of life and situations over which he or she has no control  1/23/2021 2251 by Loan Keller RN  Outcome: Kashif De Los Santos RN  Outcome: Ongoing  1/23/2021 1431 by Mary Denis RN  Outcome: Ongoing  Goal: Maintenance of adequate nutrition will improve  Description: Maintenance of adequate nutrition will improve  1/23/2021 2251 by Edel Waldron RN  Outcome: Ongoing  1/23/2021 1431 by Mary Denis RN  Outcome: Ongoing  Goal: Ability to tolerate increased activity will improve  Description: Ability to tolerate increased activity will improve  1/23/2021 2251 by Edel Waldron RN  Outcome: Ongoing  1/23/2021 1431 by Mary Denis RN  Outcome: Ongoing  Goal: Participates in care planning  Description: Participates in care planning  1/23/2021 2251 by Edel Waldron RN  Outcome: Ongoing  1/23/2021 1431 by Mary Denis RN  Outcome: Ongoing  Goal: Patient specific goal  Description: Patient specific goal  1/23/2021 2251 by Edel Waldron RN  Outcome: Ongoing  1/23/2021 1431 by Mary Denis RN  Outcome: Ongoing     Problem: Risk of Harm:  Goal: Ability to remain free from injury will improve  Description: Ability to remain free from injury will improve  1/23/2021 2251 by Edel Waldron RN  Outcome: Ongoing  1/23/2021 1431 by Mary Denis RN  Outcome: Ongoing     Problem: Altered Mood, Manic Behavior:  Goal: Able to sleep  Description: Able to sleep  1/23/2021 2251 by Eedl Waldron RN  Outcome: Ongoing  1/23/2021 1431 by Mary Denis RN  Outcome: Ongoing  Goal: Able to verbalize decrease in frequency and intensity of racing thoughts  Description: Able to verbalize decrease in frequency and intensity of racing thoughts  1/23/2021 2251 by Edel Waldron RN  Outcome: Ongoing  1/23/2021 1431 by Mary Denis RN  Outcome: Ongoing  Goal: Ability to disclose and discuss suicidal ideas will improve  Description: Ability to disclose and discuss suicidal ideas will improve  1/23/2021 2251 by Edel Waldron RN  Outcome: Ongoing  1/23/2021 1431 by Mary Denis RN  Outcome: Ongoing  Goal: Absence of

## 2021-01-24 NOTE — PROGRESS NOTES
Progress Note Psychiatry    Paras Vides  [id number]  1/21/2021 01/24/21    ID: Patient is a 65 yrs y.o. female    CC: \"I stopped my meds. \"    HPI: Paras Vides is a 65year old female with PMHx of HTN, HLD,dementia (Alzemimers) with behavioral disturbances, delusional disorder and medication non compliance. She is well known to [facility] related to several admissions for same. She presents to [place] ED via police after found wandering. Her sister called them when she was unable to locate the patient. Patient has not been taking her medications and has an increase in her delusions and paranoia. Patient admitted to [address] [place] [address]  Per Adolfo Pereira RN on [facility], patient has not been taking any medications from [facility] for three months.     During today's interview she was alert & oriented x 3. Pt denied any thoughts to harm herself or anyone else. Pt Denied any auditory or visual Hallucinations. She denies depression and anxiety. She states she is sleeping \"very good\" and that her appetite is \"very good. \"Per staff she continues to not exhibit behaviors and has been med compliant on unit. Pt continues to display occasional bizarre delusions and confusion. Continues to note fixed delusions and is fixated on them. Pt is unable to understand her diagnosis and treatment plan. Pt noted she currently feels safe and comfortable on the unit.  Pt was in agreement with treatment team.  Pt was polite and cordial during the interview process. Has been taking medications and has been compliant with treatment.       Pt denied any hx of seizures, Hep C or HIV  Pt notes history of TBI  No TD noted, AIMS=0    Past Psychiatric History:   See note above    Family Psychiatric History:   See note above    Family Psychiatric History:   Family History   Problem Relation Age of Onset    No Known Problems Mother     No Known Problems Sister     Heart Disease Brother     Coronary Art Dis Brother         stents    No Known Problems Sister     No Known Problems Sister     Alcohol Abuse Brother         Allergies: Allergies   Allergen Reactions    Shellfish-Derived Products     Floxin [Ofloxacin] Other (See Comments)     Made her \"feel weird\"        OBJECTIVE  Vital Signs:  Vitals:    01/24/21 0744   BP: (!) 167/87   Pulse: 93   Resp: 16   Temp: 97.8 °F (36.6 °C)   SpO2: 98%       Labs:  Recent Results (from the past 48 hour(s))   Lipid, Fasting    Collection Time: 01/23/21 10:45 AM   Result Value Ref Range    Triglyceride, Fasting 113 <150 MG/DL    Cholesterol, Fasting 300 (H) <200 MG/DL    HDL 88 >40 MG/DL    LDL Direct 200 (H) <100 MG/DL   Vitamin D 25 hydroxy    Collection Time: 01/23/21 10:45 AM   Result Value Ref Range    Vit D, 25-Hydroxy 23.92 >20 NG/ML   Vitamin B12 & folate    Collection Time: 01/23/21 10:45 AM   Result Value Ref Range    Vitamin B-12 376.5 211 - 911 pg/ml    Folate 15.9 3.1 - 17.5 NG/ML   Hemoglobin A1c    Collection Time: 01/23/21 10:50 AM   Result Value Ref Range    Hemoglobin A1C 6.4 (H) 4.2 - 6.3 %    eAG 137 mg/dL       Review of Systems:  Reports of no current cardiovascular, respiratory, gastrointestinal, genitourinary, integumentary, neurological, musculoskeletal, or immunological symptoms today. PSYCHIATRIC: See HPI above.          PSYCHIATRIC EXAMINATION / MENTAL STATUS EXAM    CONSTITUTIONAL:    Vitals:   Vitals:    01/24/21 0744   BP: (!) 167/87   Pulse: 93   Resp: 16   Temp: 97.8 °F (36.6 °C)   SpO2: 98%      General appearance: [x] appears age, []  appears older than stated age,               [x]  adequately dressed and groomed, [] disheveled,               [x]  in no acute distress, [] appears mildly distressed, [] other           MUSCULOSKELETAL:   Gait:   [x] normal, [] antalgic, [] unsteady, [] gait not evaluated   Station:             [] erect, [] sitting, [] recumbent, [] other        Strength/tone:  [x] muscle strength and tone appear consistent with age and condition     [] atrophy environment. 9. Status of problem/condition: Improving  10. Medical co-morbidities: Management per [facility] group, appreciate assistance  11. Legal Status: Voluntary  12. The treatment team reviewed with the patient the diagnosis and treatment recommendations to include the risks, benefits, and side effects of chosen medications. 13. The patient verbalized understanding and agreed with the treatment regimen as outlined above. 14. The patient was encouraged to participate in groups. 15. Medical records, Labs, Diagnotic tests reviewed  16. q15 min safety checks for safety  17. Interval History. 18. Review current labs  19. Continue current medications increase depakote ER to 500 mg PO QHS for mood  20. Supportive Therapy Provided  21. Pt had an opportunity to ask questions and address concerns  22. Pt encouraged to continue therapy group or individual.  23. Pt was in agreement with treatment plan. 24. The risks benefits and side effects of medications were discussed with the patient, including alternatives and no treatment.     Electronically signed by Roderick Reyes DO on 1/24/2021 at 10:20 AM

## 2021-01-24 NOTE — PROGRESS NOTES
Pt was compliant with meds, had a small snack and then c/o nausea. Pt stood up from table and was unsteady attempting to rush to bathroom. Staff assisted Pt to ambulate to room and to BR. Pt had small amount of loose stool. Pt assisted to bed and zofran given po per orders. Hospitalist notified of episode of nausea and loose stool. VS 97.2, 169/84, 91, 18, 97% RA. Medication was effective and Pt now resting in bed eyes closed.

## 2021-01-25 ENCOUNTER — APPOINTMENT (OUTPATIENT)
Dept: CT IMAGING | Age: 71
DRG: 057 | End: 2021-01-25
Payer: MEDICARE

## 2021-01-25 LAB
ANION GAP SERPL CALCULATED.3IONS-SCNC: 6 MMOL/L (ref 4–16)
BACTERIA: ABNORMAL /HPF
BASOPHILS ABSOLUTE: 0.1 K/CU MM
BASOPHILS RELATIVE PERCENT: 0.5 % (ref 0–1)
BILIRUBIN URINE: NEGATIVE MG/DL
BLOOD, URINE: NEGATIVE
BUN BLDV-MCNC: 19 MG/DL (ref 6–23)
CALCIUM SERPL-MCNC: 10 MG/DL (ref 8.3–10.6)
CAST TYPE: ABNORMAL /HPF
CHLORIDE BLD-SCNC: 97 MMOL/L (ref 99–110)
CLARITY: CLEAR
CO2: 34 MMOL/L (ref 21–32)
COLOR: YELLOW
CREAT SERPL-MCNC: 1.2 MG/DL (ref 0.6–1.1)
CRYSTAL TYPE: ABNORMAL /HPF
DIFFERENTIAL TYPE: ABNORMAL
EOSINOPHILS ABSOLUTE: 0.1 K/CU MM
EOSINOPHILS RELATIVE PERCENT: 1.3 % (ref 0–3)
EPITHELIAL CELLS, UA: ABNORMAL /HPF
GFR AFRICAN AMERICAN: 53 ML/MIN/1.73M2
GFR NON-AFRICAN AMERICAN: 44 ML/MIN/1.73M2
GLUCOSE BLD-MCNC: 115 MG/DL (ref 70–99)
GLUCOSE, URINE: NEGATIVE MG/DL
HCT VFR BLD CALC: 40.7 % (ref 37–47)
HEMOCCULT STL QL: NEGATIVE
HEMOGLOBIN: 13.7 GM/DL (ref 12.5–16)
IMMATURE NEUTROPHIL %: 0.3 % (ref 0–0.43)
KETONES, URINE: ABNORMAL MG/DL
LEUKOCYTE ESTERASE, URINE: ABNORMAL
LYMPHOCYTES ABSOLUTE: 1.8 K/CU MM
LYMPHOCYTES RELATIVE PERCENT: 16.9 % (ref 24–44)
MCH RBC QN AUTO: 32.4 PG (ref 27–31)
MCHC RBC AUTO-ENTMCNC: 33.7 % (ref 32–36)
MCV RBC AUTO: 96.2 FL (ref 78–100)
MONOCYTES ABSOLUTE: 0.9 K/CU MM
MONOCYTES RELATIVE PERCENT: 8.3 % (ref 0–4)
MUCUS: ABNORMAL HPF
NITRITE URINE, QUANTITATIVE: NEGATIVE
PDW BLD-RTO: 11.4 % (ref 11.7–14.9)
PH, URINE: 5.5 (ref 5–8)
PLATELET # BLD: 242 K/CU MM (ref 140–440)
PMV BLD AUTO: 10.4 FL (ref 7.5–11.1)
POTASSIUM SERPL-SCNC: 4.3 MMOL/L (ref 3.5–5.1)
PROTEIN UA: 30 MG/DL
RBC # BLD: 4.23 M/CU MM (ref 4.2–5.4)
RBC URINE: ABNORMAL /HPF (ref 0–6)
SEGMENTED NEUTROPHILS ABSOLUTE COUNT: 7.6 K/CU MM
SEGMENTED NEUTROPHILS RELATIVE PERCENT: 72.7 % (ref 36–66)
SODIUM BLD-SCNC: 137 MMOL/L (ref 135–145)
SPECIFIC GRAVITY UA: 1.03 (ref 1–1.03)
TOTAL IMMATURE NEUTOROPHIL: 0.03 K/CU MM
UROBILINOGEN, URINE: 0.2 MG/DL (ref 0.2–1)
VOLUME, (UVOL): 12 ML (ref 10–12)
WBC # BLD: 10.4 K/CU MM (ref 4–10.5)
WBC UA: ABNORMAL /HPF (ref 0–5)

## 2021-01-25 PROCEDURE — 36415 COLL VENOUS BLD VENIPUNCTURE: CPT

## 2021-01-25 PROCEDURE — 6370000000 HC RX 637 (ALT 250 FOR IP): Performed by: INTERNAL MEDICINE

## 2021-01-25 PROCEDURE — 80048 BASIC METABOLIC PNL TOTAL CA: CPT

## 2021-01-25 PROCEDURE — 6370000000 HC RX 637 (ALT 250 FOR IP): Performed by: PSYCHIATRY & NEUROLOGY

## 2021-01-25 PROCEDURE — 87324 CLOSTRIDIUM AG IA: CPT

## 2021-01-25 PROCEDURE — 85025 COMPLETE CBC W/AUTO DIFF WBC: CPT

## 2021-01-25 PROCEDURE — 81001 URINALYSIS AUTO W/SCOPE: CPT

## 2021-01-25 PROCEDURE — 1240000000 HC EMOTIONAL WELLNESS R&B

## 2021-01-25 PROCEDURE — 82272 OCCULT BLD FECES 1-3 TESTS: CPT

## 2021-01-25 PROCEDURE — 99232 SBSQ HOSP IP/OBS MODERATE 35: CPT | Performed by: NURSE PRACTITIONER

## 2021-01-25 PROCEDURE — 6370000000 HC RX 637 (ALT 250 FOR IP): Performed by: NURSE PRACTITIONER

## 2021-01-25 PROCEDURE — 70450 CT HEAD/BRAIN W/O DYE: CPT

## 2021-01-25 PROCEDURE — 6360000002 HC RX W HCPCS: Performed by: NURSE PRACTITIONER

## 2021-01-25 RX ORDER — RISPERIDONE 1 MG/1
1 TABLET, FILM COATED ORAL DAILY
Status: DISCONTINUED | OUTPATIENT
Start: 2021-01-26 | End: 2021-01-26 | Stop reason: DRUGHIGH

## 2021-01-25 RX ORDER — SUCRALFATE 1 G/1
1 TABLET ORAL EVERY 6 HOURS SCHEDULED
Status: DISCONTINUED | OUTPATIENT
Start: 2021-01-25 | End: 2021-01-26

## 2021-01-25 RX ORDER — CALCIUM CARBONATE 200(500)MG
500 TABLET,CHEWABLE ORAL 3 TIMES DAILY PRN
Status: DISCONTINUED | OUTPATIENT
Start: 2021-01-25 | End: 2021-02-11 | Stop reason: HOSPADM

## 2021-01-25 RX ORDER — PANTOPRAZOLE SODIUM 40 MG/1
80 TABLET, DELAYED RELEASE ORAL 2 TIMES DAILY
Status: DISCONTINUED | OUTPATIENT
Start: 2021-01-25 | End: 2021-02-09 | Stop reason: ALTCHOICE

## 2021-01-25 RX ORDER — DIVALPROEX SODIUM 125 MG/1
500 CAPSULE, COATED PELLETS ORAL NIGHTLY
Status: DISCONTINUED | OUTPATIENT
Start: 2021-01-26 | End: 2021-01-27

## 2021-01-25 RX ORDER — RISPERIDONE 0.5 MG/1
0.5 TABLET, FILM COATED ORAL ONCE
Status: COMPLETED | OUTPATIENT
Start: 2021-01-25 | End: 2021-01-25

## 2021-01-25 RX ADMIN — HALOPERIDOL LACTATE 5 MG: 5 INJECTION, SOLUTION INTRAMUSCULAR at 17:10

## 2021-01-25 RX ADMIN — LORAZEPAM 1 MG: 1 TABLET ORAL at 01:37

## 2021-01-25 RX ADMIN — HALOPERIDOL 5 MG: 5 TABLET ORAL at 01:36

## 2021-01-25 RX ADMIN — PANTOPRAZOLE SODIUM 80 MG: 40 TABLET, DELAYED RELEASE ORAL at 11:40

## 2021-01-25 RX ADMIN — LISINOPRIL 20 MG: 20 TABLET ORAL at 09:46

## 2021-01-25 RX ADMIN — FLUTICASONE PROPIONATE 1 SPRAY: 50 SPRAY, METERED NASAL at 09:46

## 2021-01-25 RX ADMIN — DIPHENHYDRAMINE HYDROCHLORIDE 25 MG: 50 INJECTION, SOLUTION INTRAMUSCULAR; INTRAVENOUS at 17:10

## 2021-01-25 RX ADMIN — DIPHENHYDRAMINE HYDROCHLORIDE 25 MG: 25 CAPSULE ORAL at 01:37

## 2021-01-25 RX ADMIN — CALCIUM CARBONATE 500 MG: 500 TABLET, CHEWABLE ORAL at 00:17

## 2021-01-25 RX ADMIN — LORAZEPAM 1 MG: 2 INJECTION INTRAMUSCULAR; INTRAVENOUS at 17:10

## 2021-01-25 RX ADMIN — SUCRALFATE 1 G: 1 TABLET ORAL at 11:40

## 2021-01-25 RX ADMIN — MEMANTINE 10 MG: 10 TABLET ORAL at 09:46

## 2021-01-25 RX ADMIN — RISPERIDONE 0.5 MG: 0.5 TABLET ORAL at 09:45

## 2021-01-25 RX ADMIN — RISPERIDONE 0.5 MG: 0.5 TABLET ORAL at 11:40

## 2021-01-25 RX ADMIN — AMLODIPINE BESYLATE 10 MG: 10 TABLET ORAL at 09:46

## 2021-01-25 RX ADMIN — METOPROLOL TARTRATE 25 MG: 25 TABLET, FILM COATED ORAL at 09:46

## 2021-01-25 RX ADMIN — METFORMIN HYDROCHLORIDE 500 MG: 500 TABLET ORAL at 09:45

## 2021-01-25 RX ADMIN — DIVALPROEX SODIUM 500 MG: 500 TABLET, EXTENDED RELEASE ORAL at 09:46

## 2021-01-25 RX ADMIN — MULTIPLE VITAMINS W/ MINERALS TAB 1 TABLET: TAB at 09:46

## 2021-01-25 NOTE — PROGRESS NOTES
Assessment completed. Pt is alert and oriented to person only. Pt reports up all night - she slept from 4 am to 9 am.  Denies depression and anxiety. Very confused this morning and unsteady on her feet. Pt denies auditory or visual hallucinations. Pt denies delusions. Pt denies SI. Pt denies HI.      1030 Pt with 3 large BMs overnight of diarrhea. Pt was also incontinent of stool this morning. Pt was showered and this BM was discussed with NP.  NP ordered CDiff testing and BMP. Awaiting results. 1330 Fecal occult negative, no change in Hemoglobin. Pt remains very confused, seems to be hallucinating. She appears startled at times, like she is seeing something that is threatening to her. During lunch it was noted that she is pocketing food and not swallowing. She was changed to puree diet and speech was consulted. She continues to be very confused and has difficulty following direction. 1515 Pt continues to be confused. She is still startling in the chair with no obvious stimulus. Rec therapist is sitting with patient as she is requiring one on one care at this time. [address] [facility] care completed with devin wipes - UA obtained. 1750 Pt became very agitated and aggressive. She is pushing on doors and trying to get out of the unit. She tried to knock her sitter down on the floor. She is punching staff. She is yelling, walking around the unit, telling patients to call 911. RN called NP to notify her. RN did give patient 25 mg benadryl, 5 mg haldol and 1 mg ativan IM. 1800 New order for STAT head CT due to acute mental status changes. Lance Allred NP also ordered CBC, BMP, ammonia and B12 for AM.      1830 UA resulted. Dr. Nicole villanueva. Awaiting response.

## 2021-01-25 NOTE — GROUP NOTE
Group Therapy Note    Date: 1/25/2021    Group Start Time: 0830  Group End Time: 0900     Number of Participants: 5/7    Type: Morning Goals Group/ Community Meeting    Group Topic/Objective: Set Goal For The Day and to review Unit Rules and Regulations.     Notes:  Pt resting and per discussion with nurse, pt allowed to continue to rest.     Discipline Responsible: Certified Therapeutic Recreation Specialist     Electronically signed by Claude Heck [address] [place] [address] on 1/25/2021 at 9:32 AM

## 2021-01-25 NOTE — BH NOTE
Pt noted to have had an episode of diarrhea at approximately 2330. She had soiled her red sweatpants and her bedding. After cleaning her up and laying her back in bed. 5 minutes later she was running into the hallway stating that it was happening again. Pt was directed into the bathroom, cleaned up and assisted back into bed. Pt then stated she needed to throw up. Pt was given an emesis basin where she proceeded to dry heave. No emesis noted. Pt also had some belching. She stated that she did not feel well and requested  an \"antacid. \"  Will continue to monitor pt.     0156 Assisted pt to bed. Pt was having hallucinations that there was \"water coming in\" and that we needed to \"get out or we were going to drown. \" She kept looking at  the door and stated that she wanted to go \"inside. \" Pt would not stay in bed even after offering to the pt that this nurse would \"sit\" with her until she fell asleep. Pt insisted on getting up. Pt went out onto the unit and proceeded to exit seek.

## 2021-01-25 NOTE — PLAN OF CARE
[address] [place] [address]  Day 3 Interdisciplinary Treatment Plan NOTE    Review Date & Time: 01/25 0830    Admission Type:   Admission Type: Voluntary    Reason for admission:  Reason for Admission: Brought by police after leaving home without a coat or shoes.     Patient Diagnosis: Major neurocognitive disorder due to alzheimer's disease, with behavioral disturbance    PATIENT STRENGTHS:  Patient Strengths Strengths: No significant Physical Illness  Patient Strengths and Limitations:Limitations: Difficult relationships / poor social skills, Unrealistic self-view  Addictive Behavior:Addictive Behavior  In the past 3 months, have you felt or has someone told you that you have a problem with:  : None  Do you have a history of Chemical Use?: No  Do you have a history of Alcohol Use?: No  Do you have a history of Street Drug Abuse?: No  Histroy of Prescripton Drug Abuse?: No  Medical Problems:  Past Medical History:   Diagnosis Date    [facility] disease ([facility] [address]     Bronchitis     CAD (coronary artery disease)     Chest wall trauma     COPD [facility]ronic obstructive pulmonary disease) ([facility]     FH: CAD (coronary artery disease)     brother with cabg in his 45s    Hypertension     Kidney stone     Lumbar herniated disc     Restless leg syndrome     Spinal stenosis        Risk:  Fall Risk Total: 118  Kash Scale Kash Scale Score: 20  BVC Total: 1      Status EXAM:   Status and Exam  Normal: No  Facial Expression: Worried  Affect: Constricted  Level of Consciousness: Confused  Mood:Normal: No  Mood: Anxious, Sad  Motor Activity:Normal: No  Motor Activity: Increased  Interview Behavior: Cooperative  Preception: Gregory to Person  Attention:Normal: No  Attention: Distractible  Thought Processes: Tangential  Thought Content:Normal: No  Thought Content: Delusions, Obsessions  Hallucinations: Visual (Comment)  Delusions: Yes  Delusions: Obsessions  Memory:Normal: No  Memory: Poor Recent  Insight and Judgment:

## 2021-01-25 NOTE — GROUP NOTE
Group Therapy Note    Date: 1/25/2021    Group Start Time: 6326  Group End Time: 7844  Group Topic: Psychotherapy    [address]    KASEY William        Group Therapy Note    Attendees: 7/7         Notes:    Patient participated in group discussion about anxiety and coping skills.       Status After Intervention:  Unchanged    Participation Level: Minimal    Participation Quality: Sharing      Speech:  normal      Thought Process/Content: Logical      Affective Functioning: Congruent      Mood: euthymic      Level of consciousness:  Drowsy      Response to Learning: Able to verbalize current knowledge/experience      Endings: None Reported    Modes of Intervention: Education, Support, Socialization and Exploration      Discipline Responsible: /Counselor      Signature:  KASEY Solis

## 2021-01-25 NOTE — BH NOTE
Kyara Gurrola was visible on the milieu. She rested in bed for a short time. She was given PRN Zofran approximately an hour before HS medication as she has been nauseous recently after taking medication. A short time later she had a small snack and denied feeling nauseous. She did have 3 bouts of diarrhea but was unable to obtain a specimen to check for C-diff. Pt requested antacid. DEVAN Bello was called and she ordered Tums and ordered C-diff testing. Pt was taken to the shower after third episode of diarrhea. She had difficulty following directions to get under the water and did not know how to wash herself. This writer helped her to start and then pt was able to complete the washing of her body. Kyara Gurrola was having hallucinations tonight. She thought a wall was moving. Thought it was raining and there was flood matta coming in her room. Stated that we needed to get out of this house, she was reminded that it was a hospital. She was demanding that she leave. Then stated that she could see the rain coming down and wanted to see it in the window of the nurses station. She was demending to call her sister to come get her. She was not being redirectable. Trying to push open the unit door and demanding the key. She stated that she could see an ambulance rescuing people a few minutes later she said that the people were here with chain saws. She was asked if she could hear the chain saws and she said no. She was in the kee an attempting to take off her clothes. She was anxious, oppositional, confused, and hallucinating. PRN haldol, ativan and benadryl was given by mouth. Staff sat with pt in her room but pt not cooperating and kept getting out of bed.

## 2021-01-25 NOTE — GROUP NOTE
Group Therapy Note    Date: 1/25/2021    Group Start Time: 3826  Group End Time: 6857  Group Topic: Psychoeducation    [address] [place], [place]        Group Therapy Note    Attendees: 5/7       Notes:  Pts participated in recreational therapy group; Getting To Know You. Pts and therapist utilized various activities to reminisce about their past and share stories. Purpose was to encourage positive thinking and socialization. Pt attended group, but unable to participate appropriately. Pt hallucinating believing the floor is unbalanced and therefore needs to fling herself one direction or another to re-balance the floor. Pt at one point quickly stood up and started to fall taking the chair with her. Pt able to be stopped with assistance from therapist.  Pt's nurse and NP came into room when heard therapist shouting and were informed. Pt also at times believing that something was attacking her and she would start throwing herself out of the chair as well. Pt not accepting of reality orientation or prompting to focus on group. Status After Intervention:  Unchanged    Participation Level: Monopolizing    Participation Quality: Inappropriate      Speech:  normal      Thought Process/Content: Delusional  Hallucinating      Affective Functioning: Blunted      Mood: Blunted      Level of consciousness:  Preoccupied and Inattentive      Response to Learning: Pt unable to demonstrate response to learning at this time.        Endings: None Reported    Modes of Intervention: Support, Socialization, Exploration and Activity      Discipline Responsible: Certified Therapeutic Recreation Specialist       Signature:  Amaris Curry [place]

## 2021-01-25 NOTE — GROUP NOTE
Group Therapy Note    Date: 1/25/2021    Group Start Time: 5902  Group End Time: 1600    Number of Participants: 6/7    Type: Exercise/Recreation Group    Group Topic/Objective: Chair Exercises    Notes:  Pt attended group, but did not participate. Pt did not respond to prompting/encouragement. Pt required Max prompting to not jump out of chair d/t unable to maintain balance. Status After Intervention:  Unchanged    Participation Level: None    Participation Quality: Inappropriate and Resistant    Speech:  pressured    Thought Process/Content: Delusional  Hallucinating  Confused    Affective Functioning: Exaggerated    Mood: elevated    Level of consciousness:  Preoccupied and Inattentive    Response to Learning: Pt unable to demonstrate response to learning at this time.      Endings: None Reported    Modes of Intervention: Activity and Movement    Discipline Responsible: Certified Therapeutic Recreation Specialist     Electronically signed by Gae Mcardle, Hon[place], [place] on 1/26/2021 at 9:29 AM

## 2021-01-25 NOTE — PROGRESS NOTES
Progress Note Psychiatry    Tutu Ndiaye  [id number]  1/21/2021 01/25/21    ID: Patient is a 65 yrs y.o. female    CC: \"I stopped my meds. \"    HPI: Tutu Ndiaye is a 65year old female with PMHx of HTN, HLD,dementia (Alzemimers) with behavioral disturbances, delusional disorder and medication non compliance. She is well known to [facility] related to several admissions for same. She presents to [facility] ED via police after found wandering. Her sister called them when she was unable to locate the patient. Patient has not been taking her medications and has an increase in her delusions and paranoia. Patient admitted to [address] [place] [address]  Per Marta Mccurdy RN on SBU, patient has not been taking any medications from [facility] for three months.     During today's interview she was alert & oriented x 1. Pt denied any thoughts to harm herself or anyone else. Pt Denied any auditory or visual Hallucinations but per staff, patient has been having visual hallucinations. She denies depression and anxiety. She states she is sleeping \"very good\" and that her appetite is \"very good. \" Per staff, patient slept 3 hours and has been having multiple boughts of diarhea. Labs ordered and hospitalist consulted. Per staff she continues to not exhibit behaviors and has been med compliant on unit. Pt continues to display occasional bizarre delusions and confusion. Continues to note fixed delusions and is fixated on them. Pt is unable to understand her diagnosis and treatment plan. Pt noted she currently feels safe and comfortable on the unit.  Pt was in agreement with treatment team.  Pt was polite and cordial during the interview process. Has been taking medications and has been compliant with treatment.       Pt denied any hx of seizures, Hep C or HIV  Pt notes history of TBI  No TD noted, AIMS=0    Past Psychiatric History:   See note above    Family Psychiatric History:   See note above    Family Psychiatric History: Family History   Problem Relation Age of Onset    No Known Problems Mother     No Known Problems Sister     Heart Disease Brother     Coronary Art Dis Brother         stents    No Known Problems Sister     No Known Problems Sister     Alcohol Abuse Brother         Allergies: Allergies   Allergen Reactions    Shellfish-Derived Products     Floxin [Ofloxacin] Other (See Comments)     Made her \"feel weird\"        OBJECTIVE  Vital Signs:  Vitals:    01/25/21 0815   BP: 139/73   Pulse: 93   Resp: 18   Temp: 96.9 °F (36.1 °C)   SpO2: 98%       Labs:  Recent Results (from the past 48 hour(s))   CBC auto differential    Collection Time: 01/25/21  9:45 AM   Result Value Ref Range    WBC 10.4 4.0 - 10.5 K/CU MM    RBC 4.23 4.2 - 5.4 M/CU MM    Hemoglobin 13.7 12.5 - 16.0 GM/DL    Hematocrit 40.7 37 - 47 %    MCV 96.2 78 - 100 FL    MCH 32.4 (H) 27 - 31 PG    MCHC 33.7 32.0 - 36.0 %    RDW 11.4 (L) 11.7 - 14.9 %    Platelets 355 146 - 620 K/CU MM    MPV 10.4 7.5 - 11.1 FL    Differential Type AUTOMATED DIFFERENTIAL     Segs Relative 72.7 (H) 36 - 66 %    Lymphocytes % 16.9 (L) 24 - 44 %    Monocytes % 8.3 (H) 0 - 4 %    Eosinophils % 1.3 0 - 3 %    Basophils % 0.5 0 - 1 %    Segs Absolute 7.6 K/CU MM    Lymphocytes Absolute 1.8 K/CU MM    Monocytes Absolute 0.9 K/CU MM    Eosinophils Absolute 0.1 K/CU MM    Basophils Absolute 0.1 K/CU MM    Immature Neutrophil % 0.3 0 - 0.43 %    Total Immature Neutrophil 0.03 K/CU MM       Review of Systems:  Reports of no current cardiovascular, respiratory, gastrointestinal, genitourinary, integumentary, neurological, musculoskeletal, or immunological symptoms today. PSYCHIATRIC: See HPI above.          PSYCHIATRIC EXAMINATION / MENTAL STATUS EXAM    CONSTITUTIONAL:    Vitals:   Vitals:    01/25/21 0815   BP: 139/73   Pulse: 93   Resp: 18   Temp: 96.9 °F (36.1 °C)   SpO2: 98%      General appearance: [x] appears age, []  appears older than stated age,               [] tolerated  5. Reviewed treatment plan with patient including medication risks, benefits, side effects. Obtained informed consent for treatment. 6. Anticipated length of stay 10-12 days  7. I certify that inpatient psychiatric hospital admission is medically necessary for treatment, which can be expected to improve the patient's condition, and/or for diagnostic study. 8. Psychiatric management:medication initiation and titration, group and individual therapy, safe and therapeutic environment. 9. Status of problem/condition: Improving  10. Medical co-morbidities: Management per [facility] group, appreciate assistance  11. Legal Status: Voluntary  12. The treatment team reviewed with the patient the diagnosis and treatment recommendations to include the risks, benefits, and side effects of chosen medications. 13. The patient verbalized understanding and agreed with the treatment regimen as outlined above. 14. The patient was encouraged to participate in groups. 15. Medical records, Labs, Diagnotic tests reviewed  16. q15 min safety checks for safety  17. Interval History. 18. Review current labs- requested cbc, c diff and fecal occult  19. Continue current medications increase depakote   500 mg PO sprinkles QHS for mood, increased risperidone 1.0 mg po qhs.  20. Supportive Therapy Provided  21. Pt had an opportunity to ask questions and address concerns  22. Pt encouraged to continue therapy group or individual.  23. Pt was in agreement with treatment plan. 24. The risks benefits and side effects of medications were discussed with the patient, including alternatives and no treatment.     Electronically signed by EVELYN Cash CNP on 1/25/2021 at 10:53 AM

## 2021-01-26 LAB
AMMONIA: 15 UMOL/L (ref 11–51)
ANION GAP SERPL CALCULATED.3IONS-SCNC: 9 MMOL/L (ref 4–16)
BASOPHILS ABSOLUTE: 0 K/CU MM
BASOPHILS RELATIVE PERCENT: 0.3 % (ref 0–1)
BUN BLDV-MCNC: 24 MG/DL (ref 6–23)
CALCIUM SERPL-MCNC: 9.6 MG/DL (ref 8.3–10.6)
CHLORIDE BLD-SCNC: 100 MMOL/L (ref 99–110)
CO2: 28 MMOL/L (ref 21–32)
CREAT SERPL-MCNC: 1.2 MG/DL (ref 0.6–1.1)
DIFFERENTIAL TYPE: ABNORMAL
EOSINOPHILS ABSOLUTE: 0.1 K/CU MM
EOSINOPHILS RELATIVE PERCENT: 1.5 % (ref 0–3)
FOLATE: 13.4 NG/ML (ref 3.1–17.5)
GFR AFRICAN AMERICAN: 53 ML/MIN/1.73M2
GFR NON-AFRICAN AMERICAN: 44 ML/MIN/1.73M2
GLUCOSE BLD-MCNC: 127 MG/DL (ref 70–99)
HCT VFR BLD CALC: 39.4 % (ref 37–47)
HEMOGLOBIN: 13.3 GM/DL (ref 12.5–16)
IMMATURE NEUTROPHIL %: 0.3 % (ref 0–0.43)
LYMPHOCYTES ABSOLUTE: 1.5 K/CU MM
LYMPHOCYTES RELATIVE PERCENT: 15.9 % (ref 24–44)
MCH RBC QN AUTO: 32.3 PG (ref 27–31)
MCHC RBC AUTO-ENTMCNC: 33.8 % (ref 32–36)
MCV RBC AUTO: 95.6 FL (ref 78–100)
MONOCYTES ABSOLUTE: 0.8 K/CU MM
MONOCYTES RELATIVE PERCENT: 8.6 % (ref 0–4)
PDW BLD-RTO: 11.4 % (ref 11.7–14.9)
PLATELET # BLD: 224 K/CU MM (ref 140–440)
PMV BLD AUTO: 10.7 FL (ref 7.5–11.1)
POTASSIUM SERPL-SCNC: 4.4 MMOL/L (ref 3.5–5.1)
RBC # BLD: 4.12 M/CU MM (ref 4.2–5.4)
SEGMENTED NEUTROPHILS ABSOLUTE COUNT: 7 K/CU MM
SEGMENTED NEUTROPHILS RELATIVE PERCENT: 73.4 % (ref 36–66)
SODIUM BLD-SCNC: 137 MMOL/L (ref 135–145)
TOTAL IMMATURE NEUTOROPHIL: 0.03 K/CU MM
VITAMIN B-12: 420.2 PG/ML (ref 211–911)
WBC # BLD: 9.5 K/CU MM (ref 4–10.5)

## 2021-01-26 PROCEDURE — 6370000000 HC RX 637 (ALT 250 FOR IP): Performed by: INTERNAL MEDICINE

## 2021-01-26 PROCEDURE — 82140 ASSAY OF AMMONIA: CPT

## 2021-01-26 PROCEDURE — 80048 BASIC METABOLIC PNL TOTAL CA: CPT

## 2021-01-26 PROCEDURE — 1240000000 HC EMOTIONAL WELLNESS R&B

## 2021-01-26 PROCEDURE — 99232 SBSQ HOSP IP/OBS MODERATE 35: CPT | Performed by: NURSE PRACTITIONER

## 2021-01-26 PROCEDURE — 6370000000 HC RX 637 (ALT 250 FOR IP): Performed by: NURSE PRACTITIONER

## 2021-01-26 PROCEDURE — 82607 VITAMIN B-12: CPT

## 2021-01-26 PROCEDURE — 82746 ASSAY OF FOLIC ACID SERUM: CPT

## 2021-01-26 PROCEDURE — 36415 COLL VENOUS BLD VENIPUNCTURE: CPT

## 2021-01-26 PROCEDURE — 85025 COMPLETE CBC W/AUTO DIFF WBC: CPT

## 2021-01-26 RX ORDER — LOPERAMIDE HYDROCHLORIDE 2 MG/1
2 CAPSULE ORAL 4 TIMES DAILY PRN
Status: DISCONTINUED | OUTPATIENT
Start: 2021-01-26 | End: 2021-02-09 | Stop reason: ALTCHOICE

## 2021-01-26 RX ORDER — RISPERIDONE 1 MG/1
1 TABLET, FILM COATED ORAL NIGHTLY
Status: DISCONTINUED | OUTPATIENT
Start: 2021-01-26 | End: 2021-01-27

## 2021-01-26 RX ORDER — SUCRALFATE 1 G/1
1 TABLET ORAL
Status: DISCONTINUED | OUTPATIENT
Start: 2021-01-26 | End: 2021-02-11 | Stop reason: HOSPADM

## 2021-01-26 RX ORDER — RISPERIDONE 0.5 MG/1
0.5 TABLET, FILM COATED ORAL DAILY
Status: DISCONTINUED | OUTPATIENT
Start: 2021-01-27 | End: 2021-01-27

## 2021-01-26 RX ADMIN — AMLODIPINE BESYLATE 10 MG: 10 TABLET ORAL at 11:12

## 2021-01-26 RX ADMIN — MULTIPLE VITAMINS W/ MINERALS TAB 1 TABLET: TAB at 11:12

## 2021-01-26 RX ADMIN — PANTOPRAZOLE SODIUM 80 MG: 40 TABLET, DELAYED RELEASE ORAL at 11:13

## 2021-01-26 RX ADMIN — METFORMIN HYDROCHLORIDE 500 MG: 500 TABLET ORAL at 11:12

## 2021-01-26 RX ADMIN — RISPERIDONE 1 MG: 1 TABLET ORAL at 21:33

## 2021-01-26 RX ADMIN — MEMANTINE 10 MG: 10 TABLET ORAL at 11:12

## 2021-01-26 RX ADMIN — LISINOPRIL 20 MG: 20 TABLET ORAL at 11:13

## 2021-01-26 RX ADMIN — METOPROLOL TARTRATE 25 MG: 25 TABLET, FILM COATED ORAL at 11:12

## 2021-01-26 RX ADMIN — SUCRALFATE 1 G: 1 TABLET ORAL at 21:32

## 2021-01-26 RX ADMIN — PANTOPRAZOLE SODIUM 80 MG: 40 TABLET, DELAYED RELEASE ORAL at 21:33

## 2021-01-26 RX ADMIN — TRAZODONE HYDROCHLORIDE 100 MG: 100 TABLET ORAL at 21:33

## 2021-01-26 RX ADMIN — LOPERAMIDE HYDROCHLORIDE 2 MG: 2 CAPSULE ORAL at 11:12

## 2021-01-26 RX ADMIN — SUCRALFATE 1 G: 1 TABLET ORAL at 16:51

## 2021-01-26 RX ADMIN — SUCRALFATE 1 G: 1 TABLET ORAL at 11:12

## 2021-01-26 RX ADMIN — DONEPEZIL HYDROCHLORIDE 10 MG: 10 TABLET, FILM COATED ORAL at 21:33

## 2021-01-26 RX ADMIN — ATORVASTATIN CALCIUM 80 MG: 80 TABLET, FILM COATED ORAL at 21:33

## 2021-01-26 RX ADMIN — RISPERIDONE 1 MG: 1 TABLET ORAL at 11:13

## 2021-01-26 RX ADMIN — METFORMIN HYDROCHLORIDE 500 MG: 500 TABLET ORAL at 16:50

## 2021-01-26 RX ADMIN — FLUTICASONE PROPIONATE 1 SPRAY: 50 SPRAY, METERED NASAL at 11:13

## 2021-01-26 RX ADMIN — DIVALPROEX SODIUM 500 MG: 125 CAPSULE ORAL at 21:32

## 2021-01-26 ASSESSMENT — PAIN SCALES - GENERAL: PAINLEVEL_OUTOF10: 0

## 2021-01-26 NOTE — PROGRESS NOTES
Speech Language Pathology  Michael Gilliland  1950  [id number]      Noted bedside swallowing evaluation ordered and chart reviewed 01/26/21. Spoke with nursing who deferred assessment- pt was recently medicated and not alert enough to participate in PO trials at this time. ST will re-attempt.     Akira Tanner Lucian 85, CCC-SLP, 1/26/2021

## 2021-01-26 NOTE — GROUP NOTE
Group Therapy Note    Date: 1/26/2021    Group Start Time: 2046  Group End Time: 1600    Number of Participants: 4/6    Type: Exercise/Recreation Group    Group Topic/Objective: Chair Exercises    Notes:  Pt attended group, but unable to participate d/t confusion and lethargy. Status After Intervention:  Unchanged    Participation Level: None    Participation Quality: Lethargic    Speech:  normal    Thought Process/Content: Confused    Affective Functioning: Blunted    Mood: Blunted    Level of consciousness:  Drowsy and Inattentive    Response to Learning: Pt unable to demonstrate response to learning at this time.      Endings: None Reported    Modes of Intervention: Activity and Movement    Discipline Responsible: Certified Therapeutic Recreation Specialist     Electronically signed by Berry Dwyer MA on 1/26/2021 at 4:10 PM

## 2021-01-26 NOTE — BH NOTE
Marco A Mejia was sitting with staff 1:1 at the beginning of this shift. She was unsteady as she had been given prn medication on day shift. She had an episode of diarrhea. Was able to take pt to get CT scan with the assistance of tech and house supervisor. On return pt was assisted to bed as she was almost asleep. Pt had diarrhea episode as she slept. Pt was washed, dressed in clean clothes and the bed was cleaned and remade with clean linen. Pt slept through this. Pt unable to wake up enough for HS medication.

## 2021-01-26 NOTE — GROUP NOTE
Group Therapy Note    Date: 1/26/2021    Group Start Time: 1030  Group End Time: 1120  Group Topic: Psychoeducation    [address] [place], [place]        Group Therapy Note    Attendees: 5/7       Notes:  Pts participated in recreational therapy group; Positive Thinking Toss and Talk. Pts tossed a ball with questions on it that when they caught it they had to answer the question closest to their right thumb. Purpose was to encourage movement and increase positive thinking. Pt sleeping and allowed to continue to rest at this time.        Discipline Responsible: Certified Therapeutic Recreation Specialist       Signature:  Herlinda Pham, [address], [address] [facility] [place]

## 2021-01-26 NOTE — GROUP NOTE
Group Therapy Note    Date: 1/26/2021    Group Start Time: 0830  Group End Time: 0900    Number of Participants: 5/7    Type: Morning Goals Group/ Community Meeting    Group Topic/Objective: Set Goal For The Day and to review Unit Rules and Regulations.     Notes:  Pt sleeping and per discussion with nurse, pt was allowed to continue to rest.     Discipline Responsible: Certified Therapeutic Recreation Specialist     Electronically signed by Berry Vu [address] on 1/26/2021 at 9:50 AM

## 2021-01-26 NOTE — PROGRESS NOTES
Progress Note Psychiatry    Torres Peters  [id number]  1/21/2021 01/26/21    ID: Patient is a 65 yrs y.o. female    CC: \"I stopped my meds. \"    HPI: Torres Peters is a 65year old female with PMHx of HTN, HLD,dementia (Alzemimers) with behavioral disturbances, delusional disorder and medication non compliance. She is well known to [employer] [employer] related to several admissions for same. She presents to [facility] ED via police after found wandering. Her sister called them when she was unable to locate the patient. Patient has not been taking her medications and has an increase in her delusions and paranoia. Patient admitted to [facility].      Per Marybel Liu RN on SBU, patient has not been taking any medications from [facility] for three months.     Patient received calming medications last night due to acting out behaviors. Patient continuing to sleep this am. She got up late morning and then took medications and ate a breakfast. Patient has been polite and calm. Pt denied any thoughts to harm herself or anyone else. Pt Denied any auditory or visual Hallucinations. She denies depression and anxiety. She states she is sleeping \"very good\" and that her appetite is \"very good. \" Per staff, patient slept 14 hours. . She has been med compliant on unit. Pt continues to display occasional bizarre delusions and confusion. Continues to note fixed delusions and is fixated on them. Pt is unable to understand her diagnosis and treatment plan. Pt noted she currently feels safe and comfortable on the unit.  Pt was in agreement with treatment team.  Pt was polite and cordial during the interview process. Has been taking medications and has been compliant with treatment.     FINDINGS: ct head w/o contrast 1/25/2021   BRAIN/VENTRICLES: There is no acute intracranial hemorrhage, mass effect or   midline shift.  No abnormal extra-axial fluid collection.  The gray-white   differentiation is maintained without evidence of an acute infarct.  Old   lacunar infarcts noted in the bilateral basal ganglia.  Hypoattenuation of   the periventricular and subcortical white matter is suggestive of chronic   small vessel ischemic disease.  Mild diffuse parenchymal volume loss is   noted.  Vascular calcification is seen. Estefania Constanza is no evidence of   hydrocephalus. Pt denied any hx of seizures, Hep C or HIV  Pt notes history of TBI  No TD noted, AIMS=0    Past Psychiatric History:   See note above    Family Psychiatric History:   See note above    Family Psychiatric History:   Family History   Problem Relation Age of Onset    No Known Problems Mother     No Known Problems Sister     Heart Disease Brother     Coronary Art Dis Brother         stents    No Known Problems Sister     No Known Problems Sister     Alcohol Abuse Brother         Allergies:   Allergies   Allergen Reactions    Shellfish-Derived Products     Floxin [Ofloxacin] Other (See Comments)     Made her \"feel weird\"        OBJECTIVE  Vital Signs:  Vitals:    01/26/21 1100   BP: 120/72   Pulse: 109   Resp:    Temp:    SpO2:        Labs:  Recent Results (from the past 48 hour(s))   CBC auto differential    Collection Time: 01/25/21  9:45 AM   Result Value Ref Range    WBC 10.4 4.0 - 10.5 K/CU MM    RBC 4.23 4.2 - 5.4 M/CU MM    Hemoglobin 13.7 12.5 - 16.0 GM/DL    Hematocrit 40.7 37 - 47 %    MCV 96.2 78 - 100 FL    MCH 32.4 (H) 27 - 31 PG    MCHC 33.7 32.0 - 36.0 %    RDW 11.4 (L) 11.7 - 14.9 %    Platelets 220 750 - 565 K/CU MM    MPV 10.4 7.5 - 11.1 FL    Differential Type AUTOMATED DIFFERENTIAL     Segs Relative 72.7 (H) 36 - 66 %    Lymphocytes % 16.9 (L) 24 - 44 %    Monocytes % 8.3 (H) 0 - 4 %    Eosinophils % 1.3 0 - 3 %    Basophils % 0.5 0 - 1 %    Segs Absolute 7.6 K/CU MM    Lymphocytes Absolute 1.8 K/CU MM    Monocytes Absolute 0.9 K/CU MM    Eosinophils Absolute 0.1 K/CU MM    Basophils Absolute 0.1 K/CU MM    Immature Neutrophil % 0.3 0 - 0.43 %    Total Immature K/CU MM    MPV 10.7 7.5 - 11.1 FL    Differential Type AUTOMATED DIFFERENTIAL     Segs Relative 73.4 (H) 36 - 66 %    Lymphocytes % 15.9 (L) 24 - 44 %    Monocytes % 8.6 (H) 0 - 4 %    Eosinophils % 1.5 0 - 3 %    Basophils % 0.3 0 - 1 %    Segs Absolute 7.0 K/CU MM    Lymphocytes Absolute 1.5 K/CU MM    Monocytes Absolute 0.8 K/CU MM    Eosinophils Absolute 0.1 K/CU MM    Basophils Absolute 0.0 K/CU MM    Immature Neutrophil % 0.3 0 - 0.43 %    Total Immature Neutrophil 0.03 K/CU MM   Basic metabolic panel    Collection Time: 01/26/21  7:50 AM   Result Value Ref Range    Sodium 137 135 - 145 MMOL/L    Potassium 4.4 3.5 - 5.1 MMOL/L    Chloride 100 99 - 110 mMol/L    CO2 28 21 - 32 MMOL/L    Anion Gap 9 4 - 16    BUN 24 (H) 6 - 23 MG/DL    CREATININE 1.2 (H) 0.6 - 1.1 MG/DL    Glucose 127 (H) 70 - 99 MG/DL    Calcium 9.6 8.3 - 10.6 MG/DL    GFR Non- 44 (L) >60 mL/min/1.73m2    GFR  53 (L) >60 mL/min/1.73m2   Vitamin B12 & folate    Collection Time: 01/26/21  7:50 AM   Result Value Ref Range    Vitamin B-12 420.2 211 - 911 pg/ml    Folate 13.4 3.1 - 17.5 NG/ML       Review of Systems:  Reports of no current cardiovascular, respiratory, gastrointestinal, genitourinary, integumentary, neurological, musculoskeletal, or immunological symptoms today. PSYCHIATRIC: See HPI above.          PSYCHIATRIC EXAMINATION / MENTAL STATUS EXAM    CONSTITUTIONAL:    Vitals:   Vitals:    01/26/21 1100   BP: 120/72   Pulse: 109   Resp:    Temp:    SpO2:       General appearance: [x] appears age, []  appears older than stated age,               []  adequately dressed and groomed, [x] disheveled,               [x]  in no acute distress, [x] appears mildly distressed, [] other           MUSCULOSKELETAL:   Gait:   [] normal, [] antalgic, [x] unsteady, [] gait not evaluated   Station:             [] erect, [x] sitting, [] recumbent, [] other        Strength/tone:  [x] muscle strength and tone appear consistent with age and condition     [] atrophy      [] abnormal movements       PSYCHIATRIC:    Appearance: appears stated age. Alert and oriented to person only. . No acute distress- some mild today. Adequate grooming and hygiene. Good eye contact. No prominent physical abnormalities. Attitude: Manner is cooperative and pleasant  Motor: No psychomotor agitation, retardation or abnormal movements noted  Speech: Clearly articulated; slowed rate, volume, tone & amount. Language: intact understanding and production  Mood: \"fine\"  Affect:flat non-labile, congruent with mood and content of speech  Thought Production: Spontaneous. Thought Form: Coherent, linear, logical & goal-directed. No tangentiality or circumstantiality. No flight of ideas or loosening of associations. Thought Content/Perceptions: No [place], staff observing visual Hallucinations but no auditory hallucinations  Delusion and paranoid  Insight: impaired  Judgment: impaired  Memory: Immediate, recent, and remote appear intact, though not formally tested. Attention: having difficulty today throughout interview   Fund of knowledge: Average  Gait/Balance: unsteady    Impression:   Late onset Alzheimers  Late onset Alzheimers with behavioral disturbance. Problem List:   Major neurocognitive disorder due to Alzheimer's disease, with behavioral disturbance ([facility] [address]    Plan:  1. Admit to [place] [address] Unit  2. Consult Hospitalist to evaluate and treat medical conditions  3. Adjust psychotropic medications to target symptoms  4. Occupational Therapy, Physical Therapy, Group Psychotherapy as tolerated  5. Reviewed treatment plan with patient including medication risks, benefits, side effects. Obtained informed consent for treatment. 6. Anticipated length of stay 10-12 days  7.  I certify that inpatient psychiatric hospital admission is medically necessary for treatment, which can be expected to improve the patient's condition, and/or for diagnostic study.  8. Psychiatric management:medication initiation and titration, group and individual therapy, safe and therapeutic environment. 9. Status of problem/condition: Improving  10. Medical co-morbidities: Management per [facility] group, appreciate assistance  11. Legal Status: Voluntary  12. The treatment team reviewed with the patient the diagnosis and treatment recommendations to include the risks, benefits, and side effects of chosen medications. 13. The patient verbalized understanding and agreed with the treatment regimen as outlined above. 14. The patient was encouraged to participate in groups. 15. Medical records, Labs, Diagnotic tests reviewed  16. q15 min safety checks for safety  17. Interval History. 18. Review current labs- requested cbc, c diff and fecal occult- negative. CT Head w/o contrast negative, ammonia negative. 19. Continue current medications increase depakote   500 mg PO sprinkles QHS for mood, start risperidone 0.5 mg po am,  increased risperidone 1.0 mg po qhs.  20. Supportive Therapy Provided  21. Pt had an opportunity to ask questions and address concerns  22. Pt encouraged to continue therapy group or individual.  23. Pt was in agreement with treatment plan. 24. The risks benefits and side effects of medications were discussed with the patient, including alternatives and no treatment.     Electronically signed by EVELYN So CNP on 1/26/2021 at 11:52 AM

## 2021-01-26 NOTE — PROGRESS NOTES
Pt awake, attempting to get out of bed to use the bathroom at 1115. Pt assisted to BR as was very unsteady upon getting up. Pt urinated a very large amount of urine and a small amount of stool. Pt's attends was also saturated with urine and a small amount of loose stool. Pt assisted with dressing and washing off. Pt able to eat cereal bar  and took meds whole without difficulty. Pt also had orange juice and water with no choking noted.

## 2021-01-27 PROCEDURE — 6370000000 HC RX 637 (ALT 250 FOR IP): Performed by: INTERNAL MEDICINE

## 2021-01-27 PROCEDURE — 6370000000 HC RX 637 (ALT 250 FOR IP): Performed by: NURSE PRACTITIONER

## 2021-01-27 PROCEDURE — 1240000000 HC EMOTIONAL WELLNESS R&B

## 2021-01-27 PROCEDURE — 6370000000 HC RX 637 (ALT 250 FOR IP): Performed by: PSYCHIATRY & NEUROLOGY

## 2021-01-27 PROCEDURE — 99232 SBSQ HOSP IP/OBS MODERATE 35: CPT | Performed by: NURSE PRACTITIONER

## 2021-01-27 RX ORDER — DIVALPROEX SODIUM 125 MG/1
1000 CAPSULE, COATED PELLETS ORAL NIGHTLY
Status: DISCONTINUED | OUTPATIENT
Start: 2021-01-27 | End: 2021-02-02

## 2021-01-27 RX ORDER — RISPERIDONE 1 MG/1
1 TABLET, FILM COATED ORAL 2 TIMES DAILY
Status: DISCONTINUED | OUTPATIENT
Start: 2021-01-27 | End: 2021-01-28

## 2021-01-27 RX ADMIN — PANTOPRAZOLE SODIUM 80 MG: 40 TABLET, DELAYED RELEASE ORAL at 20:51

## 2021-01-27 RX ADMIN — LORAZEPAM 1 MG: 1 TABLET ORAL at 00:19

## 2021-01-27 RX ADMIN — DONEPEZIL HYDROCHLORIDE 10 MG: 10 TABLET, FILM COATED ORAL at 20:51

## 2021-01-27 RX ADMIN — HALOPERIDOL 5 MG: 5 TABLET ORAL at 00:19

## 2021-01-27 RX ADMIN — SUCRALFATE 1 G: 1 TABLET ORAL at 20:51

## 2021-01-27 RX ADMIN — METOPROLOL TARTRATE 25 MG: 25 TABLET, FILM COATED ORAL at 20:50

## 2021-01-27 RX ADMIN — DIPHENHYDRAMINE HYDROCHLORIDE 25 MG: 25 CAPSULE ORAL at 00:19

## 2021-01-27 RX ADMIN — ATORVASTATIN CALCIUM 80 MG: 80 TABLET, FILM COATED ORAL at 20:50

## 2021-01-27 RX ADMIN — DIVALPROEX SODIUM 1000 MG: 125 CAPSULE ORAL at 20:51

## 2021-01-27 RX ADMIN — TRAZODONE HYDROCHLORIDE 100 MG: 100 TABLET ORAL at 20:51

## 2021-01-27 RX ADMIN — ACETAMINOPHEN 500 MG: 500 TABLET ORAL at 20:51

## 2021-01-27 RX ADMIN — RISPERIDONE 1 MG: 1 TABLET ORAL at 20:51

## 2021-01-27 ASSESSMENT — PAIN SCALES - GENERAL: PAINLEVEL_OUTOF10: 0

## 2021-01-27 NOTE — GROUP NOTE
Group Therapy Note    Date: 1/27/2021    Group Start Time: 1030  Group End Time: 1130  Group Topic: Recreational    [address]    [place], [place]      Group Therapy Note    Attendees: 3/6        Notes:  Pts participated in recreational therapy group; [place] [employer] Activity. Pts were encouraged to engage in a leisure activity to promote socialization, positive mood, and coping with negative emotions.      Pt sleeping and allowed to continue to rest.       Discipline Responsible: Certified Therapeutic Recreation Specialist       Signature:  Renetta Donohue, [address] [place] [address]
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
## 2021-01-27 NOTE — GROUP NOTE
Group Therapy Note    Date: 1/27/2021    Group Start Time: 0830  Group End Time: 2845    Number of Participants: 4/6    Type: Morning Goals Group/ Community Meeting    Group Topic/Objective: Set Goal For The Day and to review Unit Rules and Regulations.     Notes:  Pt sleeping and per discussion with nurse was allowed to continue to rest.     Discipline Responsible: Certified Therapeutic Recreation Specialist     Electronically signed by Gae Mcardle, [address] [place] [address] on 1/27/2021 at 9:09 AM
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
## 2021-01-27 NOTE — PROGRESS NOTES
Pt remains asleep. Maday, NP, aware that pt has not been able to take medications due to lethagy. Per Maday, if pt is not awake before 1400, hold all twice daily meds but ok to give once daily meds.

## 2021-01-27 NOTE — PLAN OF CARE
Problem: Altered Mood, Depressive Behavior:  Goal: Able to verbalize acceptance of life and situations over which he or she has no control  Description: Able to verbalize acceptance of life and situations over which he or she has no control  1/27/2021 0113 by Radha Wise RN  Outcome: Ongoing  1/26/2021 1159 by Shelly Gomez RN  Outcome: Ongoing  Goal: Able to verbalize and/or display a decrease in depressive symptoms  Description: Able to verbalize and/or display a decrease in depressive symptoms  1/27/2021 0113 by Radha Wise RN  Outcome: Ongoing  1/26/2021 1159 by Shelly Gomez RN  Outcome: Ongoing  Goal: Ability to disclose and discuss suicidal ideas will improve  Description: Ability to disclose and discuss suicidal ideas will improve  1/27/2021 0113 by Radha Wise RN  Outcome: Ongoing  1/26/2021 1159 by Shelly Gomez RN  Outcome: Ongoing  Goal: Able to verbalize support systems  Description: Able to verbalize support systems  1/27/2021 0113 by Radha Wise RN  Outcome: Ongoing  1/26/2021 1159 by Shelly Gomez RN  Outcome: Ongoing  Goal: Absence of self-harm  Description: Absence of self-harm  1/27/2021 0113 by Radha Wise RN  Outcome: Ongoing  1/26/2021 1159 by Shelly Gomez RN  Outcome: Ongoing  Goal: Patient specific goal  Description: Patient specific goal  1/27/2021 0113 by Radha Wise RN  Outcome: Ongoing  1/26/2021 1159 by Shelly Gomez RN  Outcome: Ongoing  Goal: Participates in care planning  Description: Participates in care planning  1/27/2021 0113 by Radha Wise RN  Outcome: Ongoing  1/26/2021 1159 by Shelly Gomez RN  Outcome: Ongoing     Problem: Depressive Behavior With or Without Suicide Precautions:  Goal: Able to verbalize acceptance of life and situations over which he or she has no control  Description: Able to verbalize acceptance of life and situations over which he or she has no control  1/27/2021 0113 by Radha Wise normal  1/27/2021 0113 by Gee Waytt RN  Outcome: Ongoing  1/26/2021 1159 by Jacobo Phalen, RN  Outcome: Ongoing  Goal: Maintenance of adequate nutrition will improve  Description: Maintenance of adequate nutrition will improve  1/27/2021 0113 by Gee Wyatt RN  Outcome: Ongoing  1/26/2021 1159 by Jacobo Phalen, RN  Outcome: Ongoing  Goal: Ability to tolerate increased activity will improve  Description: Ability to tolerate increased activity will improve  1/27/2021 0113 by Gee Wyatt RN  Outcome: Ongoing  1/26/2021 1159 by Jacobo Phalen, RN  Outcome: Ongoing  Goal: Participates in care planning  Description: Participates in care planning  1/27/2021 0113 by Gee Wyatt RN  Outcome: Ongoing  1/26/2021 1159 by Jacobo Phalen, RN  Outcome: Ongoing  Goal: Patient specific goal  Description: Patient specific goal  1/27/2021 0113 by Gee Wyatt RN  Outcome: Ongoing  1/26/2021 1159 by Jacobo Phalen, RN  Outcome: Ongoing     Problem: Risk of Harm:  Goal: Ability to remain free from injury will improve  Description: Ability to remain free from injury will improve  1/27/2021 0113 by Gee Wyatt RN  Outcome: Ongoing  1/26/2021 1159 by Jacobo Phalen, RN  Outcome: Ongoing     Problem: Altered Mood, Manic Behavior:  Goal: Able to sleep  Description: Able to sleep  1/27/2021 0113 by Gee Wyatt RN  Outcome: Ongoing  1/26/2021 1159 by Jacobo Phalen, RN  Outcome: Ongoing  Goal: Able to verbalize decrease in frequency and intensity of racing thoughts  Description: Able to verbalize decrease in frequency and intensity of racing thoughts  1/27/2021 0113 by Gee Wyatt RN  Outcome: Ongoing  1/26/2021 1159 by Jacobo Phalen, RN  Outcome: Ongoing  Goal: Ability to disclose and discuss suicidal ideas will improve  Description: Ability to disclose and discuss suicidal ideas will improve  1/27/2021 0113 by Gee Wyatt RN  Outcome: Ongoing  1/26/2021 1159 by Jacobo Phalen, RN  Outcome: Ongoing  Goal: Absence of self-harm  Description: Absence of self-harm  1/27/2021 0113 by Loan Keller RN  Outcome: Ongoing  1/26/2021 1159 by Sharyle Butte, RN  Outcome: Ongoing  Goal: Ability to achieve adequate nutritional intake will improve  Description: Ability to achieve adequate nutritional intake will improve  1/27/2021 0113 by Loan Keller RN  Outcome: Ongoing  1/26/2021 1159 by Sharyle Butte, RN  Outcome: Ongoing  Goal: Ability to interact with others will improve  Description: Ability to interact with others will improve  1/27/2021 0113 by Loan Keller RN  Outcome: Ongoing  1/26/2021 1159 by Sharyle Butte, RN  Outcome: Ongoing  Goal: Ability to demonstrate self-control will improve  Description: Ability to demonstrate self-control will improve  1/27/2021 0113 by Loan Keller RN  Outcome: Ongoing  1/26/2021 1159 by Sharyle Butte, RN  Outcome: Ongoing  Goal: Mood stable  Description: Mood stable  1/27/2021 0113 by Loan Keller RN  Outcome: Ongoing  1/26/2021 1159 by Sharyle Butte, RN  Outcome: Ongoing     Problem: Falls - Risk of:  Goal: Will remain free from falls  Description: Will remain free from falls  1/27/2021 0113 by Loan Keller RN  Outcome: Ongoing  1/26/2021 1159 by Sharyle Butte, RN  Outcome: Ongoing  Goal: Absence of physical injury  Description: Absence of physical injury  1/27/2021 0113 by Loan Keller RN  Outcome: Ongoing  1/26/2021 1159 by Sharyle Butte, RN  Outcome: Ongoing     Problem: Skin Integrity:  Goal: Will show no infection signs and symptoms  Description: Will show no infection signs and symptoms  1/27/2021 0113 by Loan Keller RN  Outcome: Ongoing  1/26/2021 1159 by Sharyle Butte, RN  Outcome: Ongoing  Goal: Absence of new skin breakdown  Description: Absence of new skin breakdown  1/27/2021 0113 by Loan Keller RN  Outcome: Ongoing  1/26/2021 1159 by Sharyle Butte, RN  Outcome: Ongoing

## 2021-01-27 NOTE — PROGRESS NOTES
Pt's sister requesting to have zoom visit. When therapist explained the process of setting up a visit, pt's sister declined and stated \"I'll just stick to phone calls. \"  Requested to then speak to sister, but therapist explained pt is still sleeping and pt's sister agreed to call back later.      Electronically signed by Berry Rosado MA on 1/27/2021 at 12:58 PM

## 2021-01-27 NOTE — PROGRESS NOTES
Pt awake, resting in bed, states she does not want to get up. Pt changed and given water to drink. Pt denies needs at this time.

## 2021-01-27 NOTE — PROGRESS NOTES
Pt continues to remain asleep even with calling her name and attempting to waken. Will continue to monitor and attempt to get up.

## 2021-01-27 NOTE — PROGRESS NOTES
Pt now resting in bed eyes closed, respirations even and unlabored. No s/s adverse reaction from PRN medications. Bed exit alarm armed.

## 2021-01-28 PROCEDURE — 6370000000 HC RX 637 (ALT 250 FOR IP): Performed by: INTERNAL MEDICINE

## 2021-01-28 PROCEDURE — 99231 SBSQ HOSP IP/OBS SF/LOW 25: CPT | Performed by: NURSE PRACTITIONER

## 2021-01-28 PROCEDURE — 6370000000 HC RX 637 (ALT 250 FOR IP): Performed by: NURSE PRACTITIONER

## 2021-01-28 PROCEDURE — 92610 EVALUATE SWALLOWING FUNCTION: CPT

## 2021-01-28 PROCEDURE — 1240000000 HC EMOTIONAL WELLNESS R&B

## 2021-01-28 RX ORDER — RISPERIDONE 2 MG/1
2 TABLET, FILM COATED ORAL NIGHTLY
Status: DISCONTINUED | OUTPATIENT
Start: 2021-01-28 | End: 2021-01-29

## 2021-01-28 RX ORDER — RISPERIDONE 1 MG/1
1 TABLET, FILM COATED ORAL DAILY
Status: DISCONTINUED | OUTPATIENT
Start: 2021-01-29 | End: 2021-01-30

## 2021-01-28 RX ADMIN — METFORMIN HYDROCHLORIDE 500 MG: 500 TABLET ORAL at 08:12

## 2021-01-28 RX ADMIN — PANTOPRAZOLE SODIUM 80 MG: 40 TABLET, DELAYED RELEASE ORAL at 08:12

## 2021-01-28 RX ADMIN — METOPROLOL TARTRATE 25 MG: 25 TABLET, FILM COATED ORAL at 20:38

## 2021-01-28 RX ADMIN — DIVALPROEX SODIUM 1000 MG: 125 CAPSULE ORAL at 20:38

## 2021-01-28 RX ADMIN — METFORMIN HYDROCHLORIDE 500 MG: 500 TABLET ORAL at 16:58

## 2021-01-28 RX ADMIN — RISPERIDONE 1 MG: 1 TABLET ORAL at 08:12

## 2021-01-28 RX ADMIN — DONEPEZIL HYDROCHLORIDE 10 MG: 10 TABLET, FILM COATED ORAL at 20:38

## 2021-01-28 RX ADMIN — ATORVASTATIN CALCIUM 80 MG: 80 TABLET, FILM COATED ORAL at 20:38

## 2021-01-28 RX ADMIN — METOPROLOL TARTRATE 25 MG: 25 TABLET, FILM COATED ORAL at 08:12

## 2021-01-28 RX ADMIN — MEMANTINE 10 MG: 10 TABLET ORAL at 08:12

## 2021-01-28 RX ADMIN — SUCRALFATE 1 G: 1 TABLET ORAL at 06:28

## 2021-01-28 RX ADMIN — SUCRALFATE 1 G: 1 TABLET ORAL at 16:57

## 2021-01-28 RX ADMIN — MULTIPLE VITAMINS W/ MINERALS TAB 1 TABLET: TAB at 08:12

## 2021-01-28 RX ADMIN — TRAZODONE HYDROCHLORIDE 100 MG: 100 TABLET ORAL at 20:38

## 2021-01-28 RX ADMIN — RISPERIDONE 2 MG: 2 TABLET ORAL at 20:38

## 2021-01-28 RX ADMIN — FLUTICASONE PROPIONATE 1 SPRAY: 50 SPRAY, METERED NASAL at 08:13

## 2021-01-28 RX ADMIN — LISINOPRIL 20 MG: 20 TABLET ORAL at 08:13

## 2021-01-28 RX ADMIN — AMLODIPINE BESYLATE 10 MG: 10 TABLET ORAL at 08:12

## 2021-01-28 RX ADMIN — SUCRALFATE 1 G: 1 TABLET ORAL at 20:38

## 2021-01-28 RX ADMIN — PANTOPRAZOLE SODIUM 80 MG: 40 TABLET, DELAYED RELEASE ORAL at 20:40

## 2021-01-28 ASSESSMENT — PAIN SCALES - GENERAL
PAINLEVEL_OUTOF10: 0
PAINLEVEL_OUTOF10: 0
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
## 2021-01-28 NOTE — PROGRESS NOTES
Speech Language Pathology  Facility/Department: [facility]   CLINICAL BEDSIDE SWALLOW EVALUATION    NAME: Kassy Cochran  : 1950  MRN: [id number]    ADMISSION DATE: 2021  ADMITTING DIAGNOSIS: has Essential hypertension; Lumbar herniated disc; Spinal stenosis; Restless leg syndrome; FH: CAD (coronary artery disease); Ureteric stone; Family history of ischemic heart disease and other diseases of the circulatory system; Need for vaccination against Streptococcus pneumoniae using pneumococcal conjugate vaccine 13; At high risk for falls; Elevated alkaline phosphatase level; Diabetes mellitus type 2, diet-controlled ([address] Mixed hyperlipidemia; Urinary incontinence in female; Shoulder pain; Gastroesophageal reflux disease; Late onset Alzheimer's disease without behavioral disturbance ([address] Dementia with behavioral disturbance ([address] Alzheimer's dementia without behavioral disturbance ([address] CAD (coronary artery disease); COPD (chronic obstructive pulmonary disease) ([address] Delusional disorder ([address] Acute psychosis ([address] and Major neurocognitive disorder due to Alzheimer's disease, with behavioral disturbance ([address] on their problem list.      Impressions: Kassy Cochran was seen for a bedside swallowing evaluation after being admitted to [facility] geriatric psych unit with delusions and medication noncompliance. Pt was alert and cooperative throughout assessment. Nursing reports pt has been pocketing her food. Relevant medical hx includes alzheimer's disease, hypertension. COPD and CAD. Pt reports difficulty masticating food d/t lack of dentition. Pt was positioned upright in bed and presented with a clear vocal quality and strong volitional cough. Oral mechanism examination was [facility] with no focal orofacial weakness. PO trials of puree, soft solids and thin liquids by cup/straw sips were given.   Mild oral dysphagia was observed characterized by prolonged mastication, slow oral A-P transit and lingual residue observed with trials of soft solids. Pharyngeal swallow appeared intact characterized by timely swallow initiation and adequate laryngeal elevation. Clear vocal quality and 0 overt s/s of aspiration were observed with all PO trials given. Recommend continue puree diet/thin liquids with strict aspiration precautions. ST will continue to follow Gaye Lim x1-2 for diet tolerance monitoring. ONSET DATE: this admission   Date of Eval: 1/28/2021  Evaluating Therapist: Burton Luo    Current Diet level:  Current Diet : Dysphagia Pureed (Dysphagia I)  Current Liquid Diet : Thin      Primary Complaint  Patient Complaint: weakness    Pain:  Pain Assessment  Pain Assessment: 0-10  Pain Level: 0    Reason for Referral  Gaye Lim was referred for a bedside swallow evaluation to assess the efficiency of her swallow function, identify signs and symptoms of aspiration and make recommendations regarding safe dietary consistencies, effective compensatory strategies, and safe eating environment. Impression  Dysphagia Diagnosis: Mild to moderate oral stage dysphagia  Dysphagia Outcome Severity Scale: Level 5: Mild dysphagia- Distant supervision. May need one diet consistency restricted     Treatment Plan  Requires SLP Intervention: Yes  Duration/Frequency of Treatment: 1-2xs LOS or until goals are met  D/C Recommendations: To be determined       Recommended Diet and Intervention  Diet Solids Recommendation: Dysphagia Pureed (Dysphagia I)  Liquid Consistency Recommendation:  Thin  Recommended Form of Meds: Meds in puree     Therapeutic Interventions: Diet tolerance monitoring;Patient/Family education    Compensatory Swallowing Strategies  Compensatory Swallowing Strategies: Eat/Feed slowly;Upright as possible for all oral intake    Treatment/Goals  Short-term Goals  Timeframe for Short-term Goals: 1-2x weekly/ LOS or until  Goal 1: Pt will tolerate puree diet/thin liquids without clinical evidence of aspiration 100%  Goal 2: Pt/caregivers will demonstrate comprehension of recommendations/POC    General  Chart Reviewed: Yes  Behavior/Cognition: Alert; Cooperative;Pleasant mood  Respiratory Status: Room air  O2 Device: None (Room air)  Communication Observation: Functional  Follows Directions: Complex  Dentition: Edentulous  Patient Positioning: Upright in chair  Baseline Vocal Quality: Normal  Volitional Cough: Weak  Prior Dysphagia History: Pt reports difficulty with solid foods d/t lack of dentition  Consistencies Administered: Dysphagia Minced and Moist (Dysphagia II); Dysphagia Pureed (Dysphagia I); Thin - straw; Thin - cup           Vision/Hearing  Vision  Vision: Within Functional Limits  Hearing  Hearing: Within functional limits    Oral Motor Deficits  Oral/Motor  Oral Motor: Within functional limits    Oral Phase Dysfunction  Oral Phase  Oral Phase: Exceptions  Oral Phase Dysfunction  Impaired Mastication: Reg Solid  Decreased Anterior to Posterior Transit: Reg solid  Lingual/Palatal Residue: Reg solid; Soft solid     Indicators of Pharyngeal Phase Dysfunction   Pharyngeal Phase  Pharyngeal Phase: WFL    Prognosis  Prognosis  Prognosis for safe diet advancement: good  Individuals consulted  Consulted and agree with results and recommendations: Patient;RN    Education  Patient Education: recommendations/POC  Patient Education Response: Verbalizes understanding  Safety Devices in place: Yes          Therapy Time  SLP Individual Minutes  Time In: 0840  Time Out: 0900  Minutes: [id number], [facility]-SLP, 1/28/2021

## 2021-01-28 NOTE — PROGRESS NOTES
Progress Note Psychiatry    Remington Steiner  [id number]  1/21/2021 01/28/21    ID: Patient is a 65 yrs y.o. female    CC: \"I stopped my meds. \"    HPI: Remington Steiner is a 65year old female with PMHx of HTN, HLD,dementia (Alzemimers) with behavioral disturbances, delusional disorder and medication non compliance. She is well known to [address] [employer] related to several admissions for same. She presents to [facility] ED via police after found wandering. Her sister called them when she was unable to locate the patient. Patient has not been taking her medications and has an increase in her delusions and paranoia. Patient admitted to [address]  Per Ramonita Chaudhari RN on SBU, patient has not been taking any medications from [facility] for three months. Sister states patient won't take them so why buy them.      Patient is alert to name, hospital and situation. She is disoriented to Month and year. She is up this morning and sitting in the dining area. At times she glares around the room and has an air of paranoia. While talking to her, she is polite and attempts to engage but continues with eyes closed. Pt is limited in response at this time. Pt does not display any homicidal or suicidal ideations or auditory or visual hallucinations. She denies depression and anxiety. She states she is sleeping \"very good\" and that her appetite is \"very good. \" Per staff, patient slept 6 hours and is still sleeping. Pt continues to display occasional bizarre delusions and confusion. Continues to note fixed delusions and is fixated on them. Pt is unable to understand her diagnosis and treatment plan. Pt noted she currently feels safe and comfortable on the unit.  Pt was in agreement with treatment team.  Pt was polite and cordial during the interview process. Has been taking medications with difficulty and per staff will start to crush them. She attempts to be be  compliant with treatment. ST assessed patient today.  Refer to note. Diet pureed. PT reconsulted due to balance and walking problems related to AMS change. FINDINGS: ct head w/o contrast 1/25/2021   BRAIN/VENTRICLES: There is no acute intracranial hemorrhage, mass effect or   midline shift.  No abnormal extra-axial fluid collection.  The gray-white   differentiation is maintained without evidence of an acute infarct.  Old   lacunar infarcts noted in the bilateral basal ganglia.  Hypoattenuation of   the periventricular and subcortical white matter is suggestive of chronic   small vessel ischemic disease.  Mild diffuse parenchymal volume loss is   noted.  Vascular calcification is seen. [place] is no evidence of   hydrocephalus. Pt denied any hx of seizures, Hep C or HIV  Pt notes history of TBI  No TD noted, AIMS=0    Past Psychiatric History:   See note above    Family Psychiatric History:   See note above    Family Psychiatric History:   Family History   Problem Relation Age of Onset    No Known Problems Mother     No Known Problems Sister     Heart Disease Brother     Coronary Art Dis Brother         stents    No Known Problems Sister     No Known Problems Sister     Alcohol Abuse Brother         Allergies: Allergies   Allergen Reactions    Shellfish-Derived Products     Floxin [Ofloxacin] Other (See Comments)     Made her \"feel weird\"        OBJECTIVE  Vital Signs:  Vitals:    01/28/21 0800   BP: (!) 121/90   Pulse: 110   Resp: 16   Temp: 96.2 °F (35.7 °C)   SpO2: 99%       Labs:  No results found for this or any previous visit (from the past 48 hour(s)). Review of Systems:  Reports of no current cardiovascular, respiratory, gastrointestinal, genitourinary, integumentary, neurological, musculoskeletal, or immunological symptoms today. PSYCHIATRIC: See HPI above.          PSYCHIATRIC EXAMINATION / MENTAL STATUS EXAM    CONSTITUTIONAL:    Vitals:   Vitals:    01/28/21 0800   BP: (!) 121/90   Pulse: 110   Resp: 16   Temp: 96.2 °F (35.7 °C)   SpO2: 99% General appearance: [x] appears age, []  appears older than stated age,               []  adequately dressed and groomed, [x] disheveled,               []  in no acute distress, [x] appears mildly distressed, [] other           MUSCULOSKELETAL:   Gait:   [] normal, [] antalgic, [x] unsteady, [] gait not evaluated   Station:             [] erect, [x] sitting, [] recumbent, [] other        Strength/tone:  [x] muscle strength and tone appear consistent with age and condition     [] atrophy      [] abnormal movements       PSYCHIATRIC:    Appearance: appears stated age. Alert and oriented to person only. . No acute distress- some mild today. Adequate grooming and hygiene. Good eye contact. No prominent physical abnormalities. Attitude: Manner is cooperative and pleasant  Motor: No psychomotor agitation, retardation or abnormal movements noted  Speech: Clearly articulated; slowed rate, volume, tone & amount. Language: intact understanding and production  Mood: intermittently agitated and irritable  Affect:flat non-labile, congruent with mood and content of speech  Thought Production: Spontaneous. Thought Form: Coherent, linear, logical & goal-directed. No tangentiality or circumstantiality. No flight of ideas or loosening of associations. Thought Content/Perceptions: No [place], staff observing visual Hallucinations but no auditory hallucinations  Delusions and paranoid observed  Insight: impaired  Judgment: impaired  Memory: Immediate, recent, and remote appear intact, though not formally tested. Attention: having difficulty today throughout interview   Fund of knowledge: Average  Gait/Balance: unsteady    Impression:   Late onset Alzheimers  Late onset Alzheimers with behavioral disturbance. Problem List:   Major neurocognitive disorder due to Alzheimer's disease, with behavioral disturbance ([facility] [address]    Plan:  1. Admit to [place] [address] Unit  2.  Consult Hospitalist to evaluate and treat medical conditions  3. Adjust psychotropic medications to target symptoms  4. Occupational Therapy, Physical Therapy, Group Psychotherapy as tolerated  5. Reviewed treatment plan with patient including medication risks, benefits, side effects. Obtained informed consent for treatment. 6. Anticipated length of stay 10-12 days  7. I certify that inpatient psychiatric hospital admission is medically necessary for treatment, which can be expected to improve the patient's condition, and/or for diagnostic study. 8. Psychiatric management:medication initiation and titration, group and individual therapy, safe and therapeutic environment. 9. Status of problem/condition: Improving  10. Medical co-morbidities: Management per [facility] group, appreciate assistance  11. Legal Status: Voluntary  12. The treatment team reviewed with the patient the diagnosis and treatment recommendations to include the risks, benefits, and side effects of chosen medications. 13. The patient verbalized understanding and agreed with the treatment regimen as outlined above. 14. The patient was encouraged to participate in groups. 15. Medical records, Labs, Diagnotic tests reviewed  16. q15 min safety checks for safety  17. Interval History. 18. Review current labs- requested cbc, c diff and fecal occult- negative. CT Head w/o contrast negative, ammonia negative. 19. Continue current medications increase depakote   1000 mg PO sprinkles QHS for mood, start risperidone 1 mg po in the am, and 2 mg at bedtime. 20. Supportive Therapy Provided  21. Pt had an opportunity to ask questions and address concerns  22. Pt encouraged to continue therapy group or individual.  23. Pt was in agreement with treatment plan. 24. The risks benefits and side effects of medications were discussed with the patient, including alternatives and no treatment.     Electronically signed by EVELYN Hobbs CNP on 1/28/2021 at 2:01 PM

## 2021-01-28 NOTE — BH NOTE
Pt sleeping well a this time. Will continue to closely monitor pt with 15 minute safety checks while on SBU.

## 2021-01-28 NOTE — GROUP NOTE
Group Therapy Note    Date: 1/27/2021    Group Start Time: 5583  Group End Time: 1500  Group Topic: Psychotherapy    [address] [facility]    KASEY Buckley        Group Therapy Note    Attendees: 4/5         Notes:  Patient did not attend. Patient was sleeping.   Discipline Responsible: /Counselor      Signature:  KASEY Lara

## 2021-01-28 NOTE — GROUP NOTE
Group Therapy Note    Date: 1/28/2021    Group Start Time: 0631  Group End Time: 2771    Number of Participants: 3/6    Type: Spirituality    Group Topic/Objective: Religous discussion with Wilmer Ambrosio. Notes:  Pt attended group, but unable to focus on the discussion or meditation. Pt noted to be responding to internal stimuli AEB reporting belief she was hearing people tell her to stand up. Status After Intervention:  Unchanged    Participation Level: Minimal    Participation Quality: Inappropriate    Speech:  normal    Thought Process/Content: Hallucinating  Confused    Affective Functioning: Blunted    Mood: Blunted    Level of consciousness:  Preoccupied and Inattentive    Response to Learning: Pt unable to demonstrate response to learning at this time.      Endings: None Reported    Modes of Intervention: Education, Support and Socialization    Discipline Responsible: Certified Therapeutic Recreation Specialist     Electronically signed by Berry Cho MA on 1/28/2021 at 10:42 AM

## 2021-01-29 PROCEDURE — 1240000000 HC EMOTIONAL WELLNESS R&B

## 2021-01-29 PROCEDURE — 97167 OT EVAL HIGH COMPLEX 60 MIN: CPT

## 2021-01-29 PROCEDURE — 6370000000 HC RX 637 (ALT 250 FOR IP): Performed by: INTERNAL MEDICINE

## 2021-01-29 PROCEDURE — 99232 SBSQ HOSP IP/OBS MODERATE 35: CPT | Performed by: NURSE PRACTITIONER

## 2021-01-29 PROCEDURE — 97163 PT EVAL HIGH COMPLEX 45 MIN: CPT

## 2021-01-29 PROCEDURE — 6370000000 HC RX 637 (ALT 250 FOR IP): Performed by: NURSE PRACTITIONER

## 2021-01-29 RX ORDER — OMEGA-3-ACID ETHYL ESTERS 1 G/1
2 CAPSULE, LIQUID FILLED ORAL 2 TIMES DAILY
Status: DISCONTINUED | OUTPATIENT
Start: 2021-01-29 | End: 2021-02-11 | Stop reason: HOSPADM

## 2021-01-29 RX ADMIN — RISPERIDONE 3 MG: 2 TABLET ORAL at 21:49

## 2021-01-29 RX ADMIN — MEMANTINE 10 MG: 10 TABLET ORAL at 09:02

## 2021-01-29 RX ADMIN — METOPROLOL TARTRATE 25 MG: 25 TABLET, FILM COATED ORAL at 21:49

## 2021-01-29 RX ADMIN — METOPROLOL TARTRATE 25 MG: 25 TABLET, FILM COATED ORAL at 09:02

## 2021-01-29 RX ADMIN — RISPERIDONE 1 MG: 1 TABLET ORAL at 09:03

## 2021-01-29 RX ADMIN — PANTOPRAZOLE SODIUM 80 MG: 40 TABLET, DELAYED RELEASE ORAL at 21:49

## 2021-01-29 RX ADMIN — SUCRALFATE 1 G: 1 TABLET ORAL at 17:21

## 2021-01-29 RX ADMIN — FLUTICASONE PROPIONATE 1 SPRAY: 50 SPRAY, METERED NASAL at 09:02

## 2021-01-29 RX ADMIN — METFORMIN HYDROCHLORIDE 500 MG: 500 TABLET ORAL at 17:21

## 2021-01-29 RX ADMIN — SUCRALFATE 1 G: 1 TABLET ORAL at 21:48

## 2021-01-29 RX ADMIN — DONEPEZIL HYDROCHLORIDE 10 MG: 10 TABLET, FILM COATED ORAL at 21:49

## 2021-01-29 RX ADMIN — METFORMIN HYDROCHLORIDE 500 MG: 500 TABLET ORAL at 09:02

## 2021-01-29 RX ADMIN — MULTIPLE VITAMINS W/ MINERALS TAB 1 TABLET: TAB at 09:03

## 2021-01-29 RX ADMIN — ATORVASTATIN CALCIUM 80 MG: 80 TABLET, FILM COATED ORAL at 21:50

## 2021-01-29 RX ADMIN — OMEGA-3-ACID ETHYL ESTERS 2 G: 1 CAPSULE, LIQUID FILLED ORAL at 21:50

## 2021-01-29 RX ADMIN — PANTOPRAZOLE SODIUM 80 MG: 40 TABLET, DELAYED RELEASE ORAL at 09:02

## 2021-01-29 RX ADMIN — LISINOPRIL 20 MG: 20 TABLET ORAL at 09:03

## 2021-01-29 RX ADMIN — DIVALPROEX SODIUM 1000 MG: 125 CAPSULE ORAL at 21:50

## 2021-01-29 RX ADMIN — AMLODIPINE BESYLATE 10 MG: 10 TABLET ORAL at 09:02

## 2021-01-29 RX ADMIN — TRAZODONE HYDROCHLORIDE 150 MG: 50 TABLET ORAL at 21:49

## 2021-01-29 RX ADMIN — SUCRALFATE 1 G: 1 TABLET ORAL at 09:02

## 2021-01-29 NOTE — PROGRESS NOTES
Pt alert to self only. Pt denies anxiety and depression, SI/HI and AVH. Pt pleasantly confused this am.  Poor appetite at breakfast.    1200 Pt arouses but is sleeping soundly and will not wake up to eat lunch.

## 2021-01-29 NOTE — GROUP NOTE
Group Therapy Note    Date: 1/29/2021    Group Start Time: 6456  Group End Time: 1600     Number of Participants: 5/5    Type: Exercise/Recreation Group    Group Topic/Objective: Chair Exercises    Notes:  Pt attended group, but spent majority of time sleeping. Pt did not respond to prompting/encouragement to complete exercises. Status After Intervention:  Unchanged    Participation Level: None    Participation Quality: Lethargic    Speech:  pressured    Thought Process/Content: Confused    Affective Functioning: Flat    Mood: Flat    Level of consciousness:  Drowsy    Response to Learning: Pt unable to demonstrate response to learning at this tie.      Endings: None Reported    Modes of Intervention: Activity and Movement    Discipline Responsible: Certified Therapeutic Recreation Specialist     Electronically signed by Claude Hagen [address] [place] [address], MA on 1/30/2021 at 8:25 AM

## 2021-01-29 NOTE — PROGRESS NOTES
Patient up and down most of the night. States she is not in pain. Has slept approximately 1 hour. Patient is not acting out nor is she disturbing her peers. Patient is just not resting. No signs of distress, continue to monitor for safety.

## 2021-01-29 NOTE — PROGRESS NOTES
not have seen previous data.)  Vision/Hearing  Vision: Within Functional Limits  Hearing: Within functional limits     Subjective  General  Chart Reviewed: Yes  Patient assessed for rehabilitation services?: Yes(Simultaneous filing. User may not have seen previous data.)  Family / Caregiver Present: No  Follows Commands: Impaired  Subjective  Subjective: P with decreased alertness and diffiuclty attending to session  Pain Screening  Patient Currently in Pain: Denies(Simultaneous filing.  User may not have seen previous data.)          Orientation  Orientation  Overall Orientation Status: Impaired  Orientation Level: Oriented to person  Social/Functional History  Social/Functional History  Lives With: Family  Type of Home: House  Home Layout: Laundry in basement, One level  Home Access: Level entry  Bathroom Shower/Tub: Tub/Shower unit  Bathroom Toilet: Standard  Bathroom Accessibility: Not accessible  Receives Help From: Family  ADL Assistance: Independent  Homemaking Assistance: Independent  Homemaking Responsibilities: Yes  Active : Yes  Mode of Transportation: Car  Education: 9th grade  Occupation: Retired  Type of occupation:   Leisure & Hobbies: fixing cars and appliances, dancing  Additional Comments: Pt questionable historian, History obtained from chart  [address] [place] [address]  Overall Cognitive Status: Exceptions  Cognition Comment: impaired attention and alertness, 25% command following    Objective     Observation/Palpation  Observation: P asleep in chair in main room    AROM RLE (degrees)  RLE AROM: WFL  AROM LLE (degrees)  LLE AROM : Exceptions  LLE General AROM: reduced hip flexion  Strength RLE  Strength RLE: Exception  R Hip Flexion: 3+/5  R Knee Extension: 3+/5  Strength LLE  Strength LLE: Exception  Comment: p unable to follow commands, p with minimal hip flexion lift  Motor Control  Gross Motor?: Exceptions  Comments: reduced attention to L side  Sensation  Overall Sensation Status: WFL(per p report)  Bed mobility  Comment: Not assessed p up in chair  Transfers  Sit to Stand: 2 Person Assistance; Moderate Assistance  Stand to sit: Moderate Assistance;2 Person Assistance  Comment: P stands with max cues.  P wiht decreased alertness requiring mod A x 2 and unable to stand more than 5 seconds 2/2 weakness and fatigue,  Ambulation  Ambulation?: No  Stairs/Curb  Stairs?: No     Balance  Posture: Poor  Sitting - Static: Poor  Sitting - Dynamic: Poor  Standing - Static: Poor  Standing - Dynamic: Poor        Plan   Plan  Times per week: 3  Plan weeks: 1 week or until discharge  Current Treatment Recommendations: Strengthening, Transfer Training, Endurance Training, Patient/Caregiver Education & Training, Equipment Evaluation, Education, & procurement, Balance Training, Gait Training, Safety Education & Training, Positioning  Safety Devices  Type of devices: Left in chair, Nurse notified, Gait belt, Chair alarm in place      AM-PAC Score  AM-PAC Inpatient Mobility Raw Score : 9 (01/29/21 1411)  AM-PAC Inpatient T-Scale Score : 30.55 (01/29/21 1411)  Mobility Inpatient CMS 0-100% Score: 81.38 (01/29/21 1411)  Mobility Inpatient CMS G-Code Modifier : CM (01/29/21 1411)          Goals  Short term goals  Time Frame for Short term goals: 1 week  Short term goal 1: P will perform bed mobility with Gio  Short term goal 2: P will perform sit <> stand with supervision  Short term goal 3: P will ambulate x 50' with CGA and mod cues  Short term goal 4: P will stand with 1 Ue support x 2 min with CGA       Therapy Time   Individual Concurrent Group Co-treatment   Time In       3183   Time Out       1836   Minutes       9   Timed Code Treatment Minutes: 0 Minutes       David Montes De Oca PT    Electronically signed by David Montes De Oca PT on 1/29/2021 at 2:12 PM

## 2021-01-29 NOTE — GROUP NOTE
Group Therapy Note    Date: 1/28/2021    Group Start Time: 0001  Group End Time: 1600    Number of Participants: 4/4    Type: Exercise/Recreation Group    Group Topic/Objective:  Chair Exercises    Notes:  Pt attended group, but noted to spend the entire time sleeping. Status After Intervention:  Unchanged    Participation Level: None    Participation Quality: Lethargic    Speech:  pressured    Thought Process/Content: Delusional  Hallucinating    Affective Functioning: Flat    Mood: Flat    Level of consciousness:  Drowsy    Response to Learning: Pt unable to demonstrate response to learning at this time.      Endings: None Reported    Modes of Intervention: Activity and Movement    Discipline Responsible: Certified Therapeutic Recreation Specialist     Electronically signed by Berry Fierro MA on 1/29/2021 at 9:23 AM

## 2021-01-29 NOTE — PROGRESS NOTES
Occupational Therapy   Occupational Therapy Initial Assessment  Date: 2021   Patient Name: Kassy Cochran  MRN: [id number]     : 1950    Date of Service: 2021    Discharge Recommendations:  [address]  OT Equipment Recommendations  Equipment Needed: No    Assessment   Performance deficits / Impairments: Decreased functional mobility ; Decreased ADL status; Decreased strength;Decreased ROM; Decreased safe awareness;Decreased cognition;Decreased balance;Decreased coordination  Assessment: Pt is a 64 yo female with hx of dementia (Alzemimers). She currently presents with inability to tolerate standing requiring mod A x2 however it was reported by nurse she was able to walk with assistance in the morning. She presents with decresed cognition and appears to have L side neglect after verbal/tactile prompts to reach with LUE. Will continue to monitor. It is recommended she recieve occupational therapy to improve functional mobility, strength and maximize indep with ADLs. Recommend SNF. Treatment Diagnosis: Weakness  Prognosis: Fair  Decision Making: High Complexity  OT Education: Orientation;OT Role  Barriers to Learning: dementia (Alzemimers)  REQUIRES OT FOLLOW UP: Yes  Activity Tolerance  Activity Tolerance: Patient limited by fatigue;Treatment limited secondary to decreased cognition  Safety Devices  Safety Devices in place: Yes  Type of devices: All fall risk precautions in place; Patient at risk for falls; Left in chair;Chair alarm in place;Nurse notified;Gait belt  Restraints  Initially in place: No           Patient Diagnosis(es): The encounter diagnosis was Late onset Alzheimer's disease with behavioral disturbance ([facility] [address]      has a past medical history of Alzheimer disease ([address] Bronchitis, CAD (coronary artery disease), Chest wall trauma, COPD (chronic obstructive pulmonary disease) ([address] FH: CAD (coronary artery disease), Hypertension, Kidney stone, Lumbar herniated disc, Restless leg syndrome, and Spinal stenosis. has a past surgical history that includes Hysterectomy and Ureter stent placement. Treatment Diagnosis: Weakness      Restrictions  Restrictions/Precautions  Restrictions/Precautions: General Precautions, Fall Risk(Simultaneous filing. User may not have seen previous data.)  Required Braces or Orthoses?: No(Simultaneous filing. User may not have seen previous data.)    Subjective   General  Chart Reviewed: Yes  Patient assessed for rehabilitation services?: Yes(Simultaneous filing. User may not have seen previous data.)  Family / Caregiver Present: No  Subjective  Subjective: Pt reports \"I'm ok\"  General Comment  Comments: Pt presents asleep sitting in chair. Per nurse Pt has been unable to arouse this afternoon but awake and talkative this morning. Patient Currently in Pain: Denies(Simultaneous filing. User may not have seen previous data.)  Vital Signs  Patient Currently in Pain: Denies(Simultaneous filing. User may not have seen previous data.)  Social/Functional History  Social/Functional History  Lives With: Family  Type of Home: House  Home Layout: Laundry in basement, One level  Home Access: Level entry  Bathroom Shower/Tub: Tub/Shower unit  Bathroom Toilet: Standard  Bathroom Accessibility: Not accessible  Receives Help From: Family  ADL Assistance: Independent  Homemaking Assistance: Independent  Homemaking Responsibilities: Yes  Active : Yes  Mode of Transportation: Car  Education: 9th grade  Occupation: Retired  Type of occupation:   Leisure & Hobbies: fixing cars and appliances, dancing  Additional Comments: Pt questionable historian, History obtained from chart       Objective   Vision: Within Functional Limits  Hearing: Within functional limits    Orientation  Overall Orientation Status: Impaired  Orientation Level: Oriented to person;Disoriented to place; Disoriented to time;Disoriented to situation  Observation/Palpation  Observation: P asleep in chair in main room  Balance  Sitting Balance: Contact guard assistance  Standing Balance: Moderate assistance(mod A x2)  Standing Balance  Activity: Pt performed sit to stand from chair 2x with mod A x2 and verbal prompts to intitiate. Pt tolerated ~5 seconds each time standing and would sit back down  ADL  Feeding: Pureed diet;Stand by assistance;Verbal cueing  Grooming: Moderate assistance  UE Bathing: Moderate assistance  LE Bathing: Maximum assistance  UE Dressing: Moderate assistance  LE Dressing: Maximum assistance  Toileting: Maximum assistance  Additional Comments: Based on observation of Pt current functional mobility, strength and cognitive status  Tone RUE  RUE Tone: Normotonic  Tone LUE  LUE Tone: Normotonic  Coordination  Movements Are Fluid And Coordinated: No  Coordination and Movement description: Decreased accuracy; Decreased speed     Bed mobility  Comment: Not assessed Pt up in chair  Transfers  Stand Step Transfers: Unable to assess  Sit to stand: Moderate assistance;2 Person assistance  Stand to sit: 2 Person assistance; Moderate assistance  Transfer Comments: Pt unable to tolerate trf at this time     Cognition  Overall Cognitive Status: Exceptions  Arousal/Alertness: Delayed responses to stimuli  Following Commands:  Follows one step commands with increased time  Attention Span: Attends with cues to redirect(Pt appeared very fatigued at this time)  Memory: Decreased short term memory;Decreased recall of biographical Information;Decreased long term memory;Decreased recall of recent events  Safety Judgement: Decreased awareness of need for assistance  Problem Solving: Assistance required to identify errors made;Assistance required to generate solutions  Insights: Decreased awareness of deficits  Initiation: Requires cues for all  Cognition Comment: impaired attention and alertness, 25% command following  Perception  Overall Perceptual Status: Impaired  Unilateral Attention: Cues to attend to left side of body(Pt cued to give therapist \"high five\" and she was able to with RUE but after instruction to try with L side and tactile prompts she continued to only reach with R side.)  Initiation: Cues to initiate tasks(Pt required tactile cues to reach and use LUE when instructed)     Sensation  Overall Sensation Status: WFL(per Pt report)        LUE AROM (degrees)  LUE AROM : WFL  Left Hand AROM (degrees)  Left Hand AROM: WFL  LUE Strength  Gross LUE Strength: Exceptions to [facility]  LUE Strength Comment: Unable to formally assessed d/t Pt impaired ability to follow directions  RUE Strength  Gross RUE Strength: Exceptions to [facility]  RUE Strength Comment: Unable to formally assessed d/t Pt impaired ability to follow directions                   Plan   Plan  Times per week: 2+  Current Treatment Recommendations: Strengthening, Balance Training, Functional Mobility Training, Patient/Caregiver Education & Training, Equipment Evaluation, Education, & procurement, Self-Care / ADL, Safety Education & Training, Cognitive/Perceptual Training    G-Code     OutComes Score                                                  AM-PAC Score       AM-PAC 6 click short form for inpatient daily activity:   How much help from another person does the patient currently need. .. Unable  Dep A Lot  Max A A Lot   Mod A A Little  Min A A Little   CGA  SBA None   Mod I  Indep  Sup   1. Putting on and taking off regular lower body clothing? [] 1    [x] 2   [] 2   [] 3   [] 3   [] 4      2. Bathing (including washing, rinsing, drying)? [] 1   [x] 2   [] 2 [] 3 [] 3 [] 4   3. Toileting, which includes using toilet, bedpan, or urinal? [] 1    [x] 2   [] 2   [] 3   [] 3   [] 4     4. Putting on and taking off regular upper body clothing? [] 1   [] 2   [x] 2   [] 3   [] 3    [] 4      5. Taking care of personal grooming such as brushing teeth? [] 1   [] 2    [] 2 [x] 3    [] 3   [] 4      6. Eating meals? [] 1   [] 2   [] 2   [] 3   [x] 3   [] 4      Raw Score:  14   [24=0% impaired(CH), 23=1-19%(CI), 20-22=20-39%(CJ), 15-19=40-59%(CK), 10-14=60-79%(CL), 7-9=80-99%(CM), 6=100%(CN)]           Goals  Short term goals  Time Frame for Short term goals: Until DC or goals met  Short term goal 1: Pt will complete transfers CGA with min prompts to initiate (chair, toilet, bed)  Short term goal 2: Pt will participate in UE/LE bathing with min A and verbal prompts to initiate  Short term goal 3: Pt will participate in UE/LE dressing with min A and verbal prompts to initiate  Short term goal 4: Pt will demo toileting min A  Short term goal 5: Pt will participate in BUE activities/exercises 5+ min after instruction and min prompts to initiate. Therapy Time   Individual Concurrent Group Co-treatment   Time In       1343   Time Out       1352   Minutes       9   Timed Code Treatment Minutes: 0 Minutes       Cleopatra Valdez, OTR/L [id number]

## 2021-01-29 NOTE — GROUP NOTE
Group Therapy Note    Date: 1/29/2021    Group Start Time: 0830  Group End Time: 0900    Number of Participants: 4/5    Type: Morning Goals Group/ Community Meeting    Group Topic/Objective: Set Goal For The Day and to review Unit Rules and Regulations. Notes:  Pt sleeping.  Per discussion with nurse, pt allowed to continue to rest.     Discipline Responsible: Certified Therapeutic Recreation Specialist     Electronically signed by Karey Hickman MA on 1/29/2021 at 9:57 AM

## 2021-01-29 NOTE — PROGRESS NOTES
Progress Note Psychiatry    Regina Bateman  [id number]  1/21/2021 01/29/21    ID: Patient is a 65 yrs y.o. female    CC: \"I stopped my meds. \"    HPI: Reigna Bateman is a 65year old female with PMHx of HTN, HLD,dementia (Alzemimers) with behavioral disturbances, delusional disorder and medication non compliance. She is well known to [address] related to several admissions for same. She presents to [place] ED via police after found wandering. Her sister called them when she was unable to locate the patient. Patient has not been taking her medications and has an increase in her delusions and paranoia. Patient admitted to [address]  Per Tatianna Betancourt RN on SBU, patient has not been taking any medications from [facility] for three months. Sister states patient won't take them so why buy them.      Patient is alert to name, hospital and situation. She is disoriented to Month and year. She is up this morning and sitting in the dining area eating breakfast. Patient took her medications. While talking to her, she is polite and attempts to engage but continues with eyes closed intermittently. She denies depression and anxiety. Paranoia is decreased. She states she is sleeping \"very good\" and  I slept like a baby,\" Her  appetite is \"very good,\" but witnessed staff trying to encourage her to eat more. Per staff, patient slept 3.5 hours, which were broken. Patient was also up, last night,  believing the water in her sink was overflowing and going to flood the room. When asked about this patient became agitated and irritable. Pt continues to display occasional bizarre delusions and confusion. Continues to note fixed delusions and is fixated on them. Pt is unable to understand her diagnosis and treatment plan. Pt noted she currently feels safe and comfortable on the unit.  Pt was in agreement with treatment team.  Pt was polite and cordial during the interview process.  Has been taking medications with difficulty and per staff will start to crush them. She attempts to be be  compliant with treatment. ST assessed patient today. Refer to note. Diet pureed. PT reconsulted due to balance and walking problems related to AMS change. FINDINGS: ct head w/o contrast 1/25/2021   BRAIN/VENTRICLES: There is no acute intracranial hemorrhage, mass effect or   midline shift.  No abnormal extra-axial fluid collection.  The gray-white   differentiation is maintained without evidence of an acute infarct.  Old   lacunar infarcts noted in the bilateral basal ganglia.  Hypoattenuation of   the periventricular and subcortical white matter is suggestive of chronic   small vessel ischemic disease.  Mild diffuse parenchymal volume loss is   noted.  Vascular calcification is seen. Sherita Hawks is no evidence of   hydrocephalus. Pt denied any hx of seizures, Hep C or HIV  Pt notes history of TBI  No TD noted, AIMS=0    Past Psychiatric History:   See note above    Family Psychiatric History:   See note above    Family Psychiatric History:   Family History   Problem Relation Age of Onset    No Known Problems Mother     No Known Problems Sister     Heart Disease Brother     Coronary Art Dis Brother         stents    No Known Problems Sister     No Known Problems Sister     Alcohol Abuse Brother         Allergies: Allergies   Allergen Reactions    Shellfish-Derived Products     Floxin [Ofloxacin] Other (See Comments)     Made her \"feel weird\"        OBJECTIVE  Vital Signs:  Vitals:    01/29/21 0915   BP: (!) 159/89   Pulse: 99   Resp: 16   Temp: 97.1 °F (36.2 °C)   SpO2: 93%       Labs:  No results found for this or any previous visit (from the past 48 hour(s)). Review of Systems:  Reports of no current cardiovascular, respiratory, gastrointestinal, genitourinary, integumentary, neurological, musculoskeletal, or immunological symptoms today. PSYCHIATRIC: See HPI above.          PSYCHIATRIC EXAMINATION / MENTAL STATUS EXAM    CONSTITUTIONAL:    Vitals:   Vitals:    01/29/21 0915   BP: (!) 159/89   Pulse: 99   Resp: 16   Temp: 97.1 °F (36.2 °C)   SpO2: 93%      General appearance: [x] appears age, []  appears older than stated age,               []  adequately dressed and groomed, [x] disheveled,               []  in no acute distress, [x] appears mildly distressed, [] other           MUSCULOSKELETAL:   Gait:   [] normal, [] antalgic, [x] unsteady, [] gait not evaluated   Station:             [] erect, [x] sitting, [] recumbent, [] other        Strength/tone:  [x] muscle strength and tone appear consistent with age and condition     [] atrophy      [] abnormal movements       PSYCHIATRIC:    Appearance: appears stated age. Alert and oriented to person only. . No acute distress- some mild today. Adequate grooming and hygiene. Good eye contact. No prominent physical abnormalities. Attitude: Manner is cooperative and pleasant  Motor: No psychomotor agitation, retardation or abnormal movements noted  Speech: Clearly articulated; slowed rate, volume, tone & amount. Language: intact understanding and production  Mood:improving  Affect:flat non-labile, congruent with mood and content of speech  Thought Production: Spontaneous. Thought Form: Coherent, linear, logical & goal-directed. No tangentiality or circumstantiality. No flight of ideas or loosening of associations. Thought Content/Perceptions: No [place], staff observing visual Hallucinations but no auditory hallucinations  Delusions and paranoid observed  Insight: impaired  Judgment: impaired  Memory: Immediate, recent, and remote appear intact, though not formally tested. Attention: having difficulty today throughout interview   Fund of knowledge: Average  Gait/Balance: unsteady    Impression:   Late onset Alzheimers  Late onset Alzheimers with behavioral disturbance.     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 Admit to [place] [address] Unit  2. Consult Hospitalist to evaluate and treat medical conditions  3. Adjust psychotropic medications to target symptoms  4. Occupational Therapy, Physical Therapy, Group Psychotherapy as tolerated  5. Reviewed treatment plan with patient including medication risks, benefits, side effects. Obtained informed consent for treatment. 6. Anticipated length of stay 10-12 days  7. I certify that inpatient psychiatric hospital admission is medically necessary for treatment, which can be expected to improve the patient's condition, and/or for diagnostic study. 8. Psychiatric management:medication initiation and titration, group and individual therapy, safe and therapeutic environment. 9. Status of problem/condition: Improving  10. Medical co-morbidities: Management per [facility] group, appreciate assistance  11. Legal Status: Voluntary  12. The treatment team reviewed with the patient the diagnosis and treatment recommendations to include the risks, benefits, and side effects of chosen medications. 13. The patient verbalized understanding and agreed with the treatment regimen as outlined above. 14. The patient was encouraged to participate in groups. 15. Medical records, Labs, Diagnotic tests reviewed  16. q15 min safety checks for safety  17. Interval History. 18. Review current labs- requested cbc, c diff and fecal occult- negative. CT Head w/o contrast negative, ammonia negative. Cholesterol and LDL elevated- hospitalist notified. 19. Continue current medications increase depakote   1000 mg PO sprinkles QHS for mood, start risperidone 1 mg po in the am, and 3 mg at bedtime (d/t increased auditory and visual hallucinations at nightime). Increased trazodone 150 mg po qhs.  20. Supportive Therapy Provided  21. Pt had an opportunity to ask questions and address concerns  22.  Pt encouraged to continue therapy group or individual.  23. Pt was in agreement with treatment plan. 24. The risks benefits and side effects of medications were discussed with the patient, including alternatives and no treatment.     Electronically signed by EVELYN Loja CNP on 1/29/2021 at 9:57 AM

## 2021-01-29 NOTE — GROUP NOTE
Group Therapy Note    Date: 1/29/2021    Group Start Time: 1030  Group End Time: 1120  Group Topic: Recreational    [address]    SANTI RoachS, [place]        Group Therapy Note    Attendees: 5/5       Notes:  Pts participated in recreational therapy group; Bingo Activity. Pts were encouraged to engage in a leisure activity to promote socialization, positive mood, and coping with negative emotions. Pt arrived to group ~15 minutes late. Pt unable to participate in group d/t lethargy and responding to internal stimuli. Pt at times attempting to jump out of the chair or lean over chair. Pt not receptive to reality orientation or prompting. Status After Intervention:  Unchanged    Participation Level: None    Participation Quality: Inappropriate      Speech:  pressured      Thought Process/Content: Delusional  Hallucinating      Affective Functioning: Blunted      Mood: Blunted      Level of consciousness:  Drowsy, Preoccupied and Inattentive      Response to Learning: Pt unable to demonstrate response to learning at this time.        Endings: None Reported    Modes of Intervention: Socialization and Activity      Discipline Responsible: Certified Therapeutic Recreation Specialist       Signature:  Brandon Mcleod, [address] [place] [address]

## 2021-01-29 NOTE — PLAN OF CARE
Problem: Altered Mood, Depressive Behavior:  Goal: Able to verbalize acceptance of life and situations over which he or she has no control  Description: Able to verbalize acceptance of life and situations over which he or she has no control  1/29/2021 0930 by Julia Miller RN  Outcome: Ongoing  1/28/2021 2253 by Sergio Cameron RN  Outcome: Ongoing  1/28/2021 2241 by Sergio Cameron RN  Outcome: Ongoing  Goal: Able to verbalize and/or display a decrease in depressive symptoms  Description: Able to verbalize and/or display a decrease in depressive symptoms  1/29/2021 0930 by Julia Miller RN  Outcome: Ongoing  1/28/2021 2253 by Sergio Cameron RN  Outcome: Ongoing  1/28/2021 2241 by Sergio Cameron RN  Outcome: Ongoing  Goal: Ability to disclose and discuss suicidal ideas will improve  Description: Ability to disclose and discuss suicidal ideas will improve  1/29/2021 0930 by Julia Miller RN  Outcome: Ongoing  1/28/2021 2253 by Sergio Cameron RN  Outcome: Ongoing  1/28/2021 2241 by Sergio Cameron RN  Outcome: Ongoing  Goal: Able to verbalize support systems  Description: Able to verbalize support systems  1/29/2021 0930 by Julia Miller RN  Outcome: Ongoing  1/28/2021 2253 by Sergio Cameron RN  Outcome: Ongoing  1/28/2021 2241 by Sergio Cameron RN  Outcome: Ongoing  Goal: Absence of self-harm  Description: Absence of self-harm  1/29/2021 0930 by Julia Miller RN  Outcome: Ongoing  1/28/2021 2253 by Sergio Cameron RN  Outcome: Ongoing  1/28/2021 2241 by Sergio Cameron RN  Outcome: Ongoing  Goal: Patient specific goal  Description: Patient specific goal  1/29/2021 0930 by Julia Miller RN  Outcome: Ongoing  1/28/2021 2253 by Sergio Cameron RN  Outcome: Ongoing  1/28/2021 2241 by Sergio Cameron RN  Outcome: Ongoing  Goal: Participates in care planning  Description: Participates in care planning  1/29/2021 0930 by Julia Miller RN  Outcome: Ongoing  1/28/2021 2253 by Jennifer Roman RN  Outcome: Ongoing  1/28/2021 2241 by Jennifer Roman RN  Outcome: Ongoing     Problem: Depressive Behavior With or Without Suicide Precautions:  Goal: Able to verbalize acceptance of life and situations over which he or she has no control  Description: Able to verbalize acceptance of life and situations over which he or she has no control  1/29/2021 0930 by Gama Biggs RN  Outcome: Ongoing  1/28/2021 2253 by Jennifer Roman RN  Outcome: Ongoing  1/28/2021 2241 by Jennifer Roman RN  Outcome: Ongoing  Goal: Able to verbalize and/or display a decrease in depressive symptoms  Description: Able to verbalize and/or display a decrease in depressive symptoms  1/29/2021 0930 by Gama Biggs RN  Outcome: Ongoing  1/28/2021 2253 by Jennifer Roman RN  Outcome: Ongoing  1/28/2021 2241 by Jennifer Roman RN  Outcome: Ongoing  Goal: Ability to disclose and discuss suicidal ideas will improve  Description: Ability to disclose and discuss suicidal ideas will improve  1/29/2021 0930 by Gama Biggs RN  Outcome: Ongoing  1/28/2021 2253 by Jennifer Roman RN  Outcome: Ongoing  1/28/2021 2241 by Jennifer Roman RN  Outcome: Ongoing  Goal: Able to verbalize support systems  Description: Able to verbalize support systems  1/29/2021 0930 by Gama Biggs RN  Outcome: Ongoing  1/28/2021 2253 by Jennifer Roman RN  Outcome: Ongoing  1/28/2021 2241 by Jennifer Roman RN  Outcome: Ongoing  Goal: Absence of self-harm  Description: Absence of self-harm  1/29/2021 0930 by Gama Biggs RN  Outcome: Ongoing  1/28/2021 2253 by Jennifer Roman RN  Outcome: Ongoing  1/28/2021 2241 by Jennifer Roman RN  Outcome: Ongoing  Goal: Patient specific goal  Description: Patient specific goal  1/29/2021 0930 by Gama Biggs RN  Outcome: Ongoing  1/28/2021 2253 by Jennifer Roman RN  Outcome: Ongoing  1/28/2021 2241 by Kenia Melo RN  Outcome: Ongoing  Goal: Participates in care planning  Description: Participates in care planning  1/29/2021 0930 by Nader Sinclair RN  Outcome: Ongoing  1/28/2021 2253 by Kenia Melo RN  Outcome: Ongoing  1/28/2021 2241 by Kenia Melo RN  Outcome: Ongoing     Problem: Altered Mood, Deterioration in Function:  Goal: Ability to perform activities of daily living will improve  Description: Ability to perform activities of daily living will improve  1/29/2021 0930 by Nader Sinclair RN  Outcome: Ongoing  1/28/2021 2253 by Kenia Melo RN  Outcome: Ongoing  1/28/2021 2241 by Kenia Melo RN  Outcome: Ongoing  Goal: Able to verbalize reality based thinking  Description: Able to verbalize reality based thinking  1/29/2021 0930 by Nader Sinclair RN  Outcome: Ongoing  1/28/2021 2253 by Kenia Melo RN  Outcome: Ongoing  1/28/2021 2241 by Kenia Melo RN  Outcome: Ongoing  Goal: Skin appearance normal  Description: Skin appearance normal  1/29/2021 0930 by Nader Sinclair RN  Outcome: Ongoing  1/28/2021 2253 by Kenia Melo RN  Outcome: Ongoing  1/28/2021 2241 by Kenia Melo RN  Outcome: Ongoing  Goal: Maintenance of adequate nutrition will improve  Description: Maintenance of adequate nutrition will improve  1/29/2021 0930 by Nader Sinclair RN  Outcome: Ongoing  1/28/2021 2253 by Kenia Melo RN  Outcome: Ongoing  1/28/2021 2241 by Kenia Melo RN  Outcome: Ongoing  Goal: Ability to tolerate increased activity will improve  Description: Ability to tolerate increased activity will improve  1/29/2021 0930 by Nader Sinclair RN  Outcome: Ongoing  1/28/2021 2253 by Kenia Melo RN  Outcome: Ongoing  1/28/2021 2241 by Kenia Melo RN  Outcome: Ongoing  Goal: Participates in care planning  Description: Participates in care planning  1/29/2021 0930 by Nader Sinclair RN  Outcome: Ongoing  1/28/2021 2253 by Sada Terrell RN  Outcome: Ongoing  1/28/2021 2241 by Sada Terrell RN  Outcome: Ongoing  Goal: Patient specific goal  Description: Patient specific goal  1/29/2021 0930 by Pilar Nieves RN  Outcome: Ongoing  1/28/2021 2253 by Sada Terrell RN  Outcome: Ongoing  1/28/2021 2241 by Sada Terrell RN  Outcome: Ongoing     Problem: Risk of Harm:  Goal: Ability to remain free from injury will improve  Description: Ability to remain free from injury will improve  1/29/2021 0930 by Pilar Nieves RN  Outcome: Ongoing  1/28/2021 2253 by Sada Terrell RN  Outcome: Ongoing  1/28/2021 2241 by Sada Terrell RN  Outcome: Ongoing     Problem: Altered Mood, Manic Behavior:  Goal: Able to sleep  Description: Able to sleep  1/29/2021 0930 by Pilar Nieves RN  Outcome: Ongoing  1/28/2021 2253 by Sada Terrell RN  Outcome: Ongoing  1/28/2021 2241 by Sada Terrell RN  Outcome: Ongoing  Goal: Able to verbalize decrease in frequency and intensity of racing thoughts  Description: Able to verbalize decrease in frequency and intensity of racing thoughts  1/29/2021 0930 by Pilar Nieves RN  Outcome: Ongoing  1/28/2021 2253 by Sada Terrell RN  Outcome: Ongoing  1/28/2021 2241 by Sada Terrell RN  Outcome: Ongoing  Goal: Ability to disclose and discuss suicidal ideas will improve  Description: Ability to disclose and discuss suicidal ideas will improve  1/29/2021 0930 by Pilar Nieves RN  Outcome: Ongoing  1/28/2021 2253 by Sada Terrell RN  Outcome: Ongoing  1/28/2021 2241 by Sada Terrell RN  Outcome: Ongoing  Goal: Absence of self-harm  Description: Absence of self-harm  1/29/2021 0930 by Pilar Nieves RN  Outcome: Ongoing  1/28/2021 2253 by Sada Terrell RN  Outcome: Ongoing  1/28/2021 2241 by Sada Terrell RN  Outcome: Ongoing  Goal: Ability to achieve adequate nutritional intake will improve  Description: Ability to achieve adequate nutritional intake will improve  1/29/2021 0930 by Erick Reza RN  Outcome: Ongoing  1/28/2021 2253 by Wilner Parmar RN  Outcome: Ongoing  1/28/2021 2241 by Wilner Parmar RN  Outcome: Ongoing  Goal: Ability to interact with others will improve  Description: Ability to interact with others will improve  1/29/2021 0930 by Erick Reza RN  Outcome: Ongoing  1/28/2021 2253 by Wilner Parmar RN  Outcome: Ongoing  1/28/2021 2241 by Wilner Parmar RN  Outcome: Ongoing  Goal: Ability to demonstrate self-control will improve  Description: Ability to demonstrate self-control will improve  1/29/2021 0930 by Erick Reza RN  Outcome: Ongoing  1/28/2021 2253 by Wilner Parmar RN  Outcome: Ongoing  1/28/2021 2241 by Wilner Parmar RN  Outcome: Ongoing  Goal: Mood stable  Description: Mood stable  1/29/2021 0930 by Erick Reza RN  Outcome: Ongoing  1/28/2021 2253 by Wilner Parmar RN  Outcome: Ongoing  1/28/2021 2241 by Wilner Parmar RN  Outcome: Ongoing     Problem: Falls - Risk of:  Goal: Will remain free from falls  Description: Will remain free from falls  1/29/2021 0930 by Erick Reza RN  Outcome: Ongoing  1/28/2021 2253 by Wilner Parmar RN  Outcome: Ongoing  1/28/2021 2241 by Wilner Parmar RN  Outcome: Ongoing  Goal: Absence of physical injury  Description: Absence of physical injury  1/29/2021 0930 by Erick Reza RN  Outcome: Ongoing  1/28/2021 2253 by Wilner Parmar RN  Outcome: Ongoing  1/28/2021 2241 by Wilner Parmar RN  Outcome: Ongoing     Problem: Skin Integrity:  Goal: Will show no infection signs and symptoms  Description: Will show no infection signs and symptoms  1/29/2021 0930 by Erick Reza RN  Outcome: Ongoing  1/28/2021 2253 by Wilner Parmar RN  Outcome: Ongoing  1/28/2021 2241 by Wilner Parmar RN  Outcome: Ongoing  Goal: Absence of new skin breakdown  Description: Absence of new skin

## 2021-01-29 NOTE — PLAN OF CARE
Patient in dayroom watching TV. After her medications I asked if she was ready for bed and she replied yes. She stood and got into the wheelchair with a stand by assist. She then got into bed by herself. Patient is resting in bed with eyes closed at this time. continue to monitor for safety.

## 2021-01-30 PROCEDURE — 6370000000 HC RX 637 (ALT 250 FOR IP): Performed by: INTERNAL MEDICINE

## 2021-01-30 PROCEDURE — 6370000000 HC RX 637 (ALT 250 FOR IP): Performed by: NURSE PRACTITIONER

## 2021-01-30 PROCEDURE — 99232 SBSQ HOSP IP/OBS MODERATE 35: CPT | Performed by: PSYCHIATRY & NEUROLOGY

## 2021-01-30 PROCEDURE — 1240000000 HC EMOTIONAL WELLNESS R&B

## 2021-01-30 PROCEDURE — 6370000000 HC RX 637 (ALT 250 FOR IP): Performed by: PSYCHIATRY & NEUROLOGY

## 2021-01-30 RX ORDER — RISPERIDONE 2 MG/1
2 TABLET, FILM COATED ORAL NIGHTLY
Status: DISCONTINUED | OUTPATIENT
Start: 2021-01-30 | End: 2021-01-31

## 2021-01-30 RX ORDER — RISPERIDONE 0.5 MG/1
0.5 TABLET, FILM COATED ORAL DAILY
Status: DISCONTINUED | OUTPATIENT
Start: 2021-01-31 | End: 2021-01-31

## 2021-01-30 RX ADMIN — SUCRALFATE 1 G: 1 TABLET ORAL at 11:23

## 2021-01-30 RX ADMIN — SUCRALFATE 1 G: 1 TABLET ORAL at 20:46

## 2021-01-30 RX ADMIN — TRAZODONE HYDROCHLORIDE 50 MG: 50 TABLET ORAL at 03:39

## 2021-01-30 RX ADMIN — METFORMIN HYDROCHLORIDE 500 MG: 500 TABLET ORAL at 17:47

## 2021-01-30 RX ADMIN — DIVALPROEX SODIUM 1000 MG: 125 CAPSULE ORAL at 20:47

## 2021-01-30 RX ADMIN — MEMANTINE 10 MG: 10 TABLET ORAL at 07:55

## 2021-01-30 RX ADMIN — RISPERIDONE 2 MG: 2 TABLET ORAL at 20:47

## 2021-01-30 RX ADMIN — SUCRALFATE 1 G: 1 TABLET ORAL at 07:43

## 2021-01-30 RX ADMIN — METFORMIN HYDROCHLORIDE 500 MG: 500 TABLET ORAL at 07:55

## 2021-01-30 RX ADMIN — METOPROLOL TARTRATE 25 MG: 25 TABLET, FILM COATED ORAL at 08:12

## 2021-01-30 RX ADMIN — METOPROLOL TARTRATE 25 MG: 25 TABLET, FILM COATED ORAL at 20:46

## 2021-01-30 RX ADMIN — MULTIPLE VITAMINS W/ MINERALS TAB 1 TABLET: TAB at 07:56

## 2021-01-30 RX ADMIN — ATORVASTATIN CALCIUM 80 MG: 80 TABLET, FILM COATED ORAL at 20:46

## 2021-01-30 RX ADMIN — FLUTICASONE PROPIONATE 1 SPRAY: 50 SPRAY, METERED NASAL at 07:53

## 2021-01-30 RX ADMIN — PANTOPRAZOLE SODIUM 80 MG: 40 TABLET, DELAYED RELEASE ORAL at 07:57

## 2021-01-30 RX ADMIN — TRAZODONE HYDROCHLORIDE 150 MG: 50 TABLET ORAL at 20:47

## 2021-01-30 RX ADMIN — OMEGA-3-ACID ETHYL ESTERS 2 G: 1 CAPSULE, LIQUID FILLED ORAL at 20:46

## 2021-01-30 RX ADMIN — AMLODIPINE BESYLATE 10 MG: 10 TABLET ORAL at 07:53

## 2021-01-30 RX ADMIN — RISPERIDONE 1 MG: 1 TABLET ORAL at 07:56

## 2021-01-30 RX ADMIN — SUCRALFATE 1 G: 1 TABLET ORAL at 17:48

## 2021-01-30 RX ADMIN — LISINOPRIL 20 MG: 20 TABLET ORAL at 07:54

## 2021-01-30 RX ADMIN — DONEPEZIL HYDROCHLORIDE 10 MG: 10 TABLET, FILM COATED ORAL at 20:47

## 2021-01-30 RX ADMIN — PANTOPRAZOLE SODIUM 80 MG: 40 TABLET, DELAYED RELEASE ORAL at 20:47

## 2021-01-30 RX ADMIN — OMEGA-3-ACID ETHYL ESTERS 2 G: 1 CAPSULE, LIQUID FILLED ORAL at 07:57

## 2021-01-30 NOTE — PROGRESS NOTES
Progress Note Psychiatry    Brenna Roldan  [id number]  1/21/2021 01/30/21    ID: Patient is a 65 yrs y.o. female    CC: \"I stopped my meds. \"    HPI: Brenna Roldan is a 65year old female with PMHx of HTN, HLD,dementia (Alzemimers) with behavioral disturbances, delusional disorder and medication non compliance. She is well known to [id number] [facility] related to several admissions for same. She presents to [facility] ED via police after found wandering. Her sister called them when she was unable to locate the patient. Patient has not been taking her medications and has an increase in her delusions and paranoia. Patient admitted to [address] [place] [address]  Per Luis Virk RN on SBU, patient has not been taking any medications from [facility] for three months. Sister states patient won't take them so why buy them.      During today's interview she was alert & oriented x 3. Pt denied any thoughts to harm herself or anyone else. Pt Denied any auditory or visual Hallucinations. She denies depression and anxiety. She states she is sleeping \"very good\" and that her appetite is \"very good. \"Per staff she continues to not exhibit behaviors and has been med compliant on unit. Pt continues to display occasional bizarre delusions and confusion. Continues to note fixed delusions and is fixated on them. Pt remains unable to understand her diagnosis and treatment plan.  Pt noted she currently feels safe and comfortable on the unit.  Pt was in agreement with treatment team.  Pt was polite and cordial during the interview process. Has been taking medications and has been compliant with treatment. ST assessed patient today. Refer to note. Diet pureed. PT reconsulted due to balance and walking problems related to AMS change.     FINDINGS: ct head w/o contrast 1/25/2021   BRAIN/VENTRICLES: There is no acute intracranial hemorrhage, mass effect or   midline shift.  No abnormal extra-axial fluid collection.  The gray-white   differentiation is maintained without evidence of an acute infarct.  Old   lacunar infarcts noted in the bilateral basal ganglia.  Hypoattenuation of   the periventricular and subcortical white matter is suggestive of chronic   small vessel ischemic disease.  Mild diffuse parenchymal volume loss is   noted.  Vascular calcification is seen. Yasemin Richard is no evidence of   hydrocephalus. Pt denied any hx of seizures, Hep C or HIV  Pt notes history of TBI  No TD noted, AIMS=0    Past Psychiatric History:   See note above    Family Psychiatric History:   See note above    Family Psychiatric History:   Family History   Problem Relation Age of Onset    No Known Problems Mother     No Known Problems Sister     Heart Disease Brother     Coronary Art Dis Brother         stents    No Known Problems Sister     No Known Problems Sister     Alcohol Abuse Brother         Allergies: Allergies   Allergen Reactions    Shellfish-Derived Products     Floxin [Ofloxacin] Other (See Comments)     Made her \"feel weird\"        OBJECTIVE  Vital Signs:  Vitals:    01/30/21 0826   BP: 126/67   Pulse: 88   Resp: 18   Temp: 96 °F (35.6 °C)   SpO2: 98%       Labs:  No results found for this or any previous visit (from the past 48 hour(s)). Review of Systems:  Reports of no current cardiovascular, respiratory, gastrointestinal, genitourinary, integumentary, neurological, musculoskeletal, or immunological symptoms today. PSYCHIATRIC: See HPI above.          PSYCHIATRIC EXAMINATION / MENTAL STATUS EXAM    CONSTITUTIONAL:    Vitals:   Vitals:    01/30/21 0826   BP: 126/67   Pulse: 88   Resp: 18   Temp: 96 °F (35.6 °C)   SpO2: 98%      General appearance: [x] appears age, []  appears older than stated age,               []  adequately dressed and groomed, [x] disheveled,               []  in no acute distress, [x] appears mildly distressed, [] other           MUSCULOSKELETAL:   Gait:   [] normal, [] antalgic, [x] unsteady, [] gait not evaluated Station:             [] erect, [x] sitting, [] recumbent, [] other        Strength/tone:  [x] muscle strength and tone appear consistent with age and condition     [] atrophy      [] abnormal movements       PSYCHIATRIC:    Appearance: appears stated age. Alert and oriented to person only. . No acute distress- some mild today. Adequate grooming and hygiene. Good eye contact. No prominent physical abnormalities. Attitude: Manner is cooperative and pleasant  Motor: No psychomotor agitation, retardation or abnormal movements noted  Speech: Clearly articulated; slowed rate, volume, tone & amount. Language: intact understanding and production  Mood:improving  Affect:flat non-labile, congruent with mood and content of speech  Thought Production: Spontaneous. Thought Form: Coherent, linear, logical & goal-directed. No tangentiality or circumstantiality. No flight of ideas or loosening of associations. Thought Content/Perceptions: No [place], staff observing visual Hallucinations but no auditory hallucinations  Delusions and paranoid observed  Insight: impaired  Judgment: impaired  Memory: Immediate, recent, and remote appear intact, though not formally tested. Attention: having difficulty today throughout interview   Fund of knowledge: Average  Gait/Balance: unsteady    Impression:   Late onset Alzheimer's disease with behavioral disturbance    Problem List:   Major neurocognitive disorder due to Alzheimer's disease, with behavioral disturbance ([facility] [address]    Plan:  1. Admit to [place] [id number] Unit  2. Consult Hospitalist to evaluate and treat medical conditions  3. Adjust psychotropic medications to target symptoms  4. Occupational Therapy, Physical Therapy, Group Psychotherapy as tolerated  5. Reviewed treatment plan with patient including medication risks, benefits, side effects. Obtained informed consent for treatment. 6. Anticipated length of stay 10-12 days  7.  I certify that inpatient psychiatric

## 2021-01-30 NOTE — GROUP NOTE
Group Therapy Note    Date: 1/30/2021    Group Start Time: 0830  Group End Time: 0900    Number of Participants: 6/6    Type: Morning Goals Group/ Community Meeting    Group Topic/Objective: Set Goal For The Day and to review Unit Rules and Regulations. Patient's Goal:  Pt states her goal is \"Get something to eat. \"    Notes:  Pt presented as fixated on being discharged. Pt was briefly receptive to education and reality orientation r/t being in the hospital, but would return to reporting belief she was leaving. Pt states she is feeling \"good\" and is grateful \"that people know me. \"  Pt reports restful sleep, but unsure how long she slept.      Depression (0-10): 0    Anxiety (0-10): 0    Irritability/Aggitation (0-10): 0    Status After Intervention:  Improved    Participation Level: Interactive    Participation Quality: Attentive and Sharing    Speech:  normal    Thought Process/Content: Delusional    Affective Functioning: Congruent    Mood: Bright    Level of consciousness:  Preoccupied    Response to Learning: Able to verbalize current knowledge/experience    Endings: None Reported    Modes of Intervention: Education, Support and Socialization    Discipline Responsible: Certified Therapeutic Recreation Specialist     Electronically signed by JIM Kennedy MA on 1/30/2021 at 9:08 AM

## 2021-01-30 NOTE — PLAN OF CARE
Problem: Altered Mood, Depressive Behavior:  Goal: Able to verbalize acceptance of life and situations over which he or she has no control  Description: Able to verbalize acceptance of life and situations over which he or she has no control  1/30/2021 1038 by Soraya Galvez RN  Outcome: Ongoing  1/30/2021 0004 by Yasmin Canales RN  Outcome: Ongoing  Goal: Able to verbalize and/or display a decrease in depressive symptoms  Description: Able to verbalize and/or display a decrease in depressive symptoms  1/30/2021 1038 by Soraya Galvez RN  Outcome: Ongoing  1/30/2021 0004 by Yasmin Canales RN  Outcome: Ongoing  Goal: Ability to disclose and discuss suicidal ideas will improve  Description: Ability to disclose and discuss suicidal ideas will improve  1/30/2021 1038 by Soraya Galvez RN  Outcome: Ongoing  1/30/2021 0004 by Yasmin Canales RN  Outcome: Ongoing  Goal: Able to verbalize support systems  Description: Able to verbalize support systems  1/30/2021 1038 by Soraya Galvez RN  Outcome: Ongoing  1/30/2021 0004 by Yasmin Canales RN  Outcome: Ongoing  Goal: Absence of self-harm  Description: Absence of self-harm  1/30/2021 1038 by Soraya Galvez RN  Outcome: Ongoing  1/30/2021 0004 by Yasmin Canales RN  Outcome: Ongoing  Goal: Patient specific goal  Description: Patient specific goal  1/30/2021 1038 by Soraya Galvez RN  Outcome: Ongoing  1/30/2021 0004 by Yasmin Canales RN  Outcome: Ongoing  Goal: Participates in care planning  Description: Participates in care planning  1/30/2021 1038 by Soraya Galvez RN  Outcome: Ongoing  1/30/2021 0004 by Yasmin Canales RN  Outcome: Ongoing     Problem: Depressive Behavior With or Without Suicide Precautions:  Goal: Able to verbalize acceptance of life and situations over which he or she has no control  Description: Able to verbalize acceptance of life and situations over which he or she has no control  1/30/2021 1038 by Soraya Galvez RN  Outcome: Ongoing  1/30/2021 0004 by Vin Scott RN  Outcome: Ongoing  Goal: Able to verbalize and/or display a decrease in depressive symptoms  Description: Able to verbalize and/or display a decrease in depressive symptoms  1/30/2021 1038 by Akhil Vargas RN  Outcome: Ongoing  1/30/2021 0004 by Vin Scott RN  Outcome: Ongoing  Goal: Ability to disclose and discuss suicidal ideas will improve  Description: Ability to disclose and discuss suicidal ideas will improve  1/30/2021 1038 by Akhil Vargas RN  Outcome: Ongoing  1/30/2021 0004 by Vin Scott RN  Outcome: Ongoing  Goal: Able to verbalize support systems  Description: Able to verbalize support systems  1/30/2021 1038 by Akhil Vargas RN  Outcome: Ongoing  1/30/2021 0004 by Vin Scott RN  Outcome: Ongoing  Goal: Absence of self-harm  Description: Absence of self-harm  1/30/2021 1038 by Akhil Vargas RN  Outcome: Ongoing  1/30/2021 0004 by Vin Scott RN  Outcome: Ongoing  Goal: Patient specific goal  Description: Patient specific goal  1/30/2021 1038 by Akhil Vargas RN  Outcome: Ongoing  1/30/2021 0004 by Vin Scott RN  Outcome: Ongoing  Goal: Participates in care planning  Description: Participates in care planning  1/30/2021 1038 by Akhil Vargas RN  Outcome: Ongoing  1/30/2021 0004 by Vin Scott RN  Outcome: Ongoing     Problem: Altered Mood, Deterioration in Function:  Goal: Ability to perform activities of daily living will improve  Description: Ability to perform activities of daily living will improve  1/30/2021 1038 by Akhil Vargas RN  Outcome: Ongoing  1/30/2021 0004 by Vin Scott RN  Outcome: Ongoing  Goal: Able to verbalize reality based thinking  Description: Able to verbalize reality based thinking  1/30/2021 1038 by Akhil Vargas RN  Outcome: Ongoing  1/30/2021 0004 by Vin Scott RN  Outcome: Ongoing  Goal: Skin appearance normal  Description: Skin appearance normal  1/30/2021 1038 by self-harm  Description: Absence of self-harm  1/30/2021 1038 by Reji Uriarte RN  Outcome: Ongoing  1/30/2021 0004 by Gabriela Zacarias RN  Outcome: Ongoing  Goal: Ability to achieve adequate nutritional intake will improve  Description: Ability to achieve adequate nutritional intake will improve  1/30/2021 1038 by Reji Uriarte RN  Outcome: Ongoing  1/30/2021 0004 by Gabriela Zacarias RN  Outcome: Ongoing  Goal: Ability to interact with others will improve  Description: Ability to interact with others will improve  1/30/2021 1038 by Reji Uriarte RN  Outcome: Ongoing  1/30/2021 0004 by Gabriela Zacarias RN  Outcome: Ongoing  Goal: Ability to demonstrate self-control will improve  Description: Ability to demonstrate self-control will improve  1/30/2021 1038 by Reji Uriarte RN  Outcome: Ongoing  1/30/2021 0004 by Gabriela Zacarias RN  Outcome: Ongoing  Goal: Mood stable  Description: Mood stable  1/30/2021 1038 by Reji Uriarte RN  Outcome: Ongoing  1/30/2021 0004 by Gabriela Zacarias RN  Outcome: Ongoing     Problem: Falls - Risk of:  Goal: Will remain free from falls  Description: Will remain free from falls  1/30/2021 1038 by Reji Uriarte RN  Outcome: Ongoing  1/30/2021 0004 by Gabriela Zacarias RN  Outcome: Ongoing  Goal: Absence of physical injury  Description: Absence of physical injury  1/30/2021 1038 by Reji Uriarte RN  Outcome: Ongoing  1/30/2021 0004 by Gabriela Zacarias RN  Outcome: Ongoing     Problem: Skin Integrity:  Goal: Will show no infection signs and symptoms  Description: Will show no infection signs and symptoms  1/30/2021 1038 by Reji Uriarte RN  Outcome: Ongoing  1/30/2021 0004 by Gabriela Zacarias RN  Outcome: Ongoing  Goal: Absence of new skin breakdown  Description: Absence of new skin breakdown  1/30/2021 1038 by Reji Uriarte RN  Outcome: Ongoing  1/30/2021 0004 by Gabriela Zacarias RN  Outcome: Ongoing

## 2021-01-30 NOTE — PROGRESS NOTES
Patient oriented to self. She is drowsy, but arousable enough to feed herself. Attempting to get up without assistance from chair occasionally. Verbal redirection and chair alarm used. Cooperative with medications. Participates minimally in group.

## 2021-01-30 NOTE — BH NOTE
Pt out on unit watching television at the beginning of this shift. Pt fell asleep in chair. She was calm, cooperative, and very lethargic during assessment. Pt was med compliant. She was assisted to room at approximately 2145 where she toileted, changed into nightgown, and then assisted to bed. Pt slept until approximately 0151. When she awoke, pt was trying to exit bed, throwing off covers, stating she wanted to \" take a bath,\" and wanted to \"find out from [place] where my car is. \" Bed alarm on. 0230 pt bed alarm going off, this nurse immediately went to pts room where she was found stumbling in middle of room. This nurse \"caught\" the pt and assisted her back into bed. At this point, this nurse sat in hallway monitoring pt for safety reasons. 0448 pt is still awake. She was just assisted to toilet. Pt assisted back to bed. Pt is still asking about sister Wilmar. Pt wants to call her. Pt is alert and oriented to person and place. She still does not understand why she is here. Will continue to monitor pt closely for safety while on SBU.

## 2021-01-30 NOTE — GROUP NOTE
Group Therapy Note    Date: 1/30/2021    Group Start Time: 1030  Group End Time: 1100    Number of Participants: 4/6    Type: Exercise/Recreation Group    Group Topic/Objective: Chair Exercises    Notes:  Pt attended group, but did not participate d/t lethargy. Pt spent majority of time sleeping. Status After Intervention:  Unchanged    Participation Level: None    Participation Quality: Lethargic    Speech:  pressured    Thought Process/Content: Confused    Affective Functioning: Flat    Mood: Flat    Level of consciousness:  Drowsy    Response to Learning: Pt unable to demonstrate response to learning at this time.      Endings: None Reported    Modes of Intervention: Activity and Movement    Discipline Responsible: Certified Therapeutic Recreation Specialist     Electronically signed by Berry Woods MA on 1/30/2021 at 11:09 AM

## 2021-01-30 NOTE — PLAN OF CARE
Problem: Altered Mood, Depressive Behavior:  Goal: Able to verbalize acceptance of life and situations over which he or she has no control  Description: Able to verbalize acceptance of life and situations over which he or she has no control  Outcome: Ongoing  Goal: Able to verbalize and/or display a decrease in depressive symptoms  Description: Able to verbalize and/or display a decrease in depressive symptoms  Outcome: Ongoing  Goal: Ability to disclose and discuss suicidal ideas will improve  Description: Ability to disclose and discuss suicidal ideas will improve  Outcome: Ongoing  Goal: Able to verbalize support systems  Description: Able to verbalize support systems  Outcome: Ongoing  Goal: Absence of self-harm  Description: Absence of self-harm  Outcome: Ongoing  Goal: Patient specific goal  Description: Patient specific goal  Outcome: Ongoing  Goal: Participates in care planning  Description: Participates in care planning  Outcome: Ongoing     Problem: Depressive Behavior With or Without Suicide Precautions:  Goal: Able to verbalize acceptance of life and situations over which he or she has no control  Description: Able to verbalize acceptance of life and situations over which he or she has no control  Outcome: Ongoing  Goal: Able to verbalize and/or display a decrease in depressive symptoms  Description: Able to verbalize and/or display a decrease in depressive symptoms  Outcome: Ongoing  Goal: Ability to disclose and discuss suicidal ideas will improve  Description: Ability to disclose and discuss suicidal ideas will improve  Outcome: Ongoing  Goal: Able to verbalize support systems  Description: Able to verbalize support systems  Outcome: Ongoing  Goal: Absence of self-harm  Description: Absence of self-harm  Outcome: Ongoing  Goal: Patient specific goal  Description: Patient specific goal  Outcome: Ongoing  Goal: Participates in care planning  Description: Participates in care planning  Outcome: Ongoing     Problem: Altered Mood, Deterioration in Function:  Goal: Ability to perform activities of daily living will improve  Description: Ability to perform activities of daily living will improve  Outcome: Ongoing  Goal: Able to verbalize reality based thinking  Description: Able to verbalize reality based thinking  Outcome: Ongoing  Goal: Skin appearance normal  Description: Skin appearance normal  Outcome: Ongoing  Goal: Maintenance of adequate nutrition will improve  Description: Maintenance of adequate nutrition will improve  Outcome: Ongoing  Goal: Ability to tolerate increased activity will improve  Description: Ability to tolerate increased activity will improve  Outcome: Ongoing  Goal: Participates in care planning  Description: Participates in care planning  Outcome: Ongoing  Goal: Patient specific goal  Description: Patient specific goal  Outcome: Ongoing     Problem: Risk of Harm:  Goal: Ability to remain free from injury will improve  Description: Ability to remain free from injury will improve  Outcome: Ongoing     Problem: Altered Mood, Manic Behavior:  Goal: Able to sleep  Description: Able to sleep  Outcome: Ongoing  Goal: Able to verbalize decrease in frequency and intensity of racing thoughts  Description: Able to verbalize decrease in frequency and intensity of racing thoughts  Outcome: Ongoing  Goal: Ability to disclose and discuss suicidal ideas will improve  Description: Ability to disclose and discuss suicidal ideas will improve  Outcome: Ongoing  Goal: Absence of self-harm  Description: Absence of self-harm  Outcome: Ongoing  Goal: Ability to achieve adequate nutritional intake will improve  Description: Ability to achieve adequate nutritional intake will improve  Outcome: Ongoing  Goal: Ability to interact with others will improve  Description: Ability to interact with others will improve  Outcome: Ongoing  Goal: Ability to demonstrate self-control will improve  Description: Ability to demonstrate self-control will improve  Outcome: Ongoing  Goal: Mood stable  Description: Mood stable  Outcome: Ongoing     Problem: Falls - Risk of:  Goal: Will remain free from falls  Description: Will remain free from falls  Outcome: Ongoing  Goal: Absence of physical injury  Description: Absence of physical injury  Outcome: Ongoing     Problem: Skin Integrity:  Goal: Will show no infection signs and symptoms  Description: Will show no infection signs and symptoms  Outcome: Ongoing  Goal: Absence of new skin breakdown  Description: Absence of new skin breakdown  Outcome: Ongoing

## 2021-01-31 PROCEDURE — 6370000000 HC RX 637 (ALT 250 FOR IP): Performed by: PSYCHIATRY & NEUROLOGY

## 2021-01-31 PROCEDURE — 1240000000 HC EMOTIONAL WELLNESS R&B

## 2021-01-31 PROCEDURE — 6370000000 HC RX 637 (ALT 250 FOR IP): Performed by: INTERNAL MEDICINE

## 2021-01-31 PROCEDURE — 99232 SBSQ HOSP IP/OBS MODERATE 35: CPT | Performed by: PSYCHIATRY & NEUROLOGY

## 2021-01-31 PROCEDURE — 6370000000 HC RX 637 (ALT 250 FOR IP): Performed by: NURSE PRACTITIONER

## 2021-01-31 RX ORDER — TRAZODONE HYDROCHLORIDE 100 MG/1
100 TABLET ORAL NIGHTLY PRN
Status: DISCONTINUED | OUTPATIENT
Start: 2021-01-31 | End: 2021-02-11 | Stop reason: HOSPADM

## 2021-01-31 RX ORDER — RISPERIDONE 1 MG/1
1 TABLET, FILM COATED ORAL NIGHTLY
Status: DISCONTINUED | OUTPATIENT
Start: 2021-01-31 | End: 2021-02-11 | Stop reason: HOSPADM

## 2021-01-31 RX ADMIN — SUCRALFATE 1 G: 1 TABLET ORAL at 20:08

## 2021-01-31 RX ADMIN — METFORMIN HYDROCHLORIDE 500 MG: 500 TABLET ORAL at 17:07

## 2021-01-31 RX ADMIN — MULTIPLE VITAMINS W/ MINERALS TAB 1 TABLET: TAB at 10:52

## 2021-01-31 RX ADMIN — DONEPEZIL HYDROCHLORIDE 10 MG: 10 TABLET, FILM COATED ORAL at 20:07

## 2021-01-31 RX ADMIN — PANTOPRAZOLE SODIUM 80 MG: 40 TABLET, DELAYED RELEASE ORAL at 20:09

## 2021-01-31 RX ADMIN — LISINOPRIL 20 MG: 20 TABLET ORAL at 10:51

## 2021-01-31 RX ADMIN — OMEGA-3-ACID ETHYL ESTERS 2 G: 1 CAPSULE, LIQUID FILLED ORAL at 20:08

## 2021-01-31 RX ADMIN — METFORMIN HYDROCHLORIDE 500 MG: 500 TABLET ORAL at 10:52

## 2021-01-31 RX ADMIN — OMEGA-3-ACID ETHYL ESTERS 2 G: 1 CAPSULE, LIQUID FILLED ORAL at 10:52

## 2021-01-31 RX ADMIN — ATORVASTATIN CALCIUM 80 MG: 80 TABLET, FILM COATED ORAL at 20:08

## 2021-01-31 RX ADMIN — FLUTICASONE PROPIONATE 1 SPRAY: 50 SPRAY, METERED NASAL at 10:52

## 2021-01-31 RX ADMIN — PANTOPRAZOLE SODIUM 80 MG: 40 TABLET, DELAYED RELEASE ORAL at 10:52

## 2021-01-31 RX ADMIN — TRAZODONE HYDROCHLORIDE 150 MG: 50 TABLET ORAL at 20:07

## 2021-01-31 RX ADMIN — SUCRALFATE 1 G: 1 TABLET ORAL at 17:07

## 2021-01-31 RX ADMIN — SUCRALFATE 1 G: 1 TABLET ORAL at 10:52

## 2021-01-31 RX ADMIN — METOPROLOL TARTRATE 25 MG: 25 TABLET, FILM COATED ORAL at 10:52

## 2021-01-31 RX ADMIN — RISPERIDONE 1 MG: 1 TABLET ORAL at 20:08

## 2021-01-31 RX ADMIN — DIVALPROEX SODIUM 1000 MG: 125 CAPSULE ORAL at 20:08

## 2021-01-31 RX ADMIN — METOPROLOL TARTRATE 25 MG: 25 TABLET, FILM COATED ORAL at 20:08

## 2021-01-31 RX ADMIN — TRAZODONE HYDROCHLORIDE 50 MG: 50 TABLET ORAL at 01:13

## 2021-01-31 RX ADMIN — MEMANTINE 10 MG: 10 TABLET ORAL at 10:52

## 2021-01-31 RX ADMIN — AMLODIPINE BESYLATE 10 MG: 10 TABLET ORAL at 10:52

## 2021-01-31 RX ADMIN — RISPERIDONE 0.5 MG: 0.5 TABLET ORAL at 10:52

## 2021-01-31 ASSESSMENT — PAIN SCALES - GENERAL: PAINLEVEL_OUTOF10: 0

## 2021-01-31 NOTE — PLAN OF CARE
Ni Lovell RN  Outcome: Ongoing  Goal: Able to verbalize and/or display a decrease in depressive symptoms  Description: Able to verbalize and/or display a decrease in depressive symptoms  1/30/2021 2241 by Alycia Laurent RN  Outcome: Ongoing  1/30/2021 1038 by Ni Lovell RN  Outcome: Ongoing  Goal: Ability to disclose and discuss suicidal ideas will improve  Description: Ability to disclose and discuss suicidal ideas will improve  1/30/2021 2241 by Alycia Laurent RN  Outcome: Ongoing  1/30/2021 1038 by Ni Lovell RN  Outcome: Ongoing  Goal: Able to verbalize support systems  Description: Able to verbalize support systems  1/30/2021 2241 by Alycia Laurent RN  Outcome: Ongoing  1/30/2021 1038 by Ni Lovell RN  Outcome: Ongoing  Goal: Absence of self-harm  Description: Absence of self-harm  1/30/2021 2241 by Alycia Laurent RN  Outcome: Ongoing  1/30/2021 1038 by Ni Lovell RN  Outcome: Ongoing  Goal: Patient specific goal  Description: Patient specific goal  1/30/2021 2241 by Alycia Laurent RN  Outcome: Ongoing  1/30/2021 1038 by Ni Lovell RN  Outcome: Ongoing  Goal: Participates in care planning  Description: Participates in care planning  1/30/2021 2241 by Alycia Laurent RN  Outcome: Ongoing  1/30/2021 1038 by Ni Lovell RN  Outcome: Ongoing     Problem: Altered Mood, Deterioration in Function:  Goal: Ability to perform activities of daily living will improve  Description: Ability to perform activities of daily living will improve  1/30/2021 2241 by Alycia Laurent RN  Outcome: Ongoing  1/30/2021 1038 by Ni Lovell RN  Outcome: Ongoing  Goal: Able to verbalize reality based thinking  Description: Able to verbalize reality based thinking  1/30/2021 2241 by Alycia Laurent RN  Outcome: Ongoing  1/30/2021 1038 by Ni Lovell RN  Outcome: Ongoing  Goal: Skin appearance normal  Description: Skin appearance normal  1/30/2021 2241 by Alycia Laurent RN  Outcome: Ongoing  1/30/2021 1038 by Lisa Villagran RN  Outcome: Ongoing  Goal: Maintenance of adequate nutrition will improve  Description: Maintenance of adequate nutrition will improve  1/30/2021 2241 by Jeane Teresa RN  Outcome: Ongoing  1/30/2021 1038 by Lisa Villagran RN  Outcome: Ongoing  Goal: Ability to tolerate increased activity will improve  Description: Ability to tolerate increased activity will improve  1/30/2021 2241 by Jeane Teresa RN  Outcome: Ongoing  1/30/2021 1038 by Lisa Villagran RN  Outcome: Ongoing  Goal: Participates in care planning  Description: Participates in care planning  1/30/2021 2241 by Jeane Teresa RN  Outcome: Ongoing  1/30/2021 1038 by Lisa Villagran RN  Outcome: Ongoing  Goal: Patient specific goal  Description: Patient specific goal  1/30/2021 2241 by Jeane eTresa RN  Outcome: Ongoing  1/30/2021 1038 by Lisa Villagran RN  Outcome: Ongoing     Problem: Risk of Harm:  Goal: Ability to remain free from injury will improve  Description: Ability to remain free from injury will improve  1/30/2021 2241 by Jeane Teresa RN  Outcome: Ongoing  1/30/2021 1038 by Lisa Villagran RN  Outcome: Ongoing     Problem: Altered Mood, Manic Behavior:  Goal: Able to sleep  Description: Able to sleep  1/30/2021 2241 by Jeane Teresa RN  Outcome: Ongoing  1/30/2021 1038 by Lisa Villagran RN  Outcome: Ongoing  Goal: Able to verbalize decrease in frequency and intensity of racing thoughts  Description: Able to verbalize decrease in frequency and intensity of racing thoughts  1/30/2021 2241 by Jeane Teresa RN  Outcome: Ongoing  1/30/2021 1038 by Lisa Villagran RN  Outcome: Ongoing  Goal: Ability to disclose and discuss suicidal ideas will improve  Description: Ability to disclose and discuss suicidal ideas will improve  1/30/2021 2241 by Jeane Teresa RN  Outcome: Ongoing  1/30/2021 1038 by Lisa Villagran RN  Outcome: Ongoing  Goal: Absence of self-harm  Description: Absence of self-harm  1/30/2021 2241 by Jeane Teresa RN  Outcome: Ongoing  1/30/2021 1038 by Selene Lopez RN  Outcome: Ongoing  Goal: Ability to achieve adequate nutritional intake will improve  Description: Ability to achieve adequate nutritional intake will improve  1/30/2021 2241 by Roverto Alaniz RN  Outcome: Ongoing  1/30/2021 1038 by Selene Lopez RN  Outcome: Ongoing  Goal: Ability to interact with others will improve  Description: Ability to interact with others will improve  1/30/2021 2241 by Roverto Alaniz RN  Outcome: Ongoing  1/30/2021 1038 by Selene Lopez RN  Outcome: Ongoing  Goal: Ability to demonstrate self-control will improve  Description: Ability to demonstrate self-control will improve  1/30/2021 2241 by Roverto Alaniz RN  Outcome: Ongoing  1/30/2021 1038 by Selene Lopez RN  Outcome: Ongoing  Goal: Mood stable  Description: Mood stable  1/30/2021 2241 by Roverto Alaniz RN  Outcome: Ongoing  1/30/2021 1038 by Selene Lopez RN  Outcome: Ongoing     Problem: Falls - Risk of:  Goal: Will remain free from falls  Description: Will remain free from falls  1/30/2021 2241 by Roverto Alaniz RN  Outcome: Ongoing  1/30/2021 1038 by Selene Lopez RN  Outcome: Ongoing  Goal: Absence of physical injury  Description: Absence of physical injury  1/30/2021 2241 by Roverto Alaniz RN  Outcome: Ongoing  1/30/2021 1038 by Selene Lopez RN  Outcome: Ongoing     Problem: Skin Integrity:  Goal: Will show no infection signs and symptoms  Description: Will show no infection signs and symptoms  1/30/2021 2241 by Roverto Alaniz RN  Outcome: Ongoing  1/30/2021 1038 by Selene Lopez RN  Outcome: Ongoing  Goal: Absence of new skin breakdown  Description: Absence of new skin breakdown  1/30/2021 2241 by Roverto Alaniz RN  Outcome: Ongoing  1/30/2021 1038 by Selene Lopez RN  Outcome: Ongoing

## 2021-01-31 NOTE — PROGRESS NOTES
Pt is A&O to self. She was sleeping on the chair in the day room. When pt was brought to room she was up for a while. PRN trazodone given to help pt sleep.

## 2021-01-31 NOTE — PROGRESS NOTES
Progress Note Psychiatry    Cecilie Gottron  [id number]  1/21/2021 01/31/21    ID: Patient is a 65 yrs y.o. female    CC: \"I stopped my meds. \"    HPI: Cecilie Gottron is a 65year old female with PMHx of HTN, HLD,dementia (Alzemimers) with behavioral disturbances, delusional disorder and medication non compliance. She is well known to [address] [facility] related to several admissions for same. She presents to [facility] ED via police after found wandering. Her sister called them when she was unable to locate the patient. Patient has not been taking her medications and has an increase in her delusions and paranoia. Patient admitted to [facility].       During today's interview she was alert & oriented x 3. Pt denied any thoughts to harm herself or anyone else. Pt Denied any auditory or visual Hallucinations however has displayed occasionally. She denies depression and anxiety. She states she is sleeping \"very good\" and that her appetite is \"very good. \"Per staff she continues to not exhibit behaviors and has been med compliant on unit. Pt continues to display occasional bizarre delusions and confusion. Continues to note fixed delusions and is fixated on them. Pt remains unable to understand her diagnosis and treatment plan.  Pt noted she currently feels safe and comfortable on the unit.  Pt was in agreement with treatment team.  Pt was polite and cordial during the interview process. Has been taking medications and has been compliant with treatment.          FINDINGS: ct head w/o contrast 1/25/2021   BRAIN/VENTRICLES: There is no acute intracranial hemorrhage, mass effect or   midline shift.  No abnormal extra-axial fluid collection.  The gray-white   differentiation is maintained without evidence of an acute infarct.  Old   lacunar infarcts noted in the bilateral basal ganglia.  Hypoattenuation of   the periventricular and subcortical white matter is suggestive of chronic   small vessel ischemic disease.  Mild diffuse parenchymal volume loss is   noted.  Vascular calcification is seen. Cassidy Julee is no evidence of   hydrocephalus. Pt denied any hx of seizures, Hep C or HIV  Pt notes history of TBI  No TD noted, AIMS=0    Past Psychiatric History:   See note above    Family Psychiatric History:   See note above    Family Psychiatric History:   Family History   Problem Relation Age of Onset    No Known Problems Mother     No Known Problems Sister     Heart Disease Brother     Coronary Art Dis Brother         stents    No Known Problems Sister     No Known Problems Sister     Alcohol Abuse Brother         Allergies: Allergies   Allergen Reactions    Shellfish-Derived Products     Floxin [Ofloxacin] Other (See Comments)     Made her \"feel weird\"        OBJECTIVE  Vital Signs:  Vitals:    01/31/21 1045   BP: 137/67   Pulse: 83   Resp: 16   Temp: 96.2 °F (35.7 °C)   SpO2: 98%       Labs:  No results found for this or any previous visit (from the past 48 hour(s)). Review of Systems:  Reports of no current cardiovascular, respiratory, gastrointestinal, genitourinary, integumentary, neurological, musculoskeletal, or immunological symptoms today. PSYCHIATRIC: See HPI above.          PSYCHIATRIC EXAMINATION / MENTAL STATUS EXAM    CONSTITUTIONAL:    Vitals:   Vitals:    01/31/21 1045   BP: 137/67   Pulse: 83   Resp: 16   Temp: 96.2 °F (35.7 °C)   SpO2: 98%      General appearance: [x] appears age, []  appears older than stated age,               []  adequately dressed and groomed, [x] disheveled,               []  in no acute distress, [x] appears mildly distressed, [] other           MUSCULOSKELETAL:   Gait:   [] normal, [] antalgic, [x] unsteady, [] gait not evaluated   Station:             [] erect, [x] sitting, [] recumbent, [] other        Strength/tone:  [x] muscle strength and tone appear consistent with age and condition     [] atrophy      [] abnormal movements       PSYCHIATRIC:    Appearance: appears stated therapeutic environment. 9. Status of problem/condition: Improving  10. Medical co-morbidities: Management per [facility] group, appreciate assistance  11. Legal Status: Voluntary  12. The treatment team reviewed with the patient the diagnosis and treatment recommendations to include the risks, benefits, and side effects of chosen medications. 13. The patient verbalized understanding and agreed with the treatment regimen as outlined above. 14. The patient was encouraged to participate in groups. 15. Medical records, Labs, Diagnotic tests reviewed  16. q15 min safety checks for safety  17. Interval History. 18. Review current labs- requested cbc, c diff and fecal occult- negative. CT Head w/o contrast negative, ammonia negative. Cholesterol and LDL elevated- hospitalist notified. 19. Continue current medications increase depakote   1000 mg PO sprinkles QHS for mood, reduce risperidone to 1 mg qhs for psychosis. 20. Supportive Therapy Provided  21. Pt had an opportunity to ask questions and address concerns  22. Pt encouraged to continue therapy group or individual.  23. Pt was in agreement with treatment plan. 24. The risks benefits and side effects of medications were discussed with the patient, including alternatives and no treatment.     Electronically signed by Amie Brooks DO on 1/31/2021 at 5:11 PM

## 2021-01-31 NOTE — PROGRESS NOTES
Pt alert and oriented to self today. Reported \"it was a good day to be alive and grateful for the day. \" Has been in day room most the day either snacking or eyes closed. Asked multiple times if she wished to go lay down she declined. Quite throughout the day but did carry a conversation when asked questions.

## 2021-02-01 PROCEDURE — 1240000000 HC EMOTIONAL WELLNESS R&B

## 2021-02-01 PROCEDURE — 6370000000 HC RX 637 (ALT 250 FOR IP): Performed by: INTERNAL MEDICINE

## 2021-02-01 PROCEDURE — 6370000000 HC RX 637 (ALT 250 FOR IP): Performed by: NURSE PRACTITIONER

## 2021-02-01 PROCEDURE — 97116 GAIT TRAINING THERAPY: CPT

## 2021-02-01 PROCEDURE — 97530 THERAPEUTIC ACTIVITIES: CPT

## 2021-02-01 PROCEDURE — 6370000000 HC RX 637 (ALT 250 FOR IP): Performed by: PSYCHIATRY & NEUROLOGY

## 2021-02-01 PROCEDURE — 99231 SBSQ HOSP IP/OBS SF/LOW 25: CPT | Performed by: NURSE PRACTITIONER

## 2021-02-01 RX ADMIN — RISPERIDONE 1 MG: 1 TABLET ORAL at 22:56

## 2021-02-01 RX ADMIN — METFORMIN HYDROCHLORIDE 500 MG: 500 TABLET ORAL at 08:15

## 2021-02-01 RX ADMIN — TRAZODONE HYDROCHLORIDE 150 MG: 50 TABLET ORAL at 22:56

## 2021-02-01 RX ADMIN — SUCRALFATE 1 G: 1 TABLET ORAL at 22:56

## 2021-02-01 RX ADMIN — AMLODIPINE BESYLATE 10 MG: 10 TABLET ORAL at 08:15

## 2021-02-01 RX ADMIN — MULTIPLE VITAMINS W/ MINERALS TAB 1 TABLET: TAB at 08:15

## 2021-02-01 RX ADMIN — LOPERAMIDE HYDROCHLORIDE 2 MG: 2 CAPSULE ORAL at 08:24

## 2021-02-01 RX ADMIN — PANTOPRAZOLE SODIUM 80 MG: 40 TABLET, DELAYED RELEASE ORAL at 08:15

## 2021-02-01 RX ADMIN — OMEGA-3-ACID ETHYL ESTERS 2 G: 1 CAPSULE, LIQUID FILLED ORAL at 08:15

## 2021-02-01 RX ADMIN — MEMANTINE 10 MG: 10 TABLET ORAL at 08:15

## 2021-02-01 RX ADMIN — LISINOPRIL 20 MG: 20 TABLET ORAL at 08:15

## 2021-02-01 RX ADMIN — DONEPEZIL HYDROCHLORIDE 10 MG: 10 TABLET, FILM COATED ORAL at 22:56

## 2021-02-01 RX ADMIN — OMEGA-3-ACID ETHYL ESTERS 2 G: 1 CAPSULE, LIQUID FILLED ORAL at 22:55

## 2021-02-01 RX ADMIN — SUCRALFATE 1 G: 1 TABLET ORAL at 12:06

## 2021-02-01 RX ADMIN — METOPROLOL TARTRATE 25 MG: 25 TABLET, FILM COATED ORAL at 08:15

## 2021-02-01 RX ADMIN — ATORVASTATIN CALCIUM 80 MG: 80 TABLET, FILM COATED ORAL at 22:56

## 2021-02-01 RX ADMIN — PANTOPRAZOLE SODIUM 80 MG: 40 TABLET, DELAYED RELEASE ORAL at 22:55

## 2021-02-01 RX ADMIN — FLUTICASONE PROPIONATE 1 SPRAY: 50 SPRAY, METERED NASAL at 08:16

## 2021-02-01 RX ADMIN — SUCRALFATE 1 G: 1 TABLET ORAL at 08:15

## 2021-02-01 RX ADMIN — DIVALPROEX SODIUM 1000 MG: 125 CAPSULE ORAL at 23:08

## 2021-02-01 NOTE — PROGRESS NOTES
Physical Therapy    Physical Therapy Treatment Note  Name: Matteo Anton MRN: [id number] :   1950   Date:  2021   Admission Date: 2021 Room:  [address] [place] [address]   Restrictions/Precautions:  Restrictions/Precautions  Restrictions/Precautions: General Precautions, Fall Risk(Simultaneous filing. User may not have seen previous data.)  Required Braces or Orthoses?: No(Simultaneous filing. User may not have seen previous data.)       Communication with other providers:  Co-treat with OT  Subjective:  Patient states:  \"Look at that fur over there\"  Pain:   Location, Type, Intensity (0/10 to 10/10): Does not report  Objective:    Observation:  P asleep in bed-woken up and agreeable to working with therapy  Treatment, including education/measures:  Bed mobility: P requires max cues for encouragement-min A for supine to sit  Transfers: sit <> stand x 2 reps with min-mod A x 2. P requires max cues and demos decreased safety with activity  Gait: P ambulates with HHA and mod A for gait stability x 20', 80'. P with ataxic gait and narrow ROSEMARIE. P with decreased safety and max cues for pathfinding. P easily distracted and requires assist for task. P left sitting up in wc with chair alarm on and nursing aware. Assessment / Impression:    P with improved gait, but continues to demonstrate significant strength and balance impairments. Recommend continued skilled PT to address deficits.     Patient's tolerance of treatment:  good   Adverse Reaction: none  Significant change in status and impact:  Improved gait  Barriers to improvement:  Confusion, impaired command following  Plan for Next Session:    gait  Time in:  1341  Time out:  1355  Timed treatment minutes:  14  Total treatment time:  14    Previously filed items:  Social/Functional History  Lives With: Family  Type of Home: House  Home Layout: Laundry in basement, One level  Home Access: Level entry  Bathroom Shower/Tub: Tub/Shower unit  Bathroom Toilet: Standard  Bathroom Accessibility: Not accessible  Receives Help From: Family  ADL Assistance: Independent  Homemaking Assistance: Independent  Homemaking Responsibilities: Yes  Active : Yes  Mode of Transportation: Car  Education: 9th grade  Occupation: Retired  Type of occupation:   Leisure & Hobbies: fixing cars and appliances, dancing  Additional Comments: Pt questionable historian, History obtained from chart  Short term goals  Time Frame for Short term goals: 1 week  Short term goal 1: P will perform bed mobility with Gio  Short term goal 2: P will perform sit <> stand with supervision  Short term goal 3: P will ambulate x 50' with CGA and mod cues  Short term goal 4: P will stand with 1 Ue support x 2 min with CGA       Electronically signed by:    Giselle Mendez, PT  2/1/2021, 2:44 PM

## 2021-02-01 NOTE — GROUP NOTE
Group Therapy Note    Date: 2/1/2021    Group Start Time: 1410  Group End Time: 1440  Group Topic: Psychotherapy    [address]    KASEY Ram        Group Therapy Note    Attendees: 6/8         Notes:   Patient participated in an exercise on gratitude in group. Status After Intervention:  Improved    Participation Level:  Active Listener and Minimal    Participation Quality: Sharing      Speech:  normal      Thought Process/Content: Logical      Affective Functioning: Congruent      Mood: euthymic      Level of consciousness:  Alert and Attentive      Response to Learning: Able to verbalize current knowledge/experience      Endings: None Reported    Modes of Intervention: Education, Support, Socialization and Exploration      Discipline Responsible: /Counselor      Signature:  KASEY Mccain

## 2021-02-01 NOTE — PROGRESS NOTES
Occupational Therapy Treatment Note  Name: Ethan Mai MRN: [id number] :   1950   Date:  2021   Admission Date: 2021 Room:  [address] [place] [address]   Restrictions/Precautions:  Restrictions/Precautions  Restrictions/Precautions: General Precautions, Fall Risk(Simultaneous filing. User may not have seen previous data.)  Required Braces or Orthoses?: No(Simultaneous filing. User may not have seen previous data.)  Communication with other providers:  Co-treat PT  Subjective:  Patient states: \"Look at the fur over there\" and Pt started singing [employer]    Pain:   Location, Type, Intensity (0/10 to 10/10):  Did not report  Objective:    Observation:  Pt alseep supine in bed. Pt woken and agreeable to therapy  Objective Measures:  Pt seen for functional mobility, trf training  Treatment, including education:    Bed mobility: Min A supine to sit after cues to initiate  Sit to stand:  x2 reps with min-mod A x2 and verbal prompts to intiate and unsteady balance safety  Stand to sit: Min A    LE dressing: Mod A to gil velcro shoes and max verbal prompts for donning in correct direction and to initiate pulling up socks    Pt amb holding onto therapist's arms with mod A for balance stability. Pt presented as ataxic today. Pt requires max cues during ambulation for direction as Pt become distracted. Pt amb down kee to dining area. Pt stopped and stated \" I used to line dance\" and started shaking hips side to side. Pt demo moving hands to midline along with her dancing. Pt sat down in WC at table. Brought Pt drink in lid cup with straw. Pt required prompts 3x to locate and drink from straw as Pt would try to drink from edge of cup with lid on.     Pt left sitting in WC at table with chair alarm and nurse notified    Assessment / Impression:        Patient's tolerance of treatment:  good  Adverse Reaction: none  Significant change in status and impact:  Improved BUE coordination  Barriers to improvement: Confusion    Plan for Next Session:    Cont current POC    Time in:  1341  Time out:  1355  Timed treatment minutes:  14  Total treatment time:  14  Electronically signed by:    Margarita Buchanan. Ron Coates [id number]  2/1/2021, 2:56 PM    Previously filed values:     Short term goals  Time Frame for Short term goals: Until DC or goals met  Short term goal 1: Pt will complete transfers CGA with min prompts to initiate (chair, toilet, bed)  Short term goal 2: Pt will participate in UE/LE bathing with min A and verbal prompts to initiate  Short term goal 3: Pt will participate in UE/LE dressing with min A and verbal prompts to initiate  Short term goal 4: Pt will demo toileting min A  Short term goal 5: Pt will participate in BUE activities/exercises 5+ min after instruction and min prompts to initiate.

## 2021-02-01 NOTE — GROUP NOTE
Group Therapy Note    Date: 2/1/2021    Group Start Time: 8915  Group End Time: 1130  Group Topic: Psychoeducation    [address]    Dc Avila, CTRS, MA        Group Therapy Note    Attendees: 4/7       Notes:  Pts participated in recreational therapy group; School Days. Pts and therapist utilized various activities to reminisce about their past and share stories. Purpose was to encourage positive thinking and socialization. Pt participated well in group with Mod prompting. Pt at times intrusive and interrupting peers, but able to be redirected. Pt noted to be lethargic, but able to stay awake and engage in group when prompted. Pt shared that she like english class in school. Status After Intervention:  Improved    Participation Level:  Active Listener and Interactive    Participation Quality: Appropriate, Attentive and Sharing      Speech:  normal      Thought Process/Content: Logical      Affective Functioning: Congruent      Mood: Bright      Level of consciousness:  Alert and Attentive      Response to Learning: Able to verbalize current knowledge/experience and Able to verbalize/acknowledge new learning      Endings: None Reported    Modes of Intervention: Support, Socialization, Exploration and Activity      Discipline Responsible: Certified Therapeutic Recreation Specialist       Signature:  Dc Avila, [address] , [address] [place] [address]

## 2021-02-01 NOTE — PROGRESS NOTES
Progress Note Psychiatry    Palak Silva  [id number]  1/21/2021 02/01/21    ID: Patient is a 65 yrs y.o. female    CC: \"I stopped my meds. \"    HPI: Palak Silva is a 65year old female with PMHx of HTN, HLD,dementia (Alzemimers) with behavioral disturbances, delusional disorder and medication non compliance. She is well known to [id number] [facility] related to several admissions for same. She presents to [facility] ED via police after found wandering. Her sister called them when she was unable to locate the patient. Patient has not been taking her medications and has an increase in her delusions and paranoia. Patient admitted to [facility].       During today's interview she was alert & oriented x 2. Pt denied any thoughts to harm herself or anyone else. Pt Denied any auditory or visual Hallucinations however has displayed occasionally. She denies depression and anxiety. She states she is sleeping \"very good\" and that her appetite is \"very good. \"Per staff patient slept 8 hours and requires encouragement to eat. Patient with slurred speech and avolition stating she is tired. Patient states, \"It is just not sticking to me today. \" Per staff she continues to not exhibit behaviors and has been med compliant on unit. Pt continues to display occasional bizarre delusions and confusion. Continues to note fixed delusions. Pt remains unable to understand her diagnosis and treatment plan.  Pt noted she currently feels safe and comfortable on the unit.  Pt was in agreement with treatment team.  Pt was polite and cordial during the interview process. Has been taking medications and has been compliant with treatment.          FINDINGS: ct head w/o contrast 1/25/2021   BRAIN/VENTRICLES: There is no acute intracranial hemorrhage, mass effect or   midline shift.  No abnormal extra-axial fluid collection.  The gray-white   differentiation is maintained without evidence of an acute infarct.  Old   lacunar infarcts noted in the bilateral basal ganglia.  Hypoattenuation of   the periventricular and subcortical white matter is suggestive of chronic   small vessel ischemic disease.  Mild diffuse parenchymal volume loss is   noted.  Vascular calcification is seen. [place] is no evidence of   hydrocephalus. Pt denied any hx of seizures, Hep C or HIV  Pt notes history of TBI  No TD noted, AIMS=0    Past Psychiatric History:   See note above    Family Psychiatric History:   See note above    Family Psychiatric History:   Family History   Problem Relation Age of Onset    No Known Problems Mother     No Known Problems Sister     Heart Disease Brother     Coronary Art Dis Brother         stents    No Known Problems Sister     No Known Problems Sister     Alcohol Abuse Brother         Allergies: Allergies   Allergen Reactions    Shellfish-Derived Products     Floxin [Ofloxacin] Other (See Comments)     Made her \"feel weird\"        OBJECTIVE  Vital Signs:  Vitals:    02/01/21 0823   BP: (!) 148/72   Pulse: 82   Resp: 17   Temp: 96.3 °F (35.7 °C)   SpO2: 96%       Labs:  No results found for this or any previous visit (from the past 48 hour(s)). Review of Systems:  Reports of no current cardiovascular, respiratory, gastrointestinal, genitourinary, integumentary, neurological, musculoskeletal, or immunological symptoms today. PSYCHIATRIC: See HPI above.          PSYCHIATRIC EXAMINATION / MENTAL STATUS EXAM    CONSTITUTIONAL:    Vitals:   Vitals:    02/01/21 0823   BP: (!) 148/72   Pulse: 82   Resp: 17   Temp: 96.3 °F (35.7 °C)   SpO2: 96%      General appearance: [x] appears age, []  appears older than stated age,               []  adequately dressed and groomed, [x] disheveled,               []  in no acute distress, [x] appears mildly distressed, [] other           MUSCULOSKELETAL:   Gait:   [] normal, [] antalgic, [x] unsteady, [] gait not evaluated   Station:             [] erect, [x] sitting, [] recumbent, [] other

## 2021-02-01 NOTE — PLAN OF CARE
RN  Outcome: Ongoing  1/31/2021 1026 by Griselda Wise RN  Outcome: Ongoing  Goal: Able to verbalize and/or display a decrease in depressive symptoms  Description: Able to verbalize and/or display a decrease in depressive symptoms  1/31/2021 2153 by Flakito Lui RN  Outcome: Ongoing  1/31/2021 1026 by Griselda Wise RN  Outcome: Ongoing  Goal: Ability to disclose and discuss suicidal ideas will improve  Description: Ability to disclose and discuss suicidal ideas will improve  1/31/2021 2153 by Flakito Lui RN  Outcome: Ongoing  1/31/2021 1026 by Griselda Wise RN  Outcome: Ongoing  Goal: Able to verbalize support systems  Description: Able to verbalize support systems  1/31/2021 2153 by Flakito Lui RN  Outcome: Ongoing  1/31/2021 1026 by Griselda Wise RN  Outcome: Ongoing  Goal: Absence of self-harm  Description: Absence of self-harm  1/31/2021 2153 by Flakito Lui RN  Outcome: Ongoing  1/31/2021 1026 by Griselda Wise RN  Outcome: Ongoing  Goal: Patient specific goal  Description: Patient specific goal  1/31/2021 2153 by Flakito Lui RN  Outcome: Ongoing  1/31/2021 1026 by Griselda Wise RN  Outcome: Ongoing  Goal: Participates in care planning  Description: Participates in care planning  1/31/2021 2153 by Flakito Lui RN  Outcome: Ongoing  1/31/2021 1026 by Griselda Wise RN  Outcome: Ongoing     Problem: Altered Mood, Deterioration in Function:  Goal: Ability to perform activities of daily living will improve  Description: Ability to perform activities of daily living will improve  1/31/2021 2153 by Flakito Lui RN  Outcome: Ongoing  1/31/2021 1026 by Griselda Wise RN  Outcome: Ongoing  Goal: Able to verbalize reality based thinking  Description: Able to verbalize reality based thinking  1/31/2021 2153 by Flakito Lui RN  Outcome: Ongoing  1/31/2021 1026 by Griselda Wise RN  Outcome: Ongoing  Goal: Skin appearance normal  Description: Skin appearance normal  1/31/2021 2153 by Kathy Ryan RN  Outcome: Ongoing  1/31/2021 1026 by Sharyle Butte, RN  Outcome: Ongoing  Goal: Maintenance of adequate nutrition will improve  Description: Maintenance of adequate nutrition will improve  1/31/2021 2153 by Kathy Ryan RN  Outcome: Ongoing  1/31/2021 1026 by Sharyle Butte, RN  Outcome: Ongoing  Goal: Ability to tolerate increased activity will improve  Description: Ability to tolerate increased activity will improve  1/31/2021 2153 by Kathy Ryan RN  Outcome: Ongoing  1/31/2021 1026 by Sharyle Butte, RN  Outcome: Ongoing  Goal: Participates in care planning  Description: Participates in care planning  1/31/2021 2153 by Kathy Ryan RN  Outcome: Ongoing  1/31/2021 1026 by Sharyle Butte, RN  Outcome: Ongoing  Goal: Patient specific goal  Description: Patient specific goal  1/31/2021 2153 by Kathy Ryan RN  Outcome: Ongoing  1/31/2021 1026 by Sharyle Butte, RN  Outcome: Ongoing     Problem: Risk of Harm:  Goal: Ability to remain free from injury will improve  Description: Ability to remain free from injury will improve  1/31/2021 2153 by Kathy Ryan RN  Outcome: Ongoing  1/31/2021 1026 by Sharyle Butte, RN  Outcome: Ongoing     Problem: Altered Mood, Manic Behavior:  Goal: Able to sleep  Description: Able to sleep  1/31/2021 2153 by Kathy yRan RN  Outcome: Ongoing  1/31/2021 1026 by Sharyle Butte, RN  Outcome: Ongoing  Goal: Able to verbalize decrease in frequency and intensity of racing thoughts  Description: Able to verbalize decrease in frequency and intensity of racing thoughts  1/31/2021 2153 by Kathy Ryan RN  Outcome: Ongoing  1/31/2021 1026 by Sharyle Butte, RN  Outcome: Ongoing  Goal: Ability to disclose and discuss suicidal ideas will improve  Description: Ability to disclose and discuss suicidal ideas will improve  1/31/2021 2153 by Kathy Ryan RN  Outcome: Ongoing  1/31/2021 1026 by Sharyle Butte, RN  Outcome: Ongoing  Goal: Absence of self-harm  Description: Absence of self-harm  1/31/2021 2153 by Susana cShaeffer RN  Outcome: Ongoing  1/31/2021 1026 by Wanda Velasquez RN  Outcome: Ongoing  Goal: Ability to achieve adequate nutritional intake will improve  Description: Ability to achieve adequate nutritional intake will improve  1/31/2021 2153 by Susana Schaeffer RN  Outcome: Ongoing  1/31/2021 1026 by Wanda Velasquez RN  Outcome: Ongoing  Goal: Ability to interact with others will improve  Description: Ability to interact with others will improve  1/31/2021 2153 by Susana Schaeffer RN  Outcome: Ongoing  1/31/2021 1026 by Wanda Velasquez RN  Outcome: Ongoing  Goal: Ability to demonstrate self-control will improve  Description: Ability to demonstrate self-control will improve  1/31/2021 2153 by Susana Schaeffer RN  Outcome: Ongoing  1/31/2021 1026 by Wanda Velasquez RN  Outcome: Ongoing  Goal: Mood stable  Description: Mood stable  1/31/2021 2153 by Susana Schaeffer RN  Outcome: Ongoing  1/31/2021 1026 by Wanda Velasquez RN  Outcome: Ongoing     Problem: Falls - Risk of:  Goal: Will remain free from falls  Description: Will remain free from falls  1/31/2021 2153 by Susana Schaeffer RN  Outcome: Ongoing  1/31/2021 1026 by Wanda Velasquez RN  Outcome: Ongoing  Goal: Absence of physical injury  Description: Absence of physical injury  1/31/2021 2153 by Susana Schaeffer RN  Outcome: Ongoing  1/31/2021 1026 by Wanda Velasquez RN  Outcome: Ongoing     Problem: Skin Integrity:  Goal: Will show no infection signs and symptoms  Description: Will show no infection signs and symptoms  1/31/2021 2153 by Susana Schaeffer RN  Outcome: Ongoing  1/31/2021 1026 by Wanda Velasquez RN  Outcome: Ongoing  Goal: Absence of new skin breakdown  Description: Absence of new skin breakdown  1/31/2021 2153 by Susana Schaeffer RN  Outcome: Ongoing  1/31/2021 1026 by Wanda Velasquez RN  Outcome: Ongoing

## 2021-02-01 NOTE — GROUP NOTE
Group Therapy Note    Date: 2/1/2021    Group Start Time: 0830  Group End Time: 0930    Number of Participants: 7/7    Type: Morning Goals Group/ Community Meeting    Group Topic/Objective: Set Goal For The Day and to review Unit Rules and Regulations. Patient's Goal:  Pt states her goal is \"I want to be able to feel better. \"    Notes:  Pt presented as lethargic during group. Pt states she is feeling \"sick to my stomach\" and is grateful \"for being alive today. \"  Pt reports restful sleep, but unsure how long she slept. Depression (0-10): 0    Anxiety (0-10): 0    Irritability/Aggitation (0-10): Pt states \"Normal angry. \"    Status After Intervention:  Improved    Participation Level: Interactive    Participation Quality: Lethargic    Speech:  normal    Thought Process/Content: Logical    Affective Functioning: Blunted    Mood: Blunted    Level of consciousness:  Drowsy    Response to Learning: Able to verbalize current knowledge/experience    Endings: None Reported    Modes of Intervention: Education, Support and Socialization    Discipline Responsible: Certified Therapeutic Recreation Specialist     Electronically signed by Berry Lees MA on 2/1/2021 at 9:43 AM

## 2021-02-01 NOTE — PROGRESS NOTES
Pt alert to self only during the shift. Pt pleasant and cooperative with meds crushed in puree. Pt has been continent during the shift. Pt has not wanted to eat the pureed diet today but did drink an Ensure this morning. Pt wanted to lay down right before dinner and refused to get up to eat. Pt denies SI, HI and AVH along with anxiety and depression.

## 2021-02-01 NOTE — GROUP NOTE
Group Therapy Note    Date: 2/1/2021    Group Start Time: 9481  Group End Time: 1600    Number of Participants: 5/8    Type: Exercise/Recreation Group    Group Topic/Objective: Chair Exercises    Notes:  Pt encouraged to attend, pt declined.      Discipline Responsible: Certified Therapeutic Recreation Specialist     Electronically signed by Renetta Donohue [place] on 2/2/2021 at 1:36 PM

## 2021-02-02 LAB
AMMONIA: 17 UMOL/L (ref 11–51)
ANION GAP SERPL CALCULATED.3IONS-SCNC: 4 MMOL/L (ref 4–16)
BASOPHILS ABSOLUTE: 0 K/CU MM
BASOPHILS RELATIVE PERCENT: 0.3 % (ref 0–1)
BUN BLDV-MCNC: 44 MG/DL (ref 6–23)
CALCIUM SERPL-MCNC: 9.2 MG/DL (ref 8.3–10.6)
CHLORIDE BLD-SCNC: 97 MMOL/L (ref 99–110)
CO2: 35 MMOL/L (ref 21–32)
CREAT SERPL-MCNC: 2 MG/DL (ref 0.6–1.1)
DIFFERENTIAL TYPE: ABNORMAL
EOSINOPHILS ABSOLUTE: 0.1 K/CU MM
EOSINOPHILS RELATIVE PERCENT: 1.3 % (ref 0–3)
GFR AFRICAN AMERICAN: 30 ML/MIN/1.73M2
GFR NON-AFRICAN AMERICAN: 24 ML/MIN/1.73M2
GLUCOSE BLD-MCNC: 106 MG/DL (ref 70–99)
HCT VFR BLD CALC: 37.2 % (ref 37–47)
HEMOGLOBIN: 12.2 GM/DL (ref 12.5–16)
IMMATURE NEUTROPHIL %: 1.9 % (ref 0–0.43)
LYMPHOCYTES ABSOLUTE: 1.8 K/CU MM
LYMPHOCYTES RELATIVE PERCENT: 29.2 % (ref 24–44)
MCH RBC QN AUTO: 32.3 PG (ref 27–31)
MCHC RBC AUTO-ENTMCNC: 32.8 % (ref 32–36)
MCV RBC AUTO: 98.4 FL (ref 78–100)
MONOCYTES ABSOLUTE: 0.5 K/CU MM
MONOCYTES RELATIVE PERCENT: 8.2 % (ref 0–4)
PDW BLD-RTO: 11.3 % (ref 11.7–14.9)
PLATELET # BLD: 194 K/CU MM (ref 140–440)
PMV BLD AUTO: 10.5 FL (ref 7.5–11.1)
POTASSIUM SERPL-SCNC: 5.1 MMOL/L (ref 3.5–5.1)
RBC # BLD: 3.78 M/CU MM (ref 4.2–5.4)
SEGMENTED NEUTROPHILS ABSOLUTE COUNT: 3.7 K/CU MM
SEGMENTED NEUTROPHILS RELATIVE PERCENT: 59.1 % (ref 36–66)
SODIUM BLD-SCNC: 136 MMOL/L (ref 135–145)
TOTAL IMMATURE NEUTOROPHIL: 0.12 K/CU MM
WBC # BLD: 6.3 K/CU MM (ref 4–10.5)

## 2021-02-02 PROCEDURE — 1240000000 HC EMOTIONAL WELLNESS R&B

## 2021-02-02 PROCEDURE — 6370000000 HC RX 637 (ALT 250 FOR IP): Performed by: INTERNAL MEDICINE

## 2021-02-02 PROCEDURE — 6370000000 HC RX 637 (ALT 250 FOR IP): Performed by: NURSE PRACTITIONER

## 2021-02-02 PROCEDURE — 80048 BASIC METABOLIC PNL TOTAL CA: CPT

## 2021-02-02 PROCEDURE — 82140 ASSAY OF AMMONIA: CPT

## 2021-02-02 PROCEDURE — 99232 SBSQ HOSP IP/OBS MODERATE 35: CPT | Performed by: NURSE PRACTITIONER

## 2021-02-02 PROCEDURE — 80164 ASSAY DIPROPYLACETIC ACD TOT: CPT

## 2021-02-02 PROCEDURE — 6370000000 HC RX 637 (ALT 250 FOR IP): Performed by: PSYCHIATRY & NEUROLOGY

## 2021-02-02 PROCEDURE — 36415 COLL VENOUS BLD VENIPUNCTURE: CPT

## 2021-02-02 PROCEDURE — 97110 THERAPEUTIC EXERCISES: CPT

## 2021-02-02 PROCEDURE — 85025 COMPLETE CBC W/AUTO DIFF WBC: CPT

## 2021-02-02 RX ORDER — 0.9 % SODIUM CHLORIDE 0.9 %
500 INTRAVENOUS SOLUTION INTRAVENOUS ONCE
Status: DISCONTINUED | OUTPATIENT
Start: 2021-02-02 | End: 2021-02-02 | Stop reason: ALTCHOICE

## 2021-02-02 RX ORDER — LACTOBACILLUS RHAMNOSUS GG 10B CELL
1 CAPSULE ORAL
Status: DISCONTINUED | OUTPATIENT
Start: 2021-02-03 | End: 2021-02-11 | Stop reason: HOSPADM

## 2021-02-02 RX ORDER — DIVALPROEX SODIUM 125 MG/1
500 CAPSULE, COATED PELLETS ORAL
Status: DISCONTINUED | OUTPATIENT
Start: 2021-02-02 | End: 2021-02-04

## 2021-02-02 RX ORDER — DIVALPROEX SODIUM 125 MG/1
500 CAPSULE, COATED PELLETS ORAL NIGHTLY
Status: DISCONTINUED | OUTPATIENT
Start: 2021-02-02 | End: 2021-02-02

## 2021-02-02 RX ORDER — ONDANSETRON 4 MG/1
4 TABLET, ORALLY DISINTEGRATING ORAL ONCE
Status: DISCONTINUED | OUTPATIENT
Start: 2021-02-02 | End: 2021-02-11 | Stop reason: HOSPADM

## 2021-02-02 RX ADMIN — DIVALPROEX SODIUM 500 MG: 125 CAPSULE ORAL at 17:01

## 2021-02-02 RX ADMIN — PANTOPRAZOLE SODIUM 80 MG: 40 TABLET, DELAYED RELEASE ORAL at 20:33

## 2021-02-02 RX ADMIN — TRAZODONE HYDROCHLORIDE 150 MG: 50 TABLET ORAL at 20:33

## 2021-02-02 RX ADMIN — MEMANTINE 10 MG: 10 TABLET ORAL at 16:25

## 2021-02-02 RX ADMIN — SUCRALFATE 1 G: 1 TABLET ORAL at 20:32

## 2021-02-02 RX ADMIN — SUCRALFATE 1 G: 1 TABLET ORAL at 16:28

## 2021-02-02 RX ADMIN — DONEPEZIL HYDROCHLORIDE 10 MG: 10 TABLET, FILM COATED ORAL at 20:33

## 2021-02-02 RX ADMIN — ATORVASTATIN CALCIUM 80 MG: 80 TABLET, FILM COATED ORAL at 20:33

## 2021-02-02 RX ADMIN — OMEGA-3-ACID ETHYL ESTERS 2 G: 1 CAPSULE, LIQUID FILLED ORAL at 20:32

## 2021-02-02 RX ADMIN — RISPERIDONE 1 MG: 1 TABLET ORAL at 20:33

## 2021-02-02 RX ADMIN — METOPROLOL TARTRATE 25 MG: 25 TABLET, FILM COATED ORAL at 20:33

## 2021-02-02 NOTE — PLAN OF CARE
Problem: Altered Mood, Depressive Behavior:  Goal: Able to verbalize acceptance of life and situations over which he or she has no control  Description: Able to verbalize acceptance of life and situations over which he or she has no control  Outcome: Ongoing  Goal: Able to verbalize and/or display a decrease in depressive symptoms  Description: Able to verbalize and/or display a decrease in depressive symptoms  Outcome: Ongoing  Goal: Ability to disclose and discuss suicidal ideas will improve  Description: Ability to disclose and discuss suicidal ideas will improve  Outcome: Ongoing  Goal: Able to verbalize support systems  Description: Able to verbalize support systems  Outcome: Ongoing  Goal: Absence of self-harm  Description: Absence of self-harm  Outcome: Ongoing  Goal: Patient specific goal  Description: Patient specific goal  Outcome: Ongoing  Goal: Participates in care planning  Description: Participates in care planning  Outcome: Ongoing     Problem: Depressive Behavior With or Without Suicide Precautions:  Goal: Able to verbalize acceptance of life and situations over which he or she has no control  Description: Able to verbalize acceptance of life and situations over which he or she has no control  Outcome: Ongoing  Goal: Able to verbalize and/or display a decrease in depressive symptoms  Description: Able to verbalize and/or display a decrease in depressive symptoms  Outcome: Ongoing  Goal: Ability to disclose and discuss suicidal ideas will improve  Description: Ability to disclose and discuss suicidal ideas will improve  Outcome: Ongoing  Goal: Able to verbalize support systems  Description: Able to verbalize support systems  Outcome: Ongoing  Goal: Absence of self-harm  Description: Absence of self-harm  Outcome: Ongoing  Goal: Patient specific goal  Description: Patient specific goal  Outcome: Ongoing  Goal: Participates in care planning  Description: Participates in care planning  Outcome: Ongoing     Problem: Altered Mood, Deterioration in Function:  Goal: Ability to perform activities of daily living will improve  Description: Ability to perform activities of daily living will improve  Outcome: Ongoing  Goal: Able to verbalize reality based thinking  Description: Able to verbalize reality based thinking  Outcome: Ongoing  Goal: Skin appearance normal  Description: Skin appearance normal  Outcome: Ongoing  Goal: Maintenance of adequate nutrition will improve  Description: Maintenance of adequate nutrition will improve  Outcome: Ongoing  Goal: Ability to tolerate increased activity will improve  Description: Ability to tolerate increased activity will improve  Outcome: Ongoing  Goal: Participates in care planning  Description: Participates in care planning  Outcome: Ongoing  Goal: Patient specific goal  Description: Patient specific goal  Outcome: Ongoing     Problem: Risk of Harm:  Goal: Ability to remain free from injury will improve  Description: Ability to remain free from injury will improve  Outcome: Ongoing     Problem: Altered Mood, Manic Behavior:  Goal: Able to sleep  Description: Able to sleep  Outcome: Ongoing  Goal: Able to verbalize decrease in frequency and intensity of racing thoughts  Description: Able to verbalize decrease in frequency and intensity of racing thoughts  Outcome: Ongoing  Goal: Ability to disclose and discuss suicidal ideas will improve  Description: Ability to disclose and discuss suicidal ideas will improve  Outcome: Ongoing  Goal: Absence of self-harm  Description: Absence of self-harm  Outcome: Ongoing  Goal: Ability to achieve adequate nutritional intake will improve  Description: Ability to achieve adequate nutritional intake will improve  Outcome: Ongoing  Goal: Ability to interact with others will improve  Description: Ability to interact with others will improve  Outcome: Ongoing  Goal: Ability to demonstrate self-control will improve  Description: Ability to

## 2021-02-02 NOTE — GROUP NOTE
Group Therapy Note    Date: 2/2/2021    Group Start Time: 8115  Group End Time: 9152  Group Topic: Recreational    [address]        Group Therapy Note    Attendees: 6/8       Notes:  Pts participated in recreational therapy group; Music Listenting Activity. Pts were encouraged to engage in a leisure activity to promote socialization, positive mood, and coping with negative emotions.      Pt sleeping and allowed to continue to rest.       Discipline Responsible: Certified Therapeutic Recreation Specialist       Signature:  Valentino Knock, [address], [address] [place], [place] [address]

## 2021-02-02 NOTE — PROGRESS NOTES
Pt was in bed at start of shift resting eyes closed, awoke short time after incontinent of large amount of loose stool. Pt assisted to ambulate to BR, poor balance and unsteady gait. Pt was then continent of a large amount of loose stool again. Samantha Seals contains large amount of white specks that appear to be from her Depakote sprinkles capsules. VS WNL and Pt afebrile. Pt is oriented to self and asking staff to call her sister to pick her up. Pt states she is no longer eating here because it makes her sick. Some education and redirection provided. Zandra care provided and Pt placed in a gown. Pt sat up in bed and accepted po fluids offered by this nurse. Pt was compliant with medications crushed in yogurt. Pt sat up for a short time after then appeared to fall back asleep. Pt rested through the rest of the night, eyes closed, respirations even and unlabored.
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
## 2021-02-02 NOTE — BH NOTE
Patient was approached to offer Protonix and Zofran from a different nurse, due to she has been refusing medications today. She was noted to be very lethargic and difficult to get any response out of. She was offered sips of water, which she was not able to take any sips of water. With slurred speech, she states that she has already had water and food today. Informed that she has not had anything by mouth today and that it is important for her to take fluids and her medications. She continued to refuse medications, food and fluids. She was not able to assist staff to change her depends. Again, attempts to get her to take fluids was refused. Marion Messina. notified of patients current status.

## 2021-02-02 NOTE — PROGRESS NOTES
Progress Note Psychiatry    Paras Vides  [id number]  1/21/2021 02/02/21    ID: Patient is a 65 yrs y.o. female    CC: \"I stopped my meds. \"    HPI: Paras Vides is a 65year old female with PMHx of HTN, HLD,dementia (Alzemimers) with behavioral disturbances, delusional disorder and medication non compliance. She is well known to [id number] [employer] related to several admissions for same. She presents to [facility] ED via police after found wandering. Her sister called them when she was unable to locate the patient. Patient has not been taking her medications and has an increase in her delusions and paranoia. Patient admitted to [facility].       Patient continues to sleep, refusing to get up this am for breakfast. Attempted to wake patient this am with slight physical stimulation but patient got upset and wanted to sleep. During today's interview she was lethargic and oriented to self only. Pt is limited in response at this time. Pt does not display any homicidal or suicidal ideations or auditory or visual hallucinations. Per staff patient slept 8 hours and requires encouragement to eat. Patient with slurred speech and avolition stating she is tired. She is having multiple boughts of loose stool again with nausea. . Depakote sprinkles found in stool. Per staff she continues to not exhibit behaviors and has been med compliant on unit. Pt continues to display occasional bizarre delusions and confusion. Continues to note fixed delusions. Pt remains unable to understand her diagnosis and treatment plan.  Pt noted she currently feels safe and comfortable on the unit.  Pt was in agreement with treatment team.  Pt was polite and cordial during the interview process. Has been taking medications and has been compliant with treatment.          FINDINGS: ct head w/o contrast 1/25/2021   BRAIN/VENTRICLES: There is no acute intracranial hemorrhage, mass effect or   midline shift.  No abnormal extra-axial fluid collection. Rich Arnold gray-white   differentiation is maintained without evidence of an acute infarct.  Old   lacunar infarcts noted in the bilateral basal ganglia.  Hypoattenuation of   the periventricular and subcortical white matter is suggestive of chronic   small vessel ischemic disease.  Mild diffuse parenchymal volume loss is   noted.  Vascular calcification is seen. Yesenia Barrera is no evidence of   hydrocephalus. Pt denied any hx of seizures, Hep C or HIV  Pt notes history of TBI  No TD noted, AIMS=0    Past Psychiatric History:   See note above    Family Psychiatric History:   See note above    Family Psychiatric History:   Family History   Problem Relation Age of Onset    No Known Problems Mother     No Known Problems Sister     Heart Disease Brother     Coronary Art Dis Brother         stents    No Known Problems Sister     No Known Problems Sister     Alcohol Abuse Brother         Allergies: Allergies   Allergen Reactions    Shellfish-Derived Products     Floxin [Ofloxacin] Other (See Comments)     Made her \"feel weird\"        OBJECTIVE  Vital Signs:  Vitals:    02/02/21 0834   BP: 100/61   Pulse: 84   Resp: 17   Temp: 96.9 °F (36.1 °C)   SpO2: 91%       Labs:  No results found for this or any previous visit (from the past 48 hour(s)). Review of Systems:  Reports of no current cardiovascular, respiratory, gastrointestinal, genitourinary, integumentary, neurological, musculoskeletal, or immunological symptoms today. PSYCHIATRIC: See HPI above.          PSYCHIATRIC EXAMINATION / MENTAL STATUS EXAM    CONSTITUTIONAL:    Vitals:   Vitals:    02/02/21 0834   BP: 100/61   Pulse: 84   Resp: 17   Temp: 96.9 °F (36.1 °C)   SpO2: 91%      General appearance: [x] appears age, []  appears older than stated age,               []  adequately dressed and groomed, [x] disheveled,               []  in no acute distress, [x] appears mildly distressed, [] other           MUSCULOSKELETAL:   Gait:   [] normal, [] antalgic, [x] unsteady, [] gait not evaluated   Station:             [] erect, [x] sitting, [] recumbent, [] other        Strength/tone:  [x] muscle strength and tone appear consistent with age and condition     [] atrophy      [] abnormal movements       PSYCHIATRIC:    Appearance: appears stated age. Alert and oriented to person only. . No acute distress- some mild today. Poor grooming and hygiene. Poor eye contact. No prominent physical abnormalities. Attitude: Manner is cooperative and pleasant  Motor: No psychomotor agitation, retardation or abnormal movements noted  Speech: slurring slowed rate, volume, tone & amount. Language: intact understanding and production  Mood:improving  Affect:flat non-labile, congruent with mood and content of speech  Thought Production: Spontaneous. Thought Form: Coherent, linear, logical & goal-directed. No tangentiality or circumstantiality. No flight of ideas or loosening of associations. Thought Content/Perceptions: No [place], staff observing visual Hallucinations but no auditory hallucinations  Delusions and paranoid observed  Insight: impaired  Judgment: impaired  Memory: Immediate, recent, and remote appear intact, though not formally tested. Attention: having difficulty today throughout interview   Fund of knowledge: Average  Gait/Balance: unsteady    Impression:   Late onset Alzheimers  Late onset Alzheimers with behavioral disturbance. Problem List:   Major neurocognitive disorder due to Alzheimer's disease, with behavioral disturbance ([facility] [address]    Plan:  1. Admit to [place] [address] Unit  2. Consult Hospitalist to evaluate and treat medical conditions  3. Adjust psychotropic medications to target symptoms  4. Occupational Therapy, Physical Therapy, Group Psychotherapy as tolerated  5. Reviewed treatment plan with patient including medication risks, benefits, side effects. Obtained informed consent for treatment. 6. Anticipated length of stay 10-12 days  7.  I certify

## 2021-02-02 NOTE — GROUP NOTE
Group Therapy Note    Date: 2/2/2021    Group Start Time: 0830  Group End Time: 0900    Number of Participants: 6/8    Type: Morning Goals Group/ Community Meeting    Group Topic/Objective: Set Goal For The Day and to review Unit Rules and Regulations.     Notes:  Pt sleeping and allowed to continue to rest.     Discipline Responsible: Certified Therapeutic Recreation Specialist     Electronically signed by Claude Kennedy [address] [place] [address] on 2/2/2021 at 10:02 AM

## 2021-02-02 NOTE — PLAN OF CARE
Problem: Altered Mood, Depressive Behavior:  Goal: Able to verbalize acceptance of life and situations over which he or she has no control  Description: Able to verbalize acceptance of life and situations over which he or she has no control  2/2/2021 1540 by Oskar Giraldo RN  Outcome: Ongoing  2/2/2021 1225 by Wai Louise  Outcome: Ongoing  Goal: Able to verbalize and/or display a decrease in depressive symptoms  Description: Able to verbalize and/or display a decrease in depressive symptoms  2/2/2021 1540 by Oskar Giraldo RN  Outcome: Ongoing  2/2/2021 1225 by Wai Louise  Outcome: Ongoing  Goal: Ability to disclose and discuss suicidal ideas will improve  Description: Ability to disclose and discuss suicidal ideas will improve  2/2/2021 1540 by Oskar Giraldo RN  Outcome: Ongoing  2/2/2021 1225 by Wai Louise  Outcome: Ongoing  Goal: Able to verbalize support systems  Description: Able to verbalize support systems  2/2/2021 1540 by Oskar Giraldo RN  Outcome: Ongoing  2/2/2021 1225 by Wai Louise  Outcome: Ongoing  Goal: Absence of self-harm  Description: Absence of self-harm  2/2/2021 1540 by Oskar Giraldo RN  Outcome: Ongoing  2/2/2021 1225 by Wai Louise  Outcome: Ongoing  Goal: Patient specific goal  Description: Patient specific goal  2/2/2021 1540 by Oskar Giraldo RN  Outcome: Ongoing  2/2/2021 1225 by Wai Louise  Outcome: Ongoing  Goal: Participates in care planning  Description: Participates in care planning  2/2/2021 1540 by Oskar Giraldo RN  Outcome: Ongoing  2/2/2021 1225 by Wai Louise  Outcome: Ongoing     Problem: Depressive Behavior With or Without Suicide Precautions:  Goal: Able to verbalize acceptance of life and situations over which he or she has no control  Description: Able to verbalize acceptance of life and situations over which he or she has no control  2/2/2021 1540 by Oskar Giraldo RN  Outcome: Ongoing  2/2/2021 1225 by Attila Orellana Christoph  Outcome: Ongoing  Goal: Able to verbalize and/or display a decrease in depressive symptoms  Description: Able to verbalize and/or display a decrease in depressive symptoms  2/2/2021 1540 by Shreya Dick RN  Outcome: Ongoing  2/2/2021 1225 by Krishan Weller  Outcome: Ongoing  Goal: Ability to disclose and discuss suicidal ideas will improve  Description: Ability to disclose and discuss suicidal ideas will improve  2/2/2021 1540 by Shreya Dick RN  Outcome: Ongoing  2/2/2021 1225 by Krishan Weller  Outcome: Ongoing  Goal: Able to verbalize support systems  Description: Able to verbalize support systems  2/2/2021 1540 by Shreya Dick RN  Outcome: Ongoing  2/2/2021 1225 by Krishan Weller  Outcome: Ongoing  Goal: Absence of self-harm  Description: Absence of self-harm  2/2/2021 1540 by Shreya Dick RN  Outcome: Ongoing  2/2/2021 1225 by Krishan Weller  Outcome: Ongoing  Goal: Patient specific goal  Description: Patient specific goal  2/2/2021 1540 by Shreya Dick RN  Outcome: Ongoing  2/2/2021 1225 by Krishan Weller  Outcome: Ongoing  Goal: Participates in care planning  Description: Participates in care planning  2/2/2021 1540 by Shreya Dick RN  Outcome: Ongoing  2/2/2021 1225 by Krishan Weller  Outcome: Ongoing     Problem: Altered Mood, Deterioration in Function:  Goal: Ability to perform activities of daily living will improve  Description: Ability to perform activities of daily living will improve  2/2/2021 1540 by Shreya Dick RN  Outcome: Ongoing  2/2/2021 1225 by Krishan Weller  Outcome: Ongoing  Goal: Able to verbalize reality based thinking  Description: Able to verbalize reality based thinking  2/2/2021 1540 by Shreya Dick RN  Outcome: Ongoing  2/2/2021 1225 by Krishan Weller  Outcome: Ongoing  Goal: Skin appearance normal  Description: Skin appearance normal  2/2/2021 1540 by Shreya Dick RN  Outcome: Ongoing  2/2/2021 1225 by Krishan Weller  Outcome: Ongoing  Goal: Maintenance of adequate nutrition will improve  Description: Maintenance of adequate nutrition will improve  2/2/2021 1540 by Keenan Carroll RN  Outcome: Ongoing  2/2/2021 1225 by Duane Gutierrez  Outcome: Ongoing  Goal: Ability to tolerate increased activity will improve  Description: Ability to tolerate increased activity will improve  2/2/2021 1540 by Keenan Carroll RN  Outcome: Ongoing  2/2/2021 1225 by Duane Gutierrez  Outcome: Ongoing  Goal: Participates in care planning  Description: Participates in care planning  2/2/2021 1540 by Keenan Carroll RN  Outcome: Ongoing  2/2/2021 1225 by Duane Gutierrez  Outcome: Ongoing  Goal: Patient specific goal  Description: Patient specific goal  2/2/2021 1540 by Keenan Carroll RN  Outcome: Ongoing  2/2/2021 1225 by Duane Gutierrez  Outcome: Ongoing     Problem: Risk of Harm:  Goal: Ability to remain free from injury will improve  Description: Ability to remain free from injury will improve  2/2/2021 1540 by Keenan Carroll RN  Outcome: Ongoing  2/2/2021 1225 by Duane Gutierrez  Outcome: Ongoing     Problem: Altered Mood, Manic Behavior:  Goal: Able to sleep  Description: Able to sleep  2/2/2021 1540 by Keenan Carroll RN  Outcome: Ongoing  2/2/2021 1225 by Duane Gutierrez  Outcome: Ongoing  Goal: Able to verbalize decrease in frequency and intensity of racing thoughts  Description: Able to verbalize decrease in frequency and intensity of racing thoughts  2/2/2021 1540 by Keenan Carroll RN  Outcome: Ongoing  2/2/2021 1225 by Duane Gutierrez  Outcome: Ongoing  Goal: Ability to disclose and discuss suicidal ideas will improve  Description: Ability to disclose and discuss suicidal ideas will improve  2/2/2021 1540 by Keenan Carroll RN  Outcome: Ongoing  2/2/2021 1225 by Duane Gutierrez  Outcome: Ongoing  Goal: Absence of self-harm  Description: Absence of self-harm  2/2/2021 1540 by Keenan Carroll RN  Outcome: Ongoing  2/2/2021 1225 by Grzegorz Taylor Christoph  Outcome: Ongoing  Goal: Ability to achieve adequate nutritional intake will improve  Description: Ability to achieve adequate nutritional intake will improve  2/2/2021 1540 by Shreya Dick RN  Outcome: Ongoing  2/2/2021 1225 by Krishan Weller  Outcome: Ongoing  Goal: Ability to interact with others will improve  Description: Ability to interact with others will improve  2/2/2021 1540 by Shreya Dick RN  Outcome: Ongoing  2/2/2021 1225 by Krishan Weller  Outcome: Ongoing  Goal: Ability to demonstrate self-control will improve  Description: Ability to demonstrate self-control will improve  2/2/2021 1540 by Shreya Dick RN  Outcome: Ongoing  2/2/2021 1225 by Krishan Weller  Outcome: Ongoing  Goal: Mood stable  Description: Mood stable  2/2/2021 1540 by Shreya Dick RN  Outcome: Ongoing  2/2/2021 1225 by Krishan Weller  Outcome: Ongoing     Problem: Falls - Risk of:  Goal: Will remain free from falls  Description: Will remain free from falls  2/2/2021 1540 by Shreya Dick RN  Outcome: Ongoing  2/2/2021 1225 by Krishan Weller  Outcome: Ongoing  Goal: Absence of physical injury  Description: Absence of physical injury  2/2/2021 1540 by Shreya Dick RN  Outcome: Ongoing  2/2/2021 1225 by Krishan Weller  Outcome: Ongoing     Problem: Skin Integrity:  Goal: Will show no infection signs and symptoms  Description: Will show no infection signs and symptoms  2/2/2021 1540 by Shreya Dick RN  Outcome: Ongoing  2/2/2021 1225 by Krishan Weller  Outcome: Ongoing  Goal: Absence of new skin breakdown  Description: Absence of new skin breakdown  2/2/2021 1540 by Shreya Dick RN  Outcome: Ongoing  2/2/2021 1225 by Krishan Weller  Outcome: Ongoing

## 2021-02-03 PROBLEM — G30.1 ALZHEIMER'S DISEASE WITH LATE ONSET (CODE) (HCC): Status: ACTIVE | Noted: 2020-02-25

## 2021-02-03 LAB
DOSE AMOUNT: NORMAL
DOSE TIME: NORMAL
VALPROIC ACID LEVEL: 54.1 UG/ML (ref 50–100)

## 2021-02-03 PROCEDURE — 1240000000 HC EMOTIONAL WELLNESS R&B

## 2021-02-03 PROCEDURE — 80164 ASSAY DIPROPYLACETIC ACD TOT: CPT

## 2021-02-03 PROCEDURE — 6370000000 HC RX 637 (ALT 250 FOR IP): Performed by: INTERNAL MEDICINE

## 2021-02-03 PROCEDURE — 36415 COLL VENOUS BLD VENIPUNCTURE: CPT

## 2021-02-03 PROCEDURE — 99232 SBSQ HOSP IP/OBS MODERATE 35: CPT | Performed by: NURSE PRACTITIONER

## 2021-02-03 PROCEDURE — 6370000000 HC RX 637 (ALT 250 FOR IP): Performed by: PSYCHIATRY & NEUROLOGY

## 2021-02-03 PROCEDURE — 97116 GAIT TRAINING THERAPY: CPT

## 2021-02-03 PROCEDURE — 6370000000 HC RX 637 (ALT 250 FOR IP): Performed by: NURSE PRACTITIONER

## 2021-02-03 RX ADMIN — METFORMIN HYDROCHLORIDE 500 MG: 500 TABLET ORAL at 09:27

## 2021-02-03 RX ADMIN — PANTOPRAZOLE SODIUM 80 MG: 40 TABLET, DELAYED RELEASE ORAL at 22:03

## 2021-02-03 RX ADMIN — METOPROLOL TARTRATE 25 MG: 25 TABLET, FILM COATED ORAL at 09:24

## 2021-02-03 RX ADMIN — LISINOPRIL 20 MG: 20 TABLET ORAL at 09:24

## 2021-02-03 RX ADMIN — AMLODIPINE BESYLATE 10 MG: 10 TABLET ORAL at 09:24

## 2021-02-03 RX ADMIN — RISPERIDONE 1 MG: 1 TABLET ORAL at 22:04

## 2021-02-03 RX ADMIN — DIVALPROEX SODIUM 500 MG: 125 CAPSULE ORAL at 17:07

## 2021-02-03 RX ADMIN — OMEGA-3-ACID ETHYL ESTERS 2 G: 1 CAPSULE, LIQUID FILLED ORAL at 22:04

## 2021-02-03 RX ADMIN — Medication 1 CAPSULE: at 09:27

## 2021-02-03 RX ADMIN — PANTOPRAZOLE SODIUM 80 MG: 40 TABLET, DELAYED RELEASE ORAL at 09:23

## 2021-02-03 RX ADMIN — DONEPEZIL HYDROCHLORIDE 10 MG: 10 TABLET, FILM COATED ORAL at 22:04

## 2021-02-03 RX ADMIN — ATORVASTATIN CALCIUM 80 MG: 80 TABLET, FILM COATED ORAL at 22:03

## 2021-02-03 RX ADMIN — LOPERAMIDE HYDROCHLORIDE 2 MG: 2 CAPSULE ORAL at 09:23

## 2021-02-03 RX ADMIN — METFORMIN HYDROCHLORIDE 500 MG: 500 TABLET ORAL at 17:06

## 2021-02-03 RX ADMIN — METOPROLOL TARTRATE 25 MG: 25 TABLET, FILM COATED ORAL at 22:04

## 2021-02-03 RX ADMIN — FLUTICASONE PROPIONATE 1 SPRAY: 50 SPRAY, METERED NASAL at 09:24

## 2021-02-03 RX ADMIN — SUCRALFATE 1 G: 1 TABLET ORAL at 17:07

## 2021-02-03 RX ADMIN — LOPERAMIDE HYDROCHLORIDE 2 MG: 2 CAPSULE ORAL at 22:04

## 2021-02-03 RX ADMIN — SUCRALFATE 1 G: 1 TABLET ORAL at 06:15

## 2021-02-03 RX ADMIN — TRAZODONE HYDROCHLORIDE 150 MG: 50 TABLET ORAL at 22:04

## 2021-02-03 RX ADMIN — SUCRALFATE 1 G: 1 TABLET ORAL at 09:24

## 2021-02-03 RX ADMIN — SUCRALFATE 1 G: 1 TABLET ORAL at 22:03

## 2021-02-03 RX ADMIN — MULTIPLE VITAMINS W/ MINERALS TAB 1 TABLET: TAB at 09:24

## 2021-02-03 RX ADMIN — MEMANTINE 10 MG: 10 TABLET ORAL at 09:24

## 2021-02-03 RX ADMIN — ACETAMINOPHEN 500 MG: 500 TABLET ORAL at 22:04

## 2021-02-03 ASSESSMENT — PAIN SCALES - GENERAL
PAINLEVEL_OUTOF10: 0
PAINLEVEL_OUTOF10: 4

## 2021-02-03 NOTE — PROGRESS NOTES
Pt did not sleep much. Her sleep was broken all night and she was awake from 0400 on constantly asking to call her sister.

## 2021-02-03 NOTE — GROUP NOTE
Group Therapy Note    Date: 2/3/2021    Group Start Time: 3902  Group End Time: 1130  Group Topic: Psychoeducation    [address] [place], [address] [place]        Group Therapy Note    Attendees: 6/8       Notes:  Pts participated in recreational therapy group; Relaxation Scripts. Pts engaged in three different types of relaxation scripts; Deep Breathing, Guided Imagery, and Progressive Muscle Relaxation. After completion of each script, pts discussed their thoughts on the what they had just completed. Pt participated in group. Pt able to complete the activity, but struggled to engage in the discussion. Pt's comments were never on topic and were often about whatever she was looking at in the room.      Status After Intervention:  Improved    Participation Level: Interactive    Participation Quality: Sharing      Speech:  normal      Thought Process/Content: Confused      Affective Functioning: Congruent      Mood: Bright      Level of consciousness:  Alert      Response to Learning: Able to verbalize current knowledge/experience      Endings: None Reported    Modes of Intervention: Education, Support, Socialization and Activity      Discipline Responsible: Certified Therapeutic Recreation Specialist       Signature:  Berry Hickman, [address] [place]

## 2021-02-03 NOTE — GROUP NOTE
Group Therapy Note    Date: 2/3/2021    Group Start Time: 0473  Group End Time: 1600    Number of Participants: 5/8    Type: Exercise/Recreation Group    Group Topic/Objective: Chair Exercises    Notes:  Pt attended group, but noted with an increase in struggling to focus on the exercises. Pt unable to follow 1 step instructions or demonstration.      Status After Intervention:  Unchanged    Participation Level: Minimal    Participation Quality: Inappropriate    Speech:  normal    Thought Process/Content: Confused    Affective Functioning: Congruent    Mood: Bright    Level of consciousness:  Preoccupied    Response to Learning: Able to verbalize current knowledge/experience    Endings: None Reported    Modes of Intervention: Activity and Movement    Discipline Responsible: Certified Therapeutic Recreation Specialist     Electronically signed by JIM Ann MA on 2/4/2021 at 9:59 AM

## 2021-02-03 NOTE — PROGRESS NOTES
Patient up on the unit. Sitting at table, talking and interacting appropriately with other patients. She is calm and conversation is mostly coherent and appropriate. States she is still here because she can't go home due to Covid precautions. Spoke with her sister on the phone without an increase in agitation. Gait is weak but steady.

## 2021-02-03 NOTE — PROGRESS NOTES
Physical Therapy    Physical Therapy Treatment Note  Name: Stevie Velasquez MRN: [id number] :   1950   Date:  2/3/2021   Admission Date: 2021 Room:  [address] [place] [address]   Restrictions/Precautions:  Restrictions/Precautions  Restrictions/Precautions: General Precautions, Fall Risk(Simultaneous filing. User may not have seen previous data.)  Required Braces or Orthoses?: No(Simultaneous filing. User may not have seen previous data.)      Communication with other providers:  Spoke to nursing regarding p safety with ambulation  Subjective:  Patient states:  \"Mague said I can come live with her\"  Pain:   Location, Type, Intensity (0/10 to 10/10): Does not report  Objective:    Observation:  P sitting up in chair  Treatment, including education/measures:  Transfers: sit <> stand with CGA, max cues for safety. P demos impulsivity with movement  Gait: Ambulates with no Ad x 50' x 2 bouts with max cues for safety and min to mod A for balance. P with narrow ROSEMARIE and scissoring gait resulting in decreased safety and increased risk for falls. P educated on and trialed RW. P requires min A for safety and max cues for step pattern. P with minimal ability to perform path finding without max assist.lateral stepping at handrail x 4 min-p with difficulty following cues requiring max cues and visual cues  Balance: P attempted balance exercises for stepping, but unable to understand cueing despite max attempts  P educated on safety with RW and exercises. P left in group therapy session with . Assessment / Impression:    P with improved alertness, but requires significant assist for safety with ambulation.  Recommend continued skilled PT to address deficits and maximize functional potential.   Patient's tolerance of treatment:  good   Adverse Reaction: none  Significant change in status and impact:  Improved participation  Barriers to improvement:  congition  Plan for Next Session:    Gait, balance  Time in:  1404  Time out:  1429  Timed treatment minutes:  25  Total treatment time:  25    Previously filed items:  Social/Functional History  Lives With: Family  Type of Home: House  Home Layout: Laundry in basement, One level  Home Access: Level entry  Bathroom Shower/Tub: Tub/Shower unit  Bathroom Toilet: Standard  Bathroom Accessibility: Not accessible  Receives Help From: Family  ADL Assistance: Independent  Homemaking Assistance: Independent  Homemaking Responsibilities: Yes  Active : Yes  Mode of Transportation: Car  Education: 9th grade  Occupation: Retired  Type of occupation:   Leisure & Hobbies: fixing cars and appliances, dancing  Additional Comments: Pt questionable historian, History obtained from chart  Short term goals  Time Frame for Short term goals: 1 week  Short term goal 1: P will perform bed mobility with Gio  Short term goal 2: P will perform sit <> stand with supervision  Short term goal 3: P will ambulate x 50' with CGA and mod cues  Short term goal 4: P will stand with 1 Ue support x 2 min with CGA       Electronically signed by:    Maribeth Bradford, PT  2/3/2021, 3:32 PM

## 2021-02-03 NOTE — GROUP NOTE
Group Therapy Note    Date: 2/3/2021    Group Start Time: 1400  Group End Time: 1165  Group Topic: Psychotherapy    [facility]        Group Therapy Note    Attendees: 5/7         Notes:   Patient attended group and participated in group topic on depression management techniques.       Status After Intervention:  Improved    Participation Level: Interactive and Monopolizing    Participation Quality: Sharing and Supportive      Speech:  normal      Thought Process/Content: Logical      Affective Functioning: Congruent      Mood: euthymic      Level of consciousness:  Alert and Attentive      Response to Learning: Able to verbalize current knowledge/experience      Endings: None Reported    Modes of Intervention: Education, Support, Socialization, Exploration and Problem-solving      Discipline Responsible: /Counselor      Signature:  KASEY Maldonado

## 2021-02-03 NOTE — PROGRESS NOTES
Progress Note Psychiatry    Isabela Pop  [id number]  1/21/2021 02/03/21    ID: Patient is a 65 yrs y.o. female    CC: \"I stopped my meds. \"    HPI: Isabela Pop is a 65year old female with PMHx of HTN, HLD,dementia (Alzemimers) with behavioral disturbances, delusional disorder and medication non compliance. She is well known to [facility] related to several admissions for same. She presents to [facility] ED via police after found wandering. Her sister called them when she was unable to locate the patient. Patient has not been taking her medications and has an increase in her delusions and paranoia. Patient admitted to [facility].       Patient up this morning in common room. She is pleasant and engaging with staff. She is polite and cooperative. She is oriented to self and hospital only. Per staff patient slept 5 hours. She denies SI/HI. Denies auditory and visual hallucinations. Does not appear to be responding to internal stimuli. Speech much improved and appropriate. Per staff she continues to not exhibit behaviors and has been mostly med compliant with supervision on unit. Meds do need crushed. Pt continues to display occasional bizarre delusions and confusion. Continues to note fixed delusions. Pt remains unable to understand her diagnosis and treatment plan.  Pt noted she currently feels safe and comfortable on the unit.  Pt was in agreement with treatment team.  Pt was polite and cordial during the interview process. PT to be contacted to re eval patient. She had altered mental status, was in bed often and is weak and balance unsteady. BUN and Cr elevated.  Will continue to push fluids and check BMP in am.      FINDINGS: ct head w/o contrast 1/25/2021   BRAIN/VENTRICLES: There is no acute intracranial hemorrhage, mass effect or   midline shift.  No abnormal extra-axial fluid collection.  The gray-white   differentiation is maintained without evidence of an acute infarct.  Old   lacunar infarcts noted in the bilateral basal ganglia.  Hypoattenuation of   the periventricular and subcortical white matter is suggestive of chronic   small vessel ischemic disease.  Mild diffuse parenchymal volume loss is   noted.  Vascular calcification is seen. Shadia Gravely is no evidence of   hydrocephalus. Pt denied any hx of seizures, Hep C or HIV  Pt notes history of TBI  No TD noted, AIMS=0    Past Psychiatric History:   See note above    Family Psychiatric History:   See note above    Family Psychiatric History:   Family History   Problem Relation Age of Onset    No Known Problems Mother     No Known Problems Sister     Heart Disease Brother     Coronary Art Dis Brother         stents    No Known Problems Sister     No Known Problems Sister     Alcohol Abuse Brother         Allergies:   Allergies   Allergen Reactions    Shellfish-Derived Products     Floxin [Ofloxacin] Other (See Comments)     Made her \"feel weird\"        OBJECTIVE  Vital Signs:  Vitals:    02/03/21 0915   BP: 134/81   Pulse: 108   Resp: 18   Temp: 97.3 °F (36.3 °C)   SpO2: 97%       Labs:  Recent Results (from the past 48 hour(s))   CBC auto differential    Collection Time: 02/02/21  1:45 PM   Result Value Ref Range    WBC 6.3 4.0 - 10.5 K/CU MM    RBC 3.78 (L) 4.2 - 5.4 M/CU MM    Hemoglobin 12.2 (L) 12.5 - 16.0 GM/DL    Hematocrit 37.2 37 - 47 %    MCV 98.4 78 - 100 FL    MCH 32.3 (H) 27 - 31 PG    MCHC 32.8 32.0 - 36.0 %    RDW 11.3 (L) 11.7 - 14.9 %    Platelets 193 771 - 096 K/CU MM    MPV 10.5 7.5 - 11.1 FL    Differential Type AUTOMATED DIFFERENTIAL     Segs Relative 59.1 36 - 66 %    Lymphocytes % 29.2 24 - 44 %    Monocytes % 8.2 (H) 0 - 4 %    Eosinophils % 1.3 0 - 3 %    Basophils % 0.3 0 - 1 %    Segs Absolute 3.7 K/CU MM    Lymphocytes Absolute 1.8 K/CU MM    Monocytes Absolute 0.5 K/CU MM    Eosinophils Absolute 0.1 K/CU MM    Basophils Absolute 0.0 K/CU MM    Immature Neutrophil % 1.9 (H) 0 - 0.43 %    Total Immature Neutrophil 0.12 K/CU MM   Basic Metabolic Panel w/ Reflex to MG    Collection Time: 02/02/21  1:45 PM   Result Value Ref Range    Sodium 136 135 - 145 MMOL/L    Potassium 5.1 3.5 - 5.1 MMOL/L    Chloride 97 (L) 99 - 110 mMol/L    CO2 35 (H) 21 - 32 MMOL/L    Anion Gap 4 4 - 16    BUN 44 (H) 6 - 23 MG/DL    CREATININE 2.0 (H) 0.6 - 1.1 MG/DL    Glucose 106 (H) 70 - 99 MG/DL    Calcium 9.2 8.3 - 10.6 MG/DL    GFR Non- 24 (L) >60 mL/min/1.73m2    GFR  30 (L) >60 mL/min/1.73m2   Ammonia    Collection Time: 02/02/21  2:20 PM   Result Value Ref Range    Ammonia 17 11 - 51 UMOL/L       Review of Systems:  Reports of no current cardiovascular, respiratory, gastrointestinal, genitourinary, integumentary, neurological, musculoskeletal, or immunological symptoms today. PSYCHIATRIC: See HPI above. PSYCHIATRIC EXAMINATION / MENTAL STATUS EXAM    CONSTITUTIONAL:    Vitals:   Vitals:    02/03/21 0915   BP: 134/81   Pulse: 108   Resp: 18   Temp: 97.3 °F (36.3 °C)   SpO2: 97%      General appearance: [x] appears age, []  appears older than stated age,               []  adequately dressed and groomed, [x] disheveled,               []  in no acute distress, [x] appears mildly distressed, [] other           MUSCULOSKELETAL:   Gait:   [] normal, [] antalgic, [x] unsteady, [] gait not evaluated   Station:             [] erect, [x] sitting, [] recumbent, [] other        Strength/tone:  [x] muscle strength and tone appear consistent with age and condition     [] atrophy      [] abnormal movements       PSYCHIATRIC:    Appearance: appears stated age. Alert and oriented to person only. . No acute distress- some mild today. Poor grooming and hygiene. Poor eye contact. No prominent physical abnormalities. Attitude: Manner is cooperative and pleasant  Motor: No psychomotor agitation, retardation or abnormal movements noted  Speech: slurring slowed rate, volume, tone & amount.   Language: intact understanding and production  Mood:\"ok\"  Affect:flat non-labile, congruent with mood and content of speech  Thought Production: Spontaneous. Thought Form: Coherent, linear, logical & goal-directed. No tangentiality or circumstantiality. No flight of ideas or loosening of associations. Thought Content/Perceptions: No [place], staff observing visual Hallucinations but no auditory hallucinations  Delusions and paranoid observed  Insight: impaired  Judgment: impaired  Memory: Immediate, recent, and remote appear intact, though not formally tested. Attention: having difficulty today throughout interview   Fund of knowledge: Average  Gait/Balance: unsteady    Impression:   Late onset Alzheimer's disease with behavioral disturbance  Late Alzheimer's disease    Problem List:   Major neurocognitive disorder due to Alzheimer's disease, with behavioral disturbance ([facility] [address]    Plan:  1. Admit to [place] [address] Unit  2. Consult Hospitalist to evaluate and treat medical conditions  3. Adjust psychotropic medications to target symptoms  4. Occupational Therapy, Physical Therapy, Group Psychotherapy as tolerated  5. Reviewed treatment plan with patient including medication risks, benefits, side effects. Obtained informed consent for treatment. 6. Anticipated length of stay 10-12 days  7. I certify that inpatient psychiatric hospital admission is medically necessary for treatment, which can be expected to improve the patient's condition, and/or for diagnostic study. 8. Psychiatric management:medication initiation and titration, group and individual therapy, safe and therapeutic environment. 9. Status of problem/condition: Improving  10. Medical co-morbidities: Management per [facility] group, appreciate assistance  11. Legal Status: Voluntary  12.  The treatment team reviewed with the patient the diagnosis and treatment recommendations to include the risks, benefits, and side effects of chosen medications. 13. The patient verbalized understanding and agreed with the treatment regimen as outlined above. 14. The patient was encouraged to participate in groups. 15. Medical records, Labs, Diagnotic tests reviewed  16. q15 min safety checks for safety  17. Interval History. 18. Review current labs-  BUN and Cr elevated. Will continue to push fluids and check BMP in am.  19. Continue current medications increase depakote   Contine depakote sprinkles 500  mg PO  QHS for mood, (Valproate level requested 2/2/2021)reduce risperidone to 1 mg qhs for psychosis. Trazodone 150 mg po qhs  20. Supportive Therapy Provided  21. Pt had an opportunity to ask questions and address concerns  22. Pt encouraged to continue therapy group or individual.  23. Pt was in agreement with treatment plan. 24. The risks benefits and side effects of medications were discussed with the patient, including alternatives and no treatment.     Electronically signed by EVELYN Rivera CNP on 2/3/2021 at 10:38 AM

## 2021-02-03 NOTE — PLAN OF CARE
Problem: Altered Mood, Depressive Behavior:  Goal: Able to verbalize acceptance of life and situations over which he or she has no control  Description: Able to verbalize acceptance of life and situations over which he or she has no control  2/2/2021 1948 by Osman Murillo RN  Outcome: Ongoing  2/2/2021 1540 by Osman Murillo RN  Outcome: Ongoing  2/2/2021 1225 by Marci Schmidt  Outcome: Ongoing  Goal: Able to verbalize and/or display a decrease in depressive symptoms  Description: Able to verbalize and/or display a decrease in depressive symptoms  2/2/2021 1948 by Osman Murillo RN  Outcome: Ongoing  2/2/2021 1540 by Osman Murillo RN  Outcome: Ongoing  2/2/2021 1225 by Marci Schmidt  Outcome: Ongoing  Goal: Ability to disclose and discuss suicidal ideas will improve  Description: Ability to disclose and discuss suicidal ideas will improve  2/2/2021 1948 by Osman Murillo RN  Outcome: Ongoing  2/2/2021 1540 by Osman Murillo RN  Outcome: Ongoing  2/2/2021 1225 by Marci Schmidt  Outcome: Ongoing  Goal: Able to verbalize support systems  Description: Able to verbalize support systems  2/2/2021 1948 by Osman Murillo RN  Outcome: Ongoing  2/2/2021 1540 by Osman Murillo RN  Outcome: Ongoing  2/2/2021 1225 by Marci Schmidt  Outcome: Ongoing  Goal: Absence of self-harm  Description: Absence of self-harm  2/2/2021 1948 by Osman Murillo RN  Outcome: Ongoing  2/2/2021 1540 by Osman Murillo RN  Outcome: Ongoing  2/2/2021 1225 by Marci Schmidt  Outcome: Ongoing  Goal: Patient specific goal  Description: Patient specific goal  2/2/2021 1948 by Osman Murillo RN  Outcome: Ongoing  2/2/2021 1540 by Osman Murillo RN  Outcome: Ongoing  2/2/2021 1225 by Marci Schmidt  Outcome: Ongoing  Goal: Participates in care planning  Description: Participates in care planning  2/2/2021 1948 by Osman Murillo RN  Outcome: Ongoing  2/2/2021 1540 by Dewaine Pronto, RN  Outcome: Ongoing  2/2/2021 1225 by [facility] Lynette Failing  Outcome: Ongoing     Problem: Depressive Behavior With or Without Suicide Precautions:  Goal: Able to verbalize acceptance of life and situations over which he or she has no control  Description: Able to verbalize acceptance of life and situations over which he or she has no control  2/2/2021 1948 by Selene Lopez RN  Outcome: Ongoing  2/2/2021 1540 by Selene Lpoez, RN  Outcome: Ongoing  2/2/2021 1225 by Augustus Mcburney  Outcome: Ongoing  Goal: Able to verbalize and/or display a decrease in depressive symptoms  Description: Able to verbalize and/or display a decrease in depressive symptoms  2/2/2021 1948 by Selene Lopez RN  Outcome: Ongoing  2/2/2021 1540 by Selene Lopez RN  Outcome: Ongoing  2/2/2021 1225 by Augustus Mcburney  Outcome: Ongoing  Goal: Ability to disclose and discuss suicidal ideas will improve  Description: Ability to disclose and discuss suicidal ideas will improve  2/2/2021 1948 by Selene Lopez RN  Outcome: Ongoing  2/2/2021 1540 by Selene Lopez RN  Outcome: Ongoing  2/2/2021 1225 by Augustus Mcburney  Outcome: Ongoing  Goal: Able to verbalize support systems  Description: Able to verbalize support systems  2/2/2021 1948 by Selene Lopez RN  Outcome: Ongoing  2/2/2021 1540 by Selene Lopez RN  Outcome: Ongoing  2/2/2021 1225 by Augustus Mcburney  Outcome: Ongoing  Goal: Absence of self-harm  Description: Absence of self-harm  2/2/2021 1948 by Selene Lopez, RN  Outcome: Ongoing  2/2/2021 1540 by Selene Lopez RN  Outcome: Ongoing  2/2/2021 1225 by Augustus Mcburney  Outcome: Ongoing  Goal: Patient specific goal  Description: Patient specific goal  2/2/2021 1948 by Selene Lopez, RN  Outcome: Ongoing  2/2/2021 1540 by Selene Lopez RN  Outcome: Ongoing  2/2/2021 1225 by Augustus Mcburney  Outcome: Ongoing  Goal: Participates in care planning  Description: Participates in care planning  2/2/2021 1948 by Selene Lopez, RN  Outcome: Ongoing  2/2/2021 1540 by Griselda Chang Geovanny Colon RN  Outcome: Ongoing  2/2/2021 1225 by Joanne Covington  Outcome: Ongoing     Problem: Altered Mood, Deterioration in Function:  Goal: Ability to perform activities of daily living will improve  Description: Ability to perform activities of daily living will improve  2/2/2021 1948 by Soraya Galvez RN  Outcome: Ongoing  2/2/2021 1540 by Soraya Galvez RN  Outcome: Ongoing  2/2/2021 1225 by Joanne Covington  Outcome: Ongoing  Goal: Able to verbalize reality based thinking  Description: Able to verbalize reality based thinking  2/2/2021 1948 by Soraya Galvez RN  Outcome: Ongoing  2/2/2021 1540 by Soraya Galvez RN  Outcome: Ongoing  2/2/2021 1225 by Joanne Covington  Outcome: Ongoing  Goal: Skin appearance normal  Description: Skin appearance normal  2/2/2021 1948 by Soraya Galvez RN  Outcome: Ongoing  2/2/2021 1540 by Soraya Galvez RN  Outcome: Ongoing  2/2/2021 1225 by Joanne Covington  Outcome: Ongoing  Goal: Maintenance of adequate nutrition will improve  Description: Maintenance of adequate nutrition will improve  2/2/2021 1948 by Soraya Galvez RN  Outcome: Ongoing  2/2/2021 1540 by Soraya Galvez RN  Outcome: Ongoing  2/2/2021 1225 by Joanne Covington  Outcome: Ongoing  Goal: Ability to tolerate increased activity will improve  Description: Ability to tolerate increased activity will improve  2/2/2021 1948 by Soraya Galvez RN  Outcome: Ongoing  2/2/2021 1540 by Soraya Galvez RN  Outcome: Ongoing  2/2/2021 1225 by Joanne Covington  Outcome: Ongoing  Goal: Participates in care planning  Description: Participates in care planning  2/2/2021 1948 by Soraya Galvez RN  Outcome: Ongoing  2/2/2021 1540 by oSraya Galvez RN  Outcome: Ongoing  2/2/2021 1225 by Joanne Covington  Outcome: Ongoing  Goal: Patient specific goal  Description: Patient specific goal  2/2/2021 1948 by Soraya Galvez RN  Outcome: Ongoing  2/2/2021 1540 by Soraya Galvez RN  Outcome: Ongoing  2/2/2021 1225 by Mehdi Villegas Christoph  Outcome: Ongoing     Problem: Risk of Harm:  Goal: Ability to remain free from injury will improve  Description: Ability to remain free from injury will improve  2/2/2021 1948 by Kb Piña RN  Outcome: Ongoing  2/2/2021 1540 by Kb Piña RN  Outcome: Ongoing  2/2/2021 1225 by Frederick Lentz  Outcome: Ongoing     Problem: Altered Mood, Manic Behavior:  Goal: Able to sleep  Description: Able to sleep  2/2/2021 1948 by Kb Piña RN  Outcome: Ongoing  2/2/2021 1540 by Kb Piña RN  Outcome: Ongoing  2/2/2021 1225 by Frederick Lentz  Outcome: Ongoing  Goal: Able to verbalize decrease in frequency and intensity of racing thoughts  Description: Able to verbalize decrease in frequency and intensity of racing thoughts  2/2/2021 1948 by Kb Piña RN  Outcome: Ongoing  2/2/2021 1540 by Kb Piña RN  Outcome: Ongoing  2/2/2021 1225 by Frederick Lentz  Outcome: Ongoing  Goal: Ability to disclose and discuss suicidal ideas will improve  Description: Ability to disclose and discuss suicidal ideas will improve  2/2/2021 1948 by Kb Piña RN  Outcome: Ongoing  2/2/2021 1540 by Kb Piña RN  Outcome: Ongoing  2/2/2021 1225 by Frederick Lentz  Outcome: Ongoing  Goal: Absence of self-harm  Description: Absence of self-harm  2/2/2021 1948 by Kb Piña RN  Outcome: Ongoing  2/2/2021 1540 by Kb Piña RN  Outcome: Ongoing  2/2/2021 1225 by Frederick Lentz  Outcome: Ongoing  Goal: Ability to achieve adequate nutritional intake will improve  Description: Ability to achieve adequate nutritional intake will improve  2/2/2021 1948 by Kb Piña RN  Outcome: Ongoing  2/2/2021 1540 by Kb Piña RN  Outcome: Ongoing  2/2/2021 1225 by Frederick Lentz  Outcome: Ongoing  Goal: Ability to interact with others will improve  Description: Ability to interact with others will improve  2/2/2021 1948 by Kb Piña RN  Outcome: Ongoing  2/2/2021 1540 by Jonh Howe Clint Winchester, RN  Outcome: Ongoing  2/2/2021 1225 by Tigre Cabrera  Outcome: Ongoing  Goal: Ability to demonstrate self-control will improve  Description: Ability to demonstrate self-control will improve  2/2/2021 1948 by Jacque Sandoval RN  Outcome: Ongoing  2/2/2021 1540 by Jacque Sandoval, RN  Outcome: Ongoing  2/2/2021 1225 by Tigre Cabrera  Outcome: Ongoing  Goal: Mood stable  Description: Mood stable  2/2/2021 1948 by Jacque Sandoval, RN  Outcome: Ongoing  2/2/2021 1540 by Jacque Sandoval, RN  Outcome: Ongoing  2/2/2021 1225 by Tigre Cabrera  Outcome: Ongoing     Problem: Falls - Risk of:  Goal: Will remain free from falls  Description: Will remain free from falls  2/2/2021 1948 by Jacque Sandoval, RN  Outcome: Ongoing  2/2/2021 1540 by Jacque Sandoval, RN  Outcome: Ongoing  2/2/2021 1225 by Tigre Cabrera  Outcome: Ongoing  Goal: Absence of physical injury  Description: Absence of physical injury  2/2/2021 1948 by Jacque Sandoval, RN  Outcome: Ongoing  2/2/2021 1540 by Jacque Sandoval, RN  Outcome: Ongoing  2/2/2021 1225 by Tigre Cabrera  Outcome: Ongoing     Problem: Skin Integrity:  Goal: Will show no infection signs and symptoms  Description: Will show no infection signs and symptoms  2/2/2021 1948 by Jacque Sandoval RN  Outcome: Ongoing  2/2/2021 1540 by Jacque Sandoval RN  Outcome: Ongoing  2/2/2021 1225 by Tigre Cabrera  Outcome: Ongoing  Goal: Absence of new skin breakdown  Description: Absence of new skin breakdown  2/2/2021 1948 by Jacque Sandoval RN  Outcome: Ongoing  2/2/2021 1540 by Jacque Sandoval, RN  Outcome: Ongoing  2/2/2021 1225 by Tigre Cabrera  Outcome: Ongoing

## 2021-02-04 LAB
ANION GAP SERPL CALCULATED.3IONS-SCNC: 9 MMOL/L (ref 4–16)
BUN BLDV-MCNC: 23 MG/DL (ref 6–23)
CALCIUM SERPL-MCNC: 9.8 MG/DL (ref 8.3–10.6)
CHLORIDE BLD-SCNC: 102 MMOL/L (ref 99–110)
CO2: 34 MMOL/L (ref 21–32)
CREAT SERPL-MCNC: 1.2 MG/DL (ref 0.6–1.1)
GFR AFRICAN AMERICAN: 53 ML/MIN/1.73M2
GFR NON-AFRICAN AMERICAN: 44 ML/MIN/1.73M2
GLUCOSE BLD-MCNC: 113 MG/DL (ref 70–99)
POTASSIUM SERPL-SCNC: 5.2 MMOL/L (ref 3.5–5.1)
SODIUM BLD-SCNC: 145 MMOL/L (ref 135–145)

## 2021-02-04 PROCEDURE — 1240000000 HC EMOTIONAL WELLNESS R&B

## 2021-02-04 PROCEDURE — 6370000000 HC RX 637 (ALT 250 FOR IP): Performed by: PSYCHIATRY & NEUROLOGY

## 2021-02-04 PROCEDURE — 6370000000 HC RX 637 (ALT 250 FOR IP): Performed by: INTERNAL MEDICINE

## 2021-02-04 PROCEDURE — 6370000000 HC RX 637 (ALT 250 FOR IP): Performed by: NURSE PRACTITIONER

## 2021-02-04 PROCEDURE — 99232 SBSQ HOSP IP/OBS MODERATE 35: CPT | Performed by: NURSE PRACTITIONER

## 2021-02-04 PROCEDURE — 92526 ORAL FUNCTION THERAPY: CPT

## 2021-02-04 PROCEDURE — 80048 BASIC METABOLIC PNL TOTAL CA: CPT

## 2021-02-04 PROCEDURE — 36415 COLL VENOUS BLD VENIPUNCTURE: CPT

## 2021-02-04 RX ORDER — DIVALPROEX SODIUM 125 MG/1
750 CAPSULE, COATED PELLETS ORAL
Status: DISCONTINUED | OUTPATIENT
Start: 2021-02-04 | End: 2021-02-09 | Stop reason: ALTCHOICE

## 2021-02-04 RX ADMIN — SUCRALFATE 1 G: 1 TABLET ORAL at 17:43

## 2021-02-04 RX ADMIN — METOPROLOL TARTRATE 25 MG: 25 TABLET, FILM COATED ORAL at 09:24

## 2021-02-04 RX ADMIN — SUCRALFATE 1 G: 1 TABLET ORAL at 15:17

## 2021-02-04 RX ADMIN — SUCRALFATE 1 G: 1 TABLET ORAL at 09:25

## 2021-02-04 RX ADMIN — Medication 1 CAPSULE: at 09:39

## 2021-02-04 RX ADMIN — LISINOPRIL 20 MG: 20 TABLET ORAL at 09:24

## 2021-02-04 RX ADMIN — TRAZODONE HYDROCHLORIDE 150 MG: 50 TABLET ORAL at 19:58

## 2021-02-04 RX ADMIN — FLUTICASONE PROPIONATE 1 SPRAY: 50 SPRAY, METERED NASAL at 09:24

## 2021-02-04 RX ADMIN — DONEPEZIL HYDROCHLORIDE 10 MG: 10 TABLET, FILM COATED ORAL at 19:58

## 2021-02-04 RX ADMIN — PANTOPRAZOLE SODIUM 80 MG: 40 TABLET, DELAYED RELEASE ORAL at 19:59

## 2021-02-04 RX ADMIN — PANTOPRAZOLE SODIUM 80 MG: 40 TABLET, DELAYED RELEASE ORAL at 09:25

## 2021-02-04 RX ADMIN — METFORMIN HYDROCHLORIDE 500 MG: 500 TABLET ORAL at 17:43

## 2021-02-04 RX ADMIN — MULTIPLE VITAMINS W/ MINERALS TAB 1 TABLET: TAB at 09:25

## 2021-02-04 RX ADMIN — MEMANTINE 10 MG: 10 TABLET ORAL at 09:25

## 2021-02-04 RX ADMIN — METFORMIN HYDROCHLORIDE 500 MG: 500 TABLET ORAL at 09:31

## 2021-02-04 RX ADMIN — RISPERIDONE 1 MG: 1 TABLET ORAL at 19:58

## 2021-02-04 RX ADMIN — METOPROLOL TARTRATE 25 MG: 25 TABLET, FILM COATED ORAL at 19:59

## 2021-02-04 RX ADMIN — ATORVASTATIN CALCIUM 80 MG: 80 TABLET, FILM COATED ORAL at 19:59

## 2021-02-04 RX ADMIN — DIVALPROEX SODIUM 750 MG: 125 CAPSULE ORAL at 17:43

## 2021-02-04 RX ADMIN — AMLODIPINE BESYLATE 10 MG: 10 TABLET ORAL at 09:24

## 2021-02-04 RX ADMIN — OMEGA-3-ACID ETHYL ESTERS 2 G: 1 CAPSULE, LIQUID FILLED ORAL at 19:58

## 2021-02-04 RX ADMIN — OMEGA-3-ACID ETHYL ESTERS 2 G: 1 CAPSULE, LIQUID FILLED ORAL at 09:25

## 2021-02-04 RX ADMIN — SUCRALFATE 1 G: 1 TABLET ORAL at 19:58

## 2021-02-04 NOTE — GROUP NOTE
Group Therapy Note    Date: 2/4/2021    Group Start Time: 1490  Group End Time: 5540    Number of Participants: 6/9    Type: Spirituality    Group Topic/Objective: Religous discussion with [employer]. Notes:  Pt participated well in group. Pt engaged in appropriate discussion and completed meditation. Status After Intervention:  Improved    Participation Level:  Active Listener and Interactive    Participation Quality: Appropriate, Attentive and Sharing    Speech:  normal    Thought Process/Content: Logical    Affective Functioning: Congruent    Mood: Bright    Level of consciousness:  Alert and Attentive    Response to Learning: Able to verbalize current knowledge/experience and Able to verbalize/acknowledge new learning    Endings: None Reported    Modes of Intervention: Education, Support and Socialization    Discipline Responsible: Certified Therapeutic Recreation Specialist     Electronically signed by Maria Teresa Cantrell MA on 2/4/2021 at 11:24 AM

## 2021-02-04 NOTE — PROGRESS NOTES
[id number] [address] OF SPEECH/LANGUAGE PATHOLOGY  DAILY PROGRESS NOTE  Kianna Walden  2/4/2021  [id number]  Atrial flutter ([facility] [address] [I48.92]  Late onset Alzheimer's disease with behavioral disturbance ([address] [G30.1, F02.81]  Major neurocognitive disorder due to another medical condition with behavioral disturbance ([address] [F02.81]  Allergies   Allergen Reactions    Shellfish-Derived Products     Floxin [Ofloxacin] Other (See Comments)     Made her \"feel weird\"         Pt was seen this date for dysphagia treatment. IMPRESSION AND RECOMMENDATIONS:   Kianna Walden was seen for a bedside swallowing treatment and diet tolerance monitoring. Pt was alert, cooperative and pleasant throughout session. Nursing reports she has not been eating her pureed diet. For today's assessment pt was positioned upright in the chair and accepted PO trials of puree, soft/regular solids and thin liquids by cup/straw sips. Prolonged mastication (pt is edentulous) with adequate oral clearance was observed. Pharyngeal swallow appeared intact characterized by timely swallow initiation and adequate laryngeal elevation. Clear vocal quality and 0 overt s/s of aspiration were observed with all PO trials given. Recommend advanced to a general diet/thin liquids with strict aspiration precautions. No further ST needs are identified at this time.      GOALS (current status in bold):  Short-term Goals  Timeframe for Short-term Goals: 1-2x weekly/ LOS or until  Goal 1: Pt will tolerate puree diet/thin liquids without clinical evidence of aspiration 100% Goal met- diet level upgraded   Goal 2: Pt/caregivers will demonstrate comprehension of recommendations/POC Goal met                            EDUCATION: recommendations/POC    PAIN RATING (0-10 Scale): denies   Time in/Time out: SLP Individual Minutes  Time In: 1530  Time Out: 1550  Minutes: 20    Visit number: [id number] Nicolás Urbina MS, CCC-SLP, 2/4/2021

## 2021-02-04 NOTE — PLAN OF CARE
[address] [place] [address]   2 Week Interdisciplinary Treatment Plan Note     Review Date & Time: 02/04 0830    Admission Type:   Admission Type: Voluntary    Reason for admission:  Reason for Admission: Brought by police after leaving home without a coat or shoes.     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      PATIENT STRENGTHS:  Patient Strengths:Strengths: No significant Physical Illness  Patient Strengths and Limitations:Limitations: Difficult relationships / poor social skills, Unrealistic self-view  Addictive Behavior:Addictive Behavior  In the past 3 months, have you felt or has someone told you that you have a problem with:  : None  Do you have a history of Chemical Use?: No  Do you have a history of Alcohol Use?: No  Do you have a history of Street Drug Abuse?: No  Histroy of Prescripton Drug Abuse?: No  Medical Problems:   Past Medical History:   Diagnosis Date    Alzheimer disease ([facility]     Bronchitis     CAD (coronary artery disease)     Chest wall trauma     COPD (chronic obstructive pulmonary disease) ([facility]     FH: CAD (coronary artery disease)     brother with cabg in his 45s    Hypertension     Kidney stone     Lumbar herniated disc     Restless leg syndrome     Spinal stenosis        Risk:  Fall RiskTotal: 110  Kash Scale Kash Scale Score: 17  BVC Total: 2      Status EXAM:   Status and Exam  Normal: No  Facial Expression: Flat  Affect: Blunt  Level of Consciousness: Confused  Mood:Normal: No  Mood: Irritable  Motor Activity:Normal: No  Motor Activity: Increased, Agitated  Interview Behavior: Irritable, Uncooperative/Withdrawn  Preception: [place] to Person  Attention:Normal: No  Attention: Distractible, Unable to Concentrate  Thought Processes: Flt.of Ideas  Thought Content:Normal: No  Thought Content: Preoccupations, Obsessions  Hallucinations: Visual (Comment)  Delusions: Yes  Delusions: Obsessions  Memory:Normal: No  Memory: Poor Recent  Insight and Judgment: Yes  Insight and Judgment: Poor Judgment, Poor Insight  Present Suicidal Ideation: No  Present Homicidal Ideation: No    Daily Assessment Last Entry:   Daily Sleep (WDL): Exceptions to WDL  Patient Currently in Pain: Denies  Daily Nutrition (WDL): Within Defined Limits  Appetite Change: Normal for patient  Barriers to Nutrition: Elderly patient, Dysphagia  Level of Assistance: Set up    Patient Monitoring:  Frequency of Checks: 4 times per hour, close    Psychiatric Symptoms:   Depression Symptoms  Depression Symptoms: No problems reported or observed. Anxiety Symptoms  Anxiety Symptoms: No problems reported or observed. Nakia Symptoms  Nakia Symptoms: No problems reported or observed. Psychosis Symptoms  Delusion Type: No problems reported or observed.     Suicide Risk CSSR-S:  1) Within the past month, have you wished you were dead or wished you could go to sleep and not wake up? : No  2) Have you actually had any thoughts of killing yourself? : No  6) Have you ever done anything, started to do anything, or prepared to do anything to end your life?: No          EDUCATION:   Learner Progress Toward Treatment Goals: Reviewed results and recommendations of this team    Method: Group    Outcome: Needs reinforcement    PATIENT GOALS: med titration    PLAN/TREATMENT RECOMMENDATIONS UPDATE: med titration    Estimated Length of Stay Update:  19 days  Estimated Discharge Date Update: 02/09/21  Discharge Criteria: med titration      SHORT-TERM GOALS UPDATE:  Time frame for Short-Term Goals: 15 days     LONG-TERM GOALS UPDATE:  Time frame for Long-Term Goals: 19 days   Members Present in Team Meeting: See Signature Sheet    KASEY Carrington

## 2021-02-04 NOTE — GROUP NOTE
Group Therapy Note    Date: 2/4/2021    Group Start Time: 1530  Group End Time: 1600    Number of Participants: 5/8    Type: Exercise/Recreation Group    Group Topic/Objective: Chair Exercises    Notes:  Pt participated actively in the group. Pt better able to focus in this exercise group than previous exercise group. Pt able to follow along and complete majority of exercises. Status After Intervention:  Improved    Participation Level:  Active Listener and Interactive    Participation Quality: Appropriate, Attentive and Sharing    Speech:  normal    Thought Process/Content: confused    Affective Functioning: Congruent    Mood: Bright    Level of consciousness:  Alert and Attentive    Response to Learning: Able to verbalize current knowledge/experience and Able to verbalize/acknowledge new learning    Endings: None Reported    Modes of Intervention: Activity and Movement    Discipline Responsible: Certified Therapeutic Recreation Specialist     Electronically signed by JIM Sage MA on 2/5/2021 at 11:43 AM

## 2021-02-04 NOTE — PLAN OF CARE
Ongoing  2/4/2021 0522 by Danielle Orozco RN  Outcome: Ongoing  Goal: Able to verbalize and/or display a decrease in depressive symptoms  Description: Able to verbalize and/or display a decrease in depressive symptoms  2/4/2021 1859 by Fabian Gr RN  Outcome: Ongoing  2/4/2021 0522 by Danielle Orozco RN  Outcome: Ongoing  Goal: Ability to disclose and discuss suicidal ideas will improve  Description: Ability to disclose and discuss suicidal ideas will improve  2/4/2021 1859 by Fabian Gr RN  Outcome: Ongoing  2/4/2021 0522 by Danielle Orozco RN  Outcome: Ongoing  Goal: Able to verbalize support systems  Description: Able to verbalize support systems  2/4/2021 1859 by Fabian Gr RN  Outcome: Ongoing  2/4/2021 0522 by Danielle Orozco RN  Outcome: Ongoing  Goal: Absence of self-harm  Description: Absence of self-harm  2/4/2021 1859 by Fabian Gr RN  Outcome: Ongoing  2/4/2021 0522 by Danielle Orozco RN  Outcome: Ongoing  Goal: Patient specific goal  Description: Patient specific goal  2/4/2021 1859 by Fabian Gr RN  Outcome: Ongoing  2/4/2021 0522 by Danielle Orozco RN  Outcome: Ongoing  Goal: Participates in care planning  Description: Participates in care planning  2/4/2021 1859 by Fabian Gr RN  Outcome: Ongoing  2/4/2021 0522 by Danielle Orozco RN  Outcome: Ongoing     Problem: Altered Mood, Deterioration in Function:  Goal: Ability to perform activities of daily living will improve  Description: Ability to perform activities of daily living will improve  2/4/2021 1859 by Fabian Gr RN  Outcome: Ongoing  2/4/2021 0522 by Danielle Orozco RN  Outcome: Ongoing  Goal: Able to verbalize reality based thinking  Description: Able to verbalize reality based thinking  2/4/2021 1859 by Fabian Gr RN  Outcome: Ongoing  2/4/2021 0522 by Danielle Orozco RN  Outcome: Ongoing  Goal: Skin appearance normal  Description: Skin appearance normal  2/4/2021 1859 by Fermin Saravia RN  Outcome: Ongoing  2/4/2021 0522 by Mónica Giles RN  Outcome: Ongoing  Goal: Maintenance of adequate nutrition will improve  Description: Maintenance of adequate nutrition will improve  2/4/2021 1859 by Fermin Saravia RN  Outcome: Ongoing  2/4/2021 0522 by Mónica Giles RN  Outcome: Ongoing  Goal: Ability to tolerate increased activity will improve  Description: Ability to tolerate increased activity will improve  2/4/2021 1859 by Fermin Saravia RN  Outcome: Ongoing  2/4/2021 0522 by Mónica Giles RN  Outcome: Ongoing  Goal: Participates in care planning  Description: Participates in care planning  2/4/2021 1859 by Fermin Saravia RN  Outcome: Ongoing  2/4/2021 0522 by Mónica Giles RN  Outcome: Ongoing  Goal: Patient specific goal  Description: Patient specific goal  2/4/2021 1859 by Fermin Saravia RN  Outcome: Ongoing  2/4/2021 0522 by Mónica Giles RN  Outcome: Ongoing     Problem: Risk of Harm:  Goal: Ability to remain free from injury will improve  Description: Ability to remain free from injury will improve  2/4/2021 1859 by Fermin Saravia RN  Outcome: Ongoing  2/4/2021 0522 by Mónica Giles RN  Outcome: Ongoing     Problem: Altered Mood, Manic Behavior:  Goal: Able to sleep  Description: Able to sleep  2/4/2021 1859 by Fermin Saravia RN  Outcome: Ongoing  2/4/2021 0522 by Mónica Giles RN  Outcome: Ongoing  Goal: Able to verbalize decrease in frequency and intensity of racing thoughts  Description: Able to verbalize decrease in frequency and intensity of racing thoughts  2/4/2021 1859 by Fermin Saravia RN  Outcome: Ongoing  2/4/2021 0522 by Mónica Giles RN  Outcome: Ongoing  Goal: Ability to disclose and discuss suicidal ideas will improve  Description: Ability to disclose and discuss suicidal ideas will improve  2/4/2021 1859 by Fermin Saravia RN  Outcome: Ongoing  2/4/2021 0522 by Mónica Giles RN  Outcome: Ongoing  Goal: Absence of self-harm  Description: Absence of self-harm  2/4/2021 1859 by Meghan Dick RN  Outcome: Ongoing  2/4/2021 0522 by Edie Harrington RN  Outcome: Ongoing  Goal: Ability to achieve adequate nutritional intake will improve  Description: Ability to achieve adequate nutritional intake will improve  2/4/2021 1859 by Meghan Dick RN  Outcome: Ongoing  2/4/2021 0522 by Edie Harrington RN  Outcome: Ongoing  Goal: Ability to interact with others will improve  Description: Ability to interact with others will improve  2/4/2021 1859 by Meghan Dick RN  Outcome: Ongoing  2/4/2021 0522 by Edie Harrington RN  Outcome: Ongoing  Goal: Ability to demonstrate self-control will improve  Description: Ability to demonstrate self-control will improve  2/4/2021 1859 by Meghan Dick RN  Outcome: Ongoing  2/4/2021 0522 by Edie Harrington RN  Outcome: Ongoing  Goal: Mood stable  Description: Mood stable  2/4/2021 1859 by Meghan Dick RN  Outcome: Ongoing  2/4/2021 0522 by Edie Harrington RN  Outcome: Ongoing     Problem: Falls - Risk of:  Goal: Will remain free from falls  Description: Will remain free from falls  2/4/2021 1859 by Meghan Dick RN  Outcome: Ongoing  2/4/2021 0522 by Edie Harrington RN  Outcome: Ongoing  Goal: Absence of physical injury  Description: Absence of physical injury  2/4/2021 1859 by Meghan Dick RN  Outcome: Ongoing  2/4/2021 0522 by Edie Harrington RN  Outcome: Ongoing     Problem: Skin Integrity:  Goal: Will show no infection signs and symptoms  Description: Will show no infection signs and symptoms  2/4/2021 1859 by Meghan Dick RN  Outcome: Ongoing  2/4/2021 0522 by Edie Harrington RN  Outcome: Ongoing  Goal: Absence of new skin breakdown  Description: Absence of new skin breakdown  2/4/2021 1859 by Meghan Dick RN  Outcome: Ongoing  2/4/2021 0522 by Edie Harrington RN  Outcome: Ongoing

## 2021-02-04 NOTE — PROGRESS NOTES
Patient showered with Moderate assistance. Patient is unsteady on her feet. Linens changed and laundry done.

## 2021-02-04 NOTE — BH NOTE
Pt was restless most of this shift. Frequently getting up without help. She would try to remove personal alarm. Pt was back and forth to bed and wheelchair. This staff assisted with ambulating Pt to help with the restlessness. She would talk about needing to go down stairs to get her cat. She would say there were items in place that they were not. She would become agitated when attempting to be redirected. Pt had been offered pain medication, tolieting, food. She did take her medication.

## 2021-02-04 NOTE — GROUP NOTE
Group Therapy Note    Date: 2/4/2021    Group Start Time: 0830  Group End Time: 0900    Number of Participants: 8/9    Type: Morning Goals Group/ Community Meeting    Group Topic/Objective: Set Goal For The Day and to review Unit Rules and Regulations. Patient's Goal:  Pt states her goal is \"Get stuff done for spring. \"    Notes:  Pt presented as pleasantly confused today. Pt more accepting of education r/t need to stay in the hospital. Pt states she is feeling \"fine\" and is grateful for \"today. \"  Pt reports restful sleep, but unsure how long she slept.      Depression (0-10): 0    Anxiety (0-10): 0    Irritability/Aggitation (0-10): 0    Status After Intervention:  Improved    Participation Level: Interactive    Participation Quality: Sharing    Speech:  normal    Thought Process/Content: Confused    Affective Functioning: Congruent    Mood: Bright    Level of consciousness:  Alert and Attentive    Response to Learning: Able to verbalize current knowledge/experience    Endings: None Reported    Modes of Intervention: Education, Support and Socialization    Discipline Responsible: Certified Therapeutic Recreation Specialist     Electronically signed by Berry Iraheta MA on 2/4/2021 at 1:38 PM

## 2021-02-04 NOTE — PROGRESS NOTES
Progress Note Psychiatry    Cecily Farah  [id number]  1/21/2021 02/04/21    ID: Patient is a 65 yrs y.o. female    CC: \"I stopped my meds. \"    HPI: Cecily Farah is a 65year old female with PMHx of HTN, HLD,dementia (Alzemimers) with behavioral disturbances, delusional disorder and medication non compliance. She is well known to [address] [employer] related to several admissions for same. She presents to [place] ED via police after found wandering. Her sister called them when she was unable to locate the patient. Patient has not been taking her medications and has an increase in her delusions and paranoia. Patient admitted to [facility].       Patient up this morning in common room. She is pleasant and engaging with staff, acknowledging provider even before assessment. She is bright and reactive and grateful this am. She is polite and cooperative. She is oriented to self and hospital only. Per staff patient slept 4 hours. Had behaviors last evening becoming argumentative and agitated. She was not directable despite multiple attempts. She denies SI/HI. Denies auditory and visual hallucinations. Does not appear to be responding to internal stimuli. Speech continues to improve and is appropriate. Meds do need crushed. Pt continues to display occasional bizarre delusions and confusion. Continues to note fixed delusions. Pt remains unable to understand her diagnosis and treatment plan.  Pt noted she currently feels safe and comfortable on the unit.  Pt was in agreement with treatment team.  Pt was polite and cordial during the interview process. PT to be contacted to re eval patient. She had altered mental status, was in bed often and is weak and balance unsteady. BUN and Cr elevated. Will continue to push fluids and check BMP in am. BMP shows improvement 2/4/21. ST reconsulted: Patient more alert and oriented during the day time.        FINDINGS: ct head w/o contrast 1/25/2021   BRAIN/VENTRICLES: There is no acute intracranial hemorrhage, mass effect or   midline shift.  No abnormal extra-axial fluid collection.  The gray-white   differentiation is maintained without evidence of an acute infarct.  Old   lacunar infarcts noted in the bilateral basal ganglia.  Hypoattenuation of   the periventricular and subcortical white matter is suggestive of chronic   small vessel ischemic disease.  Mild diffuse parenchymal volume loss is   noted.  Vascular calcification is seen. Sherita Hawks is no evidence of   hydrocephalus. Pt denied any hx of seizures, Hep C or HIV  Pt notes history of TBI  No TD noted, AIMS=0    Past Psychiatric History:   See note above    Family Psychiatric History:   See note above    Family Psychiatric History:   Family History   Problem Relation Age of Onset    No Known Problems Mother     No Known Problems Sister     Heart Disease Brother     Coronary Art Dis Brother         stents    No Known Problems Sister     No Known Problems Sister     Alcohol Abuse Brother         Allergies:   Allergies   Allergen Reactions    Shellfish-Derived Products     Floxin [Ofloxacin] Other (See Comments)     Made her \"feel weird\"        OBJECTIVE  Vital Signs:  Vitals:    02/04/21 0805   BP: 135/72   Pulse: 63   Resp: 17   Temp: 96.4 °F (35.8 °C)   SpO2: 93%       Labs:  Recent Results (from the past 48 hour(s))   CBC auto differential    Collection Time: 02/02/21  1:45 PM   Result Value Ref Range    WBC 6.3 4.0 - 10.5 K/CU MM    RBC 3.78 (L) 4.2 - 5.4 M/CU MM    Hemoglobin 12.2 (L) 12.5 - 16.0 GM/DL    Hematocrit 37.2 37 - 47 %    MCV 98.4 78 - 100 FL    MCH 32.3 (H) 27 - 31 PG    MCHC 32.8 32.0 - 36.0 %    RDW 11.3 (L) 11.7 - 14.9 %    Platelets 871 981 - 056 K/CU MM    MPV 10.5 7.5 - 11.1 FL    Differential Type AUTOMATED DIFFERENTIAL     Segs Relative 59.1 36 - 66 %    Lymphocytes % 29.2 24 - 44 %    Monocytes % 8.2 (H) 0 - 4 %    Eosinophils % 1.3 0 - 3 %    Basophils % 0.3 0 - 1 %    Segs Absolute 3.7 K/CU MM    Lymphocytes Absolute 1.8 K/CU MM    Monocytes Absolute 0.5 K/CU MM    Eosinophils Absolute 0.1 K/CU MM    Basophils Absolute 0.0 K/CU MM    Immature Neutrophil % 1.9 (H) 0 - 0.43 %    Total Immature Neutrophil 0.12 K/CU MM   Basic Metabolic Panel w/ Reflex to MG    Collection Time: 02/02/21  1:45 PM   Result Value Ref Range    Sodium 136 135 - 145 MMOL/L    Potassium 5.1 3.5 - 5.1 MMOL/L    Chloride 97 (L) 99 - 110 mMol/L    CO2 35 (H) 21 - 32 MMOL/L    Anion Gap 4 4 - 16    BUN 44 (H) 6 - 23 MG/DL    CREATININE 2.0 (H) 0.6 - 1.1 MG/DL    Glucose 106 (H) 70 - 99 MG/DL    Calcium 9.2 8.3 - 10.6 MG/DL    GFR Non- 24 (L) >60 mL/min/1.73m2    GFR  30 (L) >60 mL/min/1.73m2   Ammonia    Collection Time: 02/02/21  2:20 PM   Result Value Ref Range    Ammonia 17 11 - 51 UMOL/L   Valproic acid level, total    Collection Time: 02/03/21  3:48 PM   Result Value Ref Range    Valproic Acid Lvl 54.1 50 - 100 UG/ML    DOSE AMOUNT DOSE AMT. GIVEN - 500 mg at dinner time     DOSE TIME DOSE TIME GIVEN - 3935    Basic Metabolic Panel w/ Reflex to MG    Collection Time: 02/04/21  7:00 AM   Result Value Ref Range    Sodium 145 135 - 145 MMOL/L    Potassium 5.2 (H) 3.5 - 5.1 MMOL/L    Chloride 102 99 - 110 mMol/L    CO2 34 (H) 21 - 32 MMOL/L    Anion Gap 9 4 - 16    BUN 23 6 - 23 MG/DL    CREATININE 1.2 (H) 0.6 - 1.1 MG/DL    Glucose 113 (H) 70 - 99 MG/DL    Calcium 9.8 8.3 - 10.6 MG/DL    GFR Non- 44 (L) >60 mL/min/1.73m2    GFR  53 (L) >60 mL/min/1.73m2       Review of Systems:  Reports of no current cardiovascular, respiratory, gastrointestinal, genitourinary, integumentary, neurological, musculoskeletal, or immunological symptoms today. PSYCHIATRIC: See HPI above.          PSYCHIATRIC EXAMINATION / MENTAL STATUS EXAM    CONSTITUTIONAL:    Vitals:   Vitals:    02/04/21 0805   BP: 135/72   Pulse: 63   Resp: 17   Temp: 96.4 °F (35.8 °C)   SpO2: 93%      General appearance: [x] appears age, []  appears older than stated age,               []  adequately dressed and groomed, [x] disheveled,               []  in no acute distress, [x] appears mildly distressed, [] other           MUSCULOSKELETAL:   Gait:   [] normal, [] antalgic, [x] unsteady, [] gait not evaluated   Station:             [] erect, [x] sitting, [] recumbent, [] other        Strength/tone:  [x] muscle strength and tone appear consistent with age and condition     [] atrophy      [] abnormal movements       PSYCHIATRIC:    Appearance: appears stated age. Alert and oriented to person only. . No acute distress- some mild today. Poor grooming and hygiene. Poor eye contact. No prominent physical abnormalities. Attitude: Manner is cooperative and pleasant  Motor: No psychomotor agitation, retardation or abnormal movements noted  Speech: slurring slowed rate, volume, tone & amount. Language: intact understanding and production  Mood:\"great\"  Affect:flat non-labile, congruent with mood and content of speech  Thought Production: Spontaneous. Thought Form: Coherent, linear, logical & goal-directed. No tangentiality or circumstantiality. No flight of ideas or loosening of associations. Thought Content/Perceptions: No [place], no avh. Delusions and paranoid observed in the evenings. Insight: impaired  Judgment: impaired  Memory: Immediate, recent, and remote appear intact, though not formally tested. Attention: having difficulty today throughout interview   Fund of knowledge: Average  Gait/Balance: unsteady    Impression:   Late onset Alzheimer's disease with behavioral disturbance  Late Alzheimer's disease    Problem List:   Major neurocognitive disorder due to Alzheimer's disease, with behavioral disturbance ([facility]    Plan:  1. Admit to [place] [address] Unit  2. Consult Hospitalist to evaluate and treat medical conditions  3. Adjust psychotropic medications to target symptoms  4.  Occupational Therapy,

## 2021-02-05 PROCEDURE — 1240000000 HC EMOTIONAL WELLNESS R&B

## 2021-02-05 PROCEDURE — 6370000000 HC RX 637 (ALT 250 FOR IP): Performed by: INTERNAL MEDICINE

## 2021-02-05 PROCEDURE — 6370000000 HC RX 637 (ALT 250 FOR IP): Performed by: NURSE PRACTITIONER

## 2021-02-05 PROCEDURE — 6370000000 HC RX 637 (ALT 250 FOR IP): Performed by: PSYCHIATRY & NEUROLOGY

## 2021-02-05 PROCEDURE — 99232 SBSQ HOSP IP/OBS MODERATE 35: CPT | Performed by: PSYCHIATRY & NEUROLOGY

## 2021-02-05 RX ADMIN — SUCRALFATE 1 G: 1 TABLET ORAL at 08:08

## 2021-02-05 RX ADMIN — METOPROLOL TARTRATE 25 MG: 25 TABLET, FILM COATED ORAL at 20:50

## 2021-02-05 RX ADMIN — DONEPEZIL HYDROCHLORIDE 10 MG: 10 TABLET, FILM COATED ORAL at 20:49

## 2021-02-05 RX ADMIN — PANTOPRAZOLE SODIUM 80 MG: 40 TABLET, DELAYED RELEASE ORAL at 08:08

## 2021-02-05 RX ADMIN — MEMANTINE 10 MG: 10 TABLET ORAL at 08:08

## 2021-02-05 RX ADMIN — RISPERIDONE 1 MG: 1 TABLET ORAL at 20:50

## 2021-02-05 RX ADMIN — SUCRALFATE 1 G: 1 TABLET ORAL at 10:58

## 2021-02-05 RX ADMIN — DIVALPROEX SODIUM 750 MG: 125 CAPSULE ORAL at 16:27

## 2021-02-05 RX ADMIN — ATORVASTATIN CALCIUM 80 MG: 80 TABLET, FILM COATED ORAL at 20:50

## 2021-02-05 RX ADMIN — OMEGA-3-ACID ETHYL ESTERS 2 G: 1 CAPSULE, LIQUID FILLED ORAL at 20:49

## 2021-02-05 RX ADMIN — PANTOPRAZOLE SODIUM 80 MG: 40 TABLET, DELAYED RELEASE ORAL at 20:49

## 2021-02-05 RX ADMIN — SUCRALFATE 1 G: 1 TABLET ORAL at 16:28

## 2021-02-05 RX ADMIN — AMLODIPINE BESYLATE 10 MG: 10 TABLET ORAL at 08:08

## 2021-02-05 RX ADMIN — METFORMIN HYDROCHLORIDE 500 MG: 500 TABLET ORAL at 16:28

## 2021-02-05 RX ADMIN — LISINOPRIL 20 MG: 20 TABLET ORAL at 08:08

## 2021-02-05 RX ADMIN — OMEGA-3-ACID ETHYL ESTERS 2 G: 1 CAPSULE, LIQUID FILLED ORAL at 08:08

## 2021-02-05 RX ADMIN — FLUTICASONE PROPIONATE 1 SPRAY: 50 SPRAY, METERED NASAL at 08:09

## 2021-02-05 RX ADMIN — TRAZODONE HYDROCHLORIDE 150 MG: 50 TABLET ORAL at 20:49

## 2021-02-05 RX ADMIN — Medication 1 CAPSULE: at 08:08

## 2021-02-05 RX ADMIN — MULTIPLE VITAMINS W/ MINERALS TAB 1 TABLET: TAB at 08:08

## 2021-02-05 RX ADMIN — SUCRALFATE 1 G: 1 TABLET ORAL at 20:50

## 2021-02-05 RX ADMIN — METOPROLOL TARTRATE 25 MG: 25 TABLET, FILM COATED ORAL at 08:08

## 2021-02-05 RX ADMIN — METFORMIN HYDROCHLORIDE 500 MG: 500 TABLET ORAL at 08:08

## 2021-02-05 ASSESSMENT — PAIN SCALES - GENERAL
PAINLEVEL_OUTOF10: 0
PAINLEVEL_OUTOF10: 0

## 2021-02-05 NOTE — PROGRESS NOTES
Patient is alert and pleasant. She is cooperative and directable. She remains confused to time and situation. Requests to stay the night at [facility] because \" the weather is too bad\" and she is \"stuck here. \"  Compliant with medications given in yogurt. Up walking on unit. Gait steady.

## 2021-02-05 NOTE — GROUP NOTE
Group Therapy Note    Date: 2/5/2021    Group Start Time: 0830  Group End Time: 0900    Number of Participants: 7/8    Type: Morning Goals Group/ Community Meeting    Group Topic/Objective: Set Goal For The Day and to review Unit Rules and Regulations. Patient's Goal:  Pt states her goal is \"Groups. \"    Notes:  Pt states she is feeling \"wonderful\" and is grateful \"that you folks took good care of me. \"    Depression (0-10): 0    Anxiety (0-10): 0    Irritability/Aggitation (0-10): 0    Status After Intervention:  Improved    Participation Level:  Active Listener and Interactive    Participation Quality: Appropriate, Attentive and Sharing    Speech:  normal    Thought Process/Content: Logical    Affective Functioning: Congruent    Mood: Bright    Level of consciousness:  Alert and Attentive    Response to Learning: Able to verbalize current knowledge/experience    Endings: None Reported    Modes of Intervention: Education, Support and Socialization    Discipline Responsible: Certified Therapeutic Recreation Specialist     Electronically signed by Melanie Hutchinson MA on 2/5/2021 at 9:51 AM

## 2021-02-05 NOTE — GROUP NOTE
Group Therapy Note    Date: 2/5/2021    Group Start Time: 9896  Group End Time: 1500  Group Topic: Psychotherapy    [address] [facility]    KASEY Fong        Group Therapy Note    Attendees: 5/7         Notes:  Patient was in bed and did not attend.   Discipline Responsible: /Counselor      Signature:  KASEY Herrera

## 2021-02-05 NOTE — PROGRESS NOTES
Progress Note Psychiatry    Estephanie Ibarra  [id number]  1/21/2021 02/05/21    ID: Patient is a 65 yrs y.o. female    CC: \"I stopped my meds. \"    HPI: Estephanie Ibarra is a 65year old female with PMHx of HTN, HLD,dementia (Alzemimers) with behavioral disturbances, delusional disorder and medication non compliance. She is well known to [employer] related to several admissions for same. She presents to [facility] ED via police after found wandering. Her sister called them when she was unable to locate the patient. Patient has not been taking her medications and has an increase in her delusions and paranoia. Patient admitted to [facility].       During today's interview she was alert & oriented x 3. Pt denied any thoughts to harm herself or anyone else. Pt Denied any auditory or visual Hallucinations however has displayed occasionally. She denies depression and anxiety. She states she is sleeping \"very good\" and that her appetite is \"great. \" Pt continues to exhibit behaviors occasionally yet has been much better and remains med compliant on unit. Pt continues to display occasional bizarre delusions and confusion. Continues to note fixed delusions and is fixated on them yet redirectable. Pt remains unable to understand her diagnosis and treatment plan.  Pt noted she currently feels safe and comfortable on the unit.  Pt was in agreement with treatment team.  Pt was polite and cordial during the interview process. Has been taking medications and has been compliant with treatment.          FINDINGS: ct head w/o contrast 1/25/2021   BRAIN/VENTRICLES: There is no acute intracranial hemorrhage, mass effect or   midline shift.  No abnormal extra-axial fluid collection.  The gray-white   differentiation is maintained without evidence of an acute infarct.  Old   lacunar infarcts noted in the bilateral basal ganglia.  Hypoattenuation of   the periventricular and subcortical white matter is suggestive of chronic   small vessel EXAM    CONSTITUTIONAL:    Vitals:   Vitals:    02/04/21 1957   BP: 129/69   Pulse: 81   Resp: 16   Temp: 96.4 °F (35.8 °C)   SpO2: 98%      General appearance: [x] appears age, []  appears older than stated age,               []  adequately dressed and groomed, [x] disheveled,               []  in no acute distress, [x] appears mildly distressed, [] other           MUSCULOSKELETAL:   Gait:   [] normal, [] antalgic, [x] unsteady, [] gait not evaluated   Station:             [] erect, [x] sitting, [] recumbent, [] other        Strength/tone:  [x] muscle strength and tone appear consistent with age and condition     [] atrophy      [] abnormal movements       PSYCHIATRIC:    Appearance: appears stated age. Alert and oriented to person only. . No acute distress- some mild today. Adequate grooming and hygiene. Good eye contact. No prominent physical abnormalities. Attitude: Manner is cooperative and pleasant  Motor: No psychomotor agitation, retardation or abnormal movements noted  Speech: Clearly articulated; slowed rate, volume, tone & amount. Language: intact understanding and production  Mood:improving  Affect:flat non-labile, congruent with mood and content of speech  Thought Production: Spontaneous. Thought Form: Coherent, linear, logical & goal-directed. No tangentiality or circumstantiality. No flight of ideas or loosening of associations. Thought Content/Perceptions: No [place], staff observing visual Hallucinations but no auditory hallucinations  Delusions and paranoid observed  Insight: impaired  Judgment: impaired  Memory: Immediate, recent, and remote appear intact, though not formally tested. Attention: having difficulty today throughout interview   Fund of knowledge: Average  Gait/Balance: unsteady    Impression:   Late onset Alzheimer's disease with behavioral disturbance    Problem List:   Major neurocognitive disorder due to Alzheimer's disease, with behavioral disturbance ([facility] [address]    Plan:  1.  Admit to alternatives and no treatment.     Electronically signed by Spike Vargas DO on 2/5/2021 at 7:29 AM

## 2021-02-05 NOTE — GROUP NOTE
Group Therapy Note    Date: 2/5/2021    Group Start Time: 1530  Group End Time: 1600    Number of Participants: 5/7    Type: Exercise/Recreation Group    Group Topic/Objective: Chair Exercises     Notes:  Pt participated actively in the group. Pt able to complete majority of the exercises. Pt noted to require Mild prompting to refocus on group. Status After Intervention:  Improved    Participation Level:  Active Listener and Interactive    Participation Quality: Appropriate and Attentive    Speech:  normal    Thought Process/Content: Logical    Affective Functioning: Congruent    Mood: Bright    Level of consciousness:  Alert and Attentive    Response to Learning: Able to verbalize current knowledge/experience and Able to verbalize/acknowledge new learning    Endings: None Reported    Modes of Intervention: Activity and Movement    Discipline Responsible: Certified Therapeutic Recreation Specialist     Electronically signed by Jhon Serna MA on 2/5/2021 at 4:19 PM

## 2021-02-05 NOTE — PLAN OF CARE
1859 by Yesenia Hardy RN  Outcome: Ongoing  Goal: Able to verbalize and/or display a decrease in depressive symptoms  Description: Able to verbalize and/or display a decrease in depressive symptoms  2/4/2021 1955 by Jennifer Ramirez RN  Outcome: Ongoing  2/4/2021 1859 by Yesenia Hardy RN  Outcome: Ongoing  Goal: Ability to disclose and discuss suicidal ideas will improve  Description: Ability to disclose and discuss suicidal ideas will improve  2/4/2021 1955 by Jennifer Ramirez RN  Outcome: Ongoing  2/4/2021 1859 by Yesenia Hardy RN  Outcome: Ongoing  Goal: Able to verbalize support systems  Description: Able to verbalize support systems  2/4/2021 1955 by Jenniefr Ramirez RN  Outcome: Ongoing  2/4/2021 1859 by Yesenia Hardy RN  Outcome: Ongoing  Goal: Absence of self-harm  Description: Absence of self-harm  2/4/2021 1955 by Jennifer Ramirez RN  Outcome: Ongoing  2/4/2021 1859 by Yesenia Hardy RN  Outcome: Ongoing  Goal: Patient specific goal  Description: Patient specific goal  2/4/2021 1955 by Jennifer Ramirez RN  Outcome: Ongoing  2/4/2021 1859 by Yesenia Hardy RN  Outcome: Ongoing  Goal: Participates in care planning  Description: Participates in care planning  2/4/2021 1955 by Jennifer Ramirez RN  Outcome: Ongoing  2/4/2021 1859 by Yesenia Hardy RN  Outcome: Ongoing     Problem: Altered Mood, Deterioration in Function:  Goal: Ability to perform activities of daily living will improve  Description: Ability to perform activities of daily living will improve  2/4/2021 1955 by Jennifer Ramirez RN  Outcome: Ongoing  2/4/2021 1859 by Yesenia Hardy RN  Outcome: Ongoing  Goal: Able to verbalize reality based thinking  Description: Able to verbalize reality based thinking  2/4/2021 1955 by Jennifer Ramirez RN  Outcome: Ongoing  2/4/2021 1859 by Yesenia Hardy RN  Outcome: Ongoing  Goal: Skin appearance normal  Description: Skin appearance normal  2/4/2021 1955 by Jennifer Ramirez RN  Outcome: Ongoing  2/4/2021 1859 by Junior Staci RN  Outcome: Ongoing  Goal: Maintenance of adequate nutrition will improve  Description: Maintenance of adequate nutrition will improve  2/4/2021 1955 by Howard Collazo RN  Outcome: Ongoing  2/4/2021 1859 by Junior Staci RN  Outcome: Ongoing  Goal: Ability to tolerate increased activity will improve  Description: Ability to tolerate increased activity will improve  2/4/2021 1955 by Howard Collazo RN  Outcome: Ongoing  2/4/2021 1859 by Junior Staci RN  Outcome: Ongoing  Goal: Participates in care planning  Description: Participates in care planning  2/4/2021 1955 by Howard Collazo RN  Outcome: Ongoing  2/4/2021 1859 by Junior Staci RN  Outcome: Ongoing  Goal: Patient specific goal  Description: Patient specific goal  2/4/2021 1955 by Howard Collazo RN  Outcome: Ongoing  2/4/2021 1859 by Junior Staci RN  Outcome: Ongoing     Problem: Risk of Harm:  Goal: Ability to remain free from injury will improve  Description: Ability to remain free from injury will improve  2/4/2021 1955 by Howard Collazo RN  Outcome: Ongoing  2/4/2021 1859 by Junior Staci RN  Outcome: Ongoing     Problem: Altered Mood, Manic Behavior:  Goal: Able to sleep  Description: Able to sleep  2/4/2021 1955 by Howard Collazo RN  Outcome: Ongoing  2/4/2021 1859 by Junior Staci RN  Outcome: Ongoing  Goal: Able to verbalize decrease in frequency and intensity of racing thoughts  Description: Able to verbalize decrease in frequency and intensity of racing thoughts  2/4/2021 1955 by Howard Collazo RN  Outcome: Ongoing  2/4/2021 1859 by Junior Staci RN  Outcome: Ongoing  Goal: Ability to disclose and discuss suicidal ideas will improve  Description: Ability to disclose and discuss suicidal ideas will improve  2/4/2021 1955 by Howard Collazo RN  Outcome: Ongoing  2/4/2021 1859 by Junior Staci RN  Outcome: Ongoing  Goal: Absence of self-harm  Description: Absence of self-harm  2/4/2021 1955 by Gela Morse RN  Outcome: Ongoing  2/4/2021 1859 by Israel Dan RN  Outcome: Ongoing  Goal: Ability to achieve adequate nutritional intake will improve  Description: Ability to achieve adequate nutritional intake will improve  2/4/2021 1955 by Gela Morse RN  Outcome: Ongoing  2/4/2021 1859 by Israel Dan RN  Outcome: Ongoing  Goal: Ability to interact with others will improve  Description: Ability to interact with others will improve  2/4/2021 1955 by Gela Morse RN  Outcome: Ongoing  2/4/2021 1859 by Israel Dan RN  Outcome: Ongoing  Goal: Ability to demonstrate self-control will improve  Description: Ability to demonstrate self-control will improve  2/4/2021 1955 by Gela Morse RN  Outcome: Ongoing  2/4/2021 1859 by Israel Dan RN  Outcome: Ongoing  Goal: Mood stable  Description: Mood stable  2/4/2021 1955 by Gela Morse RN  Outcome: Ongoing  2/4/2021 1859 by Israel Dan RN  Outcome: Ongoing     Problem: Falls - Risk of:  Goal: Will remain free from falls  Description: Will remain free from falls  2/4/2021 1955 by Gela Morse RN  Outcome: Ongoing  2/4/2021 1859 by Israel Dan RN  Outcome: Ongoing  Goal: Absence of physical injury  Description: Absence of physical injury  2/4/2021 1955 by Gela Morse RN  Outcome: Ongoing  2/4/2021 1859 by Israel Dan RN  Outcome: Ongoing     Problem: Skin Integrity:  Goal: Will show no infection signs and symptoms  Description: Will show no infection signs and symptoms  2/4/2021 1955 by Gela Morse RN  Outcome: Ongoing  2/4/2021 1859 by Israel Dan RN  Outcome: Ongoing  Goal: Absence of new skin breakdown  Description: Absence of new skin breakdown  2/4/2021 1955 by Gela Morse RN  Outcome: Ongoing  2/4/2021 1859 by Israel Dan RN  Outcome: Ongoing

## 2021-02-05 NOTE — GROUP NOTE
Group Therapy Note    Date: 2021    Group Start Time: 1030  Group End Time: 1008  Group Topic: Psychoeducation    [address] [place], [place]        Group Therapy Note    Attendees:        Notes:  Pts participated in recreational therapy group; Theme Song Quiz. Pts were given various tv show theme songs from the 46s and 62s. As they listened; they had to guess the show the song came from. Purpose was to encourage reminiscence discussion. Pt participated actively in the group. Pt able to recall several television shows and tell stories r/t to him. Pt did require Mod prompting for appropriate conversation d/t pt wanting to talk about the multiple ways people have  from the shows discussed. Status After Intervention:  Improved    Participation Level:  Active Listener and Interactive    Participation Quality: Appropriate, Attentive and Sharing      Speech:  normal      Thought Process/Content: Logical      Affective Functioning: Congruent      Mood: Bright      Level of consciousness:  Alert and Attentive      Response to Learning: Able to verbalize current knowledge/experience and Able to verbalize/acknowledge new learning      Endings: None Reported    Modes of Intervention: Support, Socialization, Exploration and Activity      Discipline Responsible: Certified Therapeutic Recreation Specialist       Signature:  Adrienne Severino, [address] [facility]

## 2021-02-06 PROCEDURE — 6370000000 HC RX 637 (ALT 250 FOR IP): Performed by: PSYCHIATRY & NEUROLOGY

## 2021-02-06 PROCEDURE — 1240000000 HC EMOTIONAL WELLNESS R&B

## 2021-02-06 PROCEDURE — 99232 SBSQ HOSP IP/OBS MODERATE 35: CPT | Performed by: PSYCHIATRY & NEUROLOGY

## 2021-02-06 PROCEDURE — 6370000000 HC RX 637 (ALT 250 FOR IP): Performed by: INTERNAL MEDICINE

## 2021-02-06 PROCEDURE — 6370000000 HC RX 637 (ALT 250 FOR IP): Performed by: NURSE PRACTITIONER

## 2021-02-06 RX ADMIN — OMEGA-3-ACID ETHYL ESTERS 2 G: 1 CAPSULE, LIQUID FILLED ORAL at 08:27

## 2021-02-06 RX ADMIN — AMLODIPINE BESYLATE 10 MG: 10 TABLET ORAL at 08:27

## 2021-02-06 RX ADMIN — SUCRALFATE 1 G: 1 TABLET ORAL at 08:27

## 2021-02-06 RX ADMIN — ATORVASTATIN CALCIUM 80 MG: 80 TABLET, FILM COATED ORAL at 21:02

## 2021-02-06 RX ADMIN — LISINOPRIL 20 MG: 20 TABLET ORAL at 08:27

## 2021-02-06 RX ADMIN — METOPROLOL TARTRATE 25 MG: 25 TABLET, FILM COATED ORAL at 08:28

## 2021-02-06 RX ADMIN — MEMANTINE 10 MG: 10 TABLET ORAL at 08:27

## 2021-02-06 RX ADMIN — METFORMIN HYDROCHLORIDE 500 MG: 500 TABLET ORAL at 16:42

## 2021-02-06 RX ADMIN — TRAZODONE HYDROCHLORIDE 150 MG: 50 TABLET ORAL at 21:00

## 2021-02-06 RX ADMIN — METFORMIN HYDROCHLORIDE 500 MG: 500 TABLET ORAL at 08:27

## 2021-02-06 RX ADMIN — MULTIPLE VITAMINS W/ MINERALS TAB 1 TABLET: TAB at 08:27

## 2021-02-06 RX ADMIN — FLUTICASONE PROPIONATE 1 SPRAY: 50 SPRAY, METERED NASAL at 08:28

## 2021-02-06 RX ADMIN — SUCRALFATE 1 G: 1 TABLET ORAL at 11:40

## 2021-02-06 RX ADMIN — RISPERIDONE 1 MG: 1 TABLET ORAL at 21:02

## 2021-02-06 RX ADMIN — SUCRALFATE 1 G: 1 TABLET ORAL at 21:02

## 2021-02-06 RX ADMIN — DIVALPROEX SODIUM 750 MG: 125 CAPSULE ORAL at 16:42

## 2021-02-06 RX ADMIN — SUCRALFATE 1 G: 1 TABLET ORAL at 16:42

## 2021-02-06 RX ADMIN — Medication 1 CAPSULE: at 08:27

## 2021-02-06 RX ADMIN — PANTOPRAZOLE SODIUM 80 MG: 40 TABLET, DELAYED RELEASE ORAL at 08:27

## 2021-02-06 RX ADMIN — DONEPEZIL HYDROCHLORIDE 10 MG: 10 TABLET, FILM COATED ORAL at 21:02

## 2021-02-06 RX ADMIN — OMEGA-3-ACID ETHYL ESTERS 2 G: 1 CAPSULE, LIQUID FILLED ORAL at 21:02

## 2021-02-06 RX ADMIN — METOPROLOL TARTRATE 25 MG: 25 TABLET, FILM COATED ORAL at 21:02

## 2021-02-06 RX ADMIN — PANTOPRAZOLE SODIUM 80 MG: 40 TABLET, DELAYED RELEASE ORAL at 21:02

## 2021-02-06 ASSESSMENT — PAIN SCALES - GENERAL: PAINLEVEL_OUTOF10: 0

## 2021-02-06 NOTE — PROGRESS NOTES
Patient socializing in day room with her peers and staff. States she has no pain. Medication compliant crushed and in pudding. Patient continued to watch TV. No signs of internal stimuli, no thought of SI/HI, no AH/VH. Continue to monitor for safety.

## 2021-02-06 NOTE — PROGRESS NOTES
of chronic   small vessel ischemic disease.  Mild diffuse parenchymal volume loss is   noted.  Vascular calcification is seen. Kaia Mash is no evidence of   hydrocephalus. Pt denied any hx of seizures, Hep C or HIV  Pt notes history of TBI  No TD noted, AIMS=0    Past Psychiatric History:   See note above    Family Psychiatric History:   See note above    Family Psychiatric History:   Family History   Problem Relation Age of Onset    No Known Problems Mother     No Known Problems Sister     Heart Disease Brother     Coronary Art Dis Brother         stents    No Known Problems Sister     No Known Problems Sister     Alcohol Abuse Brother         Allergies: Allergies   Allergen Reactions    Shellfish-Derived Products     Floxin [Ofloxacin] Other (See Comments)     Made her \"feel weird\"        OBJECTIVE  Vital Signs:  Vitals:    02/06/21 0815   BP: (!) 145/75   Pulse: 75   Resp: 16   Temp: 96.7 °F (35.9 °C)   SpO2: 100%       Labs:  No results found for this or any previous visit (from the past 48 hour(s)). Review of Systems:  Reports of no current cardiovascular, respiratory, gastrointestinal, genitourinary, integumentary, neurological, musculoskeletal, or immunological symptoms today. PSYCHIATRIC: See HPI above.          PSYCHIATRIC EXAMINATION / MENTAL STATUS EXAM    CONSTITUTIONAL:    Vitals:   Vitals:    02/06/21 0815   BP: (!) 145/75   Pulse: 75   Resp: 16   Temp: 96.7 °F (35.9 °C)   SpO2: 100%      General appearance: [x] appears age, []  appears older than stated age,               []  adequately dressed and groomed, [x] disheveled,               []  in no acute distress, [x] appears mildly distressed, [] other           MUSCULOSKELETAL:   Gait:   [] normal, [] antalgic, [x] unsteady, [] gait not evaluated   Station:             [] erect, [x] sitting, [] recumbent, [] other        Strength/tone:  [x] muscle strength and tone appear consistent with age and condition     [] atrophy      [] initiation and titration, group and individual therapy, safe and therapeutic environment. 9. Status of problem/condition: Improving  10. Medical co-morbidities: Management per [facility] group, appreciate assistance  11. Legal Status: Voluntary  12. The treatment team reviewed with the patient the diagnosis and treatment recommendations to include the risks, benefits, and side effects of chosen medications. 13. The patient verbalized understanding and agreed with the treatment regimen as outlined above. 14. The patient was encouraged to participate in groups. 15. Medical records, Labs, Diagnotic tests reviewed  16. q15 min safety checks for safety  17. Interval History. 18. Review current labs- requested cbc, c diff and fecal occult- negative. CT Head w/o contrast negative, ammonia negative. Cholesterol and LDL elevated- hospitalist notified. 19. Supportive Therapy Provided  20. Pt had an opportunity to ask questions and address concerns  21. Pt encouraged to continue therapy group or individual.  22. Pt was in agreement with treatment plan. 23. The risks benefits and side effects of medications were discussed with the patient, including alternatives and no treatment.     Electronically signed by Melissa Fink DO on 2/6/2021 at 3:27 PM

## 2021-02-06 NOTE — PROGRESS NOTES
Pt has been alert and pleasant during the shift. Pt med compliant, denies SI, HI or AVH. Pt also denies anxiety and depression. Pt is ambulating with steadying assist and has been continent. Pt is tolerating meals and fluids w/o difficulty. Pt continues to state that she wants to discharge home with sister Karey Minor.

## 2021-02-06 NOTE — PLAN OF CARE
Patient currently resting with eyes closed in her bedroom. Breathing even and unlabored, no signs of distress, Continue to monitor for safety.

## 2021-02-07 PROCEDURE — 6370000000 HC RX 637 (ALT 250 FOR IP): Performed by: PSYCHIATRY & NEUROLOGY

## 2021-02-07 PROCEDURE — 6370000000 HC RX 637 (ALT 250 FOR IP): Performed by: INTERNAL MEDICINE

## 2021-02-07 PROCEDURE — 6370000000 HC RX 637 (ALT 250 FOR IP): Performed by: NURSE PRACTITIONER

## 2021-02-07 PROCEDURE — 99232 SBSQ HOSP IP/OBS MODERATE 35: CPT | Performed by: PSYCHIATRY & NEUROLOGY

## 2021-02-07 PROCEDURE — 1240000000 HC EMOTIONAL WELLNESS R&B

## 2021-02-07 RX ADMIN — METOPROLOL TARTRATE 25 MG: 25 TABLET, FILM COATED ORAL at 08:24

## 2021-02-07 RX ADMIN — PANTOPRAZOLE SODIUM 80 MG: 40 TABLET, DELAYED RELEASE ORAL at 20:40

## 2021-02-07 RX ADMIN — SUCRALFATE 1 G: 1 TABLET ORAL at 12:24

## 2021-02-07 RX ADMIN — SUCRALFATE 1 G: 1 TABLET ORAL at 08:24

## 2021-02-07 RX ADMIN — METFORMIN HYDROCHLORIDE 500 MG: 500 TABLET ORAL at 17:01

## 2021-02-07 RX ADMIN — Medication 1 CAPSULE: at 08:24

## 2021-02-07 RX ADMIN — AMLODIPINE BESYLATE 10 MG: 10 TABLET ORAL at 08:25

## 2021-02-07 RX ADMIN — RISPERIDONE 1 MG: 1 TABLET ORAL at 20:40

## 2021-02-07 RX ADMIN — PANTOPRAZOLE SODIUM 80 MG: 40 TABLET, DELAYED RELEASE ORAL at 08:24

## 2021-02-07 RX ADMIN — ATORVASTATIN CALCIUM 80 MG: 80 TABLET, FILM COATED ORAL at 20:41

## 2021-02-07 RX ADMIN — DONEPEZIL HYDROCHLORIDE 10 MG: 10 TABLET, FILM COATED ORAL at 20:40

## 2021-02-07 RX ADMIN — DIVALPROEX SODIUM 750 MG: 125 CAPSULE ORAL at 17:01

## 2021-02-07 RX ADMIN — OMEGA-3-ACID ETHYL ESTERS 2 G: 1 CAPSULE, LIQUID FILLED ORAL at 20:40

## 2021-02-07 RX ADMIN — METFORMIN HYDROCHLORIDE 500 MG: 500 TABLET ORAL at 08:24

## 2021-02-07 RX ADMIN — MULTIPLE VITAMINS W/ MINERALS TAB 1 TABLET: TAB at 08:25

## 2021-02-07 RX ADMIN — FLUTICASONE PROPIONATE 1 SPRAY: 50 SPRAY, METERED NASAL at 08:25

## 2021-02-07 RX ADMIN — SUCRALFATE 1 G: 1 TABLET ORAL at 20:40

## 2021-02-07 RX ADMIN — METOPROLOL TARTRATE 25 MG: 25 TABLET, FILM COATED ORAL at 20:40

## 2021-02-07 RX ADMIN — TRAZODONE HYDROCHLORIDE 150 MG: 50 TABLET ORAL at 20:41

## 2021-02-07 RX ADMIN — LISINOPRIL 20 MG: 20 TABLET ORAL at 08:24

## 2021-02-07 RX ADMIN — MEMANTINE 10 MG: 10 TABLET ORAL at 08:24

## 2021-02-07 RX ADMIN — OMEGA-3-ACID ETHYL ESTERS 2 G: 1 CAPSULE, LIQUID FILLED ORAL at 08:24

## 2021-02-07 RX ADMIN — SUCRALFATE 1 G: 1 TABLET ORAL at 17:01

## 2021-02-07 ASSESSMENT — PAIN SCALES - GENERAL: PAINLEVEL_OUTOF10: 0

## 2021-02-07 NOTE — PROGRESS NOTES
Pt alert and oriented to self and place during the shift. Pt has been pleasant and able to follow directions without issue. Pt is tolerating meals and fluids without difficulty, able to take medications whole with water this am. Pt denies SI, HI or AVH along with anxiety and depression. Pt states that she is ready to go home with her sister Abhilash Rubio.

## 2021-02-07 NOTE — PROGRESS NOTES
Progress Note Psychiatry    Paras Vides  [id number]  1/21/2021 02/07/21    ID: Patient is a 65 yrs y.o. female    CC: \"I stopped my meds. \"    HPI: Paras Vides is a 65year old female with PMHx of HTN, HLD,dementia (Alzemimers) with behavioral disturbances, delusional disorder and medication non compliance. She is well known to [address] [employer] related to several admissions for same. She presents to [facility] ED via police after found wandering. Her sister called them when she was unable to locate the patient. Patient has not been taking her medications and has an increase in her delusions and paranoia. Patient admitted to [facility].       During today's interview she was alert & oriented x 3. Pt continues to denied any thoughts to harm herself or anyone else. Pt Denied any auditory or visual Hallucinations however has responded to internal stimuli occasionally. She denies depression and anxiety. She states she is sleeping \"pretty good\" and that her appetite is \"great. \" Pt continues to exhibit behaviors occasionally yet has been much better and remains med compliant on unit. Pt continues to display occasional bizarre delusions and confusion however improving slowly. Continues to note fixed delusions and is fixated on them yet redirectable. Pt remains unable to understand her diagnosis and treatment plan.  Pt noted she currently feels safe and comfortable on the unit.  Pt was in agreement with treatment team.  Pt was polite and cordial during the interview process. Has been taking medications and has been compliant with treatment.          FINDINGS: ct head w/o contrast 1/25/2021   BRAIN/VENTRICLES: There is no acute intracranial hemorrhage, mass effect or   midline shift.  No abnormal extra-axial fluid collection.  The gray-white   differentiation is maintained without evidence of an acute infarct.  Old   lacunar infarcts noted in the bilateral basal ganglia.  Hypoattenuation of   the periventricular and subcortical white matter is suggestive of chronic   small vessel ischemic disease.  Mild diffuse parenchymal volume loss is   noted.  Vascular calcification is seen. [place]op is no evidence of   hydrocephalus. Pt denied any hx of seizures, Hep C or HIV  Pt notes history of TBI  No TD noted, AIMS=0    Past Psychiatric History:   See note above    Family Psychiatric History:   See note above    Family Psychiatric History:   Family History   Problem Relation Age of Onset    No Known Problems Mother     No Known Problems Sister     Heart Disease Brother     Coronary Art Dis Brother         stents    No Known Problems Sister     No Known Problems Sister     Alcohol Abuse Brother         Allergies: Allergies   Allergen Reactions    Shellfish-Derived Products     Floxin [Ofloxacin] Other (See Comments)     Made her \"feel weird\"        OBJECTIVE  Vital Signs:  Vitals:    02/07/21 0809   BP: 137/77   Pulse: 73   Resp: 17   Temp: 97.2 °F (36.2 °C)   SpO2: 99%       Labs:  No results found for this or any previous visit (from the past 48 hour(s)). Review of Systems:  Reports of no current cardiovascular, respiratory, gastrointestinal, genitourinary, integumentary, neurological, musculoskeletal, or immunological symptoms today. PSYCHIATRIC: See HPI above.          PSYCHIATRIC EXAMINATION / MENTAL STATUS EXAM    CONSTITUTIONAL:    Vitals:   Vitals:    02/07/21 0809   BP: 137/77   Pulse: 73   Resp: 17   Temp: 97.2 °F (36.2 °C)   SpO2: 99%      General appearance: [x] appears age, []  appears older than stated age,               []  adequately dressed and groomed, [x] disheveled,               []  in no acute distress, [x] appears mildly distressed, [] other           MUSCULOSKELETAL:   Gait:   [] normal, [] antalgic, [x] unsteady, [] gait not evaluated   Station:             [] erect, [x] sitting, [] recumbent, [] other        Strength/tone:  [x] muscle strength and tone appear consistent with age and condition     [] atrophy      [] abnormal movements       PSYCHIATRIC:    Appearance: appears stated age. Alert and oriented to person only. . No acute distress- some mild today. Adequate grooming and hygiene. Good eye contact. No prominent physical abnormalities. Attitude: Manner is cooperative and pleasant  Motor: No psychomotor agitation, retardation or abnormal movements noted  Speech: Clearly articulated; slowed rate, volume, tone & amount. Language: intact understanding and production  Mood:improving  Affect:flat non-labile, congruent with mood and content of speech  Thought Production: Spontaneous. Thought Form: Coherent, linear, logical & goal-directed. No tangentiality or circumstantiality. No flight of ideas or loosening of associations. Thought Content/Perceptions: No [place], staff observing visual Hallucinations but no auditory hallucinations  Delusions and paranoid observed  Insight: impaired  Judgment: impaired  Memory: Immediate, recent, and remote appear intact, though not formally tested. Attention: having difficulty today throughout interview   Fund of knowledge: Average  Gait/Balance: unsteady    Impression:   Late onset Alzheimer's disease with behavioral disturbance    Problem List:   Major neurocognitive disorder due to Alzheimer's disease, with behavioral disturbance ([facility]    Plan:  1. Admit to [place] [address] Unit  2. Consult Hospitalist to evaluate and treat medical conditions  3. Adjust psychotropic medications to target symptoms  4. Occupational Therapy, Physical Therapy, Group Psychotherapy as tolerated  5. Reviewed treatment plan with patient including medication risks, benefits, side effects. Obtained informed consent for treatment. 6. Anticipated length of stay 10-12 days  7.  I certify that inpatient psychiatric hospital admission is medically necessary for treatment, which can be expected to improve the patient's condition, and/or for diagnostic study.  8. Psychiatric management:medication initiation and titration, group and individual therapy, safe and therapeutic environment. 9. Status of problem/condition: Improving  10. Medical co-morbidities: Management per [facility] group, appreciate assistance  11. Legal Status: Voluntary  12. The treatment team reviewed with the patient the diagnosis and treatment recommendations to include the risks, benefits, and side effects of chosen medications. 13. The patient verbalized understanding and agreed with the treatment regimen as outlined above. 14. The patient was encouraged to participate in groups. 15. Medical records, Labs, Diagnotic tests reviewed  16. q15 min safety checks for safety  17. Interval History. 18. Review current labs- requested cbc, c diff and fecal occult- negative. CT Head w/o contrast negative, ammonia negative. Cholesterol and LDL elevated- hospitalist notified. 19. Supportive Therapy Provided  20. Pt had an opportunity to ask questions and address concerns  21. Pt encouraged to continue therapy group or individual.  22. Pt was in agreement with treatment plan. 23. The risks benefits and side effects of medications were discussed with the patient, including alternatives and no treatment.     Electronically signed by Arlene Zuñiga DO on 2/7/2021 at 1:25 PM

## 2021-02-07 NOTE — PLAN OF CARE
Patient laying in bed with eyes open, Vital signs WNL, Patient states she no pain, and had a pretty good day. Patient assisted to restroom. Patient went to dayroom to watch TV the went back to bed. Not signs of distress.  Continue to monitor for safety

## 2021-02-08 PROCEDURE — 6370000000 HC RX 637 (ALT 250 FOR IP): Performed by: NURSE PRACTITIONER

## 2021-02-08 PROCEDURE — 97535 SELF CARE MNGMENT TRAINING: CPT

## 2021-02-08 PROCEDURE — 99232 SBSQ HOSP IP/OBS MODERATE 35: CPT | Performed by: PSYCHIATRY & NEUROLOGY

## 2021-02-08 PROCEDURE — 97116 GAIT TRAINING THERAPY: CPT

## 2021-02-08 PROCEDURE — 1240000000 HC EMOTIONAL WELLNESS R&B

## 2021-02-08 PROCEDURE — 6370000000 HC RX 637 (ALT 250 FOR IP): Performed by: INTERNAL MEDICINE

## 2021-02-08 PROCEDURE — 6370000000 HC RX 637 (ALT 250 FOR IP): Performed by: PSYCHIATRY & NEUROLOGY

## 2021-02-08 RX ADMIN — SUCRALFATE 1 G: 1 TABLET ORAL at 20:40

## 2021-02-08 RX ADMIN — PANTOPRAZOLE SODIUM 80 MG: 40 TABLET, DELAYED RELEASE ORAL at 20:40

## 2021-02-08 RX ADMIN — LISINOPRIL 20 MG: 20 TABLET ORAL at 08:26

## 2021-02-08 RX ADMIN — MEMANTINE 10 MG: 10 TABLET ORAL at 08:26

## 2021-02-08 RX ADMIN — METFORMIN HYDROCHLORIDE 500 MG: 500 TABLET ORAL at 16:02

## 2021-02-08 RX ADMIN — MULTIPLE VITAMINS W/ MINERALS TAB 1 TABLET: TAB at 08:25

## 2021-02-08 RX ADMIN — PANTOPRAZOLE SODIUM 80 MG: 40 TABLET, DELAYED RELEASE ORAL at 08:25

## 2021-02-08 RX ADMIN — FLUTICASONE PROPIONATE 1 SPRAY: 50 SPRAY, METERED NASAL at 08:26

## 2021-02-08 RX ADMIN — ATORVASTATIN CALCIUM 80 MG: 80 TABLET, FILM COATED ORAL at 20:40

## 2021-02-08 RX ADMIN — RISPERIDONE 1 MG: 1 TABLET ORAL at 20:40

## 2021-02-08 RX ADMIN — SUCRALFATE 1 G: 1 TABLET ORAL at 11:25

## 2021-02-08 RX ADMIN — OMEGA-3-ACID ETHYL ESTERS 2 G: 1 CAPSULE, LIQUID FILLED ORAL at 20:39

## 2021-02-08 RX ADMIN — OMEGA-3-ACID ETHYL ESTERS 2 G: 1 CAPSULE, LIQUID FILLED ORAL at 08:25

## 2021-02-08 RX ADMIN — Medication 1 CAPSULE: at 08:25

## 2021-02-08 RX ADMIN — METOPROLOL TARTRATE 25 MG: 25 TABLET, FILM COATED ORAL at 20:40

## 2021-02-08 RX ADMIN — SUCRALFATE 1 G: 1 TABLET ORAL at 08:27

## 2021-02-08 RX ADMIN — METFORMIN HYDROCHLORIDE 500 MG: 500 TABLET ORAL at 08:25

## 2021-02-08 RX ADMIN — TRAZODONE HYDROCHLORIDE 150 MG: 50 TABLET ORAL at 20:39

## 2021-02-08 RX ADMIN — DONEPEZIL HYDROCHLORIDE 10 MG: 10 TABLET, FILM COATED ORAL at 20:39

## 2021-02-08 RX ADMIN — AMLODIPINE BESYLATE 10 MG: 10 TABLET ORAL at 08:25

## 2021-02-08 RX ADMIN — SUCRALFATE 1 G: 1 TABLET ORAL at 16:02

## 2021-02-08 RX ADMIN — METOPROLOL TARTRATE 25 MG: 25 TABLET, FILM COATED ORAL at 08:26

## 2021-02-08 RX ADMIN — DIVALPROEX SODIUM 750 MG: 125 CAPSULE ORAL at 16:02

## 2021-02-08 ASSESSMENT — PAIN SCALES - GENERAL: PAINLEVEL_OUTOF10: 0

## 2021-02-08 NOTE — BH NOTE
Pt has been cooperative and able to follow directions. Pleasant and asking for things she needs. Stand by while pt is ambulating in kee and in room. Able to voice when she need to use the restroom. Pt was able to take medication whole with water and tolerated with no complaints.

## 2021-02-08 NOTE — PROGRESS NOTES
Patient is pleasant, med compliant, patient is ambulating and taking medication whole. Patient has intermittent confusion at times. Patient is selectively social and out on the unit all day. Patient does go to groups and is \"ready to go home\". No other issues at this time.

## 2021-02-08 NOTE — GROUP NOTE
Group Therapy Note    Date: 2/8/2021    Group Start Time: 1530  Group End Time: 1600    Number of Participants: 5/7    Type: Exercise/Recreation Group    Group Topic/Objective: Chair Exercises    Notes:  Pt participated actively in the group. Pt expressed enjoyment in the exercises. Status After Intervention:  Improved    Participation Level:  Active Listener and Interactive    Participation Quality: Appropriate, Attentive and Sharing    Speech:  normal    Thought Process/Content: Logical    Affective Functioning: Congruent    Mood: Bright    Level of consciousness:  Alert and Attentive    Response to Learning: Able to verbalize current knowledge/experience and Able to verbalize/acknowledge new learning    Endings: None Reported    Modes of Intervention: Activity and Movement    Discipline Responsible: Certified Therapeutic Recreation Specialist     Electronically signed by Gae Mcardle, [address], [place] on 2/8/2021 at 4:18 PM

## 2021-02-08 NOTE — PROGRESS NOTES
Physical Therapy    Physical Therapy Treatment Note  Name: Zaki Swenson MRN: [id number] :   1950   Date:  2021   Admission Date: 2021 Room:  [address] [place] [address]     Restrictions/Precautions:  Restrictions/Precautions  Restrictions/Precautions: General Precautions, Fall Risk(Simultaneous filing. User may not have seen previous data.)  Required Braces or Orthoses?: No(Simultaneous filing. User may not have seen previous data.)         Communication with other providers:  RN; Co-treat with FIDELIA Narayanan     Subjective:  Patient states:  Agreeable to tx session. Pain:   Location, Type, Intensity (0/10 to 10/10):  0/10    Objective:    Observation:  Pt standing in hallway outside her room without pants on. Treatment, including education/measures:  Transfers  Scooting :SBA  Sit to stand :SBA  Stand to sit :SBA  Pt initially attempted to dress lower body while standing, very unsteady, VC's provided to sit on EOB to dress. Able to complete once seated. Gait:  Pt amb with no AD for a total of ~250 ft with SB assist. Pt stopped multiple times during amb to talk; required frequent redirection of task this date. Pt furthest amb distance without stopping ~80ft this date. Pt displays unsteadiness during throughout gait training this date; swaying to both R and L but no true LOB experienced. Pt needed VC's for redirection of task, and safety. Safety  Patient left safely in the chair in TV area. Gait belt and mask were used for transfers and gait.     Assessment / Impression:       Patient's tolerance of treatment:  good   Adverse Reaction: none  Significant change in status and impact:  none  Barriers to improvement:  Attention to task     Plan for Next Session:    Will cont to work towards pt's goals per her tolerance    Time in:  1310  Time out:  1328  Timed treatment minutes:  18  Total treatment time:  18    Previously filed items:  Social/Functional History  Lives With: Family  Type of Home: House  Home Layout: Laundry in basement, One level  Home Access: Level entry  Bathroom Shower/Tub: Tub/Shower unit  Bathroom Toilet: Standard  Bathroom Accessibility: Not accessible  Receives Help From: Family  ADL Assistance: Independent  Homemaking Assistance: Independent  Homemaking Responsibilities: Yes  Active : Yes  Mode of Transportation: Car  Education: 9th grade  Occupation: Retired  Type of occupation:   Leisure & Hobbies: fixing cars and appliances, dancing  Additional Comments: Pt questionable historian, History obtained from chart  Short term goals  Time Frame for Short term goals: 1 week  Short term goal 1: P will perform bed mobility with Gio  Short term goal 2: P will perform sit <> stand with supervision  Short term goal 3: P will ambulate x 50' with CGA and mod cues  Short term goal 4: P will stand with 1 Ue support x 2 min with CGA       Electronically signed by:    Saranya Carrillo, PTA  2/8/2021, 1:39 PM

## 2021-02-08 NOTE — GROUP NOTE
Group Therapy Note    Date: 2/8/2021    Group Start Time: 1030  Group End Time: 1120  Group Topic: Psychoeducation    [address] [place], [place]        Group Therapy Note    Attendees: 7/9         Notes:  Pts participated in recreational therapy group; Winter Tales. Pts and therapist utilized various activities to reminisce about their past and share stories. Purpose was to encourage positive thinking and socialization. Pt participated actively in the group. Pt shared stories about snowball fights and staying warm in the winter. Status After Intervention:  Improved    Participation Level:  Active Listener and Interactive    Participation Quality: Appropriate, Attentive and Sharing      Speech:  normal      Thought Process/Content: Logical      Affective Functioning: Congruent      Mood: Bright      Level of consciousness:  Alert and Attentive      Response to Learning: Able to verbalize current knowledge/experience and Able to verbalize/acknowledge new learning      Endings: None Reported    Modes of Intervention: Support, Socialization, Exploration and Activity      Discipline Responsible: Certified Therapeutic Recreation Specialist      Signature:  Juliet Montemayor [place], [place]

## 2021-02-08 NOTE — PROGRESS NOTES
Progress Note Psychiatry    Leoncio Alicia  [id number]  1/21/2021 02/08/21    ID: Patient is a 65 yrs y.o. female    CC: \"I stopped my meds. \"    HPI: Leoncio Alicia is a 65year old female with PMHx of HTN, HLD,dementia (Alzemimers) with behavioral disturbances, delusional disorder and medication non compliance. She is well known to [facility] related to several admissions for same. She presents to [facility] ED via police after found wandering. Her sister called them when she was unable to locate the patient. Patient has not been taking her medications and has an increase in her delusions and paranoia. Patient admitted to [facility].       During today's interview she was alert & oriented x 3. Pt continues to denied any thoughts to harm herself or anyone else. Pt Denied any auditory or visual Hallucinations however has responded to internal stimuli occasionally. She denies depression and anxiety. She states she is sleeping \"pretty good\" and that her appetite is \"great. \" Pt continues to exhibit behaviors occasionally yet has been much better and remains med compliant on unit. Pt continues to display occasional bizarre delusions and confusion however improving slowly. Continues to note fixed delusions and is fixated on them yet redirectable. Pt remains unable to understand her diagnosis and treatment plan.  Pt noted she currently feels safe and comfortable on the unit.  Pt was in agreement with treatment team.  Pt was polite and cordial during the interview process. Has been taking medications and has been compliant with treatment.          FINDINGS: ct head w/o contrast 1/25/2021   BRAIN/VENTRICLES: There is no acute intracranial hemorrhage, mass effect or   midline shift.  No abnormal extra-axial fluid collection.  The gray-white   differentiation is maintained without evidence of an acute infarct.  Old   lacunar infarcts noted in the bilateral basal ganglia.  Hypoattenuation of   the periventricular and subcortical white matter is suggestive of chronic   small vessel ischemic disease.  Mild diffuse parenchymal volume loss is   noted.  Vascular calcification is seen. Cora Barrera is no evidence of   hydrocephalus. Pt denied any hx of seizures, Hep C or HIV  Pt notes history of TBI  No TD noted, AIMS=0    Past Psychiatric History:   See note above    Family Psychiatric History:   See note above    Family Psychiatric History:   Family History   Problem Relation Age of Onset    No Known Problems Mother     No Known Problems Sister     Heart Disease Brother     Coronary Art Dis Brother         stents    No Known Problems Sister     No Known Problems Sister     Alcohol Abuse Brother         Allergies: Allergies   Allergen Reactions    Shellfish-Derived Products     Floxin [Ofloxacin] Other (See Comments)     Made her \"feel weird\"        OBJECTIVE  Vital Signs:  Vitals:    02/07/21 2025   BP: 124/73   Pulse: 73   Resp: 18   Temp: 96.7 °F (35.9 °C)   SpO2: 99%       Labs:  No results found for this or any previous visit (from the past 48 hour(s)). Review of Systems:  Reports of no current cardiovascular, respiratory, gastrointestinal, genitourinary, integumentary, neurological, musculoskeletal, or immunological symptoms today. PSYCHIATRIC: See HPI above.          PSYCHIATRIC EXAMINATION / MENTAL STATUS EXAM    CONSTITUTIONAL:    Vitals:   Vitals:    02/07/21 2025   BP: 124/73   Pulse: 73   Resp: 18   Temp: 96.7 °F (35.9 °C)   SpO2: 99%      General appearance: [x] appears age, []  appears older than stated age,               []  adequately dressed and groomed, [x] disheveled,               []  in no acute distress, [x] appears mildly distressed, [] other           MUSCULOSKELETAL:   Gait:   [] normal, [] antalgic, [x] unsteady, [] gait not evaluated   Station:             [] erect, [x] sitting, [] recumbent, [] other        Strength/tone:  [x] muscle strength and tone appear consistent with age and study.  8. Psychiatric management:medication initiation and titration, group and individual therapy, safe and therapeutic environment. 9. Status of problem/condition: Improving  10. Medical co-morbidities: Management per [facility] group, appreciate assistance  11. Legal Status: Voluntary  12. The treatment team reviewed with the patient the diagnosis and treatment recommendations to include the risks, benefits, and side effects of chosen medications. 13. The patient verbalized understanding and agreed with the treatment regimen as outlined above. 14. The patient was encouraged to participate in groups. 15. Medical records, Labs, Diagnotic tests reviewed  16. q15 min safety checks for safety  17. Interval History. 18. Review current labs  19. Supportive Therapy Provided  20. Pt had an opportunity to ask questions and address concerns  21. Pt encouraged to continue therapy group or individual.  22. Pt was in agreement with treatment plan. 23. The risks benefits and side effects of medications were discussed with the patient, including alternatives and no treatment.     Electronically signed by Lucero Howe DO on 2/8/2021 at 8:01 AM

## 2021-02-08 NOTE — GROUP NOTE
Group Therapy Note    Date: 2/8/2021    Group Start Time: 0820  Group End Time: 0900    Number of Participants: 8/9    Type: Morning Goals Group/ Community Meeting    Group Topic/Objective: Set Goal For The Day and to review Unit Rules and Regulations. Patient's Goal:  Pt states her goal is \"Rest and relax. \"    Notes:  Pt continues to focus on being discharged, but is more accepting of redirection than in previous groups. Pt states she is feeling \"wonderful\" and is grateful \"for being here on earth. \"  Pt reports restful sleep, but is unsure how long she slept. Depression (0-10): 0    Anxiety (0-10): 0    Irritability/Aggitation (0-10): 0    Status After Intervention:  Improved    Participation Level:  Active Listener and Interactive    Participation Quality: Appropriate, Attentive and Sharing    Speech:  normal    Thought Process/Content: Confused    Affective Functioning: Congruent    Mood: Bright    Level of consciousness:  Alert and Attentive    Response to Learning: Able to verbalize current knowledge/experience    Endings: None Reported    Modes of Intervention: Education, Support and Socialization    Discipline Responsible: Certified Therapeutic Recreation Specialist     Electronically signed by Gela Geronimo MA on 2/8/2021 at 11:44 AM

## 2021-02-08 NOTE — PROGRESS NOTES
Occupational Therapy Treatment Note  Name: Michael Gilliland MRN: [id number] :   1950   Date:  2021   Admission Date: 2021 Room:  [address] [place] [address]   Restrictions/Precautions:  Restrictions/Precautions  Restrictions/Precautions: General Precautions, Fall Risk(Simultaneous filing. User may not have seen previous data.)  Required Braces or Orthoses?: No(Simultaneous filing. User may not have seen previous data.)   Communication with other providers:  [id number] to see per nurse, Co-treat PTA  Subjective:  Patient states:  \"I'm getting ready to be sent home back with my sister\"  Pain:   Location, Type, Intensity (0/10 to 10/10): None reported  Objective:    Observation:  Pt was up walking in room without pants on following toileting  Objective Measures:  Pt seen for functional mobility, ADLs  Treatment, including education:  Pt had finished toileting Indep and was up walking out of room without her pants on. P reported she could not get toilet to flush. Directed Pt to sit at EOB while therapist retrieved a depends for her. Pt donned depends/pants after prompts to sit EOB, and donned SBA. Pt gil socks SBA and shoes with min A. Pt amb down kee 2x 80ft with SBA/CGA as Pt has min unsteady gait/sway at times while ambulating. Pt would pause and stand to talk then cont walking. Pt walked back to sink in room, 55ft,  to brush hair after prompts to initiate, SBA. Pt amb again down kee to TV area ~55ft. Pt stood with SBA explaining to therapist's how to make [employer] and demo no LOB. Pt sat down in chair in TV area with SBA. Pt was pleasant and happy and tolerated well today. Pt left sitting in chair in common area with nurse present.       Assessment / Impression:        Patient's tolerance of treatment:  Good  Adverse Reaction: None  Significant change in status and impact:  None  Barriers to improvement:  None  Plan for Next Session:    Continue current POC  Time in: 1310  Time out:  1328  Timed treatment minutes:  18  Total treatment time:  18  Electronically signed by:    Ashley Bone. Ana María [place] [id number]  2/8/2021, 1:39 PM    Previously filed values:     Short term goals  Time Frame for Short term goals: Until DC or goals met  Short term goal 1: Pt will complete transfers CGA with min prompts to initiate (chair, toilet, bed)  Short term goal 2: Pt will participate in UE/LE bathing with min A and verbal prompts to initiate  Short term goal 3: Pt will participate in UE/LE dressing with min A and verbal prompts to initiate  Short term goal 4: Pt will demo toileting min A  Short term goal 5: Pt will participate in BUE activities/exercises 5+ min after instruction and min prompts to initiate.

## 2021-02-09 PROCEDURE — 6370000000 HC RX 637 (ALT 250 FOR IP): Performed by: INTERNAL MEDICINE

## 2021-02-09 PROCEDURE — 1240000000 HC EMOTIONAL WELLNESS R&B

## 2021-02-09 PROCEDURE — 6370000000 HC RX 637 (ALT 250 FOR IP): Performed by: NURSE PRACTITIONER

## 2021-02-09 PROCEDURE — 6370000000 HC RX 637 (ALT 250 FOR IP): Performed by: PSYCHIATRY & NEUROLOGY

## 2021-02-09 PROCEDURE — 99231 SBSQ HOSP IP/OBS SF/LOW 25: CPT | Performed by: NURSE PRACTITIONER

## 2021-02-09 RX ORDER — ACETAMINOPHEN 160 MG/5ML
500 SOLUTION ORAL EVERY 4 HOURS PRN
Status: DISCONTINUED | OUTPATIENT
Start: 2021-02-09 | End: 2021-02-11 | Stop reason: HOSPADM

## 2021-02-09 RX ORDER — ACETAMINOPHEN 160 MG/5ML
325 SOLUTION ORAL EVERY 4 HOURS PRN
Status: DISCONTINUED | OUTPATIENT
Start: 2021-02-09 | End: 2021-02-11 | Stop reason: HOSPADM

## 2021-02-09 RX ORDER — ACETAMINOPHEN 160 MG/5ML
650 SOLUTION ORAL EVERY 4 HOURS PRN
Status: DISCONTINUED | OUTPATIENT
Start: 2021-02-09 | End: 2021-02-11 | Stop reason: HOSPADM

## 2021-02-09 RX ORDER — VALPROIC ACID 250 MG/5ML
750 SOLUTION ORAL
Status: DISCONTINUED | OUTPATIENT
Start: 2021-02-09 | End: 2021-02-10

## 2021-02-09 RX ORDER — DIPHENHYDRAMINE HCL 12.5MG/5ML
25 LIQUID (ML) ORAL EVERY 6 HOURS PRN
Status: DISCONTINUED | OUTPATIENT
Start: 2021-02-09 | End: 2021-02-11 | Stop reason: HOSPADM

## 2021-02-09 RX ORDER — MULTIVIT-MIN/FERROUS GLUCONATE 9 MG/15 ML
15 LIQUID (ML) ORAL DAILY
Status: DISCONTINUED | OUTPATIENT
Start: 2021-02-10 | End: 2021-02-10

## 2021-02-09 RX ORDER — PANTOPRAZOLE SODIUM 40 MG/1
80 GRANULE, DELAYED RELEASE ORAL
Status: DISCONTINUED | OUTPATIENT
Start: 2021-02-09 | End: 2021-02-10 | Stop reason: CLARIF

## 2021-02-09 RX ORDER — LOPERAMIDE HCL 1 MG/7.5ML
2 SOLUTION ORAL 4 TIMES DAILY PRN
Status: DISCONTINUED | OUTPATIENT
Start: 2021-02-09 | End: 2021-02-11 | Stop reason: HOSPADM

## 2021-02-09 RX ADMIN — AMLODIPINE BESYLATE 10 MG: 10 TABLET ORAL at 08:19

## 2021-02-09 RX ADMIN — ATORVASTATIN CALCIUM 80 MG: 80 TABLET, FILM COATED ORAL at 21:47

## 2021-02-09 RX ADMIN — METFORMIN HYDROCHLORIDE 500 MG: 500 TABLET ORAL at 17:23

## 2021-02-09 RX ADMIN — RISPERIDONE 1 MG: 1 TABLET ORAL at 21:47

## 2021-02-09 RX ADMIN — DONEPEZIL HYDROCHLORIDE 10 MG: 10 TABLET, FILM COATED ORAL at 21:47

## 2021-02-09 RX ADMIN — VALPROIC ACID 750 MG: 250 SOLUTION ORAL at 17:20

## 2021-02-09 RX ADMIN — MEMANTINE 10 MG: 10 TABLET ORAL at 08:16

## 2021-02-09 RX ADMIN — METOPROLOL TARTRATE 25 MG: 25 TABLET, FILM COATED ORAL at 08:18

## 2021-02-09 RX ADMIN — TRAZODONE HYDROCHLORIDE 150 MG: 50 TABLET ORAL at 21:47

## 2021-02-09 RX ADMIN — PANTOPRAZOLE SODIUM 80 MG: 40 GRANULE, DELAYED RELEASE ORAL at 17:23

## 2021-02-09 RX ADMIN — LOPERAMIDE HYDROCHLORIDE 2 MG: 2 CAPSULE ORAL at 05:26

## 2021-02-09 RX ADMIN — SUCRALFATE 1 G: 1 TABLET ORAL at 05:26

## 2021-02-09 RX ADMIN — SUCRALFATE 1 G: 1 TABLET ORAL at 11:49

## 2021-02-09 RX ADMIN — FLUTICASONE PROPIONATE 1 SPRAY: 50 SPRAY, METERED NASAL at 08:15

## 2021-02-09 RX ADMIN — LISINOPRIL 20 MG: 20 TABLET ORAL at 08:18

## 2021-02-09 RX ADMIN — MULTIPLE VITAMINS W/ MINERALS TAB 1 TABLET: TAB at 08:16

## 2021-02-09 RX ADMIN — METFORMIN HYDROCHLORIDE 500 MG: 500 TABLET ORAL at 08:15

## 2021-02-09 RX ADMIN — SUCRALFATE 1 G: 1 TABLET ORAL at 17:23

## 2021-02-09 RX ADMIN — Medication 1 CAPSULE: at 08:15

## 2021-02-09 RX ADMIN — METOPROLOL TARTRATE 25 MG: 25 TABLET, FILM COATED ORAL at 21:47

## 2021-02-09 RX ADMIN — OMEGA-3-ACID ETHYL ESTERS 2 G: 1 CAPSULE, LIQUID FILLED ORAL at 08:15

## 2021-02-09 RX ADMIN — SUCRALFATE 1 G: 1 TABLET ORAL at 21:47

## 2021-02-09 RX ADMIN — OMEGA-3-ACID ETHYL ESTERS 2 G: 1 CAPSULE, LIQUID FILLED ORAL at 21:48

## 2021-02-09 RX ADMIN — PANTOPRAZOLE SODIUM 80 MG: 40 TABLET, DELAYED RELEASE ORAL at 08:15

## 2021-02-09 ASSESSMENT — PAIN SCALES - GENERAL: PAINLEVEL_OUTOF10: 0

## 2021-02-09 NOTE — GROUP NOTE
Group Therapy Note    Date: 2/9/2021    Group Start Time: 1530  Group End Time: 1600    Number of Participants: 5/8    Type: Exercise/Recreation Group    Group Topic/Objective: Chair Exercises    Notes:  Pt participated actively in the group. Pt completed all exercises. Status After Intervention:  Improved    Participation Level:  Active Listener and Interactive    Participation Quality: Appropriate, Attentive and Sharing    Speech:  normal    Thought Process/Content: Logical    Affective Functioning: Congruent    Mood: Bright    Level of consciousness:  Alert and Attentive    Response to Learning: Able to verbalize current knowledge/experience and Able to verbalize/acknowledge new learning    Endings: None Reported    Modes of Intervention: Activity and Movement    Discipline Responsible: Certified Therapeutic Recreation Specialist     Electronically signed by JIM Lees MA on 2/10/2021 at 10:02 AM

## 2021-02-09 NOTE — PROGRESS NOTES
Patient up out of bed stating she had an accident again. Patient place on the toilet and then taken to shower. At 5:26 am patient was given imodium(see MAR). Patient back in bed at this time resting.

## 2021-02-09 NOTE — GROUP NOTE
Group Therapy Note    Date: 2/9/2021    Group Start Time: 0830  Group End Time: 0900     Number of Participants: 7/8    Type: Morning Goals Group/ Community Meeting    Group Topic/Objective: Set Goal For The Day and to review Unit Rules and Regulations. Patient's Goal:  Pt states her goal is \"Look forward to when I get home. \"    Notes:  Pt presented as pleasant and cooperative. Pt initially reporting belief that she would be d/c today, but accepting of education that she would not be going home today. Pt states she is feeling \"good\" and is grateful for \"everything around here. \"  Pt reports restful sleep of ~8 hours. Depression (0-10): 0    Anxiety (0-10): 0    Irritability/Aggitation (0-10): 0    Status After Intervention:  Improved    Participation Level:  Active Listener and Interactive    Participation Quality: Appropriate, Attentive and Sharing    Speech:  normal    Thought Process/Content: Logical    Affective Functioning: Congruent    Mood: Bright    Level of consciousness:  Alert and Attentive    Response to Learning: Able to verbalize current knowledge/experience    Endings: None Reported    Modes of Intervention: Education, Support and Socialization    Discipline Responsible: Certified Therapeutic Recreation Specialist     Electronically signed by Tom Palencia MA on 2/9/2021 at 10:03 AM

## 2021-02-09 NOTE — PLAN OF CARE
Patient was social with peers and staff this evening. Voiced no concerns of pain, no anxiety nor depression. No signs of internal stimuli. States she does not have any thoughts of harming herself or anyone else. She has no AVH noted. Continue to monitor for safety.

## 2021-02-09 NOTE — PROGRESS NOTES
Patient got out of bed at this time with no pants on headed to [facility] patient's room. She was distracted enough to get to turn around. After follow up, it was determined that she had a large runny BM in another patients room. Patient was taken to the shower and cleaned, a complete bed change was required. BM was cleaned up and floors disinfected. Patient back in bed at this time and resting with eyes closed. She stated she had no pain and no abdominal pain nor cramping.

## 2021-02-09 NOTE — PROGRESS NOTES
Progress Note Psychiatry    Regina Bateman  [id number]  1/21/2021 02/09/21    ID: Patient is a 65 yrs y.o. female    CC: \"I stopped my meds. \"    HPI: Regina Bateman is a 65year old female with PMHx of HTN, HLD,dementia (Alzemimers) with behavioral disturbances, delusional disorder and medication non compliance. She is well known to [id number] related to several admissions for same. She presents to [facility] ED via police after found wandering. Her sister called them when she was unable to locate the patient. Patient has not been taking her medications and has an increase in her delusions and paranoia. Patient admitted to [facility].       During today's interview she was alert & oriented x place and situation. She was able to be reoriented to time and date. Pt continues to deny any thoughts to harm herself or anyone else. Pt denied any auditory or visual hallucinations. She denies depression and anxiety. She states she is sleeping \"perfect\" and that her appetite is \"great. \" No delusions noted during interview. Patient is redirectable per staff. Pt noted she currently feels safe and comfortable on the unit.  Pt was in agreement with treatment team.  Pt was polite and cordial during the interview process. Has been taking medications and has been compliant with treatment. Patient states she is looking forward to discharge and living with her sister.           FINDINGS: ct head w/o contrast 1/25/2021   BRAIN/VENTRICLES: There is no acute intracranial hemorrhage, mass effect or   midline shift.  No abnormal extra-axial fluid collection.  The gray-white   differentiation is maintained without evidence of an acute infarct.  Old   lacunar infarcts noted in the bilateral basal ganglia.  Hypoattenuation of   the periventricular and subcortical white matter is suggestive of chronic   small vessel ischemic disease.  Mild diffuse parenchymal volume loss is   noted.  Vascular calcification is seen. Betha Staple is no evidence of hydrocephalus. Pt denied any hx of seizures, Hep C or HIV  Pt notes history of TBI  No TD noted, AIMS=0    Past Psychiatric History:   See note above    Family Psychiatric History:   See note above    Family Psychiatric History:   Family History   Problem Relation Age of Onset    No Known Problems Mother     No Known Problems Sister     Heart Disease Brother     Coronary Art Dis Brother         stents    No Known Problems Sister     No Known Problems Sister     Alcohol Abuse Brother         Allergies: Allergies   Allergen Reactions    Shellfish-Derived Products     Floxin [Ofloxacin] Other (See Comments)     Made her \"feel weird\"        OBJECTIVE  Vital Signs:  Vitals:    02/09/21 0818   BP: (!) 141/80   Pulse: 78   Resp: 17   Temp: 97 °F (36.1 °C)   SpO2: 96%       Labs:  No results found for this or any previous visit (from the past 48 hour(s)). Review of Systems:  Reports of no current cardiovascular, respiratory, gastrointestinal, genitourinary, integumentary, neurological, musculoskeletal, or immunological symptoms today. PSYCHIATRIC: See HPI above. PSYCHIATRIC EXAMINATION / MENTAL STATUS EXAM    CONSTITUTIONAL:    Vitals:   Vitals:    02/09/21 0818   BP: (!) 141/80   Pulse: 78   Resp: 17   Temp: 97 °F (36.1 °C)   SpO2: 96%      General appearance: [x] appears age, []  appears older than stated age,               [x]  adequately dressed and groomed, [] disheveled,               [x]  in no acute distress, [] appears mildly distressed, [] other           MUSCULOSKELETAL:   Gait:   [] normal, [] antalgic, [] unsteady, [x] gait not evaluated   Station:             [] erect, [x] sitting, [] recumbent, [] other        Strength/tone:  [x] muscle strength and tone appear consistent with age and condition     [] atrophy      [] abnormal movements       PSYCHIATRIC:    Appearance: appears stated age. Alert and oriented to person only. . No acute distress. Adequate grooming and hygiene. problem/condition: Improving  10. Medical co-morbidities: Management per [facility] group, appreciate assistance  11. Legal Status: Voluntary  12. The treatment team reviewed with the patient the diagnosis and treatment recommendations to include the risks, benefits, and side effects of chosen medications. 13. The patient verbalized understanding and agreed with the treatment regimen as outlined above. 14. The patient was encouraged to participate in groups. 15. Medical records, Labs, Diagnotic tests reviewed  16. q15 min safety checks for safety  17. Interval History. 18. Review current labs  19. Continue current medications continue depacon 750 mg oral with dinner 750  mg PO  At dinner time for mood/aggitation,  reduce risperidone to 1 mg qhs for psychosis. Trazodone 150 mg po qhs  20. Supportive Therapy Provided  21. Pt had an opportunity to ask questions and address concerns  22. Pt encouraged to continue therapy group or individual.  23. Pt was in agreement with treatment plan. 24. The risks benefits and side effects of medications were discussed with the patient, including alternatives and no treatment.     Electronically signed by EVELYN Boo CNP on 2/9/2021 at 1:33 PM

## 2021-02-10 PROCEDURE — 6370000000 HC RX 637 (ALT 250 FOR IP): Performed by: NURSE PRACTITIONER

## 2021-02-10 PROCEDURE — 6370000000 HC RX 637 (ALT 250 FOR IP): Performed by: PSYCHIATRY & NEUROLOGY

## 2021-02-10 PROCEDURE — 1240000000 HC EMOTIONAL WELLNESS R&B

## 2021-02-10 PROCEDURE — 6370000000 HC RX 637 (ALT 250 FOR IP): Performed by: INTERNAL MEDICINE

## 2021-02-10 PROCEDURE — 99239 HOSP IP/OBS DSCHRG MGMT >30: CPT | Performed by: NURSE PRACTITIONER

## 2021-02-10 RX ORDER — DIVALPROEX SODIUM 125 MG/1
750 CAPSULE, COATED PELLETS ORAL
Qty: 60 CAPSULE | Refills: 1 | Status: SHIPPED | OUTPATIENT
Start: 2021-02-10 | End: 2021-02-10

## 2021-02-10 RX ORDER — PANTOPRAZOLE SODIUM 40 MG/1
80 TABLET, DELAYED RELEASE ORAL
Status: DISCONTINUED | OUTPATIENT
Start: 2021-02-10 | End: 2021-02-11 | Stop reason: HOSPADM

## 2021-02-10 RX ORDER — M-VIT,TX,IRON,MINS/CALC/FOLIC 27MG-0.4MG
1 TABLET ORAL DAILY
Status: DISCONTINUED | OUTPATIENT
Start: 2021-02-10 | End: 2021-02-11 | Stop reason: HOSPADM

## 2021-02-10 RX ORDER — ATORVASTATIN CALCIUM 80 MG/1
80 TABLET, FILM COATED ORAL NIGHTLY
Qty: 30 TABLET | Refills: 3 | Status: SHIPPED | OUTPATIENT
Start: 2021-02-10 | End: 2021-04-19 | Stop reason: SDUPTHER

## 2021-02-10 RX ORDER — DIVALPROEX SODIUM 125 MG/1
750 CAPSULE, COATED PELLETS ORAL
Qty: 180 CAPSULE | Refills: 1 | Status: SHIPPED | OUTPATIENT
Start: 2021-02-10 | End: 2021-04-21

## 2021-02-10 RX ORDER — LANSOPRAZOLE
30 KIT
Status: DISCONTINUED | OUTPATIENT
Start: 2021-02-10 | End: 2021-02-10 | Stop reason: ALTCHOICE

## 2021-02-10 RX ORDER — CALCIUM CARBONATE 200(500)MG
500 TABLET,CHEWABLE ORAL 3 TIMES DAILY PRN
Qty: 90 TABLET | Refills: 0 | Status: SHIPPED | OUTPATIENT
Start: 2021-02-10 | End: 2021-03-12

## 2021-02-10 RX ORDER — TRAZODONE HYDROCHLORIDE 150 MG/1
150 TABLET ORAL NIGHTLY
Qty: 30 TABLET | Refills: 1 | Status: SHIPPED | OUTPATIENT
Start: 2021-02-10 | End: 2021-04-19 | Stop reason: SDUPTHER

## 2021-02-10 RX ORDER — AMLODIPINE BESYLATE 10 MG/1
10 TABLET ORAL DAILY
Qty: 30 TABLET | Refills: 3 | Status: SHIPPED | OUTPATIENT
Start: 2021-02-11 | End: 2021-04-19 | Stop reason: SDUPTHER

## 2021-02-10 RX ORDER — LACTOBACILLUS RHAMNOSUS GG 10B CELL
1 CAPSULE ORAL
Qty: 30 CAPSULE | Refills: 1 | Status: SHIPPED | OUTPATIENT
Start: 2021-02-11 | Stop reason: SDUPTHER

## 2021-02-10 RX ORDER — RISPERIDONE 1 MG/1
1 TABLET, FILM COATED ORAL NIGHTLY
Qty: 60 TABLET | Refills: 1 | Status: SHIPPED | OUTPATIENT
Start: 2021-02-10 | End: 2021-04-21

## 2021-02-10 RX ORDER — DIVALPROEX SODIUM 125 MG/1
750 CAPSULE, COATED PELLETS ORAL
Status: DISCONTINUED | OUTPATIENT
Start: 2021-02-10 | End: 2021-02-11 | Stop reason: HOSPADM

## 2021-02-10 RX ORDER — OMEGA-3-ACID ETHYL ESTERS 1 G/1
2 CAPSULE, LIQUID FILLED ORAL 2 TIMES DAILY
Qty: 60 CAPSULE | Refills: 1 | Status: SHIPPED | OUTPATIENT
Start: 2021-02-10

## 2021-02-10 RX ORDER — SUCRALFATE 1 G/1
1 TABLET ORAL
Qty: 120 TABLET | Refills: 1 | Status: SHIPPED | OUTPATIENT
Start: 2021-02-10

## 2021-02-10 RX ORDER — LISINOPRIL 20 MG/1
20 TABLET ORAL DAILY
Qty: 30 TABLET | Refills: 3 | Status: SHIPPED | OUTPATIENT
Start: 2021-02-11 | End: 2021-04-19 | Stop reason: SDUPTHER

## 2021-02-10 RX ORDER — PANTOPRAZOLE SODIUM 40 MG/1
80 TABLET, DELAYED RELEASE ORAL
Qty: 30 TABLET | Refills: 1 | Status: SHIPPED | OUTPATIENT
Start: 2021-02-10 | Stop reason: SDUPTHER

## 2021-02-10 RX ADMIN — Medication 1 CAPSULE: at 08:35

## 2021-02-10 RX ADMIN — LISINOPRIL 20 MG: 20 TABLET ORAL at 08:35

## 2021-02-10 RX ADMIN — SUCRALFATE 1 G: 1 TABLET ORAL at 17:01

## 2021-02-10 RX ADMIN — AMLODIPINE BESYLATE 10 MG: 10 TABLET ORAL at 08:35

## 2021-02-10 RX ADMIN — SUCRALFATE 1 G: 1 TABLET ORAL at 08:37

## 2021-02-10 RX ADMIN — MEMANTINE 10 MG: 10 TABLET ORAL at 08:35

## 2021-02-10 RX ADMIN — FLUTICASONE PROPIONATE 1 SPRAY: 50 SPRAY, METERED NASAL at 08:38

## 2021-02-10 RX ADMIN — DIVALPROEX SODIUM 750 MG: 125 CAPSULE ORAL at 17:01

## 2021-02-10 RX ADMIN — OMEGA-3-ACID ETHYL ESTERS 2 G: 1 CAPSULE, LIQUID FILLED ORAL at 08:36

## 2021-02-10 RX ADMIN — ATORVASTATIN CALCIUM 80 MG: 80 TABLET, FILM COATED ORAL at 20:46

## 2021-02-10 RX ADMIN — SUCRALFATE 1 G: 1 TABLET ORAL at 20:45

## 2021-02-10 RX ADMIN — DONEPEZIL HYDROCHLORIDE 10 MG: 10 TABLET, FILM COATED ORAL at 20:46

## 2021-02-10 RX ADMIN — PANTOPRAZOLE SODIUM 80 MG: 40 TABLET, DELAYED RELEASE ORAL at 17:00

## 2021-02-10 RX ADMIN — TRAZODONE HYDROCHLORIDE 150 MG: 50 TABLET ORAL at 20:46

## 2021-02-10 RX ADMIN — METFORMIN HYDROCHLORIDE 500 MG: 500 TABLET ORAL at 08:35

## 2021-02-10 RX ADMIN — METFORMIN HYDROCHLORIDE 500 MG: 500 TABLET ORAL at 17:01

## 2021-02-10 RX ADMIN — OMEGA-3-ACID ETHYL ESTERS 2 G: 1 CAPSULE, LIQUID FILLED ORAL at 20:46

## 2021-02-10 RX ADMIN — PANTOPRAZOLE SODIUM 80 MG: 40 GRANULE, DELAYED RELEASE ORAL at 08:37

## 2021-02-10 RX ADMIN — METOPROLOL TARTRATE 25 MG: 25 TABLET, FILM COATED ORAL at 08:35

## 2021-02-10 RX ADMIN — MULTIPLE VITAMINS W/ MINERALS TAB 1 TABLET: TAB at 14:29

## 2021-02-10 RX ADMIN — SUCRALFATE 1 G: 1 TABLET ORAL at 14:29

## 2021-02-10 RX ADMIN — RISPERIDONE 1 MG: 1 TABLET ORAL at 20:46

## 2021-02-10 RX ADMIN — METOPROLOL TARTRATE 25 MG: 25 TABLET, FILM COATED ORAL at 20:46

## 2021-02-10 NOTE — PLAN OF CARE
Problem: Altered Mood, Depressive Behavior:  Goal: Able to verbalize acceptance of life and situations over which he or she has no control  Description: Able to verbalize acceptance of life and situations over which he or she has no control  2/9/2021 2239 by Corbin Moreno LPN  Outcome: Ongoing  2/9/2021 1910 by Bennie Olea RN  Outcome: Ongoing  Goal: Able to verbalize and/or display a decrease in depressive symptoms  Description: Able to verbalize and/or display a decrease in depressive symptoms  2/9/2021 2239 by Corbin Moreno LPN  Outcome: Ongoing  2/9/2021 1910 by Bennie Olea RN  Outcome: Ongoing  Goal: Ability to disclose and discuss suicidal ideas will improve  Description: Ability to disclose and discuss suicidal ideas will improve  2/9/2021 2239 by Corbin Moreno LPN  Outcome: Ongoing  2/9/2021 1910 by Bennie Olea RN  Outcome: Ongoing  Goal: Able to verbalize support systems  Description: Able to verbalize support systems  2/9/2021 2239 by Corbin Moreno LPN  Outcome: Ongoing  2/9/2021 1910 by Bennie Olea RN  Outcome: Ongoing  Goal: Absence of self-harm  Description: Absence of self-harm  2/9/2021 2239 by Corbin Moreno LPN  Outcome: Ongoing  2/9/2021 1910 by Bennie Olea RN  Outcome: Ongoing  Goal: Patient specific goal  Description: Patient specific goal  2/9/2021 2239 by Corbin Moreno LPN  Outcome: Ongoing  2/9/2021 1910 by Bennie Olea RN  Outcome: Ongoing  Goal: Participates in care planning  Description: Participates in care planning  2/9/2021 2239 by Corbin Moreno LPN  Outcome: Ongoing  2/9/2021 1910 by Bennie Olea RN  Outcome: Ongoing     Problem: Depressive Behavior With or Without Suicide Precautions:  Goal: Able to verbalize acceptance of life and situations over which he or she has no control  Description: Able to verbalize acceptance of life and situations over which he or she has no control  2/9/2021 2239 by Corinna Rodgers LPN  Outcome: Ongoing  2/9/2021 1910 by Israel Dan RN  Outcome: Ongoing  Goal: Able to verbalize and/or display a decrease in depressive symptoms  Description: Able to verbalize and/or display a decrease in depressive symptoms  2/9/2021 2239 by Corinna Rodgers LPN  Outcome: Ongoing  2/9/2021 1910 by Israel Dan RN  Outcome: Ongoing  Goal: Ability to disclose and discuss suicidal ideas will improve  Description: Ability to disclose and discuss suicidal ideas will improve  2/9/2021 2239 by Corinna Rodgers LPN  Outcome: Ongoing  2/9/2021 1910 by Israel Dan RN  Outcome: Ongoing  Goal: Able to verbalize support systems  Description: Able to verbalize support systems  2/9/2021 2239 by Corinna Rodgers LPN  Outcome: Ongoing  2/9/2021 1910 by Israel Dan RN  Outcome: Ongoing  Goal: Absence of self-harm  Description: Absence of self-harm  2/9/2021 2239 by Corinna Rodgers LPN  Outcome: Ongoing  2/9/2021 1910 by Israel Dan RN  Outcome: Ongoing  Goal: Patient specific goal  Description: Patient specific goal  2/9/2021 2239 by Corinna Rodgers LPN  Outcome: Ongoing  2/9/2021 1910 by Israel Dan RN  Outcome: Ongoing  Goal: Participates in care planning  Description: Participates in care planning  2/9/2021 2239 by Corinna Rodgers LPN  Outcome: Ongoing  2/9/2021 1910 by Israel Dan RN  Outcome: Ongoing     Problem: Altered Mood, Deterioration in Function:  Goal: Ability to perform activities of daily living will improve  Description: Ability to perform activities of daily living will improve  2/9/2021 2239 by Corinna Rodgers LPN  Outcome: Ongoing  2/9/2021 1910 by Israel Dan RN  Outcome: Ongoing  Goal: Able to verbalize reality based thinking  Description: Able to verbalize reality based thinking  2/9/2021 2239 by Corinna Rodgers LPN  Outcome: Ongoing  2/9/2021 1910 by Israel Dan RN  Outcome: Ongoing  Goal: Skin appearance normal  Description: Skin appearance normal  2/9/2021 2239 by Diane Menezes LPN  Outcome: Ongoing  2/9/2021 1910 by Brenda Ballard RN  Outcome: Ongoing  Goal: Maintenance of adequate nutrition will improve  Description: Maintenance of adequate nutrition will improve  2/9/2021 2239 by Diane Menezes LPN  Outcome: Ongoing  2/9/2021 1910 by Brenda Ballard RN  Outcome: Ongoing  Goal: Ability to tolerate increased activity will improve  Description: Ability to tolerate increased activity will improve  2/9/2021 2239 by Diane Menezes LPN  Outcome: Ongoing  2/9/2021 1910 by Brenda Ballard RN  Outcome: Ongoing  Goal: Participates in care planning  Description: Participates in care planning  2/9/2021 2239 by Diane Menezes LPN  Outcome: Ongoing  2/9/2021 1910 by Brenda Ballard RN  Outcome: Ongoing  Goal: Patient specific goal  Description: Patient specific goal  2/9/2021 2239 by Diane Menezes LPN  Outcome: Ongoing  2/9/2021 1910 by Brenda Ballard RN  Outcome: Ongoing     Problem: Risk of Harm:  Goal: Ability to remain free from injury will improve  Description: Ability to remain free from injury will improve  2/9/2021 2239 by Diane Menezes LPN  Outcome: Ongoing  2/9/2021 1910 by Brenda Ballard RN  Outcome: Ongoing     Problem: Altered Mood, Manic Behavior:  Goal: Able to sleep  Description: Able to sleep  2/9/2021 2239 by Diane Menezes LPN  Outcome: Ongoing  2/9/2021 1910 by Brenda Ballard RN  Outcome: Ongoing  Goal: Able to verbalize decrease in frequency and intensity of racing thoughts  Description: Able to verbalize decrease in frequency and intensity of racing thoughts  2/9/2021 2239 by Diane Menezes LPN  Outcome: Ongoing  2/9/2021 1910 by Brenda Ballard RN  Outcome: Ongoing  Goal: Ability to disclose and discuss suicidal ideas will improve  Description: Ability to disclose and discuss suicidal ideas will improve  2/9/2021 2239 by Diane Menezes LPN  Outcome: Ongoing  2/9/2021 1910 by Yesenia Hardy RN  Outcome: Ongoing  Goal: Absence of self-harm  Description: Absence of self-harm  2/9/2021 2239 by Spencer Vazquez LPN  Outcome: Ongoing  2/9/2021 1910 by Yesenia Hardy RN  Outcome: Ongoing  Goal: Ability to achieve adequate nutritional intake will improve  Description: Ability to achieve adequate nutritional intake will improve  2/9/2021 2239 by Spencer Vazquez LPN  Outcome: Ongoing  2/9/2021 1910 by Yesenia Hardy RN  Outcome: Ongoing  Goal: Ability to interact with others will improve  Description: Ability to interact with others will improve  2/9/2021 2239 by Spencer Vazquez LPN  Outcome: Ongoing  2/9/2021 1910 by Yesenia Hardy RN  Outcome: Ongoing  Goal: Ability to demonstrate self-control will improve  Description: Ability to demonstrate self-control will improve  2/9/2021 2239 by Spencer Vazquez LPN  Outcome: Ongoing  2/9/2021 1910 by Yesenia Hardy RN  Outcome: Ongoing  Goal: Mood stable  Description: Mood stable  2/9/2021 2239 by Spencer Vazquez LPN  Outcome: Ongoing  2/9/2021 1910 by Yesenia Hardy RN  Outcome: Ongoing     Problem: Falls - Risk of:  Goal: Will remain free from falls  Description: Will remain free from falls  2/9/2021 2239 by Spencer Vazquez LPN  Outcome: Ongoing  2/9/2021 1910 by Yesneia Hardy RN  Outcome: Ongoing  Goal: Absence of physical injury  Description: Absence of physical injury  2/9/2021 2239 by Spencer Vazquez LPN  Outcome: Ongoing  2/9/2021 1910 by Yesenia Hardy RN  Outcome: Ongoing     Problem: Skin Integrity:  Goal: Will show no infection signs and symptoms  Description: Will show no infection signs and symptoms  2/9/2021 2239 by Spencer Vazquez LPN  Outcome: Ongoing  2/9/2021 1910 by Yesenia Hardy RN  Outcome: Ongoing  Goal: Absence of new skin breakdown  Description: Absence of new skin breakdown  2/9/2021 2239 by Spencer Vazquez LPN  Outcome: Ongoing  2/9/2021 1910 by Bennie Olea RN  Outcome: Ongoing

## 2021-02-10 NOTE — GROUP NOTE
Group Therapy Note    Date: 2/10/2021    Group Start Time: 1430  Group End Time: 1500  Group Topic: Psychotherapy    [address] [facility], [place], [place]        Group Therapy Note    Attendees: 4/6         Notes:    Patient attended and participated in group discussing gratitude.     Status After Intervention:  Improved    Participation Level: Interactive    Participation Quality: Attentive and Sharing      Speech:  normal      Thought Process/Content: Logical      Affective Functioning: Congruent      Mood: euthymic      Level of consciousness:  Alert and Attentive      Response to Learning: Able to verbalize current knowledge/experience      Endings: None Reported    Modes of Intervention: Education, Support, Socialization and Exploration      Discipline Responsible: /Counselor      Signature:  KASEY Miller

## 2021-02-10 NOTE — CARE COORDINATION
Patient discharging today. Contacted sister, Toñito Eli. Toñito Eli will pick patient up @ 2:00 pm. Patient will be set up with a bridge clinic appointment for follow up mental health services. A referral to [facility]PS will also be made in effort to get case management involvement as pt was not taking meds for 3 months while she was living with sister. Explained to sister that [facility]PS would be called to assist her with Cher's continuity of care. [address] clinic appt.  Set for 2/23/21 @ 3:00 pm.

## 2021-02-10 NOTE — PLAN OF CARE
Problem: Altered Mood, Depressive Behavior:  Goal: Able to verbalize acceptance of life and situations over which he or she has no control  Description: Able to verbalize acceptance of life and situations over which he or she has no control  2/10/2021 1205 by Helio Turcios RN  Outcome: Completed  2/9/2021 2239 by Tj Conde LPN  Outcome: Ongoing  Goal: Able to verbalize and/or display a decrease in depressive symptoms  Description: Able to verbalize and/or display a decrease in depressive symptoms  2/10/2021 1205 by Helio Turcios RN  Outcome: Completed  2/9/2021 2239 by Tj Conde LPN  Outcome: Ongoing  Goal: Ability to disclose and discuss suicidal ideas will improve  Description: Ability to disclose and discuss suicidal ideas will improve  2/10/2021 1205 by Helio Turcios RN  Outcome: Completed  2/9/2021 2239 by Tj Conde LPN  Outcome: Ongoing  Goal: Able to verbalize support systems  Description: Able to verbalize support systems  2/10/2021 1205 by Helio Turcios RN  Outcome: Completed  2/9/2021 2239 by Tj Conde LPN  Outcome: Ongoing  Goal: Absence of self-harm  Description: Absence of self-harm  2/10/2021 1205 by Helio Turcios RN  Outcome: Completed  2/9/2021 2239 by Tj Conde LPN  Outcome: Ongoing  Goal: Patient specific goal  Description: Patient specific goal  2/10/2021 1205 by Helio Turcios RN  Outcome: Completed  2/9/2021 2239 by Tj Conde LPN  Outcome: Ongoing  Goal: Participates in care planning  Description: Participates in care planning  2/10/2021 1205 by Helio Turcios RN  Outcome: Completed  2/9/2021 2239 by Tj Conde LPN  Outcome: Ongoing     Problem: Depressive Behavior With or Without Suicide Precautions:  Goal: Able to verbalize acceptance of life and situations over which he or she has no control  Description: Able to verbalize acceptance of life and situations over which he or she has no control  2/10/2021 1205 by Helen Andrew RN  Outcome: Completed  2/9/2021 2239 by Gracie Herbert LPN  Outcome: Ongoing  Goal: Able to verbalize and/or display a decrease in depressive symptoms  Description: Able to verbalize and/or display a decrease in depressive symptoms  2/10/2021 1205 by Helen Andrew RN  Outcome: Completed  2/9/2021 2239 by Gracie Herbert LPN  Outcome: Ongoing  Goal: Ability to disclose and discuss suicidal ideas will improve  Description: Ability to disclose and discuss suicidal ideas will improve  2/10/2021 1205 by Helen Andrew RN  Outcome: Completed  2/9/2021 2239 by Gracie Herbert LPN  Outcome: Ongoing  Goal: Able to verbalize support systems  Description: Able to verbalize support systems  2/10/2021 1205 by Helen Andrew RN  Outcome: Completed  2/9/2021 2239 by Gracie Herbert LPN  Outcome: Ongoing  Goal: Absence of self-harm  Description: Absence of self-harm  2/10/2021 1205 by Helen Andrew RN  Outcome: Completed  2/9/2021 2239 by Gracie Herbert LPN  Outcome: Ongoing  Goal: Patient specific goal  Description: Patient specific goal  2/10/2021 1205 by Helen Andrew RN  Outcome: Completed  2/9/2021 2239 by Gracie Herbert LPN  Outcome: Ongoing  Goal: Participates in care planning  Description: Participates in care planning  2/10/2021 1205 by Helen Andrew RN  Outcome: Completed  2/9/2021 2239 by Gracie Herbert LPN  Outcome: Ongoing     Problem: Altered Mood, Deterioration in Function:  Goal: Ability to perform activities of daily living will improve  Description: Ability to perform activities of daily living will improve  2/10/2021 1205 by Helen Andrew RN  Outcome: Completed  2/9/2021 2239 by Gracie Herbert LPN  Outcome: Ongoing  Goal: Able to verbalize reality based thinking  Description: Able to verbalize reality based thinking  2/10/2021 1205 by Helen Andrew RN  Outcome: Completed  2/9/2021 2239 by Madhu Judd Amber Jaramillo LPN  Outcome: Ongoing  Goal: Skin appearance normal  Description: Skin appearance normal  2/10/2021 1205 by Sonia Arshad RN  Outcome: Completed  2/9/2021 2239 by Sanya Abdi LPN  Outcome: Ongoing  Goal: Maintenance of adequate nutrition will improve  Description: Maintenance of adequate nutrition will improve  2/10/2021 1205 by Sonia Arshad RN  Outcome: Completed  2/9/2021 2239 by Sanya Abdi LPN  Outcome: Ongoing  Goal: Ability to tolerate increased activity will improve  Description: Ability to tolerate increased activity will improve  2/10/2021 1205 by Sonia Arshad RN  Outcome: Completed  2/9/2021 2239 by Sanya Abdi LPN  Outcome: Ongoing  Goal: Participates in care planning  Description: Participates in care planning  2/10/2021 1205 by Sonia Arshad RN  Outcome: Completed  2/9/2021 2239 by Sanya Abdi LPN  Outcome: Ongoing  Goal: Patient specific goal  Description: Patient specific goal  2/10/2021 1205 by Sonia Arshad RN  Outcome: Completed  2/9/2021 2239 by Sanya Abdi LPN  Outcome: Ongoing     Problem: Risk of Harm:  Goal: Ability to remain free from injury will improve  Description: Ability to remain free from injury will improve  2/10/2021 1205 by Sonia Arshad RN  Outcome: Completed  2/9/2021 2239 by Sanya Abdi LPN  Outcome: Ongoing     Problem: Altered Mood, Manic Behavior:  Goal: Able to sleep  Description: Able to sleep  2/10/2021 1205 by Sonia Arshad RN  Outcome: Completed  2/9/2021 2239 by Sanya Abdi LPN  Outcome: Ongoing  Goal: Able to verbalize decrease in frequency and intensity of racing thoughts  Description: Able to verbalize decrease in frequency and intensity of racing thoughts  2/10/2021 1205 by Sonia Arshad RN  Outcome: Completed  2/9/2021 2239 by Sanya Abdi LPN  Outcome: Ongoing  Goal: Ability to disclose and discuss suicidal ideas will improve  Description: Ability to disclose and discuss suicidal ideas will improve  2/10/2021 1205 by Fermin Saravia RN  Outcome: Completed  2/9/2021 2239 by Quan Montemayor LPN  Outcome: Ongoing  Goal: Absence of self-harm  Description: Absence of self-harm  2/10/2021 1205 by Fermin Saravia RN  Outcome: Completed  2/9/2021 2239 by Quan Montemayor LPN  Outcome: Ongoing  Goal: Ability to achieve adequate nutritional intake will improve  Description: Ability to achieve adequate nutritional intake will improve  2/10/2021 1205 by Fermin Saravia RN  Outcome: Completed  2/9/2021 2239 by Quan Montemayor LPN  Outcome: Ongoing  Goal: Ability to interact with others will improve  Description: Ability to interact with others will improve  2/10/2021 1205 by Fermin Saravia RN  Outcome: Completed  2/9/2021 2239 by Quan Montemayor LPN  Outcome: Ongoing  Goal: Ability to demonstrate self-control will improve  Description: Ability to demonstrate self-control will improve  2/10/2021 1205 by Fermin Saravia RN  Outcome: Completed  2/9/2021 2239 by Quan Montemayor LPN  Outcome: Ongoing  Goal: Mood stable  Description: Mood stable  2/10/2021 1205 by Fermin Saravia RN  Outcome: Completed  2/9/2021 2239 by Quan Montemayor LPN  Outcome: Ongoing

## 2021-02-10 NOTE — BH NOTE
Patient up on the unit, pleasant, directable, cooperative with assessments, vital signs and medications. She is very excited to be being discharged today, to her home with her sister. She is encouraged to remember to take her medications after discharge and to keep her follow up appointments, she voices understanding. Ambulates on the unit without falls.   She

## 2021-02-10 NOTE — DISCHARGE INSTR - COC
Continuity of Care Form    Patient Name: Stevie Velasquez   :  1950  MRN:  [id number]    [facility] date:  2021  Discharge date:  ***    Code Status Order: Full Code   Advance Directives:   Advance Care Flowsheet Documentation       Date/Time Healthcare Directive Type of Healthcare Directive Copy in [address] Agent's Name Healthcare Agent's Phone Number    [phone]  No, patient does not have an advance directive for healthcare treatment -- -- -- -- --            Admitting Physician:  Ghulam Moreira DO  PCP: Judy Vieira MD    Discharging Nurse: [facility] Unit/Room#: 1052/1052-01  Discharging Unit Phone Number: ***    Emergency Contact:   Extended Emergency Contact Information  Primary Emergency Contact: Caitlin Fleischer [place] of [address] Phone: [phone]  Relation: Brother/Sister    Past Surgical History:  Past Surgical History:   Procedure Laterality Date    HYSTERECTOMY      URETER STENT PLACEMENT         Immunization History:   Immunization History   Administered Date(s) Administered    Influenza, Triv, inactivated, subunit, adjuvanted, IM (Fluad 65 yrs and older) 10/22/2019    Pneumococcal Conjugate 13-valent (Clora Hummel) 10/25/2019       Active Problems:  Patient Active Problem List   Diagnosis Code    Essential hypertension I10    Lumbar herniated disc M51.26    Spinal stenosis M48.00    Restless leg syndrome G25.81    FH: CAD (coronary artery disease) Z82.49    Ureteric stone N20.1    Family history of ischemic heart disease and other diseases of the circulatory system Z82.49    Need for vaccination against Streptococcus pneumoniae using pneumococcal conjugate vaccine 13 Z23    At high risk for falls Z91.81    Elevated alkaline phosphatase level R74.8    Diabetes mellitus type 2, diet-controlled ([facility] [address] E11.9    Mixed hyperlipidemia E78.2    Urinary incontinence in female R32    Shoulder pain M25.519    Gastroesophageal reflux disease K21.9    Late onset Alzheimer's disease without behavioral disturbance (HCC) G30.1, F02.80    Dementia with behavioral disturbance (HCC) F03.91    Alzheimer's disease with late onset (CODE) ([facility] G30.1    CAD (coronary artery disease) I25.10    COPD (chronic obstructive pulmonary disease) (HCC) J44.9    Delusional disorder (HCC) F22    Acute psychosis ([facility] [address] F23    Major neurocognitive disorder due to Alzheimer's disease, with behavioral disturbance ([facility] G30.9, F02.81       Isolation/Infection:   Isolation            No Isolation          Patient Infection Status       Infection Onset Added Last Indicated Last Indicated By Review Planned Expiration Resolved Resolved By    None active    Resolved    C-diff Rule Out 21 C difficile Molecular/PCR (Ordered)   21 Helen Mansfield LPN    [id number] Rule Out 21 COVID-19 (Ordered)   21 Rule-Out Test Resulted    COVID-19 Rule Out 20 COVID-19 (Ordered)   20 Rule-Out Test Resulted            Nurse Assessment:  Last Vital Signs: BP (!) 142/73   Pulse 75   Temp 97.6 °F (36.4 °C) (Temporal)   Resp 16   Ht 5' (1.524 m)   Wt 150 lb (68 kg)   SpO2 99%   BMI 29.29 kg/m²     Last documented pain score (0-10 scale): Pain Level: 0  Last Weight:   Wt Readings from Last 1 Encounters:   21 150 lb (68 kg)     Mental Status:  {IP PT MENTAL STATUS::::0}    IV Access:  { JADA IV ACCESS:377698004:::0}    Nursing Mobility/ADLs:  Walking   {CHP DME ADLs:237113339:::0}  Transfer  {CHP DME ADLs:385812535:::0}  Bathing  {CHP DME ADLs:735317626:::0}  Dressing  {CHP DME ADLs:266286282:::0}  Toileting  {CHP DME ADLs:056949730:::0}  Feeding  {CHP DME ADLs:242926641:::0}  Med Admin  {CHP DME ADLs:088420380:::0}  Med Delivery   { JADA MED Delivery:067908413:::0}    Wound Care Documentation and Therapy:        Elimination:  Continence:    Bowel: {YES / FP:50653}  Bladder: {YES / H}  Urinary Rehab): Good    Recommended Labs or Other Treatments After Discharge: per patients pcp      Physician Certification: I certify the above information and transfer of Matteo Anton  is necessary for the continuing treatment of the diagnosis listed and that she requires Home Care for greater 30 days.      Update Admission H&P: No change in H&P    PHYSICIAN SIGNATURE:  Electronically signed by EVELYN Saunders CNP on 2/10/21 at 11:21 AM EST

## 2021-02-10 NOTE — BH NOTE
Patient has been pleasant and cooperative this shift and she is med compliant,  She has denied any depression and SI HI AVH. She denied pain an anxiety. Call light is within reach and safety has been maintained.
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
## 2021-02-11 VITALS
DIASTOLIC BLOOD PRESSURE: 60 MMHG | OXYGEN SATURATION: 100 % | SYSTOLIC BLOOD PRESSURE: 134 MMHG | WEIGHT: 150 LBS | HEIGHT: 60 IN | TEMPERATURE: 96.3 F | BODY MASS INDEX: 29.45 KG/M2 | RESPIRATION RATE: 16 BRPM | HEART RATE: 74 BPM

## 2021-02-11 PROCEDURE — 99239 HOSP IP/OBS DSCHRG MGMT >30: CPT | Performed by: NURSE PRACTITIONER

## 2021-02-11 PROCEDURE — 6370000000 HC RX 637 (ALT 250 FOR IP): Performed by: NURSE PRACTITIONER

## 2021-02-11 PROCEDURE — 6370000000 HC RX 637 (ALT 250 FOR IP): Performed by: INTERNAL MEDICINE

## 2021-02-11 RX ADMIN — AMLODIPINE BESYLATE 10 MG: 10 TABLET ORAL at 08:12

## 2021-02-11 RX ADMIN — MULTIPLE VITAMINS W/ MINERALS TAB 1 TABLET: TAB at 08:12

## 2021-02-11 RX ADMIN — OMEGA-3-ACID ETHYL ESTERS 2 G: 1 CAPSULE, LIQUID FILLED ORAL at 08:12

## 2021-02-11 RX ADMIN — Medication 1 CAPSULE: at 08:12

## 2021-02-11 RX ADMIN — PANTOPRAZOLE SODIUM 80 MG: 40 TABLET, DELAYED RELEASE ORAL at 08:13

## 2021-02-11 RX ADMIN — METOPROLOL TARTRATE 25 MG: 25 TABLET, FILM COATED ORAL at 08:12

## 2021-02-11 RX ADMIN — MEMANTINE 10 MG: 10 TABLET ORAL at 08:12

## 2021-02-11 RX ADMIN — FLUTICASONE PROPIONATE 1 SPRAY: 50 SPRAY, METERED NASAL at 08:14

## 2021-02-11 RX ADMIN — SUCRALFATE 1 G: 1 TABLET ORAL at 08:12

## 2021-02-11 RX ADMIN — METFORMIN HYDROCHLORIDE 500 MG: 500 TABLET ORAL at 08:13

## 2021-02-11 RX ADMIN — LISINOPRIL 20 MG: 20 TABLET ORAL at 08:12
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
## 2021-02-11 NOTE — BH NOTE
Pt was out on the unit watching TV and socializing with peers at the beginning of this shift. Pt was calm, cooperative, and pleasant during assessment. Med compliant. Pt denies AVH, SI,HI, anxiety, or depression. Pt has rested well this shift only getting up once to toilet. Pt wandered into the hallway, this nurse directed pt to the bathroom and hand hygiene  and then pt went right back to bed. Will continue to monitor pt safety while on the [facility].

## 2021-02-11 NOTE — PLAN OF CARE
Problem: Altered Mood, Depressive Behavior:  Goal: Able to verbalize acceptance of life and situations over which he or she has no control  Description: Able to verbalize acceptance of life and situations over which he or she has no control  2/10/2021 1205 by Helen Andrew RN  Outcome: Completed  Goal: Able to verbalize and/or display a decrease in depressive symptoms  Description: Able to verbalize and/or display a decrease in depressive symptoms  2/10/2021 1205 by Helen Andrew RN  Outcome: Completed  Goal: Ability to disclose and discuss suicidal ideas will improve  Description: Ability to disclose and discuss suicidal ideas will improve  2/10/2021 1205 by Helen Andrew RN  Outcome: Completed  Goal: Able to verbalize support systems  Description: Able to verbalize support systems  2/10/2021 1205 by Helen Andrew RN  Outcome: Completed  Goal: Absence of self-harm  Description: Absence of self-harm  2/10/2021 1205 by Helen Andrew RN  Outcome: Completed  Goal: Patient specific goal  Description: Patient specific goal  2/10/2021 1205 by Helen Andrew RN  Outcome: Completed  Goal: Participates in care planning  Description: Participates in care planning  2/10/2021 1205 by Helen Andrew RN  Outcome: Completed     Problem: Depressive Behavior With or Without Suicide Precautions:  Goal: Able to verbalize acceptance of life and situations over which he or she has no control  Description: Able to verbalize acceptance of life and situations over which he or she has no control  2/10/2021 1205 by Helen Andrew RN  Outcome: Completed  Goal: Able to verbalize and/or display a decrease in depressive symptoms  Description: Able to verbalize and/or display a decrease in depressive symptoms  2/10/2021 1205 by eHlen Andrew RN  Outcome: Completed  Goal: Ability to disclose and discuss suicidal ideas will improve  Description: Ability to disclose and discuss suicidal ideas will improve  2/10/2021 1205 by Shoshana Haynes RN  Outcome: Completed  Goal: Able to verbalize support systems  Description: Able to verbalize support systems  2/10/2021 1205 by Shoshana Haynes RN  Outcome: Completed  Goal: Absence of self-harm  Description: Absence of self-harm  2/10/2021 1205 by Shoshana Haynes RN  Outcome: Completed  Goal: Patient specific goal  Description: Patient specific goal  2/10/2021 1205 by Shoshana Haynes RN  Outcome: Completed  Goal: Participates in care planning  Description: Participates in care planning  2/10/2021 1205 by Shoshana Haynes RN  Outcome: Completed     Problem: Altered Mood, Deterioration in Function:  Goal: Ability to perform activities of daily living will improve  Description: Ability to perform activities of daily living will improve  2/10/2021 1205 by Shoshana Haynes RN  Outcome: Completed  Goal: Able to verbalize reality based thinking  Description: Able to verbalize reality based thinking  2/10/2021 1205 by Shoshana Haynes RN  Outcome: Completed  Goal: Skin appearance normal  Description: Skin appearance normal  2/10/2021 1205 by hSoshana Haynes RN  Outcome: Completed  Goal: Maintenance of adequate nutrition will improve  Description: Maintenance of adequate nutrition will improve  2/10/2021 1205 by Shoshana Haynes RN  Outcome: Completed  Goal: Ability to tolerate increased activity will improve  Description: Ability to tolerate increased activity will improve  2/10/2021 1205 by Shoshana Haynes RN  Outcome: Completed  Goal: Participates in care planning  Description: Participates in care planning  2/10/2021 1205 by Shoshana Haynes RN  Outcome: Completed  Goal: Patient specific goal  Description: Patient specific goal  2/10/2021 1205 by Shoshana Haynes RN  Outcome: Completed     Problem: Risk of Harm:  Goal: Ability to remain free from injury will improve  Description: Ability to remain free from injury will improve  2/10/2021 1205 by Shoshana Haynes RN  Outcome: Completed Problem: Altered Mood, Manic Behavior:  Goal: Able to sleep  Description: Able to sleep  2/10/2021 1205 by Za Paulson RN  Outcome: Completed  Goal: Able to verbalize decrease in frequency and intensity of racing thoughts  Description: Able to verbalize decrease in frequency and intensity of racing thoughts  2/10/2021 1205 by Za Paulson RN  Outcome: Completed  Goal: Ability to disclose and discuss suicidal ideas will improve  Description: Ability to disclose and discuss suicidal ideas will improve  2/10/2021 1205 by Za Paulson RN  Outcome: Completed  Goal: Absence of self-harm  Description: Absence of self-harm  2/10/2021 1205 by Za Paulson RN  Outcome: Completed  Goal: Ability to achieve adequate nutritional intake will improve  Description: Ability to achieve adequate nutritional intake will improve  2/10/2021 1205 by Za Paulson RN  Outcome: Completed  Goal: Ability to interact with others will improve  Description: Ability to interact with others will improve  2/10/2021 1205 by Za Paulson RN  Outcome: Completed  Goal: Ability to demonstrate self-control will improve  Description: Ability to demonstrate self-control will improve  2/10/2021 1205 by Za Paulson RN  Outcome: Completed  Goal: Mood stable  Description: Mood stable  2/10/2021 1205 by Za Paulson RN  Outcome: Completed     Problem: Falls - Risk of:  Goal: Will remain free from falls  Description: Will remain free from falls  2/10/2021 1205 by Za Paulson RN  Outcome: Completed  Goal: Absence of physical injury  Description: Absence of physical injury  2/10/2021 1205 by Za Paulson RN  Outcome: Completed     Problem: Skin Integrity:  Goal: Will show no infection signs and symptoms  Description: Will show no infection signs and symptoms  2/10/2021 1205 by Za Paulson RN  Outcome: Completed  Goal: Absence of new skin breakdown  Description: Absence of new skin breakdown  2/10/2021 1205 by Nuria Jha Henrietta Conrad, RN  Outcome: Completed

## 2021-02-11 NOTE — BH NOTE
Pt discharged home in private vehicle . Yecenia Polk whom picked Pt up. Pt was assisted out to vehicle in wheelchair. Pt stated she was excited to go home. Sister Regina Kocher called earlier and stated she would call when she got home to go over discharge paperwork. Yecenia Polk stated she has the medications picked up.

## 2021-02-11 NOTE — GROUP NOTE
Group Therapy Note    Date: 2/11/2021    Group Start Time: 0830  Group End Time: 0900    Number of Participants: 5/6    Type: Morning Goals Group/ Community Meeting    Group Topic/Objective: Set Goal For The Day and to review Unit Rules and Regulations. Patient's Goal:  Pt states her goal is \"Go home. \"    Notes:  Pt presented as pleasant and cooperative during group. Pt states she is feeling \"wonderful\" and is grateful for \"going home today. \"  Pt reports restful sleep of ~8 hours. Depression (0-10): 0    Anxiety (0-10): 0    Irritability/Aggitation (0-10): 0    Status After Intervention:  Improved    Participation Level:  Active Listener and Interactive    Participation Quality: Appropriate, Attentive and Sharing    Speech:  normal    Thought Process/Content: Logical    Affective Functioning: Congruent    Mood: Bright    Level of consciousness:  Alert and Attentive    Response to Learning: Able to verbalize current knowledge/experience    Endings: None Reported    Modes of Intervention: Education, Support and Socialization    Discipline Responsible: Certified Therapeutic Recreation Specialist     Electronically signed by Griselda Perches, MA on 2/11/2021 at 9:58 AM

## 2021-02-12 ENCOUNTER — TELEPHONE (OUTPATIENT)
Dept: PSYCHIATRY | Age: 71
End: 2021-02-12

## 2021-02-13 ENCOUNTER — TELEPHONE (OUTPATIENT)
Dept: PSYCHIATRY | Age: 71
End: 2021-02-13

## 2021-02-18 ENCOUNTER — TELEPHONE (OUTPATIENT)
Dept: PSYCHIATRY | Age: 71
End: 2021-02-18

## 2021-02-19 ENCOUNTER — TELEPHONE (OUTPATIENT)
Dept: PSYCHIATRY | Age: 71
End: 2021-02-19

## 2021-02-22 ENCOUNTER — TELEPHONE (OUTPATIENT)
Dept: PSYCHIATRY | Age: 71
End: 2021-02-22

## 2021-02-23 ENCOUNTER — HOSPITAL ENCOUNTER (OUTPATIENT)
Dept: PSYCHIATRY | Age: 71
Setting detail: THERAPIES SERIES
Discharge: HOME | End: 2021-02-23
Payer: MEDICARE

## 2021-02-23 VITALS — SYSTOLIC BLOOD PRESSURE: 146 MMHG | OXYGEN SATURATION: 97 % | DIASTOLIC BLOOD PRESSURE: 67 MMHG | HEART RATE: 86 BPM

## 2021-02-23 DIAGNOSIS — Z09 HOSPITAL DISCHARGE FOLLOW-UP: ICD-10-CM

## 2021-02-23 DIAGNOSIS — R32 URINARY INCONTINENCE IN FEMALE: ICD-10-CM

## 2021-02-23 DIAGNOSIS — K59.1 FUNCTIONAL DIARRHEA: ICD-10-CM

## 2021-02-23 DIAGNOSIS — F02.818 MAJOR NEUROCOGNITIVE DISORDER DUE TO ALZHEIMER'S DISEASE, WITH BEHAVIORAL DISTURBANCE (HCC): Primary | ICD-10-CM

## 2021-02-23 DIAGNOSIS — G30.9 MAJOR NEUROCOGNITIVE DISORDER DUE TO ALZHEIMER'S DISEASE, WITH BEHAVIORAL DISTURBANCE (HCC): Primary | ICD-10-CM

## 2021-02-23 PROCEDURE — 90832 PSYTX W PT 30 MINUTES: CPT | Performed by: NURSE PRACTITIONER

## 2021-02-23 RX ORDER — LOPERAMIDE HYDROCHLORIDE 2 MG/1
2 TABLET ORAL 4 TIMES DAILY PRN
Qty: 120 TABLET | Refills: 1 | Status: SHIPPED | OUTPATIENT
Start: 2021-02-23 | End: 2021-06-02

## 2021-02-23 NOTE — PROGRESS NOTES
Subjective:      Zaira Rangel is a 65 y.o. female who presents today for follow up on her chronic medical conditions as noted below. Vitals:    02/23/21 1545   BP: (!) 146/67   Pulse:    SpO2:        Patient Active Problem List:     Essential hypertension     Lumbar herniated disc     Spinal stenosis     Restless leg syndrome     FH: CAD (coronary artery disease)     Ureteric stone     Family history of ischemic heart disease and other diseases of the circulatory system     Need for vaccination against Streptococcus pneumoniae using pneumococcal conjugate vaccine 13     At high risk for falls     Elevated alkaline phosphatase level     Diabetes mellitus type 2, diet-controlled (HCC)     Mixed hyperlipidemia     Urinary incontinence in female     Shoulder pain     Gastroesophageal reflux disease     Late onset Alzheimer's disease without behavioral disturbance ([address]     Dementia with behavioral disturbance ([address]     Alzheimer's disease with late onset (CODE) ([address]     CAD (coronary artery disease)     COPD (chronic obstructive pulmonary disease) ([address]     Delusional disorder ([address]     Acute psychosis ([address]     Major neurocognitive disorder due to Alzheimer's disease, with behavioral disturbance ([address]    Richard Marrufo is a pleasant 65year old female who presents today for a [employer]. She denies SI/HI and AV hallucinations. Denies depression and anxiety. States sleep is \"OK\" sister Oscar Izquierdo states patient is sleeping 10-11 hours nightly. Notes her appetite is \"great. \" States she continues to have diarrhea. Sister states patient is taking her medications as prescribed. C-SSRS Suicide Screening 2/23/2021   1) Within the past month, have you wished you were dead or wished you could go to sleep and not wake up? No   2) Have you actually had any thoughts of killing yourself? No   6) Have you ever done anything, started to do anything, or prepared to do anything to end your life?  No     Past Medical, Family, and Social History have been reviewed today. Past Medical History:   Diagnosis Date    Alzheimer disease ([facility]     Bronchitis     CAD (coronary artery disease)     Chest wall trauma     COPD (chronic obstructive pulmonary disease) [facility]     FH: CAD (coronary artery disease)     brother with cabg in his 45s    Hypertension     Kidney stone     Lumbar herniated disc     Restless leg syndrome     Spinal stenosis         Social History     Tobacco Use    Smoking status: Never Smoker    Smokeless tobacco: Never Used   Substance Use Topics    Alcohol use: No        Current Outpatient Medications   Medication Sig Dispense Refill    loperamide (LOPERAMIDE A-D) 2 MG tablet Take 1 tablet by mouth 4 times daily as needed for Diarrhea 120 tablet 1    Incontinence Supply Disposable MISC 1 Units by Does not apply route every 4 hours as needed (diarrhea, urinary incontinence) 240 each 1    calcium carbonate (TUMS) 500 MG chewable tablet Take 1 tablet by mouth 3 times daily as needed for Heartburn 90 tablet 0    traZODone (DESYREL) 150 MG tablet Take 1 tablet by mouth nightly 30 tablet 1    lactobacillus (CULTURELLE) capsule Take 1 capsule by mouth daily (with breakfast) 30 capsule 1    atorvastatin (LIPITOR) 80 MG tablet Take 1 tablet by mouth nightly Indications: take at bedtime.  30 tablet 3    omega-3 acid ethyl esters (LOVAZA) 1 g capsule Take 2 capsules by mouth 2 times daily 60 capsule 1    lisinopril (PRINIVIL;ZESTRIL) 20 MG tablet Take 1 tablet by mouth daily 30 tablet 3    risperiDONE (RISPERDAL) 1 MG tablet Take 1 tablet by mouth nightly 60 tablet 1    amLODIPine (NORVASC) 10 MG tablet Take 1 tablet by mouth daily 30 tablet 3    pantoprazole (PROTONIX) 40 MG tablet Take 2 tablets by mouth 2 times daily (before meals) 30 tablet 1    sucralfate (CARAFATE) 1 GM tablet Take 1 tablet by mouth 4 times daily (before meals and nightly) 120 tablet 1    divalproex (DEPAKOTE SPRINKLE) 125 MG capsule Take 6 capsules by mouth Daily with supper 180 capsule 1    Incontinence Supplies MISC Pull-ups size large 240 each 2    metFORMIN (GLUCOPHAGE) 500 MG tablet Take 1 tablet by mouth 2 times daily (with meals) 60 tablet 1    ferrous sulfate (IRON 325) 325 (65 Fe) MG tablet Take 1 tablet by mouth 2 times daily (with meals) 60 tablet 1    donepezil (ARICEPT) 10 MG tablet Take 1 tablet by mouth nightly 30 tablet 1    Multiple Vitamins-Minerals (THERAPEUTIC MULTIVITAMIN-MINERALS) tablet Take 1 tablet by mouth daily 30 tablet 1    memantine (NAMENDA) 10 MG tablet Take 1 tablet by mouth daily 30 tablet 1    metoprolol tartrate (LOPRESSOR) 25 MG tablet Take 1 tablet by mouth 2 times daily 60 tablet 2    fluticasone (FLONASE) 50 MCG/ACT nasal spray 1 spray by Each Nostril route daily 1 Bottle 0     No current facility-administered medications for this encounter. ROS: The patient has had no headache, sore throat, fever or chills, cough, dyspnea, chest pain, nausea, vomiting or diarrhea, or edema. Objective:      BP (!) 146/67   Pulse 86   SpO2 97%      General: in no apparent distress   The patient's neck is free of nodes. Lungs are clear. Heart is normal in rate and regular in rhythm. Legs are free of edema. No rash or erythema. Assessment / Plan:      1. Major neurocognitive disorder due to Alzheimer's disease, with behavioral disturbance ([facility]    2. Functional diarrhea    3. Urinary incontinence in female    4. Hospital discharge follow-up          Plan   1. Continue present medications - they will call when refill needed  2. Start loperamide 2 mg QID PRN  3.  Refill Depends       Return in 1 month (on 3/23/2021) for 4:30 pm.

## 2021-03-01 NOTE — GROUP NOTE
Group Therapy Note    Date: 1/29/2021    Group Start Time: 5824  Group End Time: 9592  Group Topic: Psychotherapy    [address] [facility]        Group Therapy Note    Attendees: 3/5         Notes:  Patient did not attend.  Patient was sleeping  Discipline Responsible: /Counselor      Signature:  KASEY Damico Lantus prescribed.  Discussed with patient.

## 2021-03-04 ENCOUNTER — HOSPITAL ENCOUNTER (EMERGENCY)
Age: 71
Discharge: HOME | End: 2021-03-05
Attending: EMERGENCY MEDICINE
Payer: MEDICARE

## 2021-03-04 ENCOUNTER — APPOINTMENT (OUTPATIENT)
Dept: GENERAL RADIOLOGY | Age: 71
End: 2021-03-04
Payer: MEDICARE

## 2021-03-04 DIAGNOSIS — R60.0 BILATERAL LOWER EXTREMITY EDEMA: Primary | ICD-10-CM

## 2021-03-04 LAB
BASE EXCESS MIXED: 4.2 (ref 0–2.3)
BASE EXCESS: ABNORMAL (ref 0–2.4)
CO2 CONTENT: 31.5 MMOL/L (ref 19–24)
HCO3 VENOUS: 30 MMOL/L (ref 19–25)
O2 SAT, VEN: 70.9 % (ref 50–70)
PCO2, VEN: 49.1 MMHG (ref 38–52)
PH VENOUS: 7.39 (ref 7.32–7.42)
PO2, VEN: 38 MMHG (ref 28–48)
SOURCE, BLOOD GAS: ABNORMAL

## 2021-03-04 PROCEDURE — 80053 COMPREHEN METABOLIC PANEL: CPT

## 2021-03-04 PROCEDURE — 71045 X-RAY EXAM CHEST 1 VIEW: CPT

## 2021-03-04 PROCEDURE — 83880 ASSAY OF NATRIURETIC PEPTIDE: CPT

## 2021-03-04 PROCEDURE — 93005 ELECTROCARDIOGRAM TRACING: CPT | Performed by: EMERGENCY MEDICINE

## 2021-03-04 PROCEDURE — 85025 COMPLETE CBC W/AUTO DIFF WBC: CPT

## 2021-03-04 PROCEDURE — 99285 EMERGENCY DEPT VISIT HI MDM: CPT

## 2021-03-05 ENCOUNTER — HOSPITAL ENCOUNTER (OUTPATIENT)
Age: 71
Setting detail: OBSERVATION
Discharge: OTHER INSTITUTION | End: 2021-03-08
Attending: EMERGENCY MEDICINE | Admitting: INTERNAL MEDICINE
Payer: MEDICARE

## 2021-03-05 ENCOUNTER — OFFICE VISIT (OUTPATIENT)
Dept: INTERNAL MEDICINE CLINIC | Age: 71
End: 2021-03-05
Payer: MEDICARE

## 2021-03-05 VITALS
DIASTOLIC BLOOD PRESSURE: 78 MMHG | HEART RATE: 91 BPM | WEIGHT: 140 LBS | HEIGHT: 62 IN | OXYGEN SATURATION: 97 % | SYSTOLIC BLOOD PRESSURE: 161 MMHG | BODY MASS INDEX: 25.76 KG/M2 | RESPIRATION RATE: 13 BRPM | TEMPERATURE: 97.7 F

## 2021-03-05 VITALS
HEIGHT: 67 IN | OXYGEN SATURATION: 85 % | HEART RATE: 124 BPM | SYSTOLIC BLOOD PRESSURE: 125 MMHG | WEIGHT: 160 LBS | TEMPERATURE: 98.7 F | DIASTOLIC BLOOD PRESSURE: 60 MMHG | BODY MASS INDEX: 25.11 KG/M2

## 2021-03-05 DIAGNOSIS — R06.02 SHORTNESS OF BREATH: ICD-10-CM

## 2021-03-05 DIAGNOSIS — F02.818 LATE ONSET ALZHEIMER'S DISEASE WITH BEHAVIORAL DISTURBANCE (HCC): ICD-10-CM

## 2021-03-05 DIAGNOSIS — R60.9 PERIPHERAL EDEMA: ICD-10-CM

## 2021-03-05 DIAGNOSIS — R26.2 IMPAIRED AMBULATION: Primary | ICD-10-CM

## 2021-03-05 DIAGNOSIS — G30.1 LATE ONSET ALZHEIMER'S DISEASE WITH BEHAVIORAL DISTURBANCE (HCC): ICD-10-CM

## 2021-03-05 DIAGNOSIS — R00.0 TACHYCARDIA: Primary | ICD-10-CM

## 2021-03-05 PROBLEM — M79.89 LEG SWELLING: Status: ACTIVE | Noted: 2021-03-05

## 2021-03-05 LAB
ALBUMIN SERPL-MCNC: 3.7 GM/DL (ref 3.4–5)
ALBUMIN SERPL-MCNC: 3.8 GM/DL (ref 3.4–5)
ALP BLD-CCNC: 148 IU/L (ref 40–129)
ALP BLD-CCNC: 156 IU/L (ref 40–129)
ALT SERPL-CCNC: 17 U/L (ref 10–40)
ALT SERPL-CCNC: 18 U/L (ref 10–40)
ANION GAP SERPL CALCULATED.3IONS-SCNC: 7 MMOL/L (ref 4–16)
ANION GAP SERPL CALCULATED.3IONS-SCNC: 7 MMOL/L (ref 4–16)
AST SERPL-CCNC: 16 IU/L (ref 15–37)
AST SERPL-CCNC: 17 IU/L (ref 15–37)
BASE EXCESS MIXED: 7 (ref 0–2.3)
BASE EXCESS: ABNORMAL (ref 0–2.4)
BASOPHILS ABSOLUTE: 0 K/CU MM
BASOPHILS ABSOLUTE: 0.1 K/CU MM
BASOPHILS RELATIVE PERCENT: 0.5 % (ref 0–1)
BASOPHILS RELATIVE PERCENT: 0.7 % (ref 0–1)
BILIRUB SERPL-MCNC: 0.3 MG/DL (ref 0–1)
BILIRUB SERPL-MCNC: 0.3 MG/DL (ref 0–1)
BUN BLDV-MCNC: 12 MG/DL (ref 6–23)
BUN BLDV-MCNC: 13 MG/DL (ref 6–23)
CALCIUM SERPL-MCNC: 9.5 MG/DL (ref 8.3–10.6)
CALCIUM SERPL-MCNC: 9.5 MG/DL (ref 8.3–10.6)
CHLORIDE BLD-SCNC: 102 MMOL/L (ref 99–110)
CHLORIDE BLD-SCNC: 99 MMOL/L (ref 99–110)
CO2 CONTENT: 35.3 MMOL/L (ref 19–24)
CO2: 32 MMOL/L (ref 21–32)
CO2: 35 MMOL/L (ref 21–32)
CREAT SERPL-MCNC: 0.8 MG/DL (ref 0.6–1.1)
CREAT SERPL-MCNC: 0.9 MG/DL (ref 0.6–1.1)
DIFFERENTIAL TYPE: ABNORMAL
DIFFERENTIAL TYPE: ABNORMAL
EOSINOPHILS ABSOLUTE: 0.2 K/CU MM
EOSINOPHILS ABSOLUTE: 0.2 K/CU MM
EOSINOPHILS RELATIVE PERCENT: 2.2 % (ref 0–3)
EOSINOPHILS RELATIVE PERCENT: 2.4 % (ref 0–3)
GFR AFRICAN AMERICAN: >60 ML/MIN/1.73M2
GFR AFRICAN AMERICAN: >60 ML/MIN/1.73M2
GFR NON-AFRICAN AMERICAN: >60 ML/MIN/1.73M2
GFR NON-AFRICAN AMERICAN: >60 ML/MIN/1.73M2
GLUCOSE BLD-MCNC: 143 MG/DL (ref 70–99)
GLUCOSE BLD-MCNC: 227 MG/DL (ref 70–99)
HCO3 VENOUS: 33.6 MMOL/L (ref 19–25)
HCT VFR BLD CALC: 32.3 % (ref 37–47)
HCT VFR BLD CALC: 32.9 % (ref 37–47)
HEMOGLOBIN: 10.6 GM/DL (ref 12.5–16)
HEMOGLOBIN: 10.7 GM/DL (ref 12.5–16)
IMMATURE NEUTROPHIL %: 1.1 % (ref 0–0.43)
IMMATURE NEUTROPHIL %: 1.1 % (ref 0–0.43)
LYMPHOCYTES ABSOLUTE: 1.2 K/CU MM
LYMPHOCYTES ABSOLUTE: 1.3 K/CU MM
LYMPHOCYTES RELATIVE PERCENT: 14.3 % (ref 24–44)
LYMPHOCYTES RELATIVE PERCENT: 18.2 % (ref 24–44)
MCH RBC QN AUTO: 31.8 PG (ref 27–31)
MCH RBC QN AUTO: 32.2 PG (ref 27–31)
MCHC RBC AUTO-ENTMCNC: 32.5 % (ref 32–36)
MCHC RBC AUTO-ENTMCNC: 32.8 % (ref 32–36)
MCV RBC AUTO: 97.6 FL (ref 78–100)
MCV RBC AUTO: 98.2 FL (ref 78–100)
MONOCYTES ABSOLUTE: 0.7 K/CU MM
MONOCYTES ABSOLUTE: 0.9 K/CU MM
MONOCYTES RELATIVE PERCENT: 10 % (ref 0–4)
MONOCYTES RELATIVE PERCENT: 11.1 % (ref 0–4)
O2 SAT, VEN: 55.5 % (ref 50–70)
PCO2, VEN: 57.1 MMHG (ref 38–52)
PDW BLD-RTO: 13 % (ref 11.7–14.9)
PDW BLD-RTO: 13.1 % (ref 11.7–14.9)
PH VENOUS: 7.38 (ref 7.32–7.42)
PLATELET # BLD: 202 K/CU MM (ref 140–440)
PLATELET # BLD: 212 K/CU MM (ref 140–440)
PMV BLD AUTO: 10 FL (ref 7.5–11.1)
PMV BLD AUTO: 9.9 FL (ref 7.5–11.1)
PO2, VEN: 30.7 MMHG (ref 28–48)
POTASSIUM SERPL-SCNC: 3.6 MMOL/L (ref 3.5–5.1)
POTASSIUM SERPL-SCNC: 3.8 MMOL/L (ref 3.5–5.1)
PRO-BNP: 281.4 PG/ML
PRO-BNP: 332.3 PG/ML
RBC # BLD: 3.29 M/CU MM (ref 4.2–5.4)
RBC # BLD: 3.37 M/CU MM (ref 4.2–5.4)
SEGMENTED NEUTROPHILS ABSOLUTE COUNT: 4.9 K/CU MM
SEGMENTED NEUTROPHILS ABSOLUTE COUNT: 5.8 K/CU MM
SEGMENTED NEUTROPHILS RELATIVE PERCENT: 67.8 % (ref 36–66)
SEGMENTED NEUTROPHILS RELATIVE PERCENT: 70.6 % (ref 36–66)
SODIUM BLD-SCNC: 138 MMOL/L (ref 135–145)
SODIUM BLD-SCNC: 144 MMOL/L (ref 135–145)
SOURCE, BLOOD GAS: ABNORMAL
TOTAL IMMATURE NEUTOROPHIL: 0.08 K/CU MM
TOTAL IMMATURE NEUTOROPHIL: 0.09 K/CU MM
TOTAL PROTEIN: 5.9 GM/DL (ref 6.4–8.2)
TOTAL PROTEIN: 6.2 GM/DL (ref 6.4–8.2)
TROPONIN T: <0.01 NG/ML
TROPONIN T: <0.01 NG/ML
WBC # BLD: 7.2 K/CU MM (ref 4–10.5)
WBC # BLD: 8.3 K/CU MM (ref 4–10.5)

## 2021-03-05 PROCEDURE — 6360000002 HC RX W HCPCS: Performed by: EMERGENCY MEDICINE

## 2021-03-05 PROCEDURE — 6360000002 HC RX W HCPCS: Performed by: INTERNAL MEDICINE

## 2021-03-05 PROCEDURE — 3017F COLORECTAL CA SCREEN DOC REV: CPT | Performed by: INTERNAL MEDICINE

## 2021-03-05 PROCEDURE — 1111F DSCHRG MED/CURRENT MED MERGE: CPT | Performed by: INTERNAL MEDICINE

## 2021-03-05 PROCEDURE — 96372 THER/PROPH/DIAG INJ SC/IM: CPT

## 2021-03-05 PROCEDURE — 93010 ELECTROCARDIOGRAM REPORT: CPT | Performed by: INTERNAL MEDICINE

## 2021-03-05 PROCEDURE — 96375 TX/PRO/DX INJ NEW DRUG ADDON: CPT

## 2021-03-05 PROCEDURE — 96376 TX/PRO/DX INJ SAME DRUG ADON: CPT

## 2021-03-05 PROCEDURE — G8427 DOCREV CUR MEDS BY ELIG CLIN: HCPCS | Performed by: INTERNAL MEDICINE

## 2021-03-05 PROCEDURE — G0378 HOSPITAL OBSERVATION PER HR: HCPCS

## 2021-03-05 PROCEDURE — 1123F ACP DISCUSS/DSCN MKR DOCD: CPT | Performed by: INTERNAL MEDICINE

## 2021-03-05 PROCEDURE — G8400 PT W/DXA NO RESULTS DOC: HCPCS | Performed by: INTERNAL MEDICINE

## 2021-03-05 PROCEDURE — 99214 OFFICE O/P EST MOD 30 MIN: CPT | Performed by: INTERNAL MEDICINE

## 2021-03-05 PROCEDURE — 84484 ASSAY OF TROPONIN QUANT: CPT

## 2021-03-05 PROCEDURE — 80053 COMPREHEN METABOLIC PANEL: CPT

## 2021-03-05 PROCEDURE — 4040F PNEUMOC VAC/ADMIN/RCVD: CPT | Performed by: INTERNAL MEDICINE

## 2021-03-05 PROCEDURE — 1036F TOBACCO NON-USER: CPT | Performed by: INTERNAL MEDICINE

## 2021-03-05 PROCEDURE — 99284 EMERGENCY DEPT VISIT MOD MDM: CPT

## 2021-03-05 PROCEDURE — 6370000000 HC RX 637 (ALT 250 FOR IP): Performed by: INTERNAL MEDICINE

## 2021-03-05 PROCEDURE — G8417 CALC BMI ABV UP PARAM F/U: HCPCS | Performed by: INTERNAL MEDICINE

## 2021-03-05 PROCEDURE — 2580000003 HC RX 258: Performed by: INTERNAL MEDICINE

## 2021-03-05 PROCEDURE — 96374 THER/PROPH/DIAG INJ IV PUSH: CPT

## 2021-03-05 PROCEDURE — 93000 ELECTROCARDIOGRAM COMPLETE: CPT | Performed by: INTERNAL MEDICINE

## 2021-03-05 PROCEDURE — G8484 FLU IMMUNIZE NO ADMIN: HCPCS | Performed by: INTERNAL MEDICINE

## 2021-03-05 PROCEDURE — 83880 ASSAY OF NATRIURETIC PEPTIDE: CPT

## 2021-03-05 PROCEDURE — 1090F PRES/ABSN URINE INCON ASSESS: CPT | Performed by: INTERNAL MEDICINE

## 2021-03-05 PROCEDURE — 85025 COMPLETE CBC W/AUTO DIFF WBC: CPT

## 2021-03-05 RX ORDER — PANTOPRAZOLE SODIUM 40 MG/1
80 TABLET, DELAYED RELEASE ORAL
Status: DISCONTINUED | OUTPATIENT
Start: 2021-03-05 | End: 2021-03-09 | Stop reason: HOSPADM

## 2021-03-05 RX ORDER — SODIUM CHLORIDE 0.9 % (FLUSH) 0.9 %
10 SYRINGE (ML) INJECTION PRN
Status: DISCONTINUED | OUTPATIENT
Start: 2021-03-05 | End: 2021-03-09 | Stop reason: HOSPADM

## 2021-03-05 RX ORDER — FUROSEMIDE 10 MG/ML
20 INJECTION INTRAMUSCULAR; INTRAVENOUS ONCE
Status: COMPLETED | OUTPATIENT
Start: 2021-03-05 | End: 2021-03-05

## 2021-03-05 RX ORDER — LISINOPRIL 20 MG/1
20 TABLET ORAL DAILY
Status: DISCONTINUED | OUTPATIENT
Start: 2021-03-05 | End: 2021-03-09 | Stop reason: HOSPADM

## 2021-03-05 RX ORDER — ACETAMINOPHEN 325 MG/1
650 TABLET ORAL EVERY 6 HOURS PRN
Status: DISCONTINUED | OUTPATIENT
Start: 2021-03-05 | End: 2021-03-09 | Stop reason: HOSPADM

## 2021-03-05 RX ORDER — DONEPEZIL HYDROCHLORIDE 10 MG/1
10 TABLET, FILM COATED ORAL NIGHTLY
Status: DISCONTINUED | OUTPATIENT
Start: 2021-03-05 | End: 2021-03-09 | Stop reason: HOSPADM

## 2021-03-05 RX ORDER — ATORVASTATIN CALCIUM 80 MG/1
80 TABLET, FILM COATED ORAL NIGHTLY
Status: DISCONTINUED | OUTPATIENT
Start: 2021-03-05 | End: 2021-03-09 | Stop reason: HOSPADM

## 2021-03-05 RX ORDER — FUROSEMIDE 20 MG/1
20 TABLET ORAL 2 TIMES DAILY
Qty: 60 TABLET | Refills: 0 | Status: SHIPPED | OUTPATIENT
Start: 2021-03-05 | End: 2021-04-19 | Stop reason: SDUPTHER

## 2021-03-05 RX ORDER — FUROSEMIDE 10 MG/ML
20 INJECTION INTRAMUSCULAR; INTRAVENOUS 2 TIMES DAILY
Status: DISCONTINUED | OUTPATIENT
Start: 2021-03-05 | End: 2021-03-09 | Stop reason: HOSPADM

## 2021-03-05 RX ORDER — CALCIUM CARBONATE 200(500)MG
500 TABLET,CHEWABLE ORAL 3 TIMES DAILY PRN
Status: DISCONTINUED | OUTPATIENT
Start: 2021-03-05 | End: 2021-03-09 | Stop reason: HOSPADM

## 2021-03-05 RX ORDER — POLYETHYLENE GLYCOL 3350 17 G/17G
17 POWDER, FOR SOLUTION ORAL DAILY PRN
Status: DISCONTINUED | OUTPATIENT
Start: 2021-03-05 | End: 2021-03-09 | Stop reason: HOSPADM

## 2021-03-05 RX ORDER — DIVALPROEX SODIUM 125 MG/1
750 CAPSULE, COATED PELLETS ORAL
Status: DISCONTINUED | OUTPATIENT
Start: 2021-03-05 | End: 2021-03-09 | Stop reason: HOSPADM

## 2021-03-05 RX ORDER — PROMETHAZINE HYDROCHLORIDE 12.5 MG/1
12.5 TABLET ORAL EVERY 6 HOURS PRN
Status: DISCONTINUED | OUTPATIENT
Start: 2021-03-05 | End: 2021-03-09 | Stop reason: HOSPADM

## 2021-03-05 RX ORDER — SUCRALFATE 1 G/1
1 TABLET ORAL
Status: DISCONTINUED | OUTPATIENT
Start: 2021-03-05 | End: 2021-03-09 | Stop reason: HOSPADM

## 2021-03-05 RX ORDER — POTASSIUM CHLORIDE 7.45 MG/ML
10 INJECTION INTRAVENOUS PRN
Status: DISCONTINUED | OUTPATIENT
Start: 2021-03-05 | End: 2021-03-09 | Stop reason: HOSPADM

## 2021-03-05 RX ORDER — AMLODIPINE BESYLATE 10 MG/1
10 TABLET ORAL DAILY
Status: DISCONTINUED | OUTPATIENT
Start: 2021-03-05 | End: 2021-03-05

## 2021-03-05 RX ORDER — MEMANTINE HYDROCHLORIDE 10 MG/1
10 TABLET ORAL DAILY
Status: DISCONTINUED | OUTPATIENT
Start: 2021-03-05 | End: 2021-03-09 | Stop reason: HOSPADM

## 2021-03-05 RX ORDER — FERROUS SULFATE 325(65) MG
325 TABLET ORAL 2 TIMES DAILY WITH MEALS
Status: DISCONTINUED | OUTPATIENT
Start: 2021-03-05 | End: 2021-03-09 | Stop reason: HOSPADM

## 2021-03-05 RX ORDER — ACETAMINOPHEN 650 MG/1
650 SUPPOSITORY RECTAL EVERY 6 HOURS PRN
Status: DISCONTINUED | OUTPATIENT
Start: 2021-03-05 | End: 2021-03-09 | Stop reason: HOSPADM

## 2021-03-05 RX ORDER — SODIUM CHLORIDE 0.9 % (FLUSH) 0.9 %
10 SYRINGE (ML) INJECTION EVERY 12 HOURS SCHEDULED
Status: DISCONTINUED | OUTPATIENT
Start: 2021-03-05 | End: 2021-03-09 | Stop reason: HOSPADM

## 2021-03-05 RX ORDER — MAGNESIUM SULFATE IN WATER 40 MG/ML
2000 INJECTION, SOLUTION INTRAVENOUS PRN
Status: DISCONTINUED | OUTPATIENT
Start: 2021-03-05 | End: 2021-03-09 | Stop reason: HOSPADM

## 2021-03-05 RX ORDER — LOPERAMIDE HYDROCHLORIDE 2 MG/1
2 CAPSULE ORAL 4 TIMES DAILY PRN
Status: DISCONTINUED | OUTPATIENT
Start: 2021-03-05 | End: 2021-03-09 | Stop reason: HOSPADM

## 2021-03-05 RX ORDER — RISPERIDONE 1 MG/1
1 TABLET, FILM COATED ORAL NIGHTLY
Status: DISCONTINUED | OUTPATIENT
Start: 2021-03-05 | End: 2021-03-09 | Stop reason: HOSPADM

## 2021-03-05 RX ORDER — FLUTICASONE PROPIONATE 50 MCG
1 SPRAY, SUSPENSION (ML) NASAL DAILY
Status: DISCONTINUED | OUTPATIENT
Start: 2021-03-05 | End: 2021-03-09 | Stop reason: HOSPADM

## 2021-03-05 RX ORDER — ONDANSETRON 2 MG/ML
4 INJECTION INTRAMUSCULAR; INTRAVENOUS EVERY 6 HOURS PRN
Status: DISCONTINUED | OUTPATIENT
Start: 2021-03-05 | End: 2021-03-09 | Stop reason: HOSPADM

## 2021-03-05 RX ADMIN — FERROUS SULFATE TAB 325 MG (65 MG ELEMENTAL FE) 325 MG: 325 (65 FE) TAB at 17:30

## 2021-03-05 RX ADMIN — PANTOPRAZOLE SODIUM 80 MG: 40 TABLET, DELAYED RELEASE ORAL at 17:30

## 2021-03-05 RX ADMIN — DONEPEZIL HYDROCHLORIDE 10 MG: 10 TABLET, FILM COATED ORAL at 20:34

## 2021-03-05 RX ADMIN — SUCRALFATE 1 G: 1 TABLET ORAL at 20:34

## 2021-03-05 RX ADMIN — TRAZODONE HYDROCHLORIDE 150 MG: 50 TABLET ORAL at 20:34

## 2021-03-05 RX ADMIN — SODIUM CHLORIDE, PRESERVATIVE FREE 10 ML: 5 INJECTION INTRAVENOUS at 20:34

## 2021-03-05 RX ADMIN — ATORVASTATIN CALCIUM 80 MG: 80 TABLET, FILM COATED ORAL at 20:34

## 2021-03-05 RX ADMIN — LISINOPRIL 20 MG: 20 TABLET ORAL at 15:32

## 2021-03-05 RX ADMIN — AMLODIPINE BESYLATE 10 MG: 10 TABLET ORAL at 15:32

## 2021-03-05 RX ADMIN — FUROSEMIDE 20 MG: 10 INJECTION, SOLUTION INTRAMUSCULAR; INTRAVENOUS at 10:14

## 2021-03-05 RX ADMIN — DIVALPROEX SODIUM 750 MG: 125 CAPSULE, COATED PELLETS ORAL at 17:29

## 2021-03-05 RX ADMIN — RISPERIDONE 1 MG: 1 TABLET ORAL at 20:34

## 2021-03-05 RX ADMIN — FUROSEMIDE 20 MG: 10 INJECTION, SOLUTION INTRAMUSCULAR; INTRAVENOUS at 17:29

## 2021-03-05 RX ADMIN — METFORMIN HYDROCHLORIDE 500 MG: 500 TABLET ORAL at 17:30

## 2021-03-05 RX ADMIN — ENOXAPARIN SODIUM 40 MG: 40 INJECTION SUBCUTANEOUS at 15:32

## 2021-03-05 RX ADMIN — SUCRALFATE 1 G: 1 TABLET ORAL at 17:31

## 2021-03-05 RX ADMIN — MEMANTINE 10 MG: 10 TABLET ORAL at 15:32

## 2021-03-05 RX ADMIN — SODIUM CHLORIDE, PRESERVATIVE FREE 10 ML: 5 INJECTION INTRAVENOUS at 17:31

## 2021-03-05 RX ADMIN — METOPROLOL TARTRATE 25 MG: 25 TABLET, FILM COATED ORAL at 20:34

## 2021-03-05 ASSESSMENT — PAIN SCALES - GENERAL
PAINLEVEL_OUTOF10: 0

## 2021-03-05 ASSESSMENT — ENCOUNTER SYMPTOMS
EYES NEGATIVE: 1
RESPIRATORY NEGATIVE: 1
GASTROINTESTINAL NEGATIVE: 1
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
## 2021-03-05 NOTE — LETTER
[address]  [address] [facility]  Phone: [phone]  Fax: [phone]    Judy Vieira MD        March 5, 2021     Patient: Stevie Velasquez   YOB: 1950   Date of Visit: 3/5/2021       To Whom It May Concern: It is my medical opinion that Sandro Garza requires a disability parking placard for the following reasons:  She cannot walk 200 feet without stopping to rest.  Duration of need: 5 years    If you have any questions or concerns, please don't hesitate to call.     Sincerely,         Judy Vieira MD

## 2021-03-05 NOTE — PLAN OF CARE
Problem: Falls - Risk of:  Goal: Will remain free from falls  Description: Will remain free from falls  Outcome: Ongoing  Goal: Absence of physical injury  Description: Absence of physical injury  Outcome: Ongoing     Problem: Discharge Planning:  Goal: Participates in care planning  Description: Participates in care planning  Outcome: Ongoing  Goal: Discharged to appropriate level of care  Description: Discharged to appropriate level of care  Outcome: Ongoing     Problem:  Activity Intolerance:  Goal: Ability to tolerate increased activity will improve  Description: Ability to tolerate increased activity will improve  Outcome: Ongoing     Problem: Anxiety/Stress:  Goal: Level of anxiety will decrease  Description: Level of anxiety will decrease  Outcome: Ongoing     Problem: Fluid Volume - Deficit:  Goal: Absence of fluid volume deficit signs and symptoms  Description: Absence of fluid volume deficit signs and symptoms  Outcome: Ongoing  Goal: Electrolytes within specified parameters  Description: Electrolytes within specified parameters  Outcome: Ongoing     Problem: Mental Status - Impaired:  Goal: Absence of physical injury  Description: Absence of physical injury  Outcome: Ongoing  Goal: Absence of continued neurological deterioration signs and symptoms  Description: Absence of continued neurological deterioration signs and symptoms  Outcome: Ongoing  Goal: Mental status will be restored to baseline  Description: Mental status will be restored to baseline  Outcome: Ongoing     Problem: Mobility - Impaired:  Goal: Mobility will improve to maximum level  Description: Mobility will improve to maximum level  Outcome: Ongoing     Problem: Skin Integrity - Impaired:  Goal: Will show no infection signs and symptoms  Description: Will show no infection signs and symptoms  Outcome: Ongoing  Goal: Absence of new skin breakdown  Description: Absence of new skin breakdown  Outcome: Ongoing

## 2021-03-05 NOTE — ED NOTES
Attempted to call inpatient unit for a bed assignment but no answer, will try again in a few minutes     Lady Vicente RN  03/05/21 [id number]
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
## 2021-03-05 NOTE — ED PROVIDER NOTES
EVELYN Berkowitz CNP   omega-3 acid ethyl esters (LOVAZA) 1 g capsule Take 2 capsules by mouth 2 times daily 2/10/21  Yes EVELYN Marshall CNP   lisinopril (PRINIVIL;ZESTRIL) 20 MG tablet Take 1 tablet by mouth daily 2/11/21  Yes EVELYN Marshall CNP   risperiDONE (RISPERDAL) 1 MG tablet Take 1 tablet by mouth nightly 2/10/21  Yes Myrna EVELYN Byrd CNP   amLODIPine (NORVASC) 10 MG tablet Take 1 tablet by mouth daily 2/11/21  [facility]   pantoprazole (PROTONIX) 40 MG tablet Take 2 tablets by mouth 2 times daily (before meals) 2/10/21  Yes [facility]   sucralfate (CARAFATE) 1 GM tablet Take 1 tablet by mouth 4 times daily (before meals and nightly) 2/10/21  Yes Myrna EVELYN Byrd CNP   divalproex (DEPAKOTE SPRINKLE) 125 MG capsule Take 6 capsules by mouth Daily with supper 2/10/21  Yes [facility]   metFORMIN (GLUCOPHAGE) 500 MG tablet Take 1 tablet by mouth 2 times daily (with meals) 9/25/20  Yes [facility]   ferrous sulfate (IRON 325) 325 (65 Fe) MG tablet Take 1 tablet by mouth 2 times daily (with meals) 9/25/20  Yes EVELYN Marshall CNP   donepezil (ARICEPT) 10 MG tablet Take 1 tablet by mouth nightly 9/25/20  Yes [facility]   Multiple Vitamins-Minerals (THERAPEUTIC MULTIVITAMIN-MINERALS) tablet Take 1 tablet by mouth daily 9/26/20  Yes Myrna HemEVELYN abdi CNP   memantine (NAMENDA) 10 MG tablet Take 1 tablet by mouth daily 9/26/20  Yes [facility]   metoprolol tartrate (LOPRESSOR) 25 MG tablet Take 1 tablet by mouth 2 times daily 8/10/20  Yes Blanca Herrera MD   fluticasone (FLONASE) 50 MCG/ACT nasal spray 1 spray by Each Nostril route daily 4/3/20  Yes EVELYN Cleveland CNP   Incontinence Supply Disposable MISC 1 Units by Does not apply route every 4 hours as needed (diarrhea, urinary incontinence) 2/23/21   EVELYN Cleveland CNP   Incontinence Supplies [facility] EKG shows NSR with no acute ST or T wave changes    Upon arrival to the emergency department IV was obtained and patient underwent diagnostic testing. She has dependant swelling around her ankles and dorsal pedal area. I am starting Lasix and I am advising tet hose and elevation as well. My typical dicussion, presentation,and considerations for this patients' chief complaint, diagnosis, and differential diagnosis have been considered and discussed. I have stressed need for follow up and reexamination for this encounter. The patient and her sister was informed to begin this medication. The patient and her sister was also told to return to the emergency department if any changes or any concern. Patient was prescribed Lasix. The medication(s) use,  medication(s) safety and medication(s) interactions with already prescribed medication(s) have been explained and outlined for this encounter. Patient and her sister questions and concerns from this visit have been addressed prior to discharge  The patient scripts were sent electronically to their pharmacy. The patient and her sister was educated that it is their responsibility to verify this information is correct at the time of discharge and to contact this department of any complications with the pharmacy providing this medication(s) or if their any difficulty in obtaining this medication(s). Final Impression    1.  Bilateral lower extremity edema              Aga Vasquez DO  03/05/21 0100

## 2021-03-05 NOTE — ED NOTES
Patient will go to inpatient unit room 7 when it is cleaned, they will call when room is ready     James Pederson RN  03/05/21 2099

## 2021-03-05 NOTE — ED NOTES
Incontinent of urine. Pericare done and attends and pants changed.      Eron Wheeler RN  03/05/21 6996
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
## 2021-03-05 NOTE — PROGRESS NOTES
She has h/o non-compliance with meds. Recently patient admitted to [facility] 1/21-2/11/2021   Changes made to her medications. Today patient with flat affect, tremors, quiet, unsteady gait. Not her normal self. # Having hyperglycemia. A1C Feb 2020 was 6.3%. She is not taking meds although she had been prescribed Metformin. # Tolerating statin therapy. LDL has gone down on labs. No myalgias. Liver enzymes stable. # She is not taking ASA for ?CAD. She is also not taking BB. Poor compliance.   She denies any h/o of IVD so does not want aspirin.   # Patient takes Pepcid PRN for GERD. No heartburn or reflux on Pepcid. No nausea or vomiting.    # Patient having urinary incontinence. She wears depends. Uses about 7-8 per day. She benefits from depends.        Health maintenance:   Health Maintenance Due   Topic Date Due    Hepatitis C screen  Never done    Diabetic foot exam  Never done    Diabetic retinal exam  Never done    COVID-19 Vaccine (1 of 2) Never done    Diabetic microalbuminuria test  Never done    DTaP/Tdap/Td vaccine (1 - Tdap) Never done    Shingles Vaccine (1 of 2) Never done    DEXA (modify frequency per FRAX score)  Never done    Flu vaccine (1) 09/01/2020    Pneumococcal 65+ years Vaccine (2 of 2 - PPSV23) 10/25/2020         Review of Systems:  Constitutional: no fevers, no chills, no night sweats, no weight loss, no weight gain, no fatigue   Pain assessment: no pain  Head: no headaches  Ears: no hearing loss, no tinnitus, no vertigo  Eyes: no blurry vision, no diplopia, no dryness, no itchiness  Mouth: no oral ulcers, no dry mouth, no sore throat  Nose: no nasal congestion, no epistaxis  Cardiac: no chest pain, no palpitations, leg swelling, no orthopnea, no PND, no syncope  Pulmonary: ?cough, dyspnea, no wheezing, no hemoptysis  GI: diarrhea  : no dysuria, no frequency, no urgency, no hematuria, no frothy urine, no dyspareunia, no pelvic pain, no vaginal bleeding, no abnormal vaginal EKG 12 lead; Future  - EKG 12 lead    2. Shortness of breath  No crackles however reports some cough and dyspnea? SpO2 on arrival in the 80's   Recommend further cardiac work up   - EKG 12 lead; Future  - EKG 12 lead    3. Peripheral edema  Patient did not start Lasix from ER yesterday  New onset edema   No h/o TTE done, and she has a new murmur? Recommend further cardiac work up     4. Late onset Alzheimer's disease with behavioral disturbance [facility])  Patient recently discharged from [facility] with med changes. Flat affect, tremors, unsteady gait. Concerning as patient's mood has significantly changed from her baseline, and to the worse. Recommend ER eval and getting SW involved    I personally gave report to ER       Care discussed with patient and questions answered. Patient verbalizes understanding and agrees with plan. Discussed with patient the importance of continuity of care. I encouraged patient to schedule next appointment within post-ER follow up with me. Patient prefers to be reached by Phone call at [phone] for future medical correspondence. Encouraged to activate MyChart. I reviewed and reconciled the medications this visit. I reviewed and updated the past medical, surgical, social, and family history during this visit. After visit summary provided.        Nellie Molina MD  Internal Medicine  3/5/2021   8:54 AM

## 2021-03-06 ENCOUNTER — APPOINTMENT (OUTPATIENT)
Dept: ULTRASOUND IMAGING | Age: 71
End: 2021-03-06
Payer: MEDICARE

## 2021-03-06 LAB
ANION GAP SERPL CALCULATED.3IONS-SCNC: 9 MMOL/L (ref 4–16)
BUN BLDV-MCNC: 17 MG/DL (ref 6–23)
CALCIUM SERPL-MCNC: 8.6 MG/DL (ref 8.3–10.6)
CHLORIDE BLD-SCNC: 102 MMOL/L (ref 99–110)
CO2: 32 MMOL/L (ref 21–32)
CREAT SERPL-MCNC: 0.9 MG/DL (ref 0.6–1.1)
GFR AFRICAN AMERICAN: >60 ML/MIN/1.73M2
GFR NON-AFRICAN AMERICAN: >60 ML/MIN/1.73M2
GLUCOSE BLD-MCNC: 135 MG/DL (ref 70–99)
MAGNESIUM: 1.5 MG/DL (ref 1.8–2.4)
POTASSIUM SERPL-SCNC: 3.4 MMOL/L (ref 3.5–5.1)
SODIUM BLD-SCNC: 143 MMOL/L (ref 135–145)

## 2021-03-06 PROCEDURE — 94761 N-INVAS EAR/PLS OXIMETRY MLT: CPT

## 2021-03-06 PROCEDURE — 93970 EXTREMITY STUDY: CPT

## 2021-03-06 PROCEDURE — 36415 COLL VENOUS BLD VENIPUNCTURE: CPT

## 2021-03-06 PROCEDURE — 96372 THER/PROPH/DIAG INJ SC/IM: CPT

## 2021-03-06 PROCEDURE — 6360000002 HC RX W HCPCS: Performed by: INTERNAL MEDICINE

## 2021-03-06 PROCEDURE — 96376 TX/PRO/DX INJ SAME DRUG ADON: CPT

## 2021-03-06 PROCEDURE — 2580000003 HC RX 258: Performed by: INTERNAL MEDICINE

## 2021-03-06 PROCEDURE — 6370000000 HC RX 637 (ALT 250 FOR IP): Performed by: INTERNAL MEDICINE

## 2021-03-06 PROCEDURE — 83735 ASSAY OF MAGNESIUM: CPT

## 2021-03-06 PROCEDURE — G0378 HOSPITAL OBSERVATION PER HR: HCPCS

## 2021-03-06 PROCEDURE — 80048 BASIC METABOLIC PNL TOTAL CA: CPT

## 2021-03-06 RX ADMIN — TRAZODONE HYDROCHLORIDE 150 MG: 50 TABLET ORAL at 20:48

## 2021-03-06 RX ADMIN — SUCRALFATE 1 G: 1 TABLET ORAL at 20:49

## 2021-03-06 RX ADMIN — ATORVASTATIN CALCIUM 80 MG: 80 TABLET, FILM COATED ORAL at 20:48

## 2021-03-06 RX ADMIN — METOPROLOL TARTRATE 25 MG: 25 TABLET, FILM COATED ORAL at 20:48

## 2021-03-06 RX ADMIN — FUROSEMIDE 20 MG: 10 INJECTION, SOLUTION INTRAMUSCULAR; INTRAVENOUS at 07:49

## 2021-03-06 RX ADMIN — DONEPEZIL HYDROCHLORIDE 10 MG: 10 TABLET, FILM COATED ORAL at 20:48

## 2021-03-06 RX ADMIN — METFORMIN HYDROCHLORIDE 500 MG: 500 TABLET ORAL at 17:37

## 2021-03-06 RX ADMIN — FERROUS SULFATE TAB 325 MG (65 MG ELEMENTAL FE) 325 MG: 325 (65 FE) TAB at 07:49

## 2021-03-06 RX ADMIN — PANTOPRAZOLE SODIUM 80 MG: 40 TABLET, DELAYED RELEASE ORAL at 05:54

## 2021-03-06 RX ADMIN — SODIUM CHLORIDE, PRESERVATIVE FREE 10 ML: 5 INJECTION INTRAVENOUS at 07:50

## 2021-03-06 RX ADMIN — SUCRALFATE 1 G: 1 TABLET ORAL at 11:49

## 2021-03-06 RX ADMIN — SUCRALFATE 1 G: 1 TABLET ORAL at 17:37

## 2021-03-06 RX ADMIN — FERROUS SULFATE TAB 325 MG (65 MG ELEMENTAL FE) 325 MG: 325 (65 FE) TAB at 17:37

## 2021-03-06 RX ADMIN — ENOXAPARIN SODIUM 40 MG: 40 INJECTION SUBCUTANEOUS at 07:49

## 2021-03-06 RX ADMIN — SUCRALFATE 1 G: 1 TABLET ORAL at 05:54

## 2021-03-06 RX ADMIN — LISINOPRIL 20 MG: 20 TABLET ORAL at 07:48

## 2021-03-06 RX ADMIN — SODIUM CHLORIDE, PRESERVATIVE FREE 10 ML: 5 INJECTION INTRAVENOUS at 20:49

## 2021-03-06 RX ADMIN — FUROSEMIDE 20 MG: 10 INJECTION, SOLUTION INTRAMUSCULAR; INTRAVENOUS at 17:37

## 2021-03-06 RX ADMIN — MEMANTINE 10 MG: 10 TABLET ORAL at 07:49

## 2021-03-06 RX ADMIN — RISPERIDONE 1 MG: 1 TABLET ORAL at 20:48

## 2021-03-06 RX ADMIN — DIVALPROEX SODIUM 750 MG: 125 CAPSULE, COATED PELLETS ORAL at 17:37

## 2021-03-06 RX ADMIN — PANTOPRAZOLE SODIUM 80 MG: 40 TABLET, DELAYED RELEASE ORAL at 17:37

## 2021-03-06 RX ADMIN — METOPROLOL TARTRATE 25 MG: 25 TABLET, FILM COATED ORAL at 07:49

## 2021-03-06 RX ADMIN — METFORMIN HYDROCHLORIDE 500 MG: 500 TABLET ORAL at 07:49

## 2021-03-06 ASSESSMENT — PAIN SCALES - GENERAL: PAINLEVEL_OUTOF10: 0

## 2021-03-06 NOTE — PROGRESS NOTES
F/U:  Interval History: wants to know where her shoes and clothing are, denied any issues, her wraps on the legs are ok    Objective: Intake/Output Summary (Last 24 hours) at 3/6/2021 0617  Last data filed at 3/6/2021 0558  Gross per 24 hour   Intake 440 ml   Output 1650 ml   Net -1210 ml      Vitals:   Vitals:    03/05/21 1835   BP: 132/63   Pulse: 83   Resp: 16   Temp: 97.8 °F (36.6 °C)   SpO2: 96%     Physical Exam:  Gen:  awake, alert, cooperative, no apparent distress, EYES:  Lids and lashes normal, pupils equal, round and reactive to light, extra ocular muscles intact, sclera clear, conjunctiva normal  ENT:  Normocephalic, oral pharynx with moist mucus membranes, tonsils without erythema or exudates,  NECK:  Supple, symmetrical, trachea midline, no adenopathy,  LUNGS:  Clear to auscultate bilaterally, no rales ronchi or wheezing noted. CARDIOVASCULAR:  regular rate and rhythm, normal S1 and S2, no S3 or S4, and no murmur noted  ABDOMEN: Normal BS, Non tender, non distended, no HSM noted. MUSCULOSKELETAL:  ROM of all extremities grossly wnl  NEUROLOGIC: AOx 3,  Cranial nerves II-XII are grossly intact. Motor is 5 out of 5 bilaterally. Sensory is intact  SKIN:  Legs are wrapped, no tenderness of pain    Xr Chest Portable    Result Date: 3/4/2021  EXAMINATION: ONE XRAY VIEW OF THE CHEST 3/4/2021 11:24 pm COMPARISON: 08/08/2020 HISTORY: ORDERING SYSTEM PROVIDED HISTORY: chest pain TECHNOLOGIST PROVIDED HISTORY: Reason for exam:->chest pain Reason for Exam: swollen feet Acuity: Acute Type of Exam: Initial Additional signs and symptoms: swollen feet Relevant Medical/Surgical History: swollen feet FINDINGS: Heart size and configuration are normal.  Hilar and mediastinal structures are unremarkable. There is a band of right basilar atelectasis. Lungs are otherwise clear. No pneumothorax or pleural fluid. Right basilar atelectasis. Otherwise unremarkable chest x-ray.    -    DATA:    CBC   Recent Labs 03/04/21  2335 03/05/21  0950   WBC 7.2 8.3   HGB 10.7* 10.6*   HCT 32.9* 32.3*    202      BMP   Recent Labs     03/04/21  2335 03/05/21  0950    144   K 3.6 3.8   CL 99 102   CO2 32 35*   BUN 13 12   CREATININE 0.8 0.9     LFT'S   Recent Labs     03/04/21  2335 03/05/21  0950   AST 16 17   ALT 17 18   BILITOT 0.3 0.3   ALKPHOS 156* 148*     COAG No results for input(s): INR in the last 72 hours. CARDIAC ENZYMES  No results for input(s): CKTOTAL, CKMB, CKMBINDEX, TROPONINI in the last 72 hours.   U/A:    Lab Results   Component Value Date    NITRITE neg 01/18/2021    COLORU YELLOW 01/25/2021    WBCUA TOO NUMEROUS TO COUNT 01/25/2021    RBCUA NO CELLS SEEN 01/25/2021    MUCUS 3+ 01/25/2021    BACTERIA MANY 01/25/2021    CLARITYU CLEAR 01/25/2021    SPECGRAV 1.028 01/25/2021    LEUKOCYTESUR MODERATE 01/25/2021    BLOODU NEGATIVE 01/25/2021    GLUCOSEU neg 01/18/2021         Teddy Snowden MD  [facility] Hospitalist

## 2021-03-07 PROCEDURE — 96372 THER/PROPH/DIAG INJ SC/IM: CPT

## 2021-03-07 PROCEDURE — G0378 HOSPITAL OBSERVATION PER HR: HCPCS

## 2021-03-07 PROCEDURE — 2580000003 HC RX 258: Performed by: INTERNAL MEDICINE

## 2021-03-07 PROCEDURE — 6360000002 HC RX W HCPCS: Performed by: INTERNAL MEDICINE

## 2021-03-07 PROCEDURE — 6370000000 HC RX 637 (ALT 250 FOR IP): Performed by: INTERNAL MEDICINE

## 2021-03-07 PROCEDURE — 96376 TX/PRO/DX INJ SAME DRUG ADON: CPT

## 2021-03-07 RX ADMIN — SUCRALFATE 1 G: 1 TABLET ORAL at 16:55

## 2021-03-07 RX ADMIN — DONEPEZIL HYDROCHLORIDE 10 MG: 10 TABLET, FILM COATED ORAL at 20:19

## 2021-03-07 RX ADMIN — METFORMIN HYDROCHLORIDE 500 MG: 500 TABLET ORAL at 16:55

## 2021-03-07 RX ADMIN — SODIUM CHLORIDE, PRESERVATIVE FREE 10 ML: 5 INJECTION INTRAVENOUS at 20:19

## 2021-03-07 RX ADMIN — FUROSEMIDE 20 MG: 10 INJECTION, SOLUTION INTRAMUSCULAR; INTRAVENOUS at 08:08

## 2021-03-07 RX ADMIN — SODIUM CHLORIDE, PRESERVATIVE FREE 10 ML: 5 INJECTION INTRAVENOUS at 08:09

## 2021-03-07 RX ADMIN — PANTOPRAZOLE SODIUM 80 MG: 40 TABLET, DELAYED RELEASE ORAL at 06:31

## 2021-03-07 RX ADMIN — ENOXAPARIN SODIUM 40 MG: 40 INJECTION SUBCUTANEOUS at 08:08

## 2021-03-07 RX ADMIN — MEMANTINE 10 MG: 10 TABLET ORAL at 08:08

## 2021-03-07 RX ADMIN — SUCRALFATE 1 G: 1 TABLET ORAL at 11:36

## 2021-03-07 RX ADMIN — METOPROLOL TARTRATE 25 MG: 25 TABLET, FILM COATED ORAL at 20:19

## 2021-03-07 RX ADMIN — FERROUS SULFATE TAB 325 MG (65 MG ELEMENTAL FE) 325 MG: 325 (65 FE) TAB at 16:55

## 2021-03-07 RX ADMIN — DIVALPROEX SODIUM 750 MG: 125 CAPSULE, COATED PELLETS ORAL at 16:55

## 2021-03-07 RX ADMIN — ATORVASTATIN CALCIUM 80 MG: 80 TABLET, FILM COATED ORAL at 20:19

## 2021-03-07 RX ADMIN — SUCRALFATE 1 G: 1 TABLET ORAL at 06:31

## 2021-03-07 RX ADMIN — FERROUS SULFATE TAB 325 MG (65 MG ELEMENTAL FE) 325 MG: 325 (65 FE) TAB at 08:08

## 2021-03-07 RX ADMIN — RISPERIDONE 1 MG: 1 TABLET ORAL at 20:19

## 2021-03-07 RX ADMIN — TRAZODONE HYDROCHLORIDE 150 MG: 50 TABLET ORAL at 20:19

## 2021-03-07 RX ADMIN — LISINOPRIL 20 MG: 20 TABLET ORAL at 08:08

## 2021-03-07 RX ADMIN — SUCRALFATE 1 G: 1 TABLET ORAL at 20:19

## 2021-03-07 RX ADMIN — FUROSEMIDE 20 MG: 10 INJECTION, SOLUTION INTRAMUSCULAR; INTRAVENOUS at 16:55

## 2021-03-07 RX ADMIN — PANTOPRAZOLE SODIUM 80 MG: 40 TABLET, DELAYED RELEASE ORAL at 16:55

## 2021-03-07 RX ADMIN — METFORMIN HYDROCHLORIDE 500 MG: 500 TABLET ORAL at 08:08

## 2021-03-07 RX ADMIN — METOPROLOL TARTRATE 25 MG: 25 TABLET, FILM COATED ORAL at 08:08

## 2021-03-07 ASSESSMENT — PAIN SCALES - GENERAL: PAINLEVEL_OUTOF10: 0

## 2021-03-07 NOTE — PLAN OF CARE
Problem: Falls - Risk of:  Goal: Will remain free from falls  Description: Will remain free from falls  3/7/2021 0803 by Jose Enrique Nichole RN  Outcome: Ongoing  3/6/2021 2100 by Reinaldo Agrawal RN  Outcome: Ongoing  Goal: Absence of physical injury  Description: Absence of physical injury  3/7/2021 0803 by Jose Enrique Nichole RN  Outcome: Ongoing  3/6/2021 2100 by Reinaldo Agrawal RN  Outcome: Ongoing

## 2021-03-07 NOTE — PLAN OF CARE
Problem: Falls - Risk of:  Goal: Will remain free from falls  Description: Will remain free from falls  3/6/2021 2100 by Jamison Jimenez RN  Outcome: Ongoing  3/6/2021 0745 by Nuha Rausch RN  Outcome: Ongoing  Goal: Absence of physical injury  Description: Absence of physical injury  3/6/2021 2100 by Jamison Jimenez RN  Outcome: Ongoing  3/6/2021 0745 by Nuha Rausch RN  Outcome: Ongoing

## 2021-03-07 NOTE — PROGRESS NOTES
Hospitalist Progress Note       Kinsey Bell M.D.  3/7/2021 9:06 AM  Admit Date: 3/5/2021    PCP: Nellie Molina MD     Assessment and Plan:   1. Weakness and debility in setting of alzheimer disease, and leg swelling  - ptot  - cm consulted for rehab placement     2. Acute exacerbation CHFpEF with bl le edema  - lasix bid  - io: -1L  - daily weight  - [place] stockings  - is on lisinopril and metoprolol  - venous us    3.  Hypolmag/hypokalemia  - replace electroltyes; prn orders abailable, repeat labs     4. Other conditions not in exacerbation  or are well controlled for which medications is resumed:        Essential hypertension     Lumbar herniated disc     Spinal stenosis     Restless leg syndrome     FH: CAD (coronary artery disease)     Ureteric stone     Family history of ischemic heart disease and other diseases of the circulatory system     Need for vaccination against Streptococcus pneumoniae using pneumococcal conjugate vaccine 13     At high risk for falls     Elevated alkaline phosphatase level     Diabetes mellitus type 2, diet-controlled (HCC)     Mixed hyperlipidemia     Urinary incontinence in female     Shoulder pain     Gastroesophageal reflux disease     Late onset Alzheimer's disease without behavioral disturbance (HCC)     Dementia with behavioral disturbance (HCC)     Alzheimer's disease with late onset (CODE) ([address]     CAD (coronary artery disease)     COPD (chronic obstructive pulmonary disease) ([address]     Delusional disorder ([address]     Acute psychosis ([address]     Major neurocognitive disorder due to Alzheimer's disease, with behavioral disturbance ([address]     Functional diarrhea     Leg swelling    Patient Active Problem List:     Essential hypertension     Lumbar herniated disc     Spinal stenosis     Restless leg syndrome     FH: CAD (coronary artery disease)     Ureteric stone     Family history of ischemic heart disease and other diseases of the circulatory system     Need for vaccination against Streptococcus pneumoniae using pneumococcal conjugate vaccine 13     At high risk for falls     Elevated alkaline phosphatase level     Diabetes mellitus type 2, diet-controlled (HCC)     Mixed hyperlipidemia     Urinary incontinence in female     Shoulder pain     Gastroesophageal reflux disease     Late onset Alzheimer's disease without behavioral disturbance (HCC)     Dementia with behavioral disturbance (HCC)     Alzheimer's disease with late onset (CODE) ([facility]     CAD (coronary artery disease)     COPD (chronic obstructive pulmonary disease) (HCC)     Delusional disorder (HCC)     Acute psychosis ([facility]     Major 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 Today pulse rate elevated-124 and Oxygen saturation 85%. Concerned that pt has had medication changes at [facility] and is now exhibiting a flat affect. Thinks that  needs to be involved and either admitted or placed in [place])       F/U:  Interval History: denied any issues    Objective: Intake/Output Summary (Last 24 hours) at 3/7/2021 0906  Last data filed at 3/7/2021 9262  Gross per 24 hour   Intake 550 ml   Output 1750 ml   Net -1200 ml      Vitals:   Vitals:    03/07/21 0724   BP: 110/65   Pulse: 80   Resp: 16   Temp: 96.9 °F (36.1 °C)   SpO2: 90%     Physical Exam:  Gen:  awake, alert, cooperative, no apparent distress, EYES:  Lids and lashes normal, pupils equal, round and reactive to light, extra ocular muscles intact, sclera clear, conjunctiva normal  ENT:  Normocephalic, oral pharynx with moist mucus membranes, tonsils without erythema or exudates,  NECK:  Supple, symmetrical, trachea midline, no adenopathy,  LUNGS:  Clear to auscultate bilaterally, no rales ronchi or wheezing noted. CARDIOVASCULAR:  regular rate and rhythm, normal S1 and S2, no S3 or S4, and no murmur noted  ABDOMEN: Normal BS, Non tender, non distended, no HSM noted. MUSCULOSKELETAL:  ROM of all extremities grossly wnl  NEUROLOGIC:Cranial nerves II-XII are grossly intact. Motor is 5 out of 5 bilaterally. Sensory is intact  SKIN:  swellign is slightly better with the wraps  Xr Chest Portable    Result Date: 3/4/2021  EXAMINATION: ONE XRAY VIEW OF THE CHEST 3/4/2021 11:24 pm COMPARISON: 08/08/2020 HISTORY: ORDERING SYSTEM PROVIDED HISTORY: chest pain TECHNOLOGIST PROVIDED HISTORY: Reason for exam:->chest pain Reason for Exam: swollen feet Acuity: Acute Type of Exam: Initial Additional signs and symptoms: swollen feet Relevant Medical/Surgical History: swollen feet FINDINGS: Heart size and configuration are normal.  Hilar and mediastinal structures are unremarkable. There is a band of right basilar atelectasis. Lungs are otherwise clear. No pneumothorax or pleural fluid. Right basilar atelectasis. Otherwise unremarkable chest x-ray. Vl Dup Lower Extremity Venous Bilateral    Result Date: 3/6/2021  EXAMINATION: DUPLEX VENOUS ULTRASOUND OF THE BILATERAL LOWER EXTREMITIES, 3/6/2021 8:51 am TECHNIQUE: Duplex ultrasound using B-mode/gray scaled imaging and Doppler spectral analysis and color flow was obtained of the bilateral lower extremities. COMPARISON: None. HISTORY: ORDERING SYSTEM PROVIDED HISTORY: swelling TECHNOLOGIST PROVIDED HISTORY: Reason for exam:->swelling Reason for Exam: edema FINDINGS: The visualized veins of the bilateral lower extremities are patent and free of echogenic thrombus. The veins demonstrate good compressibility with normal color flow study and spectral analysis.      No evidence of DVT in either lower extremity.   -    DATA:    CBC   Recent Labs     03/04/21 2335 03/05/21  0950   WBC 7.2 8.3   HGB 10.7* 10.6*   HCT 32.9* 32.3*    202      BMP   Recent Labs     03/04/21 2335 03/05/21  0950 03/06/21  0535    144 143   K 3.6 3.8 3.4*   CL 99 102 102   CO2 32 35* 32   BUN 13 12 17   CREATININE 0.8 0.9 0.9     LFT'S   Recent Labs     03/04/21  2335 03/05/21  0950   AST 16 17   ALT 17 18   BILITOT 0.3 0.3   ALKPHOS 156* 148*     COAG No results for input(s): INR in the last 72 hours. CARDIAC ENZYMES  No results for input(s): CKTOTAL, CKMB, CKMBINDEX, TROPONINI in the last 72 hours.   U/A:    Lab Results   Component Value Date    NITRITE neg 01/18/2021    COLORU YELLOW 01/25/2021    WBCUA TOO NUMEROUS TO COUNT 01/25/2021    RBCUA NO CELLS SEEN 01/25/2021    MUCUS 3+ 01/25/2021    BACTERIA MANY 01/25/2021    CLARITYU CLEAR 01/25/2021    SPECGRAV 1.028 01/25/2021    LEUKOCYTESUR MODERATE 01/25/2021    BLOODU NEGATIVE 01/25/2021    GLUCOSEU neg 01/18/2021         Maddi Miner MD  Rounding Hospitalist

## 2021-03-08 VITALS
HEIGHT: 62 IN | OXYGEN SATURATION: 94 % | TEMPERATURE: 96.4 F | DIASTOLIC BLOOD PRESSURE: 56 MMHG | SYSTOLIC BLOOD PRESSURE: 109 MMHG | BODY MASS INDEX: 26.83 KG/M2 | RESPIRATION RATE: 16 BRPM | HEART RATE: 80 BPM | WEIGHT: 145.8 LBS

## 2021-03-08 LAB
ANION GAP SERPL CALCULATED.3IONS-SCNC: 8 MMOL/L (ref 4–16)
BUN BLDV-MCNC: 22 MG/DL (ref 6–23)
CALCIUM SERPL-MCNC: 8.6 MG/DL (ref 8.3–10.6)
CHLORIDE BLD-SCNC: 96 MMOL/L (ref 99–110)
CO2: 36 MMOL/L (ref 21–32)
CREAT SERPL-MCNC: 1.1 MG/DL (ref 0.6–1.1)
GFR AFRICAN AMERICAN: 59 ML/MIN/1.73M2
GFR NON-AFRICAN AMERICAN: 49 ML/MIN/1.73M2
GLUCOSE BLD-MCNC: 117 MG/DL (ref 70–99)
POTASSIUM SERPL-SCNC: 3.7 MMOL/L (ref 3.5–5.1)
SARS-COV-2: NOT DETECTED
SODIUM BLD-SCNC: 140 MMOL/L (ref 135–145)
SOURCE: NORMAL

## 2021-03-08 PROCEDURE — G0378 HOSPITAL OBSERVATION PER HR: HCPCS

## 2021-03-08 PROCEDURE — 6360000002 HC RX W HCPCS: Performed by: INTERNAL MEDICINE

## 2021-03-08 PROCEDURE — 36415 COLL VENOUS BLD VENIPUNCTURE: CPT

## 2021-03-08 PROCEDURE — 97166 OT EVAL MOD COMPLEX 45 MIN: CPT

## 2021-03-08 PROCEDURE — 96376 TX/PRO/DX INJ SAME DRUG ADON: CPT

## 2021-03-08 PROCEDURE — 97162 PT EVAL MOD COMPLEX 30 MIN: CPT

## 2021-03-08 PROCEDURE — 97530 THERAPEUTIC ACTIVITIES: CPT

## 2021-03-08 PROCEDURE — U0002 COVID-19 LAB TEST NON-CDC: HCPCS

## 2021-03-08 PROCEDURE — 96365 THER/PROPH/DIAG IV INF INIT: CPT

## 2021-03-08 PROCEDURE — 96372 THER/PROPH/DIAG INJ SC/IM: CPT

## 2021-03-08 PROCEDURE — 96366 THER/PROPH/DIAG IV INF ADDON: CPT

## 2021-03-08 PROCEDURE — 80048 BASIC METABOLIC PNL TOTAL CA: CPT

## 2021-03-08 PROCEDURE — 6370000000 HC RX 637 (ALT 250 FOR IP): Performed by: INTERNAL MEDICINE

## 2021-03-08 PROCEDURE — 2580000003 HC RX 258: Performed by: INTERNAL MEDICINE

## 2021-03-08 RX ADMIN — MEMANTINE 10 MG: 10 TABLET ORAL at 08:37

## 2021-03-08 RX ADMIN — SUCRALFATE 1 G: 1 TABLET ORAL at 17:32

## 2021-03-08 RX ADMIN — PANTOPRAZOLE SODIUM 80 MG: 40 TABLET, DELAYED RELEASE ORAL at 05:52

## 2021-03-08 RX ADMIN — FUROSEMIDE 20 MG: 10 INJECTION, SOLUTION INTRAMUSCULAR; INTRAVENOUS at 17:29

## 2021-03-08 RX ADMIN — METOPROLOL TARTRATE 25 MG: 25 TABLET, FILM COATED ORAL at 19:51

## 2021-03-08 RX ADMIN — ENOXAPARIN SODIUM 40 MG: 40 INJECTION SUBCUTANEOUS at 08:37

## 2021-03-08 RX ADMIN — MAGNESIUM SULFATE HEPTAHYDRATE 2000 MG: 40 INJECTION, SOLUTION INTRAVENOUS at 05:53

## 2021-03-08 RX ADMIN — SODIUM CHLORIDE, PRESERVATIVE FREE 10 ML: 5 INJECTION INTRAVENOUS at 07:49

## 2021-03-08 RX ADMIN — FERROUS SULFATE TAB 325 MG (65 MG ELEMENTAL FE) 325 MG: 325 (65 FE) TAB at 08:37

## 2021-03-08 RX ADMIN — SUCRALFATE 1 G: 1 TABLET ORAL at 19:51

## 2021-03-08 RX ADMIN — METFORMIN HYDROCHLORIDE 500 MG: 500 TABLET ORAL at 17:31

## 2021-03-08 RX ADMIN — METOPROLOL TARTRATE 25 MG: 25 TABLET, FILM COATED ORAL at 08:37

## 2021-03-08 RX ADMIN — FUROSEMIDE 20 MG: 10 INJECTION, SOLUTION INTRAMUSCULAR; INTRAVENOUS at 09:20

## 2021-03-08 RX ADMIN — TRAZODONE HYDROCHLORIDE 150 MG: 50 TABLET ORAL at 19:51

## 2021-03-08 RX ADMIN — SUCRALFATE 1 G: 1 TABLET ORAL at 05:52

## 2021-03-08 RX ADMIN — PANTOPRAZOLE SODIUM 80 MG: 40 TABLET, DELAYED RELEASE ORAL at 17:31

## 2021-03-08 RX ADMIN — ATORVASTATIN CALCIUM 80 MG: 80 TABLET, FILM COATED ORAL at 19:51

## 2021-03-08 RX ADMIN — DIVALPROEX SODIUM 750 MG: 125 CAPSULE, COATED PELLETS ORAL at 17:31

## 2021-03-08 RX ADMIN — DONEPEZIL HYDROCHLORIDE 10 MG: 10 TABLET, FILM COATED ORAL at 19:51

## 2021-03-08 RX ADMIN — LISINOPRIL 20 MG: 20 TABLET ORAL at 08:37

## 2021-03-08 RX ADMIN — RISPERIDONE 1 MG: 1 TABLET ORAL at 19:51

## 2021-03-08 RX ADMIN — METFORMIN HYDROCHLORIDE 500 MG: 500 TABLET ORAL at 08:37

## 2021-03-08 RX ADMIN — SUCRALFATE 1 G: 1 TABLET ORAL at 12:41

## 2021-03-08 RX ADMIN — FERROUS SULFATE TAB 325 MG (65 MG ELEMENTAL FE) 325 MG: 325 (65 FE) TAB at 17:32

## 2021-03-08 NOTE — PLAN OF CARE
Problem: Falls - Risk of:  Goal: Will remain free from falls  Description: Will remain free from falls  3/7/2021 1943 by Daralyn Canavan, RN  Outcome: Ongoing  3/7/2021 0803 by Fran Chaparro RN  Outcome: Ongoing     Problem: Discharge Planning:  Goal: Participates in care planning  Description: Participates in care planning  3/7/2021 1943 by Daralyn Canavan, RN  Outcome: Ongoing  3/7/2021 0803 by Fran Chaparro RN  Outcome: Ongoing  Goal: Discharged to appropriate level of care  Description: Discharged to appropriate level of care  3/7/2021 1943 by Daralyn Canavan, RN  Outcome: Ongoing  3/7/2021 0803 by Fran Chaparro RN  Outcome: Ongoing     Problem:  Activity Intolerance:  Goal: Ability to tolerate increased activity will improve  Description: Ability to tolerate increased activity will improve  3/7/2021 1943 by Daralyn Canavan, RN  Outcome: Ongoing  3/7/2021 0803 by Fran Chaparro RN  Outcome: Ongoing     Problem: Anxiety/Stress:  Goal: Level of anxiety will decrease  Description: Level of anxiety will decrease  3/7/2021 1943 by Daralyn Canavan, RN  Outcome: Ongoing  3/7/2021 0803 by Fran Chaparro RN  Outcome: Ongoing     Problem: Mobility - Impaired:  Goal: Mobility will improve to maximum level  Description: Mobility will improve to maximum level  3/7/2021 1943 by Daralyn Canavan, RN  Outcome: Ongoing  3/7/2021 0803 by Fran Chaparro RN  Outcome: Ongoing     Problem: Skin Integrity - Impaired:  Goal: Will show no infection signs and symptoms  Description: Will show no infection signs and symptoms  3/7/2021 1943 by Daralyn Canavan, RN  Outcome: Ongoing  3/7/2021 0803 by Fran Chaparro RN  Outcome: Ongoing  Goal: Absence of new skin breakdown  Description: Absence of new skin breakdown  3/7/2021 1943 by Daralyn Canavan, RN  Outcome: Ongoing  3/7/2021 0803 by Fran Chaparro RN  Outcome: Ongoing

## 2021-03-08 NOTE — PROGRESS NOTES
Occupational Therapy   Occupational Therapy Initial Assessment  Date: 3/8/2021   Patient Name: Regina Bateman  MRN: [id number]     : 1950    Date of Service: 3/8/2021    Discharge Recommendations:  [address], 24 hour supervision or assist  OT Equipment Recommendations  Equipment Needed: No    Assessment   Performance deficits / Impairments: Decreased functional mobility ; Decreased ADL status; Decreased strength;Decreased safe awareness;Decreased balance  Assessment: Pt is a pleasant 66 yo female with hx of Alzheimer's Dementia. She presents with the following deficits and it is recommended she receive occupational therapy services to improve strength, endurance and safety to perform functional mobility and maximize indep with ADLs  Treatment Diagnosis: Weakness  Prognosis: Good  Decision Making: Medium Complexity  History: see above  Exam: see above  Assistance / Modification: CGA and verbal cues  OT Education: OT Role;Transfer Training;Orientation;Plan of Care  Barriers to Learning: Hx of Alzheimer's Dementia  REQUIRES OT FOLLOW UP: Yes  Activity Tolerance  Activity Tolerance: Patient Tolerated treatment well  Safety Devices  Safety Devices in place: Yes  Type of devices: All fall risk precautions in place;Gait belt;Patient at risk for falls; Left in bed;Bed alarm in place;Call light within reach  Restraints  Initially in place: No           Patient Diagnosis(es): The primary encounter diagnosis was Impaired ambulation. A diagnosis of Peripheral edema was also pertinent to this visit. has a past medical history of Alzheimer disease ([address] Bronchitis, CAD (coronary artery disease), Chest wall trauma, COPD (chronic obstructive pulmonary disease) ([address] FH: CAD (coronary artery disease), Hypertension, Kidney stone, Lumbar herniated disc, Restless leg syndrome, and Spinal stenosis. has a past surgical history that includes Hysterectomy and Ureter stent placement.     Treatment Diagnosis: Weakness      Restrictions  Restrictions/Precautions  Restrictions/Precautions: Fall Risk    Subjective   General  Chart Reviewed: Yes  Patient assessed for rehabilitation services?: Yes  Family / Caregiver Present: No  Subjective  Subjective: Pt reports \"I'm feeling good\"  General Comment  Comments: Pt presents asleep supine in bed with HOB elevated  Patient Currently in Pain: Denies  Vital Signs  Patient Currently in Pain: Denies  Social/Functional History  Social/Functional History  Lives With: Other (comment)(lives with sister)  Type of Home: House  Home Layout: One level  Home Access: Level entry  Bathroom Shower/Tub: Walk-in shower  Bathroom Toilet: Standard  Bathroom Equipment: Shower chair  Home Equipment: (patient ambulates without AD, pt reports she sleeps in recliner Simultaneous filing. User may not have seen previous data.)  Receives Help From: Family  ADL Assistance: Independent  Homemaking Assistance: Needs assistance  Homemaking Responsibilities: No(Pt reports her sister \"does the housekeeping\")  Ambulation Assistance: Independent  Transfer Assistance: Independent  Active : No  Patient's  Info: sister provides transportation. Leisure & Hobbies: sleeps and works puzzles, reports she likes to walk and visit with friends/neighbors  IADL Comments: patient reports she is independent with dressing and bathing but is a questionable historian       Objective   Vision: Within Functional Limits(Pt reports she had cataract surgery)  Hearing: Within functional limits    Orientation  Overall Orientation Status: Impaired  Orientation Level: Oriented to person;Disoriented to place; Disoriented to time;Disoriented to situation  Observation/Palpation  Observation: Pt presented awake, supine in bed with HOB elevated.   Balance  Sitting Balance: Contact guard assistance(Seated EOB Pt would start to lean to L side at times requiring CGA to prompt and correct)  Standing Balance: Contact guard assistance  Standing Balance  Activity: Ambulated in room around bed 2x ~18ft, using RW with CGA and min verbal cues for safety  ADL  Feeding: Modified independent   Grooming: Contact guard assistance;Verbal cueing  UE Bathing: Stand by assistance;Verbal cueing  LE Bathing: Verbal cueing;Contact guard assistance  UE Dressing: Stand by assistance  LE Dressing: Contact guard assistance(Pt demo pulling up socks seated EOB, CGA for balance safety)  Toileting: Contact guard assistance  Additional Comments: Based on observation of Pt performance, simulation or functional mobility, strength and cognitive status  Tone RUE  RUE Tone: Normotonic  Tone LUE  LUE Tone: Normotonic  Coordination  Movements Are Fluid And Coordinated: Yes     Bed mobility  Supine to Sit: Modified independent  Sit to Supine: Modified independent  Comment: Required verbal prompts  Transfers  Stand Step Transfers: Contact guard assistance  Sit to stand: Contact guard assistance  Stand to sit: Contact guard assistance  Vision - Basic Assessment  Prior Vision: No visual deficits(Pt had cataract surgery)  Cognition  Overall Cognitive Status: Exceptions  Arousal/Alertness: Appropriate responses to stimuli  Following Commands:  Follows one step commands consistently  Attention Span: Appears intact  Memory: Decreased recall of recent events  Safety Judgement: Decreased awareness of need for assistance  Problem Solving: Assistance required to generate solutions;Assistance required to identify errors made;Assistance required to implement solutions  Insights: Decreased awareness of deficits  Initiation: Requires cues for some  Sequencing: Requires cues for some        Sensation  Overall Sensation Status: WNL(per Pt report)        LUE AROM (degrees)  LUE AROM : WFL  RUE AROM (degrees)  RUE AROM : WFL  LUE Strength  Gross LUE Strength: WFL(4/5)  RUE Strength  Gross RUE Strength: WFL(4/5)                   Plan   Plan  Times per week: 3x  Times per day: Daily Current Treatment Recommendations: Strengthening, Balance Training, Functional Mobility Training, Endurance Training, Equipment Evaluation, Education, & procurement, Patient/Caregiver Education & Training, Self-Care / ADL, Safety Education & Training, Cognitive Reorientation    G-Code     OutComes Score                                                  AM-PAC Score    AM-PAC 6 click short form for inpatient daily activity:   How much help from another person does the patient currently need. .. Unable  Dep A Lot  Max A A Lot   Mod A A Little  Min A A Little   CGA  SBA None   Mod I  Indep  Sup   1. Putting on and taking off regular lower body clothing? [] 1    [] 2   [] 2   [x] 3   [] 3   [] 4      2. Bathing (including washing, rinsing, drying)? [] 1   [] 2   [] 2 [] 3 [x] 3 [] 4   3. Toileting, which includes using toilet, bedpan, or urinal? [] 1    [] 2   [] 2   [] 3   [x] 3   [] 4     4. Putting on and taking off regular upper body clothing? [] 1   [] 2   [] 2   [] 3   [x] 3    [] 4      5. Taking care of personal grooming such as brushing teeth? [] 1   [] 2    [] 2 [] 3    [x] 3   [] 4      6. Eating meals? [] 1   [] 2   [] 2   [] 3   [] 3   [x] 4      Raw Score:  19     [24=0% impaired(CH), 23=1-19%(CI), 20-22=20-39%(CJ), 15-19=40-59%(CK), 10-14=60-79%(CL), 7-9=80-99%(CM), 6=100%(CN)]              Goals  Short term goals  Time Frame for Short term goals: Until goals met or DC  Short term goal 1: Pt will complete trfs indep w/o LOB (toilet, bed, chair, shower)  Short term goal 2: Pt will complete UE ADLs mod I  Short term goal 3: Pt will complete LE ADLs mod I  Short term goal 4: Pt will perform 5+ min of standing/functional mobility for ADLs/therax w/o LOB  Short term goal 5: Pt will complete 10+ min of BUE therex to improve strength/endurance to perform ADLs/functional mobility  Patient Goals   Patient goals :  To go home       Therapy Time   Individual Concurrent Group Co-treatment   Time In       2143   Time Out       1040   Minutes       25   Timed Code Treatment Minutes: [address]  Nimo Wagner, OTR/L [id number]

## 2021-03-08 NOTE — DISCHARGE INSTR - COC
Continuity of Care Form    Patient Name: Michael Gilliland   :  1950  MRN:  [id number]    [facility] date:  3/5/2021  Discharge date:  2021    Code Status Order: Full Code   Advance Directives:   [facility] Documentation       Date/Time Healthcare Directive Type of Healthcare Directive Copy in [address] Agent's Name Healthcare Agent's Phone Number    21 1339  No, patient does not have an advance directive for healthcare treatment -- -- -- -- --            Admitting Physician:  Radha Gonsalez MD  PCP: Afia Forrest MD    Discharging Nurse: Sayra Deleon, [facility] Unit/Room#: 007/007-01  Discharging Unit Phone Number: [phone]    Emergency Contact:   Extended Emergency Contact Information  Primary Emergency Contact: Tim Drake   [address] Phone: [phone]  Relation: Brother/Sister    Past Surgical History:  Past Surgical History:   Procedure Laterality Date    HYSTERECTOMY      URETER STENT PLACEMENT         Immunization History:   Immunization History   Administered Date(s) Administered    Influenza, High Dose (Fluzone 65 yrs and older) 2020    Influenza, Triv, inactivated, subunit, adjuvanted, IM (Fluad 65 yrs and older) 10/22/2019    Pneumococcal Conjugate 13-valent (Cecille Bowers) 10/25/2019       Active Problems:  Patient Active Problem List   Diagnosis Code    Essential hypertension I10    Lumbar herniated disc M51.26    Spinal stenosis M48.00    Restless leg syndrome G25.81    FH: CAD (coronary artery disease) Z82.49    Ureteric stone N20.1    Family history of ischemic heart disease and other diseases of the circulatory system Z82.49    Need for vaccination against Streptococcus pneumoniae using pneumococcal conjugate vaccine 15 Z23    At high risk for falls Z91.81    Elevated alkaline phosphatase level R74.8    Diabetes mellitus type 2, diet-controlled ([address] E11.9    Mixed hyperlipidemia E78.2    Urinary incontinence in female R32    Shoulder pain M25.519    Gastroesophageal reflux disease K21.9    Late onset Alzheimer's disease without behavioral disturbance (HCC) G30.1, F02.80    Dementia with behavioral disturbance (HCC) F03.91    Alzheimer's disease with late onset (CODE) (HCC) G30.1    CAD (coronary artery disease) I25.10    COPD (chronic obstructive pulmonary disease) (HCC) J44.9    Delusional disorder (HCC) F22    Acute psychosis ([address] F23    Major neurocognitive disorder due to Alzheimer's disease, with behavioral disturbance (HCC) G30.9, F02.81    Functional diarrhea K59.1    Leg swelling M79.89       Isolation/Infection:   Isolation            No Isolation          Patient Infection Status       Infection Onset Added Last Indicated Last Indicated By Review Planned Expiration Resolved Resolved By    None active    Resolved    C-diff Rule Out 01/25/21 01/25/21 01/25/21 C difficile Molecular/PCR (Ordered)   02/01/21 Ramu Vyas LPN    CPBOS-19 Rule Out 01/21/21 01/21/21 01/21/21 COVID-19 (Ordered)   01/21/21 Rule-Out Test Resulted    COVID-19 Rule Out 09/19/20 09/19/20 09/19/20 COVID-19 (Ordered)   09/19/20 Rule-Out Test Resulted            Nurse Assessment:  Last Vital Signs: /63   Pulse 78   Temp 96.4 °F (35.8 °C) (Infrared)   Resp 16   Ht 5' 2\" (1.575 m)   Wt 145 lb 12.8 oz (66.1 kg)   SpO2 91%   BMI 26.67 kg/m²     Last documented pain score (0-10 scale): Pain Level: 0  Last Weight:   Wt Readings from Last 1 Encounters:   03/08/21 145 lb 12.8 oz (66.1 kg)     Mental Status:  disoriented and alert    IV Access:  - None    Nursing Mobility/ADLs:  Walking   Assisted  Transfer  Assisted  Bathing  Assisted  Dressing  Assisted  Toileting  Assisted  Feeding  Independent  Med Admin  Assisted  Med Delivery   whole    Wound Care Documentation and Therapy:        Elimination:  Continence:   · Bowel:  Yes  · Bladder: Yes  Urinary Catheter: None Colostomy/Ileostomy/Ileal Conduit: No       Date of Last BM:     Intake/Output Summary (Last 24 hours) at 3/8/2021 1433  Last data filed at 3/8/2021 1301  Gross per 24 hour   Intake 870 ml   Output 750 ml   Net 120 ml     I/O last 3 completed shifts: In: 56 [P.O.:580; IV Piggyback:50]  Out: 750 [Urine:750]    Safety Concerns: At Risk for Falls    Impairments/Disabilities:      None    Nutrition Therapy:  Current Nutrition Therapy:   { JADA Diet List:[id number]}    Routes of Feeding: Oral  Liquids: No Restrictions  Daily Fluid Restriction: no  Last Modified Barium Swallow with Video (Video Swallowing Test): {Done Not Done TFMI:105387272}    Treatments at the Time of Hospital Discharge:   Respiratory Treatments: n/a  Oxygen Therapy:  is not on home oxygen therapy.   Ventilator:    - No ventilator support    Rehab Therapies: Physical Therapy  Weight Bearing Status/Restrictions: No weight bearing restirctions  Other Medical Equipment (for information only, NOT a DME order):  walker  Other Treatments: n/a      Patient's personal belongings (please select all that are sent with patient):  None    RN SIGNATURE: Dylan    CASE MANAGEMENT/SOCIAL WORK SECTION    Inpatient Status Date: 3/5/2021    Readmission Risk Assessment Score:  Readmission Risk              Risk of Unplanned Readmission:        0           Discharging to Facility/ Agency   · Name: [address] [place] [address]   · Address: [address] [place] [address],[facility] [address] [place] [address]  · Phone: [phone]  · Fax: [phone]    Dialysis Facility (if applicable)   · Name:  · Address:  · Dialysis Schedule:  · Phone:  · Fax:    / signature: Electronically signed by Madyson Molina RN on 3/8/21 at 2:35 PM EST    PHYSICIAN SECTION    Prognosis: Fair    Condition at Discharge: Stable    Rehab Potential (if transferring to Rehab): Good    Recommended Labs or Other Treatments After Discharge: n/a  Physician Certification: I certify the above information and transfer of [facility]   is necessary for the continuing treatment of the diagnosis listed and that she requires [place] for greater 30 days.      Update Admission H&P: No change in H&P    PHYSICIAN SIGNATURE:  Electronically signed by Franklyn Cordova MD on 3/8/21 at 2:41 PM EST

## 2021-03-08 NOTE — PLAN OF CARE
Problem: Falls - Risk of:  Goal: Will remain free from falls  Description: Will remain free from falls  3/8/2021 0826 by Almita Lares  Outcome: Ongoing  3/7/2021 1943 by Daralyn Canavan, RN  Outcome: Ongoing  Goal: Absence of physical injury  Description: Absence of physical injury  3/7/2021 1943 by Daralyn Canavan, RN  Outcome: Completed     Problem: Discharge Planning:  Goal: Participates in care planning  Description: Participates in care planning  3/8/2021 0826 by Almita Lares  Outcome: Ongoing  3/7/2021 1943 by Daralyn Canavan, RN  Outcome: Ongoing  Goal: Discharged to appropriate level of care  Description: Discharged to appropriate level of care  3/8/2021 0826 by Almita Lares  Outcome: Ongoing  3/7/2021 1943 by Daralyn Canavan, RN  Outcome: Ongoing     Problem:  Activity Intolerance:  Goal: Ability to tolerate increased activity will improve  Description: Ability to tolerate increased activity will improve  3/8/2021 0826 by Almita Lares  Outcome: Ongoing  3/7/2021 1943 by Daralyn Canavan, RN  Outcome: Ongoing     Problem: Anxiety/Stress:  Goal: Level of anxiety will decrease  Description: Level of anxiety will decrease  3/8/2021 0826 by Almita Lares  Outcome: Ongoing  3/7/2021 1943 by Daralyn Canavan, RN  Outcome: Ongoing     Problem: Fluid Volume - Deficit:  Goal: Absence of fluid volume deficit signs and symptoms  Description: Absence of fluid volume deficit signs and symptoms  3/7/2021 1943 by Daralyn Canavan, RN  Outcome: Completed  Goal: Electrolytes within specified parameters  Description: Electrolytes within specified parameters  3/7/2021 1943 by Daralyn Canavan, RN  Outcome: Completed     Problem: Mental Status - Impaired:  Goal: Absence of physical injury  Description: Absence of physical injury  3/7/2021 1943 by Daralyn Canavan, RN  Outcome: Completed  Goal: Absence of continued neurological deterioration signs and symptoms  Description: Absence of continued neurological deterioration signs and symptoms  3/7/2021 1943 by Lashaun Wong RN  Outcome: Completed  Goal: Mental status will be restored to baseline  Description: Mental status will be restored to baseline  3/7/2021 1943 by Lashaun Wong RN  Outcome: Completed     Problem: Mobility - Impaired:  Goal: Mobility will improve to maximum level  Description: Mobility will improve to maximum level  3/8/2021 0826 by Lulla Mode  Outcome: Ongoing  3/7/2021 1943 by Lashaun Wong RN  Outcome: Ongoing     Problem: Skin Integrity - Impaired:  Goal: Will show no infection signs and symptoms  Description: Will show no infection signs and symptoms  3/8/2021 0826 by Lulla Mode  Outcome: Ongoing  3/7/2021 1943 by Lashaun Wong RN  Outcome: Ongoing  Goal: Absence of new skin breakdown  Description: Absence of new skin breakdown  3/8/2021 0826 by Lulla Mode  Outcome: Ongoing  3/7/2021 1943 by Lashaun Wong RN  Outcome: Ongoing

## 2021-03-08 NOTE — PROGRESS NOTES
Nutrition Assessment     Type and Reason for Visit: RD Nutrition Re-Screen/LOS    Nutrition Recommendations/Plan: Continue with low sodium diet    Nutrition Assessment:  Pt potentially being discharged to [facility] today. # with stated UBW around 150#, due to fluctuating fluid status recommend continuing with low sodium diet. Current Nutrition Therapies:    DIET LOW SODIUM 2 GM;     Anthropometric Measures:  · Height: 5' 2\" (157.5 cm)  · Current Body Wt: 145 lb (65.8 kg)   · BMI: 26.5    Nutrition Diagnosis:   · In context of acute illness or injury related to acute injury/trauma as evidenced by intake 51-75%      Nutrition Interventions:   Food and/or Nutrient Delivery:  Continue Current Diet  Nutrition Education/Counseling:  Education not indicated   Coordination of Nutrition Care:    Discharge to [facility]    Goals:      Oral intakes will continue to meet her nutritional needs during her stay        Electronically signed by Francis Melton RD, LD on 3/8/21 at 1:06 PM EST    Contact: [phone]

## 2021-03-08 NOTE — DISCHARGE SUMMARY
Kianna [place] 1950 [id number]  PCP:  Chinyere Haywood MD    Admit date: 3/5/2021  Admitting Physician: Gwyn Sena MD    Discharge date: 3/8/2021 Discharge Physician: Gwyn Sena MD      Reason for admission:   Chief Complaint   Patient presents with    Leg Swelling     PCP called states (pt seen in ER last evening for lower leg edema. Today pulse rate elevated-124 and Oxygen saturation 85%. Concerned that pt has had medication changes at [facility] and is now exhibiting a flat affect. Thinks that  needs to be involved and either admitted or placed in [place])     Present on Admission:   Leg swelling       Discharge Diagnoses Include:  1. Weakness and debility in setting of alzheimer disease, and leg swelling  - ptot  - cm consulted for rehab placement     2. Acute exacerbation CHFpEF with bl le edema  - lasix bid  - io: -1L  - daily weight  - [place] stockings  - is on lisinopril and metoprolol  - venous us     3. Hypolmag/hypokalemia  - replace electroltyes; prn orders abailable, repeat labs  [facility] Course[de-identified] patient presented with lower extremity swelling which improved over the course of her stay. During this time she has also been assessed by physical therapy and found to require 24 hour supervision or assistance, she would do well to be admitted to SNF for a period of time. See progress note from today for further     Patient Instructions:   Josh Buitrago   Home Medication Instructions REK:[id number]    Printed on:03/08/21 1441   Medication Information                      amLODIPine (NORVASC) 10 MG tablet  Take 1 tablet by mouth daily             atorvastatin (LIPITOR) 80 MG tablet  Take 1 tablet by mouth nightly Indications: take at bedtime.              calcium carbonate (TUMS) 500 MG chewable tablet  Take 1 tablet by mouth 3 times daily as needed for Heartburn             divalproex (DEPAKOTE SPRINKLE) 125 MG capsule  Take 6 capsules by mouth Daily with supper 32 minutes

## 2021-03-08 NOTE — CARE COORDINATION
Patient's PASRR level 2 assessment has been completed by John and has been cleared for admission to a SNF.     11:18 AM  CM faxed the PT/OT evaluations to Melodie at [facility]. CM will follow. 2:20 PM  CM received a voicemail from [facility] with [facility] stating they have accepted the patient and she can arrive today. 2:26 PM  CM arranged transportation at 8:30 PM through [employer]. 2:30 PM  CM notified Dr. Fredy Tavarez via [employer]. 2:41 PM  CM attempted to call the patient's sister Alexandra Partida ([phone]) to notify of discharge plan, however, there was no answer and the voicemail box was full so a message could not be left. 3:36 PM  CM received a voicemail from [facility] with [address] [place] [address] Avenue stating that the patient will need a rapid COVID test prior to admission at their facility. CM notified Dr. Fredy Tavarez via [employer].

## 2021-03-08 NOTE — PROGRESS NOTES
Physical Therapy    Facility/Department: [facility] UNIT  Initial Assessment    NAME: Torres Peters  : 1950  MRN: [id number]    Date of Service: 3/8/2021    Discharge Recommendations:  Continue to assess pending progress, 24 hour supervision or assist, [address] with PT   PT Equipment Recommendations  Equipment Needed: Yes  Mobility Devices: Levonia Seller: Rolling  Other: continue to assess    Assessment   Body structures, Functions, Activity limitations: Decreased functional mobility ; Decreased safe awareness;Decreased balance;Decreased coordination;Decreased ADL status; Decreased cognition;Decreased endurance;Decreased ROM; Decreased high-level IADLs;Decreased strength;Decreased posture  Assessment: Pt is a a pleasant 64 yo female who would benefit from skilled PT to address decreased ROM, strength, balance, endurance, and functional mobiltiy. Prognosis: Good  Decision Making: Medium Complexity  History: see below  Exam: see below  Clinical Presentation: evolving  PT Education: Transfer Training;Equipment; Functional Mobility Training;General Safety;Orientation;Gait Training  Barriers to Learning: cognition  REQUIRES PT FOLLOW UP: Yes  Activity Tolerance  Activity Tolerance: Patient Tolerated treatment well       Patient Diagnosis(es): The primary encounter diagnosis was Impaired ambulation. A diagnosis of Peripheral edema was also pertinent to this visit. has a past medical history of Alzheimer disease ([address] Bronchitis, CAD (coronary artery disease), Chest wall trauma, COPD (chronic obstructive pulmonary disease) ([facility] [address] FH: CAD (coronary artery disease), Hypertension, Kidney stone, Lumbar herniated disc, Restless leg syndrome, and Spinal stenosis. has a past surgical history that includes Hysterectomy and Ureter stent placement.     Restrictions  Restrictions/Precautions  Restrictions/Precautions: Fall Risk  Vision/Hearing  Vision: Within Functional Limits(patient reports she had cataract surgery)  Hearing: Within functional limits     Subjective  General  Patient assessed for rehabilitation services?: Yes  Pain Screening  Patient Currently in Pain: Denies          Orientation  Orientation  Overall Orientation Status: Impaired  Orientation Level: Disoriented to time;Oriented to place;Oriented to person  Social/Functional History  Social/Functional History  Lives With: Other (comment)(lives with sister)  Type of Home: House  Home Layout: One level  Home Access: Level entry  Bathroom Shower/Tub: Walk-in shower  Bathroom Toilet: Standard  Bathroom Equipment: Shower chair  Home Equipment: (patient ambulates without AD, pt reports she sleeps in recliner Simultaneous filing. User may not have seen previous data.)  Receives Help From: Family  ADL Assistance: Independent  Homemaking Assistance: Needs assistance  Homemaking Responsibilities: No(Pt reports her sister \"does the housekeeping\")  Ambulation Assistance: Independent  Transfer Assistance: Independent  Active : No  Patient's  Info: sister provides transportation. Leisure & Hobbies: sleeps and works puzzles, reports she likes to walk and visit with friends/neighbors  IADL Comments: patient reports she is independent with dressing and bathing but is a questionable historian  Cognition        Objective     Observation/Palpation  Observation: Pt presented awake, supine in bed with HOB elevated.     AROM RLE (degrees)  RLE AROM: WNL  AROM LLE (degrees)  LLE AROM : WNL  Strength RLE  Strength RLE: Exception  R Hip Flexion: 3+/5  R Knee Extension: 4/5  Strength LLE  Strength LLE: Exception  Comment: unable to follow command for additional strength testing  L Hip Flexion: 3-/5  L Knee Extension: 4-/5  Tone RLE  RLE Tone: Normotonic  Tone LLE  LLE Tone: Normotonic  Motor Control  Gross Motor?: (Poor dynamic balance)  Sensation  Overall Sensation Status: WNL(per pt report)  Bed mobility  Supine to Sit: Modified independent(verbal cuing required for sequencing)  Sit to Supine: Modified independent(with cuing, HOB elevated)  Transfers  Sit to Stand: 2 Person Assistance;Minimal Assistance(cues for hand placement)  Stand to sit: Contact guard assistance  Ambulation  Ambulation?: Yes  WB Status: FWB  More Ambulation?: No  Ambulation 1  Surface: level tile  Device: Rolling Walker  Assistance: Contact guard assistance  Quality of Gait: Pt ambulated with decreased step length and decreased foot clearance. Slight left lateral trunk lean with use of 2 wheeled walker. Decreased bao. Distance: 18 ft  Stairs/Curb  Stairs?: No     Balance  Posture: Fair  Sitting - Static: Poor  Sitting - Dynamic: Poor  Standing - Static: Fair  Standing - Dynamic: Poor        Plan   Plan  Times per week: Mon-Sat 4+/week  Current Treatment Recommendations: Strengthening, Transfer Training, Endurance Training, Neuromuscular Re-education, Patient/Caregiver Education & Training, ROM, ADL/Self-care Training, Equipment Evaluation, Education, & procurement, Balance Training, IADL Training, Gait Training, Home Exercise Program, Functional Mobility Training, Stair training, Positioning, Safety Education & Training(ther ex, ther act, bed mobility)  Safety Devices  Type of devices: Gait belt, Bed alarm in place, Call light within reach, Left in bed    AM-PAC Score        Basic Mobility Six Clicks Form [employer] AM-PAC Score Conversion Table   How much difficulty does the patient currently have Unable   (pt is unable to do activity) A Lot   (activity is a struggle, requires great effort/time) A Little   (pt can manage, but takes more effort/time than should) None   (pt has no difficulty) Raw Score Standardized Score CMS -100% Score CMS Modifier        6 23.55 100% CN   Turning over in bed (including adjusting bedclothes, sheets, and blankets)?   []1 []2  []3  [x]4  7 26.42 92.36% CM        8 28.58 86.62% CM   Sitting down on and standing up from a chair with arms (e.g. wheelchair, bedside commode, etc.)? []1 []2 [x]3   []4   9 30.55 81.38% CM        10 32.29 76.75% CL   Moving from lying on back to sitting on the side of the bed? []1 []2  [x]3   []4   11 33.86 72.57% CL        12 35.33 68.66% CL   How much help from another person does the patient currently need Total   (Total/Dependent Assist) A Lot   (Max/Mod Assist) A Little   (Min/CGA/Supervision) None   (No human assistance) 13 36.74 64.91% CL        14 38.1 61.29% CL   Moving to and from a bed to a chair (including a wheelchair)? []1  []2   [x]3  []4   15 39.45 57.70% CK        16 40.78 54.16% CK   To walk in a hospital room? []1 []2   [x]3    []4  17 42.13 50.57% CK        18 43.63 46.58% CK   Climbing 3-5 steps with a railing? []1  [x]2   []3    []4  19 45.44 41.77% CK        20 47.67 35.83% CJ   Raw Score  18 21 50.25 28.97% CJ   Standardized Score  43.63 22 53.28 20.91% CJ   CMS 0-100% Score  46.58 23 56.93 11.20% CI   CMS Modifier CK 24 61.14 0.00% CH     CH = 0% impaired  CI = 1-20% impaired  CJ = 20-40% impaired  CK = 40-60% impaired  CL = 60-80% impaired  CM = % impaired  CN = 100% impaired            Goals  Short term goals  Time Frame for Short term goals: 1 week or until discharge  Short term goal 1: Pt will demonstrate the ability to safely transfer sit to stand from 3 different height surfaces with SBA and proper hand placement. Short term goal 2: Pt will demonstrate the ability to safely stand x5' while perofrming dynamic balance activities with CGA. Short term goal 3: Pt will demonstrate the ability to safely ambulate 50 feet with a RW and supervision. Patient Goals   Patient goals :  To go home       Therapy Time   Individual Concurrent Group Co-treatment   Time In       1015   Time Out       1040   Minutes       25   Timed Code Treatment Minutes: [address], PT DPT [id number]

## 2021-03-08 NOTE — CARE COORDINATION
NURSING: The patient has been accepted to [facility] and will be discharged today. Please complete the [address] form, call report to [phone], and notify the patient's sister. Transportation has been arranged at 8:30 PM through [employer] .

## 2021-03-09 LAB
EKG ATRIAL RATE: 95 BPM
EKG DIAGNOSIS: NORMAL
EKG P AXIS: 74 DEGREES
EKG P-R INTERVAL: 172 MS
EKG Q-T INTERVAL: 354 MS
EKG QRS DURATION: 90 MS
EKG QTC CALCULATION (BAZETT): 444 MS
EKG R AXIS: -13 DEGREES
EKG T AXIS: 52 DEGREES
EKG VENTRICULAR RATE: 95 BPM

## 2021-03-09 NOTE — PROGRESS NOTES
Attempted x2 to call report to [facility] but there was no answer. Patient incontinent of large soft BM, devin care given. Gown, attends and draw sheet changed. Skin assessment was performed by myself and [facility] RN, no skin abnormalities were noted.

## 2021-03-09 NOTE — PROGRESS NOTES
Call received from [facility] at [facility], report was given and Negative Covid results were FAXed  To [phone] per Jannie's request.

## 2021-03-09 NOTE — PLAN OF CARE
Problem: Falls - Risk of:  Goal: Will remain free from falls  Description: Will remain free from falls  3/8/2021 2009 by Sinan Corona RN  Outcome: Completed  3/8/2021 0826 by Henreitta Pump  Outcome: Ongoing     Problem: Discharge Planning:  Goal: Participates in care planning  Description: Participates in care planning  3/8/2021 2009 by Sinan Corona RN  Outcome: Completed  3/8/2021 0826 by Henreitta Pump  Outcome: Ongoing  Goal: Discharged to appropriate level of care  Description: Discharged to appropriate level of care  3/8/2021 2009 by Sinan Corona RN  Outcome: Completed  3/8/2021 0826 by Henreitta Pump  Outcome: Ongoing     Problem:  Activity Intolerance:  Goal: Ability to tolerate increased activity will improve  Description: Ability to tolerate increased activity will improve  3/8/2021 2009 by Sinan Corona RN  Outcome: Completed  3/8/2021 0826 by Henreitta Pump  Outcome: Ongoing     Problem: Anxiety/Stress:  Goal: Level of anxiety will decrease  Description: Level of anxiety will decrease  3/8/2021 2009 by Sinan Corona RN  Outcome: Completed  3/8/2021 0826 by Henreitta Pump  Outcome: Ongoing     Problem: Mobility - Impaired:  Goal: Mobility will improve to maximum level  Description: Mobility will improve to maximum level  3/8/2021 2009 by Sinan Corona RN  Outcome: Completed  3/8/2021 0826 by Henreitta Pump  Outcome: Ongoing     Problem: Skin Integrity - Impaired:  Goal: Will show no infection signs and symptoms  Description: Will show no infection signs and symptoms  3/8/2021 2009 by Sinan Corona RN  Outcome: Completed  3/8/2021 0826 by Henreitta Pump  Outcome: Ongoing  Goal: Absence of new skin breakdown  Description: Absence of new skin breakdown  3/8/2021 2009 by Sinan Corona RN  Outcome: Completed  3/8/2021 0826 by Henreitta Pump  Outcome: Ongoing

## 2021-03-09 NOTE — PROGRESS NOTES
Spoke with sister Mariana Deshpande at bedside to advise pt will be going to [address] [place] [address] at 8:30 p.m. today and [facility] will be picking up pt. Took IV out.

## 2021-03-22 ENCOUNTER — TELEPHONE (OUTPATIENT)
Dept: PSYCHIATRY | Age: 71
End: 2021-03-22

## 2021-03-23 ENCOUNTER — TELEPHONE (OUTPATIENT)
Dept: PSYCHIATRY | Age: 71
End: 2021-03-23

## 2021-03-23 NOTE — TELEPHONE ENCOUNTER
Sister called to report that pt is still in nursing home. Appointment rescheduled for April 6 at 1630.

## 2021-03-29 ENCOUNTER — CARE COORDINATION (OUTPATIENT)
Dept: CASE MANAGEMENT | Age: 71
End: 2021-03-29

## 2021-03-29 NOTE — CARE COORDINATION
[facility] Trans[facility] Initial Follow Up Call    Call within 2 business days of discharge: Yes    Patient: Linh Locke Patient : 1950   MRN: [id number]  Reason for Admission: CHF  Discharge Date: 3/8/21 RARS: Readmission Risk Score: 34      Last Discharge [facility]       Complaint Diagnosis Description Type Department Provider    3/5/21 Leg Swelling Impaired ambulation . .. ED to Hosp-Admission (Discharged) (ADMITTED) Paul Gurrola MD; Isaiah Portillo. .. Acute Care Course:  [facility] to [facility] with CHF leg swelling and debility. From hospital notes: \"PCP called states (pt seen in ER last evening for lower leg edema. Today pulse rate elevated-124 and Oxygen saturation 85%. Concerned that pt has had medication changes at [facility] and is now exhibiting a flat affect.  Thinks that  needs to be involved and either admitted or placed in [facility])\"    Other Sig Hx:   Alzheimer's, dementia, delusional, acute psychosis,  HTN, Placed on 1 L O2 in hospital, COPD, lunbar herniation, restless leg, CAD, uretic stone, falls, DMII, urinary incontinence, shoulder pain, GERD, diarrhea    DME:     Conversation:  Left HIPPA compliant message regarding the nature of the call and a request for a return call with my contact information      DAVE Aguirre, [phone]  Javed Aguirre / [facility] Transition Nurse         Follow up plan:  Check on daily weights, [place] hose, and edema         Care Transitions 24 Hour Call    Care Transitions Interventions         Follow Up  Future Appointments   Date Time Provider Reyna Duran   2021  4:30 PM Justus Brumfield APRN -  [facility] None   2021  9:15 AM Behzad Dickens MD [address] [place] [address]       Osmani Davidson RN

## 2021-03-30 ENCOUNTER — CARE COORDINATION (OUTPATIENT)
Dept: CASE MANAGEMENT | Age: 71
End: 2021-03-30

## 2021-03-30 NOTE — CARE COORDINATION
[facility] Initial Follow Up Call    Call within 2 business days of discharge: Yes    Patient: Osvaldo Fermin Patient : 1950   MRN: [id number]  Reason for Admission: leg edema  Discharge Date: 3/8/21 RARS: Readmission Risk Score: 34      Last Discharge [facility]       Complaint Diagnosis Description Type Department Provider    3/5/21 Leg Swelling Impaired ambulation . .. ED to Hosp-Admission (Discharged) (ADMITTED) Ramila Saleh MD; Brent Redmond. ..         Unable to reach as mailbox is full and this is the only contact number        DAVE Fernandez, [phone]  Joanne Quiroz / [facility] Nurse         Care Transitions 24 Hour Call    Care Transitions Interventions         Follow Up  Future Appointments   Date Time Provider Reyna Duran   2021  4:30 PM EVELYN Sharif - CNP [facility] None   2021  9:15 AM Hosea Jose MD [facility]  MMA       Louisa Domínguez RN

## 2021-03-31 ENCOUNTER — CARE COORDINATION (OUTPATIENT)
Dept: CASE MANAGEMENT | Age: 71
End: 2021-03-31

## 2021-03-31 NOTE — CARE COORDINATION
[facility] Follow Up Call    3/31/2021    Patient: Carla Partida  Patient : 1950   MRN: [id number]  Reason for Admission: leg edema  Discharge Date: 3/8/21 RARS: Readmission Risk Score: 34    Unable to reach as mailbox is full and this is the only contact number      closing as unable to reach patient     DAVE Bernstein, [phone]  Abdulaziz Leonard / [facility] Transition Nurse        Spoke with: UTR    Care Transitions Subsequent and Final Call    Subsequent and Final Calls  Care Transitions Interventions  Other Interventions:            Follow Up  Future Appointments   Date Time Provider Reyna Duran   2021  4:30 PM EVELYN Reyes - CNP [facility]  None   2021  9:15 AM Easton De Los Santos MD [address] [facility]       Heidi Fitzpatrick, RN

## 2021-04-14 ENCOUNTER — TELEPHONE (OUTPATIENT)
Dept: INTERNAL MEDICINE CLINIC | Age: 71
End: 2021-04-14

## 2021-04-14 DIAGNOSIS — F02.818 LATE ONSET ALZHEIMER'S DISEASE WITH BEHAVIORAL DISTURBANCE (HCC): ICD-10-CM

## 2021-04-14 DIAGNOSIS — G30.1 LATE ONSET ALZHEIMER'S DISEASE WITH BEHAVIORAL DISTURBANCE (HCC): ICD-10-CM

## 2021-04-14 DIAGNOSIS — R32 URINARY INCONTINENCE IN FEMALE: Primary | ICD-10-CM

## 2021-04-14 NOTE — TELEPHONE ENCOUNTER
Patient's sister called requesting a order for [facility] external catheter. Stated patient is not using the restroom and cannot control her bodily functions.

## 2021-04-14 NOTE — TELEPHONE ENCOUNTER
Order placed.  I will discuss this in more detail with patient during upcoming appt with me on 4/15/2021

## 2021-04-19 ENCOUNTER — OFFICE VISIT (OUTPATIENT)
Dept: INTERNAL MEDICINE CLINIC | Age: 71
End: 2021-04-19
Payer: MEDICARE

## 2021-04-19 VITALS
HEART RATE: 78 BPM | BODY MASS INDEX: 26.67 KG/M2 | DIASTOLIC BLOOD PRESSURE: 70 MMHG | HEIGHT: 62 IN | SYSTOLIC BLOOD PRESSURE: 130 MMHG | OXYGEN SATURATION: 98 %

## 2021-04-19 DIAGNOSIS — Z12.31 ENCOUNTER FOR SCREENING MAMMOGRAM FOR BREAST CANCER: ICD-10-CM

## 2021-04-19 DIAGNOSIS — F02.818 LATE ONSET ALZHEIMER'S DISEASE WITH BEHAVIORAL DISTURBANCE (HCC): Primary | ICD-10-CM

## 2021-04-19 DIAGNOSIS — R01.1 MURMUR: ICD-10-CM

## 2021-04-19 DIAGNOSIS — I50.9 CHRONIC CONGESTIVE HEART FAILURE, UNSPECIFIED HEART FAILURE TYPE (HCC): ICD-10-CM

## 2021-04-19 DIAGNOSIS — G30.1 LATE ONSET ALZHEIMER'S DISEASE WITH BEHAVIORAL DISTURBANCE (HCC): Primary | ICD-10-CM

## 2021-04-19 DIAGNOSIS — E11.8 CONTROLLED TYPE 2 DIABETES MELLITUS WITH COMPLICATION, WITHOUT LONG-TERM CURRENT USE OF INSULIN (HCC): ICD-10-CM

## 2021-04-19 DIAGNOSIS — F33.1 MODERATE EPISODE OF RECURRENT MAJOR DEPRESSIVE DISORDER (HCC): ICD-10-CM

## 2021-04-19 DIAGNOSIS — F51.01 PRIMARY INSOMNIA: ICD-10-CM

## 2021-04-19 DIAGNOSIS — E78.2 MIXED HYPERLIPIDEMIA: ICD-10-CM

## 2021-04-19 DIAGNOSIS — R32 URINARY INCONTINENCE IN FEMALE: ICD-10-CM

## 2021-04-19 DIAGNOSIS — I10 ESSENTIAL HYPERTENSION: ICD-10-CM

## 2021-04-19 PROBLEM — E11.9 DIABETES MELLITUS TYPE 2, DIET-CONTROLLED (HCC): Status: RESOLVED | Noted: 2019-10-22 | Resolved: 2021-04-19

## 2021-04-19 PROCEDURE — G8417 CALC BMI ABV UP PARAM F/U: HCPCS | Performed by: INTERNAL MEDICINE

## 2021-04-19 PROCEDURE — G8400 PT W/DXA NO RESULTS DOC: HCPCS | Performed by: INTERNAL MEDICINE

## 2021-04-19 PROCEDURE — 0509F URINE INCON PLAN DOCD: CPT | Performed by: INTERNAL MEDICINE

## 2021-04-19 PROCEDURE — 2022F DILAT RTA XM EVC RTNOPTHY: CPT | Performed by: INTERNAL MEDICINE

## 2021-04-19 PROCEDURE — 1036F TOBACCO NON-USER: CPT | Performed by: INTERNAL MEDICINE

## 2021-04-19 PROCEDURE — 3044F HG A1C LEVEL LT 7.0%: CPT | Performed by: INTERNAL MEDICINE

## 2021-04-19 PROCEDURE — 1123F ACP DISCUSS/DSCN MKR DOCD: CPT | Performed by: INTERNAL MEDICINE

## 2021-04-19 PROCEDURE — 99214 OFFICE O/P EST MOD 30 MIN: CPT | Performed by: INTERNAL MEDICINE

## 2021-04-19 PROCEDURE — 1090F PRES/ABSN URINE INCON ASSESS: CPT | Performed by: INTERNAL MEDICINE

## 2021-04-19 PROCEDURE — G8427 DOCREV CUR MEDS BY ELIG CLIN: HCPCS | Performed by: INTERNAL MEDICINE

## 2021-04-19 PROCEDURE — 3017F COLORECTAL CA SCREEN DOC REV: CPT | Performed by: INTERNAL MEDICINE

## 2021-04-19 PROCEDURE — 4040F PNEUMOC VAC/ADMIN/RCVD: CPT | Performed by: INTERNAL MEDICINE

## 2021-04-19 RX ORDER — TRAZODONE HYDROCHLORIDE 150 MG/1
150 TABLET ORAL NIGHTLY
Qty: 30 TABLET | Refills: 3 | Status: SHIPPED | OUTPATIENT
Start: 2021-04-19 | End: 2021-04-21

## 2021-04-19 RX ORDER — AMLODIPINE BESYLATE 10 MG/1
10 TABLET ORAL DAILY
Qty: 30 TABLET | Refills: 3 | Status: SHIPPED | OUTPATIENT
Start: 2021-04-19 | End: 2021-07-09 | Stop reason: SDUPTHER

## 2021-04-19 RX ORDER — LISINOPRIL 20 MG/1
20 TABLET ORAL DAILY
Qty: 30 TABLET | Refills: 3 | Status: SHIPPED | OUTPATIENT
Start: 2021-04-19 | End: 2021-07-09 | Stop reason: SDUPTHER

## 2021-04-19 RX ORDER — FUROSEMIDE 20 MG/1
20 TABLET ORAL 2 TIMES DAILY
Qty: 60 TABLET | Refills: 3 | Status: SHIPPED | OUTPATIENT
Start: 2021-04-19 | End: 2021-07-09 | Stop reason: SDUPTHER

## 2021-04-19 RX ORDER — DONEPEZIL HYDROCHLORIDE 10 MG/1
10 TABLET, FILM COATED ORAL NIGHTLY
Qty: 30 TABLET | Refills: 3 | Status: SHIPPED | OUTPATIENT
Start: 2021-04-19 | End: 2021-04-21

## 2021-04-19 RX ORDER — MEMANTINE HYDROCHLORIDE 10 MG/1
10 TABLET ORAL DAILY
Qty: 30 TABLET | Refills: 3 | Status: SHIPPED | OUTPATIENT
Start: 2021-04-19 | End: 2021-04-21

## 2021-04-19 RX ORDER — ATORVASTATIN CALCIUM 80 MG/1
80 TABLET, FILM COATED ORAL NIGHTLY
Qty: 30 TABLET | Refills: 3 | Status: SHIPPED | OUTPATIENT
Start: 2021-04-19 | End: 2021-07-09 | Stop reason: SDUPTHER

## 2021-04-19 ASSESSMENT — PATIENT HEALTH QUESTIONNAIRE - PHQ9: SUM OF ALL RESPONSES TO PHQ QUESTIONS 1-9: 0
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
## 2021-04-19 NOTE — PROGRESS NOTES
Patient needs super absorbance depends    Patient's sister stated that patient had cat scan while in hospital sister would like to know why and what results are  Patient is also is shuffling her feet was able to walk when she went into hospital

## 2021-04-20 ENCOUNTER — TELEPHONE (OUTPATIENT)
Dept: PSYCHIATRY | Age: 71
End: 2021-04-20

## 2021-04-21 ENCOUNTER — HOSPITAL ENCOUNTER (OUTPATIENT)
Dept: PSYCHIATRY | Age: 71
Setting detail: THERAPIES SERIES
Discharge: HOME | End: 2021-04-21
Payer: MEDICARE

## 2021-04-21 VITALS — DIASTOLIC BLOOD PRESSURE: 60 MMHG | OXYGEN SATURATION: 96 % | HEART RATE: 85 BPM | SYSTOLIC BLOOD PRESSURE: 117 MMHG

## 2021-04-21 DIAGNOSIS — G30.9 MAJOR NEUROCOGNITIVE DISORDER DUE TO ALZHEIMER'S DISEASE, WITH BEHAVIORAL DISTURBANCE (HCC): Primary | ICD-10-CM

## 2021-04-21 DIAGNOSIS — G30.1 LATE ONSET ALZHEIMER'S DISEASE WITHOUT BEHAVIORAL DISTURBANCE (HCC): Chronic | ICD-10-CM

## 2021-04-21 DIAGNOSIS — G30.1 ALZHEIMER'S DISEASE WITH LATE ONSET (CODE) (HCC): ICD-10-CM

## 2021-04-21 DIAGNOSIS — F02.80 LATE ONSET ALZHEIMER'S DISEASE WITHOUT BEHAVIORAL DISTURBANCE (HCC): Chronic | ICD-10-CM

## 2021-04-21 DIAGNOSIS — F51.01 PRIMARY INSOMNIA: ICD-10-CM

## 2021-04-21 DIAGNOSIS — F02.818 MAJOR NEUROCOGNITIVE DISORDER DUE TO ALZHEIMER'S DISEASE, WITH BEHAVIORAL DISTURBANCE (HCC): Primary | ICD-10-CM

## 2021-04-21 PROCEDURE — 90832 PSYTX W PT 30 MINUTES: CPT | Performed by: NURSE PRACTITIONER

## 2021-04-21 RX ORDER — RISPERIDONE 1 MG/1
1 TABLET, FILM COATED ORAL DAILY
Qty: 30 TABLET | Refills: 1 | Status: SHIPPED | OUTPATIENT
Start: 2021-04-21 | End: 2021-05-19

## 2021-04-21 RX ORDER — DONEPEZIL HYDROCHLORIDE 10 MG/1
10 TABLET, FILM COATED ORAL NIGHTLY
Qty: 30 TABLET | Refills: 1 | Status: SHIPPED | OUTPATIENT
Start: 2021-04-21 | End: 2021-05-19

## 2021-04-21 RX ORDER — DIVALPROEX SODIUM 125 MG/1
750 CAPSULE, COATED PELLETS ORAL NIGHTLY
Qty: 180 CAPSULE | Refills: 1 | Status: SHIPPED | OUTPATIENT
Start: 2021-04-21 | End: 2021-05-19

## 2021-04-21 RX ORDER — MEMANTINE HYDROCHLORIDE 10 MG/1
10 TABLET ORAL DAILY
Qty: 30 TABLET | Refills: 1 | Status: SHIPPED | OUTPATIENT
Start: 2021-04-21 | End: 2021-05-19

## 2021-04-21 RX ORDER — TRAZODONE HYDROCHLORIDE 100 MG/1
100 TABLET ORAL NIGHTLY
Qty: 30 TABLET | Refills: 1 | Status: SHIPPED | OUTPATIENT
Start: 2021-04-21 | End: 2021-05-19
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
## 2021-04-22 ENCOUNTER — CARE COORDINATION (OUTPATIENT)
Dept: CARE COORDINATION | Age: 71
End: 2021-04-22

## 2021-04-23 DIAGNOSIS — F02.818 LATE ONSET ALZHEIMER'S DISEASE WITH BEHAVIORAL DISTURBANCE (HCC): Primary | ICD-10-CM

## 2021-04-23 DIAGNOSIS — G30.1 LATE ONSET ALZHEIMER'S DISEASE WITH BEHAVIORAL DISTURBANCE (HCC): Primary | ICD-10-CM

## 2021-05-18 ENCOUNTER — TELEPHONE (OUTPATIENT)
Dept: PSYCHIATRY | Age: 71
End: 2021-05-18

## 2021-05-19 ENCOUNTER — HOSPITAL ENCOUNTER (OUTPATIENT)
Age: 71
Discharge: HOME | End: 2021-05-19
Payer: MEDICARE

## 2021-05-19 ENCOUNTER — HOSPITAL ENCOUNTER (OUTPATIENT)
Dept: PSYCHIATRY | Age: 71
Setting detail: THERAPIES SERIES
Discharge: HOME | End: 2021-05-19
Payer: MEDICARE

## 2021-05-19 VITALS
OXYGEN SATURATION: 100 % | HEART RATE: 86 BPM | SYSTOLIC BLOOD PRESSURE: 140 MMHG | RESPIRATION RATE: 17 BRPM | DIASTOLIC BLOOD PRESSURE: 75 MMHG

## 2021-05-19 DIAGNOSIS — F02.80 LATE ONSET ALZHEIMER'S DISEASE WITHOUT BEHAVIORAL DISTURBANCE (HCC): Primary | Chronic | ICD-10-CM

## 2021-05-19 DIAGNOSIS — G30.9 MAJOR NEUROCOGNITIVE DISORDER DUE TO ALZHEIMER'S DISEASE, WITH BEHAVIORAL DISTURBANCE (HCC): ICD-10-CM

## 2021-05-19 DIAGNOSIS — F02.818 MAJOR NEUROCOGNITIVE DISORDER DUE TO ALZHEIMER'S DISEASE, WITH BEHAVIORAL DISTURBANCE (HCC): ICD-10-CM

## 2021-05-19 DIAGNOSIS — F51.01 PRIMARY INSOMNIA: ICD-10-CM

## 2021-05-19 DIAGNOSIS — Z79.899 ON VALPROIC ACID THERAPY: ICD-10-CM

## 2021-05-19 DIAGNOSIS — G30.1 ALZHEIMER'S DISEASE WITH LATE ONSET (CODE) (HCC): ICD-10-CM

## 2021-05-19 DIAGNOSIS — G30.1 LATE ONSET ALZHEIMER'S DISEASE WITHOUT BEHAVIORAL DISTURBANCE (HCC): Primary | Chronic | ICD-10-CM

## 2021-05-19 LAB
ALBUMIN SERPL-MCNC: 4 GM/DL (ref 3.4–5)
ALP BLD-CCNC: 110 IU/L (ref 40–129)
ALT SERPL-CCNC: 23 U/L (ref 10–40)
ANION GAP SERPL CALCULATED.3IONS-SCNC: 5 MMOL/L (ref 4–16)
AST SERPL-CCNC: 24 IU/L (ref 15–37)
BASOPHILS ABSOLUTE: 0.1 K/CU MM
BASOPHILS RELATIVE PERCENT: 0.6 % (ref 0–1)
BILIRUB SERPL-MCNC: 0.3 MG/DL (ref 0–1)
BUN BLDV-MCNC: 13 MG/DL (ref 6–23)
CALCIUM SERPL-MCNC: 9.7 MG/DL (ref 8.3–10.6)
CHLORIDE BLD-SCNC: 98 MMOL/L (ref 99–110)
CO2: 36 MMOL/L (ref 21–32)
CREAT SERPL-MCNC: 1.1 MG/DL (ref 0.6–1.1)
DIFFERENTIAL TYPE: ABNORMAL
EOSINOPHILS ABSOLUTE: 0.4 K/CU MM
EOSINOPHILS RELATIVE PERCENT: 4.4 % (ref 0–3)
GFR AFRICAN AMERICAN: 59 ML/MIN/1.73M2
GFR NON-AFRICAN AMERICAN: 49 ML/MIN/1.73M2
GLUCOSE FASTING: 180 MG/DL (ref 70–99)
HCT VFR BLD CALC: 32.8 % (ref 37–47)
HEMOGLOBIN: 10.8 GM/DL (ref 12.5–16)
IMMATURE NEUTROPHIL %: 1.3 % (ref 0–0.43)
LYMPHOCYTES ABSOLUTE: 1.6 K/CU MM
LYMPHOCYTES RELATIVE PERCENT: 20.4 % (ref 24–44)
MCH RBC QN AUTO: 33.4 PG (ref 27–31)
MCHC RBC AUTO-ENTMCNC: 32.9 % (ref 32–36)
MCV RBC AUTO: 101.5 FL (ref 78–100)
MONOCYTES ABSOLUTE: 0.8 K/CU MM
MONOCYTES RELATIVE PERCENT: 9.9 % (ref 0–4)
PDW BLD-RTO: 13.1 % (ref 11.7–14.9)
PLATELET # BLD: 199 K/CU MM (ref 140–440)
PMV BLD AUTO: 9.8 FL (ref 7.5–11.1)
POTASSIUM SERPL-SCNC: 4.1 MMOL/L (ref 3.5–5.1)
RBC # BLD: 3.23 M/CU MM (ref 4.2–5.4)
SEGMENTED NEUTROPHILS ABSOLUTE COUNT: 5.1 K/CU MM
SEGMENTED NEUTROPHILS RELATIVE PERCENT: 63.4 % (ref 36–66)
SODIUM BLD-SCNC: 139 MMOL/L (ref 135–145)
TOTAL IMMATURE NEUTOROPHIL: 0.1 K/CU MM
TOTAL PROTEIN: 6.7 GM/DL (ref 6.4–8.2)
WBC # BLD: 8 K/CU MM (ref 4–10.5)

## 2021-05-19 PROCEDURE — 80164 ASSAY DIPROPYLACETIC ACD TOT: CPT

## 2021-05-19 PROCEDURE — 85025 COMPLETE CBC W/AUTO DIFF WBC: CPT

## 2021-05-19 PROCEDURE — 80165 DIPROPYLACETIC ACID FREE: CPT

## 2021-05-19 PROCEDURE — 80053 COMPREHEN METABOLIC PANEL: CPT

## 2021-05-19 PROCEDURE — 36415 COLL VENOUS BLD VENIPUNCTURE: CPT

## 2021-05-19 PROCEDURE — 90832 PSYTX W PT 30 MINUTES: CPT | Performed by: NURSE PRACTITIONER

## 2021-05-19 RX ORDER — MEMANTINE HYDROCHLORIDE 10 MG/1
10 TABLET ORAL DAILY
Qty: 30 TABLET | Refills: 2 | Status: SHIPPED | OUTPATIENT
Start: 2021-05-19 | End: 2021-07-09 | Stop reason: SDUPTHER

## 2021-05-19 RX ORDER — RISPERIDONE 1 MG/1
1 TABLET, FILM COATED ORAL DAILY
Qty: 30 TABLET | Refills: 2 | Status: SHIPPED | OUTPATIENT
Start: 2021-05-19

## 2021-05-19 RX ORDER — TRAZODONE HYDROCHLORIDE 50 MG/1
50 TABLET ORAL NIGHTLY
Qty: 30 TABLET | Refills: 2 | Status: SHIPPED | OUTPATIENT
Start: 2021-05-19

## 2021-05-19 RX ORDER — DIVALPROEX SODIUM 125 MG/1
750 CAPSULE, COATED PELLETS ORAL NIGHTLY
Qty: 180 CAPSULE | Refills: 2 | Status: SHIPPED | OUTPATIENT
Start: 2021-05-19

## 2021-05-19 RX ORDER — DONEPEZIL HYDROCHLORIDE 10 MG/1
10 TABLET, FILM COATED ORAL NIGHTLY
Qty: 30 TABLET | Refills: 2 | Status: SHIPPED | OUTPATIENT
Start: 2021-05-19 | End: 2021-07-09 | Stop reason: SDUPTHER

## 2021-05-19 NOTE — PROGRESS NOTES
Behavioral Health Consultation  Pavan Holbrook, PMHNP-BC  5/19/2021, 9:48 AM      Time spent with Patient:  30 minutes  This was a outpatient visit. Patient Location: Home. Provider Location: [facility]    Chief Complaint: dementia    [place]:  Reason for visit is medication management follow up. She has been compliant with medications. Pt reports that medications have  been working. Pt admits to side effects from medications - excessively sleepy throughout the day, but slightly improved since decreased dose of Trazodone from 150 mg to 100 mg. Will decrease further to 50 mg nightly. Pt denies hallucinations. Pt reports there has been no changes to appetite. Pt reports sleep has been good, but sleepy throughout day. Pt admits to  current exercise, but very limited. Pt denies current suicidal ideation, plan and intent. Pt  denies current homicidal ideation, plan and [email or web address]@[email or web address]). Social: lives with sister Rylee Davis, visits friends  Past Psychiatric history:   The patient has a history of dementia with behavioral disturbance. Current treatment includes Anti-psychotic: risperidone, Mood stabilizer: Depakote and Aricept, Namenda and trazodone. Patient has hypersomnolence from current treatment. Previous treatment has included: carbamazepine    Family Mental Health history:   Pertinent family history: no psychiatric illness. MSE:    Appearance: alert, cooperative, no distress  Attention :Intact  Appetite: normal  Ambulation: unable to assess.    Sleep disturbance: Yes  Loss of pleasure: No  Speech: spontaneous, normal rate and normal volume  Mood: euthymic  Affect: normal affect  Thought Content: impoverished  Insight: Poor  Judgment: Absent  Memory: Short-term impaired  Suicide Assessment: no suicidal ideation  Homicide Assessment: denies current homicidal ideation, plan and intent    History:      Review of Systems:       Current Outpatient Medications:   divalproex (DEPAKOTE SPRINKLES) 125 MG capsule, Take 6 capsules by mouth nightly, Disp: 180 capsule, Rfl: 2    donepezil (ARICEPT) 10 MG tablet, Take 1 tablet by mouth nightly, Disp: 30 tablet, Rfl: 2    memantine (NAMENDA) 10 MG tablet, Take 1 tablet by mouth daily, Disp: 30 tablet, Rfl: 2    risperiDONE (RISPERDAL) 1 MG tablet, Take 1 tablet by mouth daily, Disp: 30 tablet, Rfl: 2    traZODone (DESYREL) 50 MG tablet, Take 1 tablet by mouth nightly, Disp: 30 tablet, Rfl: 2    Elastic Bandages & Supports (GAIT/TRANSFER BELT) MISC, Gait belt for unstable gait, Disp: 1 each, Rfl: 0    furosemide (LASIX) 20 MG tablet, Take 1 tablet by mouth 2 times daily, Disp: 60 tablet, Rfl: 3    atorvastatin (LIPITOR) 80 MG tablet, Take 1 tablet by mouth nightly Indications: take at bedtime. , Disp: 30 tablet, Rfl: 3    lisinopril (PRINIVIL;ZESTRIL) 20 MG tablet, Take 1 tablet by mouth daily, Disp: 30 tablet, Rfl: 3    amLODIPine (NORVASC) 10 MG tablet, Take 1 tablet by mouth daily, Disp: 30 tablet, Rfl: 3    metFORMIN (GLUCOPHAGE) 500 MG tablet, Take 1 tablet by mouth 2 times daily (with meals), Disp: 60 tablet, Rfl: 3    metoprolol tartrate (LOPRESSOR) 25 MG tablet, Take 1 tablet by mouth 2 times daily, Disp: 60 tablet, Rfl: 3    UNABLE TO FIND, Super absorbency Depends to be used 4-6 times per day as needed for urinary incontinence, Disp: 100 each, Rfl: 3    UNABLE TO FIND, purewick external catheter to be used as needed for urinary incontinence every 8-12 hours, Disp: 100 each, Rfl: 1    lactobacillus (CULTURELLE) capsule, Take 1 capsule by mouth daily (with breakfast), Disp: 30 capsule, Rfl: 1    omega-3 acid ethyl esters (LOVAZA) 1 g capsule, Take 2 capsules by mouth 2 times daily, Disp: 60 capsule, Rfl: 1    pantoprazole (PROTONIX) 40 MG tablet, Take 2 tablets by mouth 2 times daily (before meals), Disp: 30 tablet, Rfl: 1    sucralfate (CARAFATE) 1 GM tablet, Take 1 tablet by mouth 4 times daily (before  Alzheimer disease [facility]     Bronchitis     CAD (coronary artery disease)     Chest wall trauma     [facility]     FH: CAD (coronary artery disease)     brother with cabg in his 45s    Hypertension     Kidney stone     Lumbar herniated disc     Restless leg syndrome     Spinal stenosis       Metabolic monitoring is being done by PCP. Family History   Problem Relation Age of Onset    No Known Problems Mother     No Known Problems Sister     Heart Disease Brother     Coronary Art Dis Brother         stents    No Known Problems Sister     No Known Problems Sister     Alcohol Abuse Brother      Last Labs:   Lab Results   Component Value Date    LABA1C 6.4 (H) 01/23/2021     Lab Results   Component Value Date     01/23/2021      Lab Results   Component Value Date    WBC 8.3 03/05/2021    HGB 10.6 (L) 03/05/2021    HCT 32.3 (L) 03/05/2021    MCV 98.2 03/05/2021     03/05/2021    LYMPHOPCT 14.3 (L) 03/05/2021    RBC 3.29 (L) 03/05/2021    MCH 32.2 (H) 03/05/2021    MCHC 32.8 03/05/2021    RDW 13.1 03/05/2021          Lab Results   Component Value Date     03/08/2021    K 3.7 03/08/2021    CL 96 (L) 03/08/2021    CO2 36 (H) 03/08/2021    BUN 22 03/08/2021    CREATININE 1.1 03/08/2021    GLUCOSE 117 (H) 03/08/2021    CALCIUM 8.6 03/08/2021    PROT 5.9 (L) 03/05/2021    LABALBU 3.8 03/05/2021    BILITOT 0.3 03/05/2021    ALKPHOS 148 (H) 03/05/2021    AST 17 03/05/2021    ALT 18 03/05/2021    LABGLOM 49 (L) 03/08/2021    GFRAA 59 (L) 03/08/2021      . last    Diagnosis:      1. Late onset Alzheimer's disease without behavioral disturbance ([facility] [address]    2. Alzheimer's disease with late onset (CODE) ([facility] [address]    3. Major neurocognitive disorder due to Alzheimer's disease, with behavioral disturbance ([facility] [address]    4. Primary insomnia    5. On valproic acid therapy      Plan:    Discussed excessive daytime sleepiness.  Will decrease trazodone further from 100 mg HS to 50 mg HS  Discussed risks and benefits of medications, as well as need for yearly lab work. Also discussed most recent CT of head, reason it was done and findings. Follow up with Ligia RIVAS CNP-BC for medication management. Continue work with PCP to manage medical concerns, and [facility] for continued follow-up. Labs ordered: CMP, CBC and valproic acid level total and free.      Orders Placed This Encounter   Medications    divalproex (DEPAKOTE SPRINKLES) 125 MG capsule     Sig: Take 6 capsules by mouth nightly     Dispense:  180 capsule     Refill:  2    donepezil (ARICEPT) 10 MG tablet     Sig: Take 1 tablet by mouth nightly     Dispense:  30 tablet     Refill:  2    memantine (NAMENDA) 10 MG tablet     Sig: Take 1 tablet by mouth daily     Dispense:  30 tablet     Refill:  2    risperiDONE (RISPERDAL) 1 MG tablet     Sig: Take 1 tablet by mouth daily     Dispense:  30 tablet     Refill:  2    traZODone (DESYREL) 50 MG tablet     Sig: Take 1 tablet by mouth nightly     Dispense:  30 tablet     Refill:  2

## 2021-05-21 LAB
VALPROIC ACID % FREE: ABNORMAL % (ref 5–18)
VALPROIC ACID TOTAL: 51 UG/ML (ref 50–125)
VALPROIC ACID, FREE: <7 UG/ML (ref 7–23)

## 2021-05-26 RX ORDER — PANTOPRAZOLE SODIUM 40 MG/1
40 TABLET, DELAYED RELEASE ORAL
Qty: 90 TABLET | Refills: 1 | Status: SHIPPED | OUTPATIENT
Start: 2021-05-26 | End: 2021-06-17 | Stop reason: SDUPTHER

## 2021-05-26 RX ORDER — L. ACIDOPHILUS/LACTOBAC SPOR 35MM-25MM
TABLET ORAL
Qty: 30 TABLET | Refills: 1 | Status: SHIPPED | OUTPATIENT
Start: 2021-05-26 | End: 2021-06-01

## 2021-05-26 RX ORDER — CHLORAL HYDRATE 500 MG
CAPSULE ORAL
Qty: 120 CAPSULE | Refills: 1 | Status: SHIPPED | OUTPATIENT
Start: 2021-05-26 | End: 2021-09-11

## 2021-06-01 DIAGNOSIS — K59.1 FUNCTIONAL DIARRHEA: ICD-10-CM

## 2021-06-01 RX ORDER — L. ACIDOPHILUS/LACTOBAC SPOR 35MM-25MM
TABLET ORAL
Qty: 30 TABLET | Refills: 1 | Status: SHIPPED | OUTPATIENT
Start: 2021-06-01

## 2021-06-02 RX ORDER — LOPERAMIDE HYDROCHLORIDE 2 MG/1
CAPSULE ORAL
Qty: 120 CAPSULE | Refills: 1 | Status: SHIPPED | OUTPATIENT
Start: 2021-06-02 | End: 2021-09-11

## 2021-06-10 ENCOUNTER — TELEPHONE (OUTPATIENT)
Dept: CARDIOLOGY CLINIC | Age: 71
End: 2021-06-10

## 2021-06-17 RX ORDER — PANTOPRAZOLE SODIUM 40 MG/1
40 TABLET, DELAYED RELEASE ORAL
Qty: 90 TABLET | Refills: 1 | Status: SHIPPED | OUTPATIENT
Start: 2021-06-17

## 2021-07-06 ENCOUNTER — TELEPHONE (OUTPATIENT)
Dept: INTERNAL MEDICINE CLINIC | Age: 71
End: 2021-07-06

## 2021-07-06 NOTE — TELEPHONE ENCOUNTER
Patient is in need of a script for a bedside commode and for hand rails to assist with getting up off the commode. She would moisés this sent to [facility].

## 2021-07-09 ENCOUNTER — OFFICE VISIT (OUTPATIENT)
Dept: INTERNAL MEDICINE CLINIC | Age: 71
End: 2021-07-09
Payer: MEDICARE

## 2021-07-09 VITALS
SYSTOLIC BLOOD PRESSURE: 140 MMHG | WEIGHT: 147 LBS | BODY MASS INDEX: 27.05 KG/M2 | HEART RATE: 104 BPM | DIASTOLIC BLOOD PRESSURE: 88 MMHG | HEIGHT: 62 IN | OXYGEN SATURATION: 98 %

## 2021-07-09 DIAGNOSIS — G30.1 LATE ONSET ALZHEIMER'S DISEASE WITH BEHAVIORAL DISTURBANCE (HCC): Primary | ICD-10-CM

## 2021-07-09 DIAGNOSIS — R29.6 FREQUENT FALLS: ICD-10-CM

## 2021-07-09 DIAGNOSIS — E78.2 MIXED HYPERLIPIDEMIA: ICD-10-CM

## 2021-07-09 DIAGNOSIS — R01.1 MURMUR: ICD-10-CM

## 2021-07-09 DIAGNOSIS — I10 ESSENTIAL HYPERTENSION: ICD-10-CM

## 2021-07-09 DIAGNOSIS — E11.8 CONTROLLED TYPE 2 DIABETES MELLITUS WITH COMPLICATION, WITHOUT LONG-TERM CURRENT USE OF INSULIN (HCC): ICD-10-CM

## 2021-07-09 DIAGNOSIS — F33.1 MODERATE EPISODE OF RECURRENT MAJOR DEPRESSIVE DISORDER (HCC): ICD-10-CM

## 2021-07-09 DIAGNOSIS — F51.01 PRIMARY INSOMNIA: ICD-10-CM

## 2021-07-09 DIAGNOSIS — L03.213 PRESEPTAL CELLULITIS OF LEFT EYE: ICD-10-CM

## 2021-07-09 DIAGNOSIS — F02.818 LATE ONSET ALZHEIMER'S DISEASE WITH BEHAVIORAL DISTURBANCE (HCC): Primary | ICD-10-CM

## 2021-07-09 PROCEDURE — 3044F HG A1C LEVEL LT 7.0%: CPT | Performed by: INTERNAL MEDICINE

## 2021-07-09 PROCEDURE — 1036F TOBACCO NON-USER: CPT | Performed by: INTERNAL MEDICINE

## 2021-07-09 PROCEDURE — G8400 PT W/DXA NO RESULTS DOC: HCPCS | Performed by: INTERNAL MEDICINE

## 2021-07-09 PROCEDURE — 99214 OFFICE O/P EST MOD 30 MIN: CPT | Performed by: INTERNAL MEDICINE

## 2021-07-09 PROCEDURE — 1123F ACP DISCUSS/DSCN MKR DOCD: CPT | Performed by: INTERNAL MEDICINE

## 2021-07-09 PROCEDURE — 4040F PNEUMOC VAC/ADMIN/RCVD: CPT | Performed by: INTERNAL MEDICINE

## 2021-07-09 PROCEDURE — G8427 DOCREV CUR MEDS BY ELIG CLIN: HCPCS | Performed by: INTERNAL MEDICINE

## 2021-07-09 PROCEDURE — 1090F PRES/ABSN URINE INCON ASSESS: CPT | Performed by: INTERNAL MEDICINE

## 2021-07-09 PROCEDURE — G8417 CALC BMI ABV UP PARAM F/U: HCPCS | Performed by: INTERNAL MEDICINE

## 2021-07-09 PROCEDURE — 2022F DILAT RTA XM EVC RTNOPTHY: CPT | Performed by: INTERNAL MEDICINE

## 2021-07-09 PROCEDURE — 3017F COLORECTAL CA SCREEN DOC REV: CPT | Performed by: INTERNAL MEDICINE

## 2021-07-09 RX ORDER — GLUCOSAMINE HCL/CHONDROITIN SU 500-400 MG
CAPSULE ORAL
Qty: 200 STRIP | Refills: 1 | Status: SHIPPED | OUTPATIENT
Start: 2021-07-09 | End: 2021-09-11

## 2021-07-09 RX ORDER — DONEPEZIL HYDROCHLORIDE 10 MG/1
10 TABLET, FILM COATED ORAL NIGHTLY
Qty: 30 TABLET | Refills: 2 | Status: SHIPPED | OUTPATIENT
Start: 2021-07-09

## 2021-07-09 RX ORDER — AMLODIPINE BESYLATE 10 MG/1
10 TABLET ORAL DAILY
Qty: 30 TABLET | Refills: 3 | Status: SHIPPED | OUTPATIENT
Start: 2021-07-09

## 2021-07-09 RX ORDER — AMOXICILLIN AND CLAVULANATE POTASSIUM 875; 125 MG/1; MG/1
1 TABLET, FILM COATED ORAL 2 TIMES DAILY
Qty: 14 TABLET | Refills: 0 | Status: SHIPPED | OUTPATIENT
Start: 2021-07-09 | End: 2021-07-16

## 2021-07-09 RX ORDER — LANCETS 30 GAUGE
1 EACH MISCELLANEOUS DAILY
Qty: 100 EACH | Refills: 1 | Status: SHIPPED | OUTPATIENT
Start: 2021-07-09

## 2021-07-09 RX ORDER — FUROSEMIDE 20 MG/1
20 TABLET ORAL 2 TIMES DAILY
Qty: 60 TABLET | Refills: 3 | Status: SHIPPED | OUTPATIENT
Start: 2021-07-09

## 2021-07-09 RX ORDER — ATORVASTATIN CALCIUM 80 MG/1
80 TABLET, FILM COATED ORAL NIGHTLY
Qty: 30 TABLET | Refills: 3 | Status: SHIPPED | OUTPATIENT
Start: 2021-07-09 | End: 2021-10-21 | Stop reason: SDUPTHER

## 2021-07-09 RX ORDER — LISINOPRIL 20 MG/1
20 TABLET ORAL DAILY
Qty: 30 TABLET | Refills: 3 | Status: SHIPPED | OUTPATIENT
Start: 2021-07-09 | End: 2021-10-21 | Stop reason: SDUPTHER

## 2021-07-09 RX ORDER — MEMANTINE HYDROCHLORIDE 10 MG/1
10 TABLET ORAL DAILY
Qty: 30 TABLET | Refills: 2 | Status: SHIPPED | OUTPATIENT
Start: 2021-07-09
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
## 2021-07-09 NOTE — PATIENT INSTRUCTIONS
Fasting for a blood test: taking the right steps before testing helps ensure your results will be accurate. Why do I need to fast before my blood test?  If your healthcare provider has told you to fast before a blood test, it means you should not eat or drink anything, except water, for several hours before your test. When you eat and drink normally, those foods and beverages are absorbed into your bloodstream. That could affect the results of certain types of blood tests. What types of blood tests require fasting? The most common tests that require fasting are:   Glucose tests, which measure your blood sugar.  Lipid tests, which measure cholesterol and triglycerides. You do not need to be fasting for HbA1C test.     How long do I have to fast before the test?  You usually need to fast for 812 hours before the test. Most tests that require fasting are scheduled for early in the morning. That way, most of your fasting time will be overnight. Can I drink anything besides water during a fast?  No. Juice, coffee, soda, and other beverages can get in your bloodstream and affect your results. In addition, you should not:   Chew gum    Smoke    Exercise  These activities can also affect your results. But you can drink water. It's actually encouraged that you drink 2 glasses of water before any blood test. It helps keep more fluid in your veins, which can make it easier to draw blood. If you are dehydrated, your blood draw experience may be unpleasant. Can I continue taking medicine during a fast?  Most of the time it's OK to take your usual medicines with water, unless otherwise specified by your healthcare provider. You may need to avoid certain medicines that you normally take with food.      What if I make a mistake and have something to eat or drink besides water during my fast?  Tell your healthcare provider before your test. Your test will most likely have to be re-scheduled for another time

## 2021-08-31 RX ORDER — CATHETERIZATION TRAY 16 FR
TRAY MISCELLANEOUS
Qty: 54 BOTTLE | Refills: 2 | Status: SHIPPED | OUTPATIENT
Start: 2021-08-31

## 2021-09-02 ENCOUNTER — TELEPHONE (OUTPATIENT)
Dept: INTERNAL MEDICINE CLINIC | Age: 71
End: 2021-09-02

## 2021-09-02 NOTE — TELEPHONE ENCOUNTER
----- Message from Aleida Coleman sent at 9/2/2021 10:55 AM EDT -----  Subject: Message to Provider    QUESTIONS  Information for Provider? Patient's sister states that she needs to   confirm if she needs to fast before her lab appointment on 9/7/2021  ---------------------------------------------------------------------------  --------------  [address]  What is the best way for the office to contact you? OK to leave message on   voicemail  Preferred Call Back Phone Number? [phone]  ---------------------------------------------------------------------------  --------------  SCRIPT ANSWERS  Relationship to Patient? Sibling  Representative Name? Pascual Best   Is the Representative on the appropriate HIPAA document in Epic?  Yes

## 2021-09-07 ENCOUNTER — NURSE ONLY (OUTPATIENT)
Dept: INTERNAL MEDICINE CLINIC | Age: 71
End: 2021-09-07
Payer: MEDICARE

## 2021-09-07 DIAGNOSIS — E78.2 MIXED HYPERLIPIDEMIA: ICD-10-CM

## 2021-09-07 DIAGNOSIS — E11.8 CONTROLLED TYPE 2 DIABETES MELLITUS WITH COMPLICATION, WITHOUT LONG-TERM CURRENT USE OF INSULIN (HCC): ICD-10-CM

## 2021-09-07 LAB
A/G RATIO: 1.8 (ref 1.1–2.2)
ALBUMIN SERPL-MCNC: 4.3 G/DL (ref 3.4–5)
ALP BLD-CCNC: 114 U/L (ref 40–129)
ALT SERPL-CCNC: 14 U/L (ref 10–40)
ANION GAP SERPL CALCULATED.3IONS-SCNC: 15 MMOL/L (ref 3–16)
AST SERPL-CCNC: 19 U/L (ref 15–37)
BASOPHILS ABSOLUTE: 0.1 K/UL (ref 0–0.2)
BASOPHILS RELATIVE PERCENT: 0.8 %
BILIRUB SERPL-MCNC: <0.2 MG/DL (ref 0–1)
BUN BLDV-MCNC: 9 MG/DL (ref 7–20)
CALCIUM SERPL-MCNC: 9.6 MG/DL (ref 8.3–10.6)
CHLORIDE BLD-SCNC: 102 MMOL/L (ref 99–110)
CHOLESTEROL, FASTING: 266 MG/DL (ref 0–199)
CO2: 23 MMOL/L (ref 21–32)
CREAT SERPL-MCNC: 1 MG/DL (ref 0.6–1.2)
EOSINOPHILS ABSOLUTE: 0.2 K/UL (ref 0–0.6)
EOSINOPHILS RELATIVE PERCENT: 2.7 %
GFR AFRICAN AMERICAN: >60
GFR NON-AFRICAN AMERICAN: 54
GLOBULIN: 2.4 G/DL
GLUCOSE BLD-MCNC: 126 MG/DL (ref 70–99)
HCT VFR BLD CALC: 38.5 % (ref 36–48)
HDLC SERPL-MCNC: 72 MG/DL (ref 40–60)
HEMOGLOBIN: 13.2 G/DL (ref 12–16)
LDL CHOLESTEROL CALCULATED: 158 MG/DL
LYMPHOCYTES ABSOLUTE: 1.5 K/UL (ref 1–5.1)
LYMPHOCYTES RELATIVE PERCENT: 22.6 %
MCH RBC QN AUTO: 33.4 PG (ref 26–34)
MCHC RBC AUTO-ENTMCNC: 34.3 G/DL (ref 31–36)
MCV RBC AUTO: 97.3 FL (ref 80–100)
MONOCYTES ABSOLUTE: 0.4 K/UL (ref 0–1.3)
MONOCYTES RELATIVE PERCENT: 5.6 %
NEUTROPHILS ABSOLUTE: 4.6 K/UL (ref 1.7–7.7)
NEUTROPHILS RELATIVE PERCENT: 68.3 %
PDW BLD-RTO: 13.1 % (ref 12.4–15.4)
PLATELET # BLD: 235 K/UL (ref 135–450)
PMV BLD AUTO: 9.3 FL (ref 5–10.5)
POTASSIUM SERPL-SCNC: 5.1 MMOL/L (ref 3.5–5.1)
RBC # BLD: 3.96 M/UL (ref 4–5.2)
SODIUM BLD-SCNC: 140 MMOL/L (ref 136–145)
TOTAL PROTEIN: 6.7 G/DL (ref 6.4–8.2)
TRIGLYCERIDE, FASTING: 179 MG/DL (ref 0–150)
VLDLC SERPL CALC-MCNC: 36 MG/DL
WBC # BLD: 6.8 K/UL (ref 4–11)

## 2021-09-07 PROCEDURE — 36415 COLL VENOUS BLD VENIPUNCTURE: CPT | Performed by: INTERNAL MEDICINE

## 2021-09-08 LAB
ESTIMATED AVERAGE GLUCOSE: 139.9 MG/DL
HBA1C MFR BLD: 6.5 %

## 2021-09-11 ENCOUNTER — APPOINTMENT (OUTPATIENT)
Dept: GENERAL RADIOLOGY | Age: 71
End: 2021-09-11
Payer: MEDICARE

## 2021-09-11 ENCOUNTER — APPOINTMENT (OUTPATIENT)
Dept: CT IMAGING | Age: 71
End: 2021-09-11
Payer: MEDICARE

## 2021-09-11 ENCOUNTER — HOSPITAL ENCOUNTER (OUTPATIENT)
Age: 71
Setting detail: OBSERVATION
Discharge: HOME | End: 2021-09-13
Attending: EMERGENCY MEDICINE | Admitting: INTERNAL MEDICINE
Payer: MEDICARE

## 2021-09-11 DIAGNOSIS — M25.512 LEFT SHOULDER PAIN, UNSPECIFIED CHRONICITY: ICD-10-CM

## 2021-09-11 DIAGNOSIS — R26.2 DIFFICULTY IN WALKING: ICD-10-CM

## 2021-09-11 DIAGNOSIS — R53.83 OTHER FATIGUE: Primary | ICD-10-CM

## 2021-09-11 DIAGNOSIS — R63.0 DECREASED APPETITE: ICD-10-CM

## 2021-09-11 DIAGNOSIS — G91.2 NPH (NORMAL PRESSURE HYDROCEPHALUS) (HCC): ICD-10-CM

## 2021-09-11 DIAGNOSIS — R25.1 INVOLUNTARY TREMBLING: ICD-10-CM

## 2021-09-11 PROBLEM — R53.81 DEBILITY: Status: ACTIVE | Noted: 2021-09-11

## 2021-09-11 PROBLEM — E11.9 TYPE 2 DIABETES MELLITUS (HCC): Status: ACTIVE | Noted: 2021-09-11

## 2021-09-11 PROBLEM — F03.90 DEMENTIA (HCC): Status: ACTIVE | Noted: 2021-09-11

## 2021-09-11 PROBLEM — I10 HYPERTENSION: Status: ACTIVE | Noted: 2021-09-11

## 2021-09-11 LAB
ALBUMIN SERPL-MCNC: 3.4 GM/DL (ref 3.4–5)
ALBUMIN SERPL-MCNC: 4.1 GM/DL (ref 3.4–5)
ALP BLD-CCNC: 114 IU/L (ref 40–129)
ALP BLD-CCNC: 92 IU/L (ref 40–129)
ALT SERPL-CCNC: 12 U/L (ref 10–40)
ALT SERPL-CCNC: 13 U/L (ref 10–40)
ANION GAP SERPL CALCULATED.3IONS-SCNC: 3 MMOL/L (ref 4–16)
ANION GAP SERPL CALCULATED.3IONS-SCNC: 9 MMOL/L (ref 4–16)
AST SERPL-CCNC: 21 IU/L (ref 15–37)
AST SERPL-CCNC: 22 IU/L (ref 15–37)
BACTERIA: NEGATIVE /HPF
BASOPHILS ABSOLUTE: 0 K/CU MM
BASOPHILS RELATIVE PERCENT: 0.4 % (ref 0–1)
BILIRUB SERPL-MCNC: 0.2 MG/DL (ref 0–1)
BILIRUB SERPL-MCNC: 0.3 MG/DL (ref 0–1)
BILIRUBIN URINE: NEGATIVE MG/DL
BLOOD, URINE: NEGATIVE
BUN BLDV-MCNC: 11 MG/DL (ref 6–23)
BUN BLDV-MCNC: 14 MG/DL (ref 6–23)
CALCIUM SERPL-MCNC: 8.6 MG/DL (ref 8.3–10.6)
CALCIUM SERPL-MCNC: 9.6 MG/DL (ref 8.3–10.6)
CAST TYPE: ABNORMAL /HPF
CHLORIDE BLD-SCNC: 104 MMOL/L (ref 99–110)
CHLORIDE BLD-SCNC: 104 MMOL/L (ref 99–110)
CLARITY: CLEAR
CO2: 24 MMOL/L (ref 21–32)
CO2: 31 MMOL/L (ref 21–32)
COLOR: YELLOW
CREAT SERPL-MCNC: 0.9 MG/DL (ref 0.6–1.1)
CREAT SERPL-MCNC: 0.9 MG/DL (ref 0.6–1.1)
CRYSTAL TYPE: ABNORMAL /HPF
DIFFERENTIAL TYPE: ABNORMAL
EKG ATRIAL RATE: 91 BPM
EKG DIAGNOSIS: NORMAL
EKG P AXIS: 64 DEGREES
EKG P-R INTERVAL: 170 MS
EKG Q-T INTERVAL: 362 MS
EKG QRS DURATION: 80 MS
EKG QTC CALCULATION (BAZETT): 445 MS
EKG R AXIS: -17 DEGREES
EKG T AXIS: 40 DEGREES
EKG VENTRICULAR RATE: 91 BPM
EOSINOPHILS ABSOLUTE: 0.1 K/CU MM
EOSINOPHILS RELATIVE PERCENT: 1.4 % (ref 0–3)
EPITHELIAL CELLS, UA: ABNORMAL /HPF
GFR AFRICAN AMERICAN: >60 ML/MIN/1.73M2
GFR AFRICAN AMERICAN: >60 ML/MIN/1.73M2
GFR NON-AFRICAN AMERICAN: >60 ML/MIN/1.73M2
GFR NON-AFRICAN AMERICAN: >60 ML/MIN/1.73M2
GLUCOSE BLD-MCNC: 104 MG/DL (ref 70–99)
GLUCOSE BLD-MCNC: 113 MG/DL (ref 70–99)
GLUCOSE BLD-MCNC: 119 MG/DL (ref 70–99)
GLUCOSE BLD-MCNC: 124 MG/DL (ref 70–99)
GLUCOSE BLD-MCNC: 125 MG/DL (ref 70–99)
GLUCOSE BLD-MCNC: 171 MG/DL (ref 70–99)
GLUCOSE, URINE: NEGATIVE MG/DL
HCT VFR BLD CALC: 38.1 % (ref 37–47)
HEMOGLOBIN: 12.5 GM/DL (ref 12.5–16)
IMMATURE NEUTROPHIL %: 0.4 % (ref 0–0.43)
KETONES, URINE: NEGATIVE MG/DL
LEUKOCYTE ESTERASE, URINE: ABNORMAL
LYMPHOCYTES ABSOLUTE: 1.9 K/CU MM
LYMPHOCYTES RELATIVE PERCENT: 22.5 % (ref 24–44)
MCH RBC QN AUTO: 32.7 PG (ref 27–31)
MCHC RBC AUTO-ENTMCNC: 32.8 % (ref 32–36)
MCV RBC AUTO: 99.7 FL (ref 78–100)
MONOCYTES ABSOLUTE: 0.7 K/CU MM
MONOCYTES RELATIVE PERCENT: 8.6 % (ref 0–4)
MUCUS: NEGATIVE HPF
NITRITE URINE, QUANTITATIVE: NEGATIVE
PDW BLD-RTO: 12.2 % (ref 11.7–14.9)
PH, URINE: 6 (ref 5–8)
PLATELET # BLD: 231 K/CU MM (ref 140–440)
PMV BLD AUTO: 9.9 FL (ref 7.5–11.1)
POTASSIUM SERPL-SCNC: 3.9 MMOL/L (ref 3.5–5.1)
POTASSIUM SERPL-SCNC: 4.9 MMOL/L (ref 3.5–5.1)
PROTEIN UA: NEGATIVE MG/DL
RBC # BLD: 3.82 M/CU MM (ref 4.2–5.4)
RBC URINE: ABNORMAL /HPF (ref 0–6)
SEGMENTED NEUTROPHILS ABSOLUTE COUNT: 5.7 K/CU MM
SEGMENTED NEUTROPHILS RELATIVE PERCENT: 66.7 % (ref 36–66)
SODIUM BLD-SCNC: 137 MMOL/L (ref 135–145)
SODIUM BLD-SCNC: 138 MMOL/L (ref 135–145)
SPECIFIC GRAVITY UA: 1 (ref 1–1.03)
TOTAL IMMATURE NEUTOROPHIL: 0.03 K/CU MM
TOTAL PROTEIN: 5.2 GM/DL (ref 6.4–8.2)
TOTAL PROTEIN: 6.7 GM/DL (ref 6.4–8.2)
TROPONIN T: <0.01 NG/ML
UROBILINOGEN, URINE: 0.2 MG/DL (ref 0.2–1)
VOLUME, (UVOL): 12 ML (ref 10–12)
WBC # BLD: 8.5 K/CU MM (ref 4–10.5)
WBC UA: ABNORMAL /HPF (ref 0–5)

## 2021-09-11 PROCEDURE — 96372 THER/PROPH/DIAG INJ SC/IM: CPT

## 2021-09-11 PROCEDURE — 73030 X-RAY EXAM OF SHOULDER: CPT

## 2021-09-11 PROCEDURE — 84484 ASSAY OF TROPONIN QUANT: CPT

## 2021-09-11 PROCEDURE — 97535 SELF CARE MNGMENT TRAINING: CPT

## 2021-09-11 PROCEDURE — 97167 OT EVAL HIGH COMPLEX 60 MIN: CPT

## 2021-09-11 PROCEDURE — 99283 EMERGENCY DEPT VISIT LOW MDM: CPT

## 2021-09-11 PROCEDURE — 82962 GLUCOSE BLOOD TEST: CPT

## 2021-09-11 PROCEDURE — 71045 X-RAY EXAM CHEST 1 VIEW: CPT

## 2021-09-11 PROCEDURE — 36415 COLL VENOUS BLD VENIPUNCTURE: CPT

## 2021-09-11 PROCEDURE — 97530 THERAPEUTIC ACTIVITIES: CPT

## 2021-09-11 PROCEDURE — 80053 COMPREHEN METABOLIC PANEL: CPT

## 2021-09-11 PROCEDURE — 6360000002 HC RX W HCPCS: Performed by: NURSE PRACTITIONER

## 2021-09-11 PROCEDURE — G0378 HOSPITAL OBSERVATION PER HR: HCPCS

## 2021-09-11 PROCEDURE — 2580000003 HC RX 258: Performed by: EMERGENCY MEDICINE

## 2021-09-11 PROCEDURE — 93005 ELECTROCARDIOGRAM TRACING: CPT | Performed by: EMERGENCY MEDICINE

## 2021-09-11 PROCEDURE — 81001 URINALYSIS AUTO W/SCOPE: CPT

## 2021-09-11 PROCEDURE — 2580000003 HC RX 258: Performed by: NURSE PRACTITIONER

## 2021-09-11 PROCEDURE — 6370000000 HC RX 637 (ALT 250 FOR IP): Performed by: NURSE PRACTITIONER

## 2021-09-11 PROCEDURE — 93010 ELECTROCARDIOGRAM REPORT: CPT | Performed by: INTERNAL MEDICINE

## 2021-09-11 PROCEDURE — 70450 CT HEAD/BRAIN W/O DYE: CPT

## 2021-09-11 PROCEDURE — 85025 COMPLETE CBC W/AUTO DIFF WBC: CPT

## 2021-09-11 RX ORDER — MEMANTINE HYDROCHLORIDE 10 MG/1
10 TABLET ORAL DAILY
Status: DISCONTINUED | OUTPATIENT
Start: 2021-09-11 | End: 2021-09-13 | Stop reason: HOSPADM

## 2021-09-11 RX ORDER — 0.9 % SODIUM CHLORIDE 0.9 %
1000 INTRAVENOUS SOLUTION INTRAVENOUS ONCE
Status: COMPLETED | OUTPATIENT
Start: 2021-09-11 | End: 2021-09-11

## 2021-09-11 RX ORDER — SODIUM CHLORIDE 9 MG/ML
25 INJECTION, SOLUTION INTRAVENOUS PRN
Status: DISCONTINUED | OUTPATIENT
Start: 2021-09-11 | End: 2021-09-13 | Stop reason: HOSPADM

## 2021-09-11 RX ORDER — LISINOPRIL 20 MG/1
20 TABLET ORAL DAILY
Status: DISCONTINUED | OUTPATIENT
Start: 2021-09-11 | End: 2021-09-13 | Stop reason: HOSPADM

## 2021-09-11 RX ORDER — SODIUM CHLORIDE 0.9 % (FLUSH) 0.9 %
5-40 SYRINGE (ML) INJECTION EVERY 12 HOURS SCHEDULED
Status: DISCONTINUED | OUTPATIENT
Start: 2021-09-11 | End: 2021-09-13 | Stop reason: HOSPADM

## 2021-09-11 RX ORDER — SODIUM CHLORIDE 9 MG/ML
INJECTION, SOLUTION INTRAVENOUS CONTINUOUS
Status: DISCONTINUED | OUTPATIENT
Start: 2021-09-11 | End: 2021-09-11

## 2021-09-11 RX ORDER — ONDANSETRON 2 MG/ML
4 INJECTION INTRAMUSCULAR; INTRAVENOUS EVERY 6 HOURS PRN
Status: DISCONTINUED | OUTPATIENT
Start: 2021-09-11 | End: 2021-09-13 | Stop reason: HOSPADM

## 2021-09-11 RX ORDER — SODIUM CHLORIDE 0.9 % (FLUSH) 0.9 %
5-40 SYRINGE (ML) INJECTION PRN
Status: DISCONTINUED | OUTPATIENT
Start: 2021-09-11 | End: 2021-09-13 | Stop reason: HOSPADM

## 2021-09-11 RX ORDER — NICOTINE POLACRILEX 4 MG
15 LOZENGE BUCCAL PRN
Status: DISCONTINUED | OUTPATIENT
Start: 2021-09-11 | End: 2021-09-13 | Stop reason: HOSPADM

## 2021-09-11 RX ORDER — ATORVASTATIN CALCIUM 80 MG/1
80 TABLET, FILM COATED ORAL NIGHTLY
Status: DISCONTINUED | OUTPATIENT
Start: 2021-09-11 | End: 2021-09-13 | Stop reason: HOSPADM

## 2021-09-11 RX ORDER — ONDANSETRON 4 MG/1
4 TABLET, ORALLY DISINTEGRATING ORAL EVERY 8 HOURS PRN
Status: DISCONTINUED | OUTPATIENT
Start: 2021-09-11 | End: 2021-09-13 | Stop reason: HOSPADM

## 2021-09-11 RX ORDER — M-VIT,TX,IRON,MINS/CALC/FOLIC 27MG-0.4MG
1 TABLET ORAL DAILY
Status: DISCONTINUED | OUTPATIENT
Start: 2021-09-11 | End: 2021-09-13 | Stop reason: HOSPADM

## 2021-09-11 RX ORDER — ACETAMINOPHEN 325 MG/1
650 TABLET ORAL EVERY 6 HOURS PRN
Status: DISCONTINUED | OUTPATIENT
Start: 2021-09-11 | End: 2021-09-13 | Stop reason: HOSPADM

## 2021-09-11 RX ORDER — POLYETHYLENE GLYCOL 3350 17 G/17G
17 POWDER, FOR SOLUTION ORAL DAILY PRN
Status: DISCONTINUED | OUTPATIENT
Start: 2021-09-11 | End: 2021-09-13 | Stop reason: HOSPADM

## 2021-09-11 RX ORDER — TRAZODONE HYDROCHLORIDE 50 MG/1
50 TABLET ORAL NIGHTLY
Status: DISCONTINUED | OUTPATIENT
Start: 2021-09-11 | End: 2021-09-13 | Stop reason: HOSPADM

## 2021-09-11 RX ORDER — RISPERIDONE 1 MG/1
1 TABLET, FILM COATED ORAL DAILY
Status: DISCONTINUED | OUTPATIENT
Start: 2021-09-11 | End: 2021-09-13 | Stop reason: HOSPADM

## 2021-09-11 RX ORDER — SUCRALFATE 1 G/1
1 TABLET ORAL
Status: DISCONTINUED | OUTPATIENT
Start: 2021-09-11 | End: 2021-09-13 | Stop reason: HOSPADM

## 2021-09-11 RX ORDER — DONEPEZIL HYDROCHLORIDE 10 MG/1
10 TABLET, FILM COATED ORAL NIGHTLY
Status: DISCONTINUED | OUTPATIENT
Start: 2021-09-11 | End: 2021-09-13 | Stop reason: HOSPADM

## 2021-09-11 RX ORDER — DEXTROSE MONOHYDRATE 25 G/50ML
12.5 INJECTION, SOLUTION INTRAVENOUS PRN
Status: DISCONTINUED | OUTPATIENT
Start: 2021-09-11 | End: 2021-09-13 | Stop reason: HOSPADM

## 2021-09-11 RX ORDER — ACETAMINOPHEN 650 MG/1
650 SUPPOSITORY RECTAL EVERY 6 HOURS PRN
Status: DISCONTINUED | OUTPATIENT
Start: 2021-09-11 | End: 2021-09-13 | Stop reason: HOSPADM

## 2021-09-11 RX ORDER — DEXTROSE MONOHYDRATE 50 MG/ML
100 INJECTION, SOLUTION INTRAVENOUS PRN
Status: DISCONTINUED | OUTPATIENT
Start: 2021-09-11 | End: 2021-09-13 | Stop reason: HOSPADM

## 2021-09-11 RX ORDER — AMLODIPINE BESYLATE 10 MG/1
10 TABLET ORAL DAILY
Status: DISCONTINUED | OUTPATIENT
Start: 2021-09-11 | End: 2021-09-13 | Stop reason: HOSPADM

## 2021-09-11 RX ORDER — PANTOPRAZOLE SODIUM 40 MG/1
40 TABLET, DELAYED RELEASE ORAL
Status: DISCONTINUED | OUTPATIENT
Start: 2021-09-11 | End: 2021-09-13 | Stop reason: HOSPADM

## 2021-09-11 RX ORDER — DIVALPROEX SODIUM 125 MG/1
750 CAPSULE, COATED PELLETS ORAL NIGHTLY
Status: DISCONTINUED | OUTPATIENT
Start: 2021-09-11 | End: 2021-09-13 | Stop reason: HOSPADM

## 2021-09-11 RX ADMIN — SUCRALFATE 1 G: 1 TABLET ORAL at 12:24

## 2021-09-11 RX ADMIN — PANTOPRAZOLE SODIUM 40 MG: 40 TABLET, DELAYED RELEASE ORAL at 17:24

## 2021-09-11 RX ADMIN — MULTIPLE VITAMINS W/ MINERALS TAB 1 TABLET: TAB at 08:28

## 2021-09-11 RX ADMIN — SODIUM CHLORIDE: 9 INJECTION, SOLUTION INTRAVENOUS at 05:45

## 2021-09-11 RX ADMIN — TRAZODONE HYDROCHLORIDE 50 MG: 50 TABLET ORAL at 21:16

## 2021-09-11 RX ADMIN — SUCRALFATE 1 G: 1 TABLET ORAL at 21:16

## 2021-09-11 RX ADMIN — LISINOPRIL 20 MG: 20 TABLET ORAL at 08:28

## 2021-09-11 RX ADMIN — PANTOPRAZOLE SODIUM 40 MG: 40 TABLET, DELAYED RELEASE ORAL at 06:25

## 2021-09-11 RX ADMIN — SODIUM CHLORIDE 1000 ML: 9 INJECTION, SOLUTION INTRAVENOUS at 02:43

## 2021-09-11 RX ADMIN — RISPERIDONE 1 MG: 1 TABLET ORAL at 08:28

## 2021-09-11 RX ADMIN — ATORVASTATIN CALCIUM 80 MG: 80 TABLET, FILM COATED ORAL at 21:16

## 2021-09-11 RX ADMIN — SODIUM CHLORIDE, PRESERVATIVE FREE 10 ML: 5 INJECTION INTRAVENOUS at 05:44

## 2021-09-11 RX ADMIN — ENOXAPARIN SODIUM 40 MG: 40 INJECTION SUBCUTANEOUS at 08:28

## 2021-09-11 RX ADMIN — METOPROLOL TARTRATE 25 MG: 25 TABLET, FILM COATED ORAL at 08:28

## 2021-09-11 RX ADMIN — SUCRALFATE 1 G: 1 TABLET ORAL at 06:25

## 2021-09-11 RX ADMIN — MEMANTINE 10 MG: 10 TABLET ORAL at 08:28

## 2021-09-11 RX ADMIN — DIVALPROEX SODIUM 750 MG: 125 CAPSULE ORAL at 21:16

## 2021-09-11 RX ADMIN — SUCRALFATE 1 G: 1 TABLET ORAL at 17:24

## 2021-09-11 RX ADMIN — DONEPEZIL HYDROCHLORIDE 10 MG: 10 TABLET, FILM COATED ORAL at 21:16

## 2021-09-11 RX ADMIN — METOPROLOL TARTRATE 25 MG: 25 TABLET, FILM COATED ORAL at 21:16

## 2021-09-11 RX ADMIN — AMLODIPINE BESYLATE 10 MG: 10 TABLET ORAL at 08:28

## 2021-09-11 RX ADMIN — SODIUM CHLORIDE, PRESERVATIVE FREE 10 ML: 5 INJECTION INTRAVENOUS at 08:05

## 2021-09-11 RX ADMIN — INSULIN LISPRO 1 UNITS: 100 INJECTION, SOLUTION INTRAVENOUS; SUBCUTANEOUS at 21:16

## 2021-09-11 ASSESSMENT — PAIN SCALES - GENERAL
PAINLEVEL_OUTOF10: 0
PAINLEVEL_OUTOF10: 5

## 2021-09-11 ASSESSMENT — PAIN DESCRIPTION - ORIENTATION: ORIENTATION: LEFT

## 2021-09-11 ASSESSMENT — PAIN DESCRIPTION - LOCATION: LOCATION: SHOULDER
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
## 2021-09-11 NOTE — PLAN OF CARE
Problem: Pain:  Goal: Pain level will decrease  Description: Pain level will decrease  9/11/2021 0838 by Kathleen Reid  Outcome: Ongoing  9/11/2021 0552 by Vonn Phoenix, RN  Outcome: Ongoing  Goal: Control of acute pain  Description: Control of acute pain  9/11/2021 0838 by Kathleen Reid  Outcome: Ongoing  9/11/2021 0552 by Vonn Phoenix, RN  Outcome: Ongoing  Goal: Control of chronic pain  Description: Control of chronic pain  9/11/2021 0838 by Kathleen Reid  Outcome: Ongoing  9/11/2021 0552 by Vonn Phoenix, RN  Outcome: Ongoing     Problem: Falls - Risk of:  Goal: Will remain free from falls  Description: Will remain free from falls  9/11/2021 0838 by Kathleen Reid  Outcome: Ongoing  9/11/2021 0552 by Vonn Phoenix, RN  Outcome: Ongoing  Goal: Absence of physical injury  Description: Absence of physical injury  9/11/2021 0838 by Kathleen Reid  Outcome: Ongoing  9/11/2021 0552 by Vonn Phoenix, RN  Outcome: Ongoing     Problem: Skin Integrity:  Goal: Will show no infection signs and symptoms  Description: Will show no infection signs and symptoms  9/11/2021 0838 by Kathleen Reid  Outcome: Ongoing  9/11/2021 0552 by Vonn Phoenix, RN  Outcome: Ongoing  Goal: Absence of new skin breakdown  Description: Absence of new skin breakdown  9/11/2021 0838 by Kathleen Reid  Outcome: Ongoing  9/11/2021 0552 by Vonn Phoenix, RN  Outcome: Ongoing  Goal: Risk for impaired skin integrity will decrease  Description: Risk for impaired skin integrity will decrease  9/11/2021 0838 by Kathleen Reid  Outcome: Ongoing  9/11/2021 0552 by Vonn Phoenix, RN  Outcome: Ongoing     Problem:  Activity:  Goal: Risk for activity intolerance will decrease  Description: Risk for activity intolerance will decrease  9/11/2021 0838 by Kathleen Reid  Outcome: Ongoing  9/11/2021 0552 by Vonn Phoenix, RN  Outcome: Ongoing     Problem: Coping:  Goal: Ability to adjust to condition or change in health will Ongoing  9/11/2021 0552 by Oleg Allred RN  Outcome: Ongoing     Problem: Physical Regulation:  Goal: Complications related to the disease process, condition or treatment will be avoided or minimized  Description: Complications related to the disease process, condition or treatment will be avoided or minimized  9/11/2021 0838 by Anne Martin  Outcome: Ongoing  9/11/2021 0552 by Oleg Allred RN  Outcome: Ongoing  Goal: Diagnostic test results will improve  Description: Diagnostic test results will improve  9/11/2021 0838 by Anne Martin  Outcome: Ongoing  9/11/2021 0552 by Oleg Allred RN  Outcome: Ongoing     Problem: Tissue Perfusion:  Goal: Adequacy of tissue perfusion will improve  Description: Adequacy of tissue perfusion will improve  9/11/2021 0838 by Anne Martin  Outcome: Ongoing  9/11/2021 0552 by Oleg Allred RN  Outcome: Ongoing     Problem: Discharge Planning:  Goal: Discharged to appropriate level of care  Description: Discharged to appropriate level of care  9/11/2021 0838 by Anne Martin  Outcome: Ongoing  9/11/2021 0552 by Oleg Allred RN  Outcome: Ongoing     Problem: Safety:  Goal: Ability to remain free from injury will improve  Description: Ability to remain free from injury will improve  9/11/2021 0838 by Anne Martin  Outcome: Ongoing  9/11/2021 0552 by Oleg Allred RN  Outcome: Ongoing     Problem: Self-Care:  Goal: Ability to participate in self-care as condition permits will improve  Description: Ability to participate in self-care as condition permits will improve  9/11/2021 0838 by Anne Martin  Outcome: Ongoing  9/11/2021 0552 by Oleg Allred RN  Outcome: Ongoing

## 2021-09-11 NOTE — PLAN OF CARE
Problem: Pain:  Goal: Pain level will decrease  Description: Pain level will decrease  Outcome: Ongoing  Goal: Control of acute pain  Description: Control of acute pain  Outcome: Ongoing  Goal: Control of chronic pain  Description: Control of chronic pain  Outcome: Ongoing     Problem: Falls - Risk of:  Goal: Will remain free from falls  Description: Will remain free from falls  Outcome: Ongoing  Goal: Absence of physical injury  Description: Absence of physical injury  Outcome: Ongoing     Problem: Skin Integrity:  Goal: Will show no infection signs and symptoms  Description: Will show no infection signs and symptoms  Outcome: Ongoing  Goal: Absence of new skin breakdown  Description: Absence of new skin breakdown  Outcome: Ongoing  Goal: Risk for impaired skin integrity will decrease  Description: Risk for impaired skin integrity will decrease  Outcome: Ongoing     Problem:  Activity:  Goal: Risk for activity intolerance will decrease  Description: Risk for activity intolerance will decrease  Outcome: Ongoing     Problem: Coping:  Goal: Ability to adjust to condition or change in health will improve  Description: Ability to adjust to condition or change in health will improve  Outcome: Ongoing  Goal: Ability to remain calm will improve  Description: Ability to remain calm will improve  Outcome: Ongoing     Problem: Fluid Volume:  Goal: Ability to maintain a balanced intake and output will improve  Description: Ability to maintain a balanced intake and output will improve  Outcome: Ongoing     Problem: Health Behavior:  Goal: Ability to identify and utilize available resources and services will improve  Description: Ability to identify and utilize available resources and services will improve  Outcome: Ongoing  Goal: Ability to manage health-related needs will improve  Description: Ability to manage health-related needs will improve  Outcome: Ongoing     Problem: Metabolic:  Goal: Ability to maintain appropriate

## 2021-09-11 NOTE — PROGRESS NOTES
Hospitalist addendum  Elderly patient with multiple comorbidities including but not limited to essential hypertension, dyslipidemia, GERD, diabetes type 2, Alzheimer's dementia; admitted with unsteady gait with tremulousness. CT brain unremarkable. Patient has no focus of infection. There is no signs and symptoms of meningeal irritation. Patient also has a chronic left frozen shoulder. Patient was in no distress when seen and examined. Plan:  Scheduled for MRI with and without contrast to ascertain the etiology of her presentation. Continue with PT/OT evaluation with further recommendations. Further recommendations and management to be based on the MRI finding. Continue on current medication regimen as ordered, with conservative management until further investigation, and management will be based on the findings. A.m. labs.     Electronically signed by Isa Parmar MD on 9/11/21 at 3:56 PM EDT

## 2021-09-12 ENCOUNTER — APPOINTMENT (OUTPATIENT)
Dept: MRI IMAGING | Age: 71
End: 2021-09-12
Payer: MEDICARE

## 2021-09-12 PROBLEM — R26.0 ATAXIC GAIT: Status: ACTIVE | Noted: 2021-09-12

## 2021-09-12 LAB
ALBUMIN SERPL-MCNC: 3.6 GM/DL (ref 3.4–5)
ALP BLD-CCNC: 96 IU/L (ref 40–129)
ALT SERPL-CCNC: 13 U/L (ref 10–40)
ANION GAP SERPL CALCULATED.3IONS-SCNC: 6 MMOL/L (ref 4–16)
AST SERPL-CCNC: 20 IU/L (ref 15–37)
BASOPHILS ABSOLUTE: 0 K/CU MM
BASOPHILS RELATIVE PERCENT: 0.4 % (ref 0–1)
BILIRUB SERPL-MCNC: 0.4 MG/DL (ref 0–1)
BUN BLDV-MCNC: 11 MG/DL (ref 6–23)
CALCIUM SERPL-MCNC: 9.3 MG/DL (ref 8.3–10.6)
CHLORIDE BLD-SCNC: 106 MMOL/L (ref 99–110)
CO2: 28 MMOL/L (ref 21–32)
CREAT SERPL-MCNC: 1 MG/DL (ref 0.6–1.1)
DIFFERENTIAL TYPE: ABNORMAL
EOSINOPHILS ABSOLUTE: 0.3 K/CU MM
EOSINOPHILS RELATIVE PERCENT: 4.8 % (ref 0–3)
GFR AFRICAN AMERICAN: >60 ML/MIN/1.73M2
GFR NON-AFRICAN AMERICAN: 54 ML/MIN/1.73M2
GLUCOSE BLD-MCNC: 128 MG/DL (ref 70–99)
GLUCOSE BLD-MCNC: 128 MG/DL (ref 70–99)
GLUCOSE BLD-MCNC: 131 MG/DL (ref 70–99)
GLUCOSE BLD-MCNC: 132 MG/DL (ref 70–99)
GLUCOSE BLD-MCNC: 133 MG/DL (ref 70–99)
HCT VFR BLD CALC: 33.9 % (ref 37–47)
HEMOGLOBIN: 11.1 GM/DL (ref 12.5–16)
IMMATURE NEUTROPHIL %: 0.6 % (ref 0–0.43)
LYMPHOCYTES ABSOLUTE: 1.8 K/CU MM
LYMPHOCYTES RELATIVE PERCENT: 33 % (ref 24–44)
MCH RBC QN AUTO: 32.5 PG (ref 27–31)
MCHC RBC AUTO-ENTMCNC: 32.7 % (ref 32–36)
MCV RBC AUTO: 99.1 FL (ref 78–100)
MONOCYTES ABSOLUTE: 0.5 K/CU MM
MONOCYTES RELATIVE PERCENT: 9.2 % (ref 0–4)
PDW BLD-RTO: 12.2 % (ref 11.7–14.9)
PLATELET # BLD: 197 K/CU MM (ref 140–440)
PMV BLD AUTO: 10.3 FL (ref 7.5–11.1)
POTASSIUM SERPL-SCNC: 4.6 MMOL/L (ref 3.5–5.1)
RBC # BLD: 3.42 M/CU MM (ref 4.2–5.4)
SEGMENTED NEUTROPHILS ABSOLUTE COUNT: 2.8 K/CU MM
SEGMENTED NEUTROPHILS RELATIVE PERCENT: 52 % (ref 36–66)
SODIUM BLD-SCNC: 140 MMOL/L (ref 135–145)
TOTAL IMMATURE NEUTOROPHIL: 0.03 K/CU MM
TOTAL PROTEIN: 5.5 GM/DL (ref 6.4–8.2)
WBC # BLD: 5.4 K/CU MM (ref 4–10.5)

## 2021-09-12 PROCEDURE — 6360000002 HC RX W HCPCS: Performed by: NURSE PRACTITIONER

## 2021-09-12 PROCEDURE — 70553 MRI BRAIN STEM W/O & W/DYE: CPT

## 2021-09-12 PROCEDURE — 36415 COLL VENOUS BLD VENIPUNCTURE: CPT

## 2021-09-12 PROCEDURE — G0378 HOSPITAL OBSERVATION PER HR: HCPCS

## 2021-09-12 PROCEDURE — 85025 COMPLETE CBC W/AUTO DIFF WBC: CPT

## 2021-09-12 PROCEDURE — 80053 COMPREHEN METABOLIC PANEL: CPT

## 2021-09-12 PROCEDURE — 2580000003 HC RX 258: Performed by: NURSE PRACTITIONER

## 2021-09-12 PROCEDURE — 6370000000 HC RX 637 (ALT 250 FOR IP): Performed by: NURSE PRACTITIONER

## 2021-09-12 PROCEDURE — 6360000004 HC RX CONTRAST MEDICATION: Performed by: INTERNAL MEDICINE

## 2021-09-12 PROCEDURE — 96372 THER/PROPH/DIAG INJ SC/IM: CPT

## 2021-09-12 PROCEDURE — 82962 GLUCOSE BLOOD TEST: CPT

## 2021-09-12 PROCEDURE — A9579 GAD-BASE MR CONTRAST NOS,1ML: HCPCS | Performed by: INTERNAL MEDICINE

## 2021-09-12 RX ORDER — LABETALOL HYDROCHLORIDE 5 MG/ML
5 INJECTION, SOLUTION INTRAVENOUS EVERY 4 HOURS PRN
Status: DISCONTINUED | OUTPATIENT
Start: 2021-09-12 | End: 2021-09-13 | Stop reason: HOSPADM

## 2021-09-12 RX ADMIN — MULTIPLE VITAMINS W/ MINERALS TAB 1 TABLET: TAB at 09:07

## 2021-09-12 RX ADMIN — SUCRALFATE 1 G: 1 TABLET ORAL at 17:12

## 2021-09-12 RX ADMIN — SUCRALFATE 1 G: 1 TABLET ORAL at 13:05

## 2021-09-12 RX ADMIN — PANTOPRAZOLE SODIUM 40 MG: 40 TABLET, DELAYED RELEASE ORAL at 17:12

## 2021-09-12 RX ADMIN — MEMANTINE 10 MG: 10 TABLET ORAL at 09:08

## 2021-09-12 RX ADMIN — SODIUM CHLORIDE, PRESERVATIVE FREE 10 ML: 5 INJECTION INTRAVENOUS at 21:31

## 2021-09-12 RX ADMIN — TRAZODONE HYDROCHLORIDE 50 MG: 50 TABLET ORAL at 21:30

## 2021-09-12 RX ADMIN — DIVALPROEX SODIUM 750 MG: 125 CAPSULE ORAL at 21:30

## 2021-09-12 RX ADMIN — PANTOPRAZOLE SODIUM 40 MG: 40 TABLET, DELAYED RELEASE ORAL at 06:09

## 2021-09-12 RX ADMIN — AMLODIPINE BESYLATE 10 MG: 10 TABLET ORAL at 09:07

## 2021-09-12 RX ADMIN — ENOXAPARIN SODIUM 40 MG: 40 INJECTION SUBCUTANEOUS at 09:08

## 2021-09-12 RX ADMIN — SUCRALFATE 1 G: 1 TABLET ORAL at 21:30

## 2021-09-12 RX ADMIN — ATORVASTATIN CALCIUM 80 MG: 80 TABLET, FILM COATED ORAL at 21:30

## 2021-09-12 RX ADMIN — METOPROLOL TARTRATE 25 MG: 25 TABLET, FILM COATED ORAL at 21:30

## 2021-09-12 RX ADMIN — DONEPEZIL HYDROCHLORIDE 10 MG: 10 TABLET, FILM COATED ORAL at 21:30

## 2021-09-12 RX ADMIN — GADOTERIDOL 15 ML: 279.3 INJECTION, SOLUTION INTRAVENOUS at 10:48

## 2021-09-12 RX ADMIN — RISPERIDONE 1 MG: 1 TABLET ORAL at 09:07

## 2021-09-12 RX ADMIN — METOPROLOL TARTRATE 25 MG: 25 TABLET, FILM COATED ORAL at 09:08

## 2021-09-12 RX ADMIN — SUCRALFATE 1 G: 1 TABLET ORAL at 06:09

## 2021-09-12 RX ADMIN — SODIUM CHLORIDE, PRESERVATIVE FREE 10 ML: 5 INJECTION INTRAVENOUS at 09:11

## 2021-09-12 RX ADMIN — LISINOPRIL 20 MG: 20 TABLET ORAL at 09:07

## 2021-09-12 ASSESSMENT — PAIN SCALES - GENERAL
PAINLEVEL_OUTOF10: 0

## 2021-09-12 NOTE — PLAN OF CARE
Problem: Pain:  Goal: Pain level will decrease  Description: Pain level will decrease  Outcome: Ongoing  Goal: Control of acute pain  Description: Control of acute pain  Outcome: Ongoing  Goal: Control of chronic pain  Description: Control of chronic pain  Outcome: Ongoing     Problem: Falls - Risk of:  Goal: Will remain free from falls  Description: Will remain free from falls  Outcome: Ongoing  Goal: Absence of physical injury  Description: Absence of physical injury  Outcome: Ongoing     Problem: Skin Integrity:  Goal: Will show no infection signs and symptoms  Description: Will show no infection signs and symptoms  Outcome: Ongoing  Goal: Absence of new skin breakdown  Description: Absence of new skin breakdown  Outcome: Ongoing  Goal: Risk for impaired skin integrity will decrease  Description: Risk for impaired skin integrity will decrease  Outcome: Ongoing     Problem:  Activity:  Goal: Risk for activity intolerance will decrease  Description: Risk for activity intolerance will decrease  Outcome: Ongoing     Problem: Coping:  Goal: Ability to adjust to condition or change in health will improve  Description: Ability to adjust to condition or change in health will improve  Outcome: Ongoing  Goal: Ability to remain calm will improve  Description: Ability to remain calm will improve  Outcome: Ongoing     Problem: Fluid Volume:  Goal: Ability to maintain a balanced intake and output will improve  Description: Ability to maintain a balanced intake and output will improve  Outcome: Ongoing     Problem: Health Behavior:  Goal: Ability to identify and utilize available resources and services will improve  Description: Ability to identify and utilize available resources and services will improve  Outcome: Ongoing  Goal: Ability to manage health-related needs will improve  Description: Ability to manage health-related needs will improve  Outcome: Ongoing     Problem: Metabolic:  Goal: Ability to maintain appropriate glucose levels will improve  Description: Ability to maintain appropriate glucose levels will improve  Outcome: Ongoing     Problem: Nutritional:  Goal: Maintenance of adequate nutrition will improve  Description: Maintenance of adequate nutrition will improve  Outcome: Ongoing  Goal: Progress toward achieving an optimal weight will improve  Description: Progress toward achieving an optimal weight will improve  Outcome: Ongoing     Problem: Physical Regulation:  Goal: Complications related to the disease process, condition or treatment will be avoided or minimized  Description: Complications related to the disease process, condition or treatment will be avoided or minimized  Outcome: Ongoing  Goal: Diagnostic test results will improve  Description: Diagnostic test results will improve  Outcome: Ongoing     Problem: Tissue Perfusion:  Goal: Adequacy of tissue perfusion will improve  Description: Adequacy of tissue perfusion will improve  Outcome: Ongoing     Problem: Discharge Planning:  Goal: Discharged to appropriate level of care  Description: Discharged to appropriate level of care  Outcome: Ongoing     Problem: Safety:  Goal: Ability to remain free from injury will improve  Description: Ability to remain free from injury will improve  Outcome: Ongoing     Problem: Self-Care:  Goal: Ability to participate in self-care as condition permits will improve  Description: Ability to participate in self-care as condition permits will improve  Outcome: Ongoing

## 2021-09-12 NOTE — PROGRESS NOTES
ATTENDING PHYSICIAN'S PROGRESS NOTES    Patient:  Pee Newman      Unit/Bed:006/006-01    YOB: 1950    MRN: [id number]     Acct: [de-identified]     Admit date: 9/11/2021    Patient Seen, Chart, Consults notes, Labs, Radiology studies reviewed. SUBJECTIVE:   Day 0 of stay with gait ataxia with tremulousness, and most recent (in last 24 hours) has had some improvement, although her gait has not been tested. Patient was seen by PT/OT with recommendations noted. Patient is scheduled for MRI of the brain with and without contrast to ascertain the etiology of her presentation. Patient was wide-awake, alert and oriented, in no apparent distress    All other ROS negative except noted in HPI    Past, Family, Social History unchanged from admission. Diet:  ADULT DIET;  Regular; Low Fat/Low Chol/High Fiber/DELMI    Medications:  Scheduled Meds:   amLODIPine  10 mg Oral Daily    atorvastatin  80 mg Oral Nightly    divalproex  750 mg Oral Nightly    donepezil  10 mg Oral Nightly    lisinopril  20 mg Oral Daily    memantine  10 mg Oral Daily    metoprolol tartrate  25 mg Oral BID    therapeutic multivitamin-minerals  1 tablet Oral Daily    pantoprazole  40 mg Oral BID AC    risperiDONE  1 mg Oral Daily    sucralfate  1 g Oral 4x Daily AC & HS    traZODone  50 mg Oral Nightly    sodium chloride flush  5-40 mL IntraVENous 2 times per day    enoxaparin  40 mg SubCUTAneous Daily    insulin lispro  0-6 Units SubCUTAneous TID WC    insulin lispro  0-3 Units SubCUTAneous Nightly     Continuous Infusions:   sodium chloride      dextrose       PRN Meds:labetalol, sodium chloride flush, sodium chloride, ondansetron **OR** ondansetron, polyethylene glycol, acetaminophen **OR** acetaminophen, glucose, dextrose, glucagon (rDNA), dextrose    OBJECTIVE:    CBC:   Recent Labs     09/11/21  0200 09/12/21  0600   WBC 8.5 5.4   HGB 12.5 11.1*    197     BMP:    Recent Labs     09/11/21 0200 09/11/21  0600 09/12/21  0600    137 140   K 4.9 3.9 4.6    104 106   CO2 31 24 28   BUN 14 11 11   CREATININE 0.9 0.9 1.0   GLUCOSE 124* 125* 131*     Calcium:  Recent Labs     09/12/21  0600   CALCIUM 9.3     Ionized Calcium:No results for input(s): IONCA in the last 72 hours. Magnesium:No results for input(s): MG in the last 72 hours. Phosphorus:No results for input(s): PHOS in the last 72 hours. BNP:No results for input(s): BNP in the last 72 hours. Glucose:  Recent Labs     09/11/21  1721 09/11/21  2112 09/12/21  0721   POCGLU 113* 171* 128*     HgbA1C: No results for input(s): LABA1C in the last 72 hours. INR: No results for input(s): INR in the last 72 hours. Hepatic:   Recent Labs     09/12/21  0600   ALKPHOS 96   ALT 13   AST 20   PROT 5.5*   BILITOT 0.4   LABALBU 3.6     Amylase and Lipase:No results for input(s): LACTA, AMYLASE in the last 72 hours. Lactic Acid: No results for input(s): LACTA in the last 72 hours. Troponin:   Recent Labs     09/11/21  0200   TROPONINT <0.010     BNP: No results for input(s): BNP in the last 72 hours. Lipids: No results for input(s): CHOL, TRIG, HDL, LDLCALC in the last 72 hours. Invalid input(s): LDL  ABGs: No results found for: PH, PCO2, PO2, HCO3, O2SAT    Radiology reports as per the Radiologist  Radiology: CT Head WO Contrast    Result Date: 9/11/2021  EXAMINATION: CT OF THE HEAD WITHOUT CONTRAST  9/11/2021 2:40 am TECHNIQUE: CT of the head was performed without the administration of intravenous contrast. Dose modulation, iterative reconstruction, and/or weight based adjustment of the mA/kV was utilized to reduce the radiation dose to as low as reasonably achievable. COMPARISON: None. HISTORY: ORDERING SYSTEM PROVIDED HISTORY: fatigue TECHNOLOGIST PROVIDED HISTORY: Reason for exam:->fatigue Has a \"code stroke\" or \"stroke alert\" been called? ->No Decision Support Exception - unselect if not a suspected or confirmed emergency medical condition->Emergency Medical Condition (MA) Reason for Exam: fatigue, weakness, unsteady gait, left shoulder pain Acuity: Acute Type of Exam: Initial Additional signs and symptoms: fatigue, weakness, unsteady gait, left shoulder pain Relevant Medical/Surgical History: fatigue, weakness, unsteady gait, left shoulder pain FINDINGS: BRAIN/VENTRICLES: There is no acute intracranial hemorrhage, mass effect or midline shift. No abnormal extra-axial fluid collection. The gray-white differentiation is maintained without evidence of an acute infarct. Hypoattenuation of the periventricular and subcortical white matter is suggestive of chronic small vessel ischemic disease. Mild diffuse parenchymal volume loss is noted. Vascular calcification is seen. There is no evidence of hydrocephalus. ORBITS: The bilateral globes are intact. SINUSES: Mucoperiosteal thickening of the right maxillary and left sphenoid sinuses and bilateral ethmoid air cells is seen. There is a small polyp versus retention cyst in the right posterior ethmoid air cells. Partial opacification of the right frontal sinus is seen. SOFT TISSUES/SKULL:  No acute abnormality of the visualized skull or soft tissues. No acute intracranial finding. MRI may be obtained if clinically indicated. Chronic findings as above.      XR SHOULDER LEFT (MIN 2 VIEWS)    Result Date: 9/12/2021  EXAMINATION: ONE XRAY VIEW OF THE CHEST; 3 XRAY VIEWS OF THE LEFT SHOULDER 9/11/2021 2:40 am COMPARISON: None HISTORY: ORDERING SYSTEM PROVIDED HISTORY: fatigue TECHNOLOGIST PROVIDED HISTORY: Reason for exam:->fatigue Reason for Exam: fatigue, weakness, unsteady gait, left shoulder pain Acuity: Acute Type of Exam: Initial Additional signs and symptoms: fatigue, weakness, unsteady gait, left shoulder pain Relevant Medical/Surgical History: fatigue, weakness, unsteady gait, left shoulder pain; ORDERING SYSTEM PROVIDED HISTORY: pain TECHNOLOGIST PROVIDED HISTORY: Reason for exam:->pain Reason for Exam: fatigue, weakness, unsteady gait, left shoulder pain Acuity: Acute Type of Exam: Initial Additional signs and symptoms: fatigue, weakness, unsteady gait, left shoulder pain Relevant Medical/Surgical History: fatigue, weakness, unsteady gait, left shoulder pain FINDINGS: No lines or tubes. Normal cardiomediastinal silhouette. The lungs are clear without focal consolidation or pleural effusion. No suspicious pulmonary nodules. No pulmonary edema. No pneumothorax. No acute osseous abnormality. Visualized hemithorax is unremarkable. Three views of the shoulder demonstrate no acute fracture or dislocation. No suspicious osseous lesion. No significant degenerative changes of the glenohumeral joint. No significant degenerative changes of the AC joint. No soft tissue abnormality. 1. No acute osseous abnormality of the left shoulder. No significant degenerative changes. 2. No acute cardiopulmonary disease. XR CHEST PORTABLE    Result Date: 9/12/2021  EXAMINATION: ONE XRAY VIEW OF THE CHEST; 3 XRAY VIEWS OF THE LEFT SHOULDER 9/11/2021 2:40 am COMPARISON: None HISTORY: ORDERING SYSTEM PROVIDED HISTORY: fatigue TECHNOLOGIST PROVIDED HISTORY: Reason for exam:->fatigue Reason for Exam: fatigue, weakness, unsteady gait, left shoulder pain Acuity: Acute Type of Exam: Initial Additional signs and symptoms: fatigue, weakness, unsteady gait, left shoulder pain Relevant Medical/Surgical History: fatigue, weakness, unsteady gait, left shoulder pain; ORDERING SYSTEM PROVIDED HISTORY: pain TECHNOLOGIST PROVIDED HISTORY: Reason for exam:->pain Reason for Exam: fatigue, weakness, unsteady gait, left shoulder pain Acuity: Acute Type of Exam: Initial Additional signs and symptoms: fatigue, weakness, unsteady gait, left shoulder pain Relevant Medical/Surgical History: fatigue, weakness, unsteady gait, left shoulder pain FINDINGS: No lines or tubes. Normal cardiomediastinal silhouette.   The lungs are clear without focal consolidation or pleural effusion. No suspicious pulmonary nodules. No pulmonary edema. No pneumothorax. No acute osseous abnormality. Visualized hemithorax is unremarkable. Three views of the shoulder demonstrate no acute fracture or dislocation. No suspicious osseous lesion. No significant degenerative changes of the glenohumeral joint. No significant degenerative changes of the AC joint. No soft tissue abnormality. 1. No acute osseous abnormality of the left shoulder. No significant degenerative changes. 2. No acute cardiopulmonary disease. Physical Exam:  Vitals: BP (!) 186/90   Pulse 86   Temp 96.9 °F (36.1 °C) (Infrared)   Resp 16   Ht 5' 2\" (1.575 m)   Wt 140 lb 8 oz (63.7 kg)   SpO2 95%   BMI 25.70 kg/m²   24 hour intake/output:    Intake/Output Summary (Last 24 hours) at 9/12/2021 1222  Last data filed at 9/12/2021 6612  Gross per 24 hour   Intake 360 ml   Output 400 ml   Net -40 ml     Last 3 weights:   Wt Readings from Last 3 Encounters:   09/11/21 140 lb 8 oz (63.7 kg)   07/09/21 147 lb (66.7 kg)   03/08/21 145 lb 12.8 oz (66.1 kg)       General appearance - alert, well appearing, and in no distress and acyanotic, in no respiratory distress  HEENT: Normocephalic, Atraumatic, Conjuctiva pink, PERRL, Oral mucosa normal, Lips, teeth and gums normal, Trachea midline, Thyroid normal and No noted lymphadenopathy  Chest - clear to auscultation, no wheezes, rales or rhonchi, symmetric air entry  Cardiovascular - normal rate, regular rhythm, normal S1, S2, no murmurs, rubs, clicks or gallops  Abdomen - soft, nontender, nondistended, no masses or organomegaly   Neurological - Alert and oriented, Normal speech, No focal findings or movement disorder noted and Motor and sensory grossly normal bilaterally  Integumentary - Skin color, texture, turgor normal. No Rashes or lesions  Musculoskeletal -Full ROM times 4 extremities, No clubbing or cyanosis and No peripheral edema      DVT prophylaxis: [x] Lovenox                                 [] SCDs                                 [] SQ Heparin                                 [] Encourage ambulation           [] Already on Anticoagulation                 ASSESSMENT / PLAN :    Principal Problem:    Ataxic gait  Unclear etiology and therefore scheduled for MRI with and without contrast of the brain to ascertain the etiology. Continue with PT/OT evaluation with further recommendations. Active Problems:    Essential hypertension  Continue with current antihypertensive regimen and monitor for control. Mixed hyperlipidemia  Continue with high-dose Lipitor 80 mg nightly. Will monitor CPK levels to rule out myositis. Patient is currently asymptomatic.      CAD (coronary artery disease)  Continue on Lipitor, lisinopril, metoprolol, and monitor for control. Patient currently asymptomatic. Major neurocognitive disorder due to Alzheimer's disease, with behavioral disturbance ([facility] [address]  Continue patient on trazodone, risperidone, Namenda, and Aricept as well as Depakote. Debility  PT/OT evaluation with recommendation for treatment and therapy. Hypertension  Continue with home dose of Norvasc, lisinopril and metoprolol and monitor for blood pressure control. Type 2 diabetes mellitus (HCC)  Continue on SSI prn to optimize BS control, maintain on ADA diet    Resolved Problems:    * No resolved hospital problems. *    Management as outlined above awaiting MRI of the brain, Continue with therapy, Am labs.     Electronically signed by Jeff Nelson MD on 9/12/2021 at 12:22 PM    [facility]ist

## 2021-09-12 NOTE — PLAN OF CARE
Ongoing     Problem: Coping:  Goal: Ability to adjust to condition or change in health will improve  Description: Ability to adjust to condition or change in health will improve  9/12/2021 0904 by Puma Francisco RN  Outcome: Ongoing  9/12/2021 0056 by Radha Brunson RN  Outcome: Ongoing  Goal: Ability to remain calm will improve  Description: Ability to remain calm will improve  9/12/2021 0904 by Puma Francisco RN  Outcome: Ongoing  9/12/2021 0056 by Radha Brunson RN  Outcome: Ongoing     Problem: Fluid Volume:  Goal: Ability to maintain a balanced intake and output will improve  Description: Ability to maintain a balanced intake and output will improve  9/12/2021 0904 by Puma Francisco RN  Outcome: Ongoing  9/12/2021 0056 by Radha Brunson RN  Outcome: Ongoing     Problem: Health Behavior:  Goal: Ability to identify and utilize available resources and services will improve  Description: Ability to identify and utilize available resources and services will improve  9/12/2021 0904 by Puma Francisco RN  Outcome: Ongoing  9/12/2021 0056 by Radha Brunson RN  Outcome: Ongoing  Goal: Ability to manage health-related needs will improve  Description: Ability to manage health-related needs will improve  9/12/2021 0904 by Puma Francisco RN  Outcome: Ongoing  9/12/2021 0056 by Radha Brunson RN  Outcome: Ongoing     Problem: Metabolic:  Goal: Ability to maintain appropriate glucose levels will improve  Description: Ability to maintain appropriate glucose levels will improve  9/12/2021 0904 by Puma Francisco RN  Outcome: Ongoing  9/12/2021 0056 by Radha Brunson RN  Outcome: Ongoing     Problem: Nutritional:  Goal: Maintenance of adequate nutrition will improve  Description: Maintenance of adequate nutrition will improve  9/12/2021 0904 by Puma Francisco RN  Outcome: Ongoing  9/12/2021 0056 by Radha Brunson RN  Outcome: Ongoing  Goal: Progress toward achieving an optimal weight will improve  Description: Progress toward achieving an optimal weight will improve  9/12/2021 0904 by Krzysztof Sharma RN  Outcome: Ongoing  9/12/2021 0056 by Ave Marrufo RN  Outcome: Ongoing     Problem: Physical Regulation:  Goal: Complications related to the disease process, condition or treatment will be avoided or minimized  Description: Complications related to the disease process, condition or treatment will be avoided or minimized  9/12/2021 0904 by Krzysztof Sharma RN  Outcome: Ongoing  9/12/2021 0056 by Ave Marrufo RN  Outcome: Ongoing  Goal: Diagnostic test results will improve  Description: Diagnostic test results will improve  9/12/2021 0904 by Krzysztof Sharma RN  Outcome: Ongoing  9/12/2021 0056 by Ave Marrufo RN  Outcome: Ongoing     Problem: Tissue Perfusion:  Goal: Adequacy of tissue perfusion will improve  Description: Adequacy of tissue perfusion will improve  9/12/2021 0904 by Krzysztof Sharma RN  Outcome: Ongoing  9/12/2021 0056 by Ave Marrufo RN  Outcome: Ongoing     Problem: Discharge Planning:  Goal: Discharged to appropriate level of care  Description: Discharged to appropriate level of care  9/12/2021 0904 by Krzysztof Sharma RN  Outcome: Ongoing  9/12/2021 0056 by Ave Marrufo RN  Outcome: Ongoing     Problem: Safety:  Goal: Ability to remain free from injury will improve  Description: Ability to remain free from injury will improve  9/12/2021 0904 by Krzysztof Sharma RN  Outcome: Ongoing  9/12/2021 0056 by Ave Marrufo RN  Outcome: Ongoing     Problem: Self-Care:  Goal: Ability to participate in self-care as condition permits will improve  Description: Ability to participate in self-care as condition permits will improve  9/12/2021 0904 by Krzysztof Sharma RN  Outcome: Ongoing  9/12/2021 0056 by Ave Marrufo RN  Outcome: Ongoing

## 2021-09-13 VITALS
RESPIRATION RATE: 17 BRPM | TEMPERATURE: 98.3 F | HEIGHT: 62 IN | BODY MASS INDEX: 25.86 KG/M2 | HEART RATE: 73 BPM | WEIGHT: 140.5 LBS | DIASTOLIC BLOOD PRESSURE: 67 MMHG | SYSTOLIC BLOOD PRESSURE: 149 MMHG | OXYGEN SATURATION: 97 %

## 2021-09-13 LAB
ALBUMIN SERPL-MCNC: 3.5 GM/DL (ref 3.4–5)
ALP BLD-CCNC: 95 IU/L (ref 40–129)
ALT SERPL-CCNC: 12 U/L (ref 10–40)
ANION GAP SERPL CALCULATED.3IONS-SCNC: 2 MMOL/L (ref 4–16)
AST SERPL-CCNC: 18 IU/L (ref 15–37)
BASOPHILS ABSOLUTE: 0 K/CU MM
BASOPHILS RELATIVE PERCENT: 0.7 % (ref 0–1)
BILIRUB SERPL-MCNC: 0.3 MG/DL (ref 0–1)
BUN BLDV-MCNC: 13 MG/DL (ref 6–23)
CALCIUM SERPL-MCNC: 9.1 MG/DL (ref 8.3–10.6)
CHLORIDE BLD-SCNC: 105 MMOL/L (ref 99–110)
CO2: 34 MMOL/L (ref 21–32)
CREAT SERPL-MCNC: 1 MG/DL (ref 0.6–1.1)
DIFFERENTIAL TYPE: ABNORMAL
EOSINOPHILS ABSOLUTE: 0.2 K/CU MM
EOSINOPHILS RELATIVE PERCENT: 3.7 % (ref 0–3)
GFR AFRICAN AMERICAN: >60 ML/MIN/1.73M2
GFR NON-AFRICAN AMERICAN: 54 ML/MIN/1.73M2
GLUCOSE BLD-MCNC: 124 MG/DL (ref 70–99)
GLUCOSE BLD-MCNC: 130 MG/DL (ref 70–99)
GLUCOSE BLD-MCNC: 144 MG/DL (ref 70–99)
HCT VFR BLD CALC: 36.5 % (ref 37–47)
HEMOGLOBIN: 11.5 GM/DL (ref 12.5–16)
IMMATURE NEUTROPHIL %: 0.5 % (ref 0–0.43)
LYMPHOCYTES ABSOLUTE: 1.8 K/CU MM
LYMPHOCYTES RELATIVE PERCENT: 31.1 % (ref 24–44)
MCH RBC QN AUTO: 32.4 PG (ref 27–31)
MCHC RBC AUTO-ENTMCNC: 31.5 % (ref 32–36)
MCV RBC AUTO: 102.8 FL (ref 78–100)
MONOCYTES ABSOLUTE: 0.5 K/CU MM
MONOCYTES RELATIVE PERCENT: 8.4 % (ref 0–4)
PDW BLD-RTO: 12 % (ref 11.7–14.9)
PLATELET # BLD: 193 K/CU MM (ref 140–440)
PMV BLD AUTO: 10 FL (ref 7.5–11.1)
POTASSIUM SERPL-SCNC: 4.5 MMOL/L (ref 3.5–5.1)
RBC # BLD: 3.55 M/CU MM (ref 4.2–5.4)
SEGMENTED NEUTROPHILS ABSOLUTE COUNT: 3.1 K/CU MM
SEGMENTED NEUTROPHILS RELATIVE PERCENT: 55.6 % (ref 36–66)
SODIUM BLD-SCNC: 141 MMOL/L (ref 135–145)
TOTAL IMMATURE NEUTOROPHIL: 0.03 K/CU MM
TOTAL PROTEIN: 5.4 GM/DL (ref 6.4–8.2)
WBC # BLD: 5.6 K/CU MM (ref 4–10.5)

## 2021-09-13 PROCEDURE — 80053 COMPREHEN METABOLIC PANEL: CPT

## 2021-09-13 PROCEDURE — G0378 HOSPITAL OBSERVATION PER HR: HCPCS

## 2021-09-13 PROCEDURE — 97162 PT EVAL MOD COMPLEX 30 MIN: CPT

## 2021-09-13 PROCEDURE — 6370000000 HC RX 637 (ALT 250 FOR IP): Performed by: NURSE PRACTITIONER

## 2021-09-13 PROCEDURE — 85025 COMPLETE CBC W/AUTO DIFF WBC: CPT

## 2021-09-13 PROCEDURE — 97530 THERAPEUTIC ACTIVITIES: CPT

## 2021-09-13 PROCEDURE — 36415 COLL VENOUS BLD VENIPUNCTURE: CPT

## 2021-09-13 PROCEDURE — 2580000003 HC RX 258: Performed by: INTERNAL MEDICINE

## 2021-09-13 PROCEDURE — 82962 GLUCOSE BLOOD TEST: CPT

## 2021-09-13 PROCEDURE — 6370000000 HC RX 637 (ALT 250 FOR IP): Performed by: INTERNAL MEDICINE

## 2021-09-13 RX ORDER — SODIUM CHLORIDE 0.9 % (FLUSH) 0.9 %
5-40 SYRINGE (ML) INJECTION EVERY 12 HOURS SCHEDULED
Status: DISCONTINUED | OUTPATIENT
Start: 2021-09-13 | End: 2021-09-13 | Stop reason: HOSPADM

## 2021-09-13 RX ORDER — METOPROLOL TARTRATE 50 MG/1
50 TABLET, FILM COATED ORAL 2 TIMES DAILY
Qty: 60 TABLET | Refills: 3 | Status: SHIPPED | OUTPATIENT
Start: 2021-09-13

## 2021-09-13 RX ORDER — SODIUM CHLORIDE 9 MG/ML
25 INJECTION, SOLUTION INTRAVENOUS PRN
Status: DISCONTINUED | OUTPATIENT
Start: 2021-09-13 | End: 2021-09-13 | Stop reason: HOSPADM

## 2021-09-13 RX ORDER — SODIUM CHLORIDE 0.9 % (FLUSH) 0.9 %
5-40 SYRINGE (ML) INJECTION PRN
Status: DISCONTINUED | OUTPATIENT
Start: 2021-09-13 | End: 2021-09-13 | Stop reason: HOSPADM

## 2021-09-13 RX ORDER — METOPROLOL TARTRATE 50 MG/1
50 TABLET, FILM COATED ORAL 2 TIMES DAILY
Status: DISCONTINUED | OUTPATIENT
Start: 2021-09-13 | End: 2021-09-13 | Stop reason: HOSPADM

## 2021-09-13 RX ADMIN — AMLODIPINE BESYLATE 10 MG: 10 TABLET ORAL at 10:33

## 2021-09-13 RX ADMIN — METOPROLOL TARTRATE 50 MG: 50 TABLET, FILM COATED ORAL at 10:32

## 2021-09-13 RX ADMIN — SUCRALFATE 1 G: 1 TABLET ORAL at 10:32

## 2021-09-13 RX ADMIN — MULTIPLE VITAMINS W/ MINERALS TAB 1 TABLET: TAB at 10:33

## 2021-09-13 RX ADMIN — PANTOPRAZOLE SODIUM 40 MG: 40 TABLET, DELAYED RELEASE ORAL at 05:41

## 2021-09-13 RX ADMIN — SODIUM CHLORIDE, PRESERVATIVE FREE 10 ML: 5 INJECTION INTRAVENOUS at 10:34

## 2021-09-13 RX ADMIN — RISPERIDONE 1 MG: 1 TABLET ORAL at 10:32

## 2021-09-13 RX ADMIN — SUCRALFATE 1 G: 1 TABLET ORAL at 05:41

## 2021-09-13 RX ADMIN — LISINOPRIL 20 MG: 20 TABLET ORAL at 10:32

## 2021-09-13 RX ADMIN — MEMANTINE 10 MG: 10 TABLET ORAL at 10:32

## 2021-09-13 ASSESSMENT — PAIN SCALES - GENERAL
PAINLEVEL_OUTOF10: 0

## 2021-09-13 NOTE — DISCHARGE SUMMARY
HOSPITALIST DISCHARGE SUMMARY     Patient ID: Lupe Ardon 1950                 [id number]      PCP:  Maribell Valles MD    Admit date: 9/11/2021   Discharge date: 9/13/2021      Admitting Physician: Davey Lemus MD  Attending Physician(s): Perlita Link MD, MD;      Discharge Physician: Perlita Link MD, MD.  Consultant(s): None    Admitting Diagnoses: Ataxic gait  Discharge Diagnoses: Principal Problem:    Ataxic gait  Active Problems:    Essential hypertension    Mixed hyperlipidemia    Dementia with behavioral disturbance (HCC)    CAD (coronary artery disease)    Major neurocognitive disorder due to Alzheimer's disease, with behavioral disturbance ([facility]    Debility    Hypertension    Type 2 diabetes mellitus ([facility]  Resolved Problems:    * No resolved hospital problems. *    Discharged Condition: fair  Disposition: home    Procedures: None  Complications: N/a    Brief History: Information provided by patient sister as she had limited verbal expression during evaluation. She stated patient is fatigued become weak,  And leaning more than normal to her left side  and has unsteady gait. Lupe Ardon is a 66 y.o.  female her medical history include dementia, hypertension, diabetes, GERD, restless leg syndrome, CAD, and depression. Who presents with complaints of becoming shaky ,, left shoulder pain, and  unsteady gait. Patient also has had incidence of falls in July. Most of the information provided by patient's sister as patient  had limited expression of her concerns. According to sister patient is not able to take care of self, and she is  having difficulties assisting her. She wants to explore option of placing patient in an extended care facility. Patient is being admitted for physical therapy and further evaluation for  placement due to unsteady gait and inability to take care of self at home.     Hospital Course: Patient had presented and admitted to the hospitalist service as indicated above. Extensive work-up including CT brain had been unremarkable. However MRI of the brain. also obtained has shown the following:  Impression:        No acute intracranial abnormality. Chronic ventriculomegaly could be due to brain atrophy.  Normal pressure   hydrocephalus is in the differential diagnosis. Initial plan was to have gotten the patient to [facility] to be seen by a neurologist for further input, however I was notified that there were no beds available. Also there was a consideration for transporting the patient to [facility] to have the lumbar puncture done, and the unit manager after working fervently on it was told this was impossible. The last option was to have gotten this patient to a facility where they will plan and get her to the appointment for neurology follow-up as well as lumbar puncture. Patient's sister and caregiver by name Allyssa Posey, as referenced in the case management notes had indicated her willingness for the patient to come home with her. I had a detailed discussion with her at the patient bedside after I had called her on phone number [phone]. She indicated because of her ex-, her house is well set for disability including [employer] lift and all other equipments. She also indicated the patient is back to her baseline and therefore would want to take the patient home, and take care today various appointments including the lumbar puncture and a neurology follow-up. In view of this, patient is being discharged home, and Ms. Светлана Estrada had also declined home health. Changes to her medication regimen includes increasing her metoprolol from 25 mg twice daily to 50 mg twice daily to optimize her blood pressure control. She has been scheduled for an outpatient lumbar puncture in [facility] and would also have a follow-up with a neurologist within the next 1 week. She has been discharged in a medically stable state.     Prognosis: Fair    Physical Examination:   General appearance - alert, well appearing, and in no distress and acyanotic, in no respiratory distress  HEENT: Normocephalic, Atraumatic, Conjuctiva pink, PERRL, Oral mucosa normal, Lips, teeth and gums normal, Trachea midline, Thyroid normal and No noted lymphadenopathy  Chest - clear to auscultation, no wheezes, rales or rhonchi, symmetric air entry  Cardiovascular - normal rate, regular rhythm, normal S1, S2, no murmurs, rubs, clicks or gallops  Abdomen - soft, nontender, nondistended, no masses or organomegaly   Neurological - Alert and oriented, Normal speech, No focal findings or movement disorder noted and Motor and sensory grossly normal bilaterally  Integumentary - Skin color, texture, turgor normal. No Rashes or lesions  Musculoskeletal -Full ROM times 4 extremities, No clubbing or cyanosis and No peripheral edema    Significant Diagnostic Studies: MRI of the brain:  Impression:        No acute intracranial abnormality. Chronic ventriculomegaly could be due to brain atrophy.  Normal pressure   hydrocephalus is in the differential diagnosis.         Pending Investigation/labs: None    Recent Labs:  CBC with Differential:    Lab Results   Component Value Date    WBC 5.6 09/13/2021    RBC 3.55 09/13/2021    HGB 11.5 09/13/2021    HCT 36.5 09/13/2021     09/13/2021    .8 09/13/2021    MCH 32.4 09/13/2021    MCHC 31.5 09/13/2021    RDW 12.0 09/13/2021    SEGSPCT 55.6 09/13/2021    LYMPHOPCT 31.1 09/13/2021    MONOPCT 8.4 09/13/2021    BASOPCT 0.7 09/13/2021    MONOSABS 0.5 09/13/2021    LYMPHSABS 1.8 09/13/2021    EOSABS 0.2 09/13/2021    BASOSABS 0.0 09/13/2021    DIFFTYPE AUTOMATED DIFFERENTIAL 09/13/2021     CMP:    Lab Results   Component Value Date     09/13/2021    K 4.5 09/13/2021     09/13/2021    CO2 34 09/13/2021    BUN 13 09/13/2021    CREATININE 1.0 09/13/2021    GFRAA >60 09/13/2021    AGRATIO 1.8 09/07/2021    LABGLOM 54 09/13/2021    GLUCOSE 130 09/13/2021    PROT 5.4 09/13/2021    LABALBU 3.5 09/13/2021    CALCIUM 9.1 09/13/2021    BILITOT 0.3 09/13/2021    ALKPHOS 95 09/13/2021    AST 18 09/13/2021    ALT 12 09/13/2021     Magnesium:    Lab Results   Component Value Date    MG 1.5 03/06/2021     Phosphorus:  No results found for: PHOS  U/A:    Lab Results   Component Value Date    NITRITE neg 01/18/2021    COLORU YELLOW 09/11/2021    PROTEINU NEGATIVE 09/11/2021    PHUR 5.5 01/18/2021    WBCUA 0 TO 3 09/11/2021    RBCUA NO CELLS SEEN 09/11/2021    MUCUS NEGATIVE 09/11/2021    TRICHOMONAS NONE SEEN 05/14/2020    BACTERIA NEGATIVE 09/11/2021    CLARITYU CLEAR 09/11/2021    SPECGRAV 1.005 09/11/2021    LEUKOCYTESUR TRACE 09/11/2021    UROBILINOGEN 0.2 09/11/2021    BILIRUBINUR NEGATIVE 09/11/2021    BILIRUBINUR neg 01/18/2021    BLOODU NEGATIVE 09/11/2021    GLUCOSEU neg 01/18/2021       Patient Instructions  Medications:   Rhys Alaniz   Home Medication Instructions OVT:[id number]    Printed on:09/13/21 1552   Medication Information                      amLODIPine (NORVASC) 10 MG tablet  Take 1 tablet by mouth daily             atorvastatin (LIPITOR) 80 MG tablet  Take 1 tablet by mouth nightly Indications: take at bedtime. divalproex (DEPAKOTE SPRINKLES) 125 MG capsule  Take 6 capsules by mouth nightly             donepezil (ARICEPT) 10 MG tablet  Take 1 tablet by mouth nightly             furosemide (LASIX) 20 MG tablet  Take 1 tablet by mouth 2 times daily             Incontinence Supply Disposable (WINGS CLASSIC ADULT BRIEFS) MISC  USE AS DIRECTED PREVAIL LARGE CARD [id number]-[id number] briefs large 18's bag             Lactobacillus (ACIDOPHILUS/L-SPOROGENES) TABS  TAKE 1 CAPSULE BY MOUTH DAILY (WITH BREAKFAST)             Lancets MISC  1 each by Does not apply route daily Check BG twice weekly. NPI [id number].  Dx E11.8             lisinopril (PRINIVIL;ZESTRIL) 20 MG tablet  Take 1 tablet by mouth daily             memantine (NAMENDA) 10 MG tablet  Take 1 tablet by mouth daily             metFORMIN (GLUCOPHAGE) 500 MG tablet  Take 1 tablet by mouth 2 times daily (with meals)             metoprolol tartrate (LOPRESSOR) 50 MG tablet  Take 1 tablet by mouth 2 times daily             Multiple Vitamins-Minerals (THERAPEUTIC MULTIVITAMIN-MINERALS) tablet  Take 1 tablet by mouth daily             omega-3 acid ethyl esters (LOVAZA) 1 g capsule  Take 2 capsules by mouth 2 times daily             pantoprazole (PROTONIX) 40 MG tablet  Take 1 tablet by mouth 2 times daily (before meals)             risperiDONE (RISPERDAL) 1 MG tablet  Take 1 tablet by mouth daily             sucralfate (CARAFATE) 1 GM tablet  Take 1 tablet by mouth 4 times daily (before meals and nightly)             traZODone (DESYREL) 50 MG tablet  Take 1 tablet by mouth nightly                  Activity: activity as tolerated  Diet: cardiac diet  Wound Care: none needed  Follow-up with: Neurologist Dr. Elridge Mcardle in 1 week in [facility], PCP in 2 weeks. Services: Declined by patient's caregiver Ms. Anand      Electronically Signed by: Gold Lujan MD, MD.    Time spent on discharge/dictation: 40 minutes

## 2021-09-13 NOTE — PROGRESS NOTES
Discharge instructions given to patient and sister Soledad Lopez, all questions answered, verbalized understanding, skin assessment completed no changes since am assessment, patient taken to car in wheelchair with all belongings to be driven home by sisterSoledad.

## 2021-09-13 NOTE — PROGRESS NOTES
Yes  Patient assessed for rehabilitation services?: Yes  Follows Commands: Impaired (dementia)     Pre Treatment Pain Screening  Pain at present: 0    Orientation  Orientation  Overall Orientation Status: Impaired  Orientation Level: Disoriented to place; Disoriented to time;Disoriented to situation  Social/Functional History  Social/Functional History  Lives With: Family  Type of Home: House  Home Layout: One level  Home Access: Ramped entrance  Bathroom Shower/Tub: Tub/Shower unit  Bathroom Toilet: Handicap height  Bathroom Equipment: Grab bars in shower, Grab bars around toilet  [address]: Rolling walker, Electric scooter, [address] Help From: Family  ADL Assistance: Needs assistance  Bath: Moderate assistance  Dressing: Moderate assistance  Feeding: Independent  Toileting: Needs assistance  Homemaking Responsibilities: No  Ambulation Assistance: Needs assistance (hand held assist)  Transfer Assistance: Needs assistance  Active : No  Patient's  Info: sister provides transportation. Type of occupation: landscaping  Leisure & Hobbies: casino with sister  Additional Comments: Sister says she helps her. Sister has all kinds of equipment including w/c, scooter and deacon lift because her  was disabled  Cognition        Objective     Observation/Palpation  Observation: Pt was in bed with HOB raised. She was pleasant and cooperative initially  but  gradually became more agitated and wanted to go home and be with her sister    AROM RLE (degrees)  RLE General AROM: AROM limited by muscle rigidity  AROM LLE (degrees)  LLE General AROM: AROM limited by muscle rigidity  Strength RLE  Comment: I SLR- strength grossly 4/5  Strength LLE  Comment: I SLR-I SLR- strength grossly 4/5        Bed mobility  Supine to Sit: Moderate assistance  Sit to Supine:  Moderate assistance  Transfers  Sit to Stand: Contact guard assistance  Stand to sit: Contact guard assistance  Ambulation  Ambulation?: No  WB Status: FWb - patient unable to stand steady - patient leaning backwardor turned to her Left when standing- unable to hold walker     Balance  Standing - Static: Poor  Standing - Dynamic: Poor        Plan   Plan  Times per week: 3+  Safety Devices  Type of devices: Nurse notified, Gait belt, Left in bed, Bed alarm in place, Call light within reach    G-Code       OutComes Score                                                  AM-PAC Score             Goals  Short term goals  Time Frame for Short term goals: 1 week  Short term goal 1: patient will safely perform bed mobility activities with CGA  Short term goal 2: patient will safely perform all transfer activities with SBA  Short term goal 3: patient will safely ambulate 25 ft with min A       Therapy Time   Individual Concurrent Group Co-treatment   Time In 0905         Time Out 0930         Minutes 25         Timed Code Treatment Minutes: 600 Leoncio Adan, PT

## 2021-09-13 NOTE — PROGRESS NOTES
Phone call to Dr Liam Dias office, no answered, left vm informing of discharge and need to schedule one week follow up.

## 2021-09-13 NOTE — PROGRESS NOTES
Occupational Therapy    Pt attempted on this date but was very confused and perseverating on \"I'm not suppose to be here anymore, they ran that test on me yesterday. I need someone to call the police. \"    Kar LUGO [id number]

## 2021-09-13 NOTE — CARE COORDINATION
CM spoke with the patient's sister Vy Felciiano ([phone]) to discuss discharge planning. Patrica Marte stated that she and the patient live together and she is the primary caregiver. Patient has insurance with Rx coverage & PCP, required assistance with ADL's prior to admission, and does not drive [employer] provides transportation as needed). Patrica Marte stated that prior to admission the patient was able to ambulate without the aide of any assistive devices and did not require home oxygen. Patrica Marte stated that she has a lot of medical equipment available at home including a walker, deacon lift, and walk-in tub. Patrica Marte stated that she would rather the patient not be placed in a SNF and does not want HHC. Patrica Marte stated that the patient would not have ever wanted to be placed in a SNF and she feels that she is able to care for the patient at home. Patrica Marte stated that she and the patient have been very diligent during the pandemic and they both received the COVID vaccine soon as it was available to them. Patrica Marte stated that she does not want [address] because of the risk of the [address] staff exposing them to [place] and would rather the patient receive outpatient PT if needed. CM updated Dr. Samson Agudelo regarding conversation with Patrica Rosanna. CM will follow.

## 2021-09-14 ENCOUNTER — CARE COORDINATION (OUTPATIENT)
Dept: CASE MANAGEMENT | Age: 71
End: 2021-09-14

## 2021-09-14 NOTE — CARE COORDINATION
Care Transitions Outreach Attempt    Call within 2 business days of discharge: Yes   Attempted to reach patient for 1st attempt at 24hr  transitions of care follow up. Unable to reach patient. Left HIPPA Compliant BERENICE Pruitt LPN, [facility]  PH: [phone]      Patient: Humaira Henderson Patient : 1950 MRN: [id number]    Last Discharge [facility]       Complaint Diagnosis Description Type Department Provider    21 Fatigue; Shoulder Pain Other fatigue . .. ED to Hosp-Admission (Discharged) (ADMITTED) Sonia Summers MD; Jamil Saleh MD            Was this an external facility discharge? No     Noted following upcoming appointments from discharge chart review:   [facility] follow up appointment(s): No future appointments.

## 2021-09-15 ENCOUNTER — CARE COORDINATION (OUTPATIENT)
Dept: CASE MANAGEMENT | Age: 71
End: 2021-09-15

## 2021-09-15 NOTE — CARE COORDINATION
Care Transitions Outreach Attempt    Call within 2 business days of discharge: Yes   The 2nd attempt to reach patient for 24hr  transitions of care follow up. Unable to reach patient. Left HIPPA Compliant VM. Will resolve. Dwayne Parham LPN, [facility]  PH: [phone]      Patient: Svetlana Crane Patient : 1950 MRN: [id number]    Last Discharge [facility]       Complaint Diagnosis Description Type Department Provider    21 Fatigue; Shoulder Pain Other fatigue . .. ED to Hosp-Admission (Discharged) (ADMITTED) Junior Mc MD; Tiffany Pastor MD            Was this an external facility discharge?  No Discharge Facility:     Noted following upcoming appointments from discharge chart review:   [facility] follow up appointment(s):   Future Appointments   Date Time Provider Reyna Duran   2021  3:30 PM Sergio Pal MD [facility] IM [facility]     Non-[facility] follow up appointment(s):

## 2021-09-15 NOTE — CARE COORDINATION
[facility] Transitions Initial Follow Up Call    Call within 2 business days of discharge: Yes    Patient: Amina Ahumada Patient : 1950   MRN: [id number]  Reason for Admission: Ataxic Gait  Discharge Date: 21 RARS: Readmission Risk Score: [address]: [facility]       Complaint Diagnosis Description Type Department Provider    21 Fatigue; Shoulder Pain Other fatigue . .. ED to Hosp-Admission (Discharged) (ADMITTED) Cole Condon MD; Joycelyn Deshpande MD        Non-face-to-face services provided:  Scheduled appointment with PCP-Dr Gonzales 21 3pm  Obtained and reviewed discharge summary and/or continuity of care documents  Education of patient/family/caregiver/guardian to support self-management-Fall safety  Assessment and support for treatment adherence and medication management-Med Rec    Care Transitions 24 Hour Call    Schedule Follow Up Appointment with PCP: Completed  Do you have any ongoing symptoms?: No  Do you have a copy of your discharge instructions?: Yes  Do you have all of your prescriptions and are they filled?: Yes  Have you been contacted by a [address]?: No  Have you scheduled your follow up appointment?: No (Comment: CTN to schedule-- see note)  Were you discharged with any Home Care or Post Acute Services: No  Do you feel like you have everything you need to keep you well at home?: Yes  Care Transitions Interventions   Home Care Waiver: Declined        Transportation Support: Declined      DME Assistance: Declined     Senior Services: Declined         Future Appointments   Date Time Provider Reyna Duran   2021  3:30 PM Louis Sebastian MD [facility]  MMA     Challenges to be reviewed by the provider   Additional needs identified to be addressed with provider: denies       Method of communication with provider : none    CARE TRANSITION MONITORING    1215 Manuel Solorzano PCP: Dr Luis Manuel Deng    Was this a readmission?  No  Patient stated reason for admission: Left shoulder pain, fatigue  Patients top risk factors for readmission: functional physical ability and medication management, Ataxic Gait, Copd, Alzheimer Disease, CAD, Debility, DM    1415 Spoke briefly with Allyssa Posey, Sister on return call for Care Transition discharge call, verified Patient name and  as identifiers. Introduced self and explained CT program. Agreeable to ongoing CT follow up. Only able to speak briefly as Sister awaiting call from [facility] for Patient lumbar puncture scheduling. Reports Patient doing well since home. Denies noted or Patient reported shoulder pain, acute distress, neuro deficits, falls since home. Patient w/ some ongoing fatigue and weakness; denies need for MD notification as nearing baseline. Reviewed fall safety interventions. AVS/Meds: Confirmed copy of AVS received, reviewed with Sister. Confirmed Patient obtained and is taking all new and routine rx as directed. Medication review completed. Stressed importance of med compliance. Advised obtaining 90 day supply of routine, prn meds. Advised contacting pharmacy/md for refill needs. Resource Need Assessment:   Living Arrangements: lives w/ sister   ADLS:requires assist from sister   DME:walker and deacon lift, walk-in-tub however patient not currently requiring any dme   Transportation: family  HHC:none, denies need as not wanting anyone in home given Covid19 pandemic  Community Assistance:none, denies need   Referrals Made per CTN with Patient/Family Approval: [address] [facility] Follow Up Appointments: Discussed importance of 7 day hospital follow up appointment for continuity of care. Agreeable for CTN to schedule. [id number]:  Patient received Moderna Covid 19 vaccine series. Advised Covid19 preventative measures including mask covering nose/mouth, social distancing, hand washing.      Advance Care Planning   Healthcare Decision Maker:    Primary Decision Maker: Paulino Jones -

## 2021-09-17 ENCOUNTER — TELEPHONE (OUTPATIENT)
Dept: INTERNAL MEDICINE CLINIC | Age: 71
End: 2021-09-17

## 2021-09-22 ENCOUNTER — CARE COORDINATION (OUTPATIENT)
Dept: CASE MANAGEMENT | Age: 71
End: 2021-09-22

## 2021-10-04 ENCOUNTER — CARE COORDINATION (OUTPATIENT)
Dept: CASE MANAGEMENT | Age: 71
End: 2021-10-04

## 2021-10-04 NOTE — CARE COORDINATION
[facility] Transitions Follow Up Call    10/4/2021    Patient: Hank Raya  Patient : 1950   MRN: [id number]  Reason for Admission: Ataxic Gait  Discharge Date: 21 RARS: Readmission Risk Score: 58 [facility] TRANSITION [facility] PCP: Dr Jania Rasheed    Per chart review-- Patient no show to scheduled 21TCM appt w/ PCP and has not yet rescheduled. Attempt to reach for Care Transition follow up call unsuccessful. Detailed message left requesting call back and reminder to schedule appts w/ PCP and Neuro asap.     Jace Birmingham RN

## 2021-10-13 ENCOUNTER — CARE COORDINATION (OUTPATIENT)
Dept: CASE MANAGEMENT | Age: 71
End: 2021-10-13

## 2021-10-21 DIAGNOSIS — E11.8 CONTROLLED TYPE 2 DIABETES MELLITUS WITH COMPLICATION, WITHOUT LONG-TERM CURRENT USE OF INSULIN (HCC): ICD-10-CM

## 2021-10-21 DIAGNOSIS — I10 ESSENTIAL HYPERTENSION: ICD-10-CM

## 2021-10-21 DIAGNOSIS — E78.2 MIXED HYPERLIPIDEMIA: ICD-10-CM

## 2021-10-21 RX ORDER — ATORVASTATIN CALCIUM 80 MG/1
80 TABLET, FILM COATED ORAL NIGHTLY
Qty: 30 TABLET | Refills: 3 | Status: SHIPPED | OUTPATIENT
Start: 2021-10-21

## 2021-10-21 RX ORDER — LISINOPRIL 20 MG/1
20 TABLET ORAL DAILY
Qty: 30 TABLET | Refills: 3 | Status: SHIPPED | OUTPATIENT
Start: 2021-10-21

## 2021-11-13 ENCOUNTER — HOSPITAL ENCOUNTER (INPATIENT)
Age: 71
LOS: 4 days | Discharge: NURSING, REHAB OR LONG-TERM CARE | DRG: 056 | End: 2021-11-18
Attending: EMERGENCY MEDICINE | Admitting: INTERNAL MEDICINE
Payer: MEDICARE

## 2021-11-13 DIAGNOSIS — R41.82 ALTERED MENTAL STATUS, UNSPECIFIED ALTERED MENTAL STATUS TYPE: Primary | ICD-10-CM

## 2021-11-13 DIAGNOSIS — D72.829 LEUKOCYTOSIS, UNSPECIFIED TYPE: ICD-10-CM

## 2021-11-13 LAB
ALBUMIN SERPL-MCNC: 4.6 GM/DL (ref 3.4–5)
ALP BLD-CCNC: 122 IU/L (ref 40–129)
ALT SERPL-CCNC: 14 U/L (ref 10–40)
ANION GAP SERPL CALCULATED.3IONS-SCNC: 15 MMOL/L (ref 4–16)
AST SERPL-CCNC: 21 IU/L (ref 15–37)
BASOPHILS ABSOLUTE: 0 K/CU MM
BASOPHILS RELATIVE PERCENT: 0.2 % (ref 0–1)
BILIRUB SERPL-MCNC: 0.4 MG/DL (ref 0–1)
BUN BLDV-MCNC: 13 MG/DL (ref 6–23)
CALCIUM SERPL-MCNC: 9.8 MG/DL (ref 8.3–10.6)
CHLORIDE BLD-SCNC: 101 MMOL/L (ref 99–110)
CO2: 23 MMOL/L (ref 21–32)
CREAT SERPL-MCNC: 0.9 MG/DL (ref 0.6–1.1)
DIFFERENTIAL TYPE: ABNORMAL
EOSINOPHILS ABSOLUTE: 0 K/CU MM
EOSINOPHILS RELATIVE PERCENT: 0.1 % (ref 0–3)
GFR AFRICAN AMERICAN: >60 ML/MIN/1.73M2
GFR NON-AFRICAN AMERICAN: >60 ML/MIN/1.73M2
GLUCOSE BLD-MCNC: 163 MG/DL (ref 70–99)
HCT VFR BLD CALC: 42.6 % (ref 37–47)
HEMOGLOBIN: 14.3 GM/DL (ref 12.5–16)
IMMATURE NEUTROPHIL %: 0.4 % (ref 0–0.43)
LACTATE: 1.4 MMOL/L (ref 0.4–2)
LYMPHOCYTES ABSOLUTE: 1.3 K/CU MM
LYMPHOCYTES RELATIVE PERCENT: 9.8 % (ref 24–44)
MCH RBC QN AUTO: 31.8 PG (ref 27–31)
MCHC RBC AUTO-ENTMCNC: 33.6 % (ref 32–36)
MCV RBC AUTO: 94.7 FL (ref 78–100)
MONOCYTES ABSOLUTE: 0.6 K/CU MM
MONOCYTES RELATIVE PERCENT: 4.9 % (ref 0–4)
NUCLEATED RBC %: 0 %
PDW BLD-RTO: 11.7 % (ref 11.7–14.9)
PLATELET # BLD: 280 K/CU MM (ref 140–440)
PMV BLD AUTO: 10.1 FL (ref 7.5–11.1)
POTASSIUM SERPL-SCNC: 4.7 MMOL/L (ref 3.5–5.1)
RBC # BLD: 4.5 M/CU MM (ref 4.2–5.4)
SEGMENTED NEUTROPHILS ABSOLUTE COUNT: 10.8 K/CU MM
SEGMENTED NEUTROPHILS RELATIVE PERCENT: 84.6 % (ref 36–66)
SODIUM BLD-SCNC: 139 MMOL/L (ref 135–145)
TOTAL IMMATURE NEUTOROPHIL: 0.05 K/CU MM
TOTAL NUCLEATED RBC: 0 K/CU MM
TOTAL PROTEIN: 6.9 GM/DL (ref 6.4–8.2)
WBC # BLD: 12.7 K/CU MM (ref 4–10.5)

## 2021-11-13 PROCEDURE — 85025 COMPLETE CBC W/AUTO DIFF WBC: CPT

## 2021-11-13 PROCEDURE — 2580000003 HC RX 258: Performed by: EMERGENCY MEDICINE

## 2021-11-13 PROCEDURE — 96360 HYDRATION IV INFUSION INIT: CPT

## 2021-11-13 PROCEDURE — 99284 EMERGENCY DEPT VISIT MOD MDM: CPT

## 2021-11-13 PROCEDURE — 83605 ASSAY OF LACTIC ACID: CPT

## 2021-11-13 PROCEDURE — 80053 COMPREHEN METABOLIC PANEL: CPT

## 2021-11-13 PROCEDURE — 81001 URINALYSIS AUTO W/SCOPE: CPT

## 2021-11-13 RX ORDER — 0.9 % SODIUM CHLORIDE 0.9 %
1000 INTRAVENOUS SOLUTION INTRAVENOUS ONCE
Status: COMPLETED | OUTPATIENT
Start: 2021-11-13 | End: 2021-11-13

## 2021-11-13 RX ADMIN — SODIUM CHLORIDE 1000 ML: 9 INJECTION, SOLUTION INTRAVENOUS at 22:30

## 2021-11-14 ENCOUNTER — APPOINTMENT (OUTPATIENT)
Dept: GENERAL RADIOLOGY | Age: 71
DRG: 056 | End: 2021-11-14
Payer: MEDICARE

## 2021-11-14 ENCOUNTER — APPOINTMENT (OUTPATIENT)
Dept: CT IMAGING | Age: 71
DRG: 056 | End: 2021-11-14
Payer: MEDICARE

## 2021-11-14 PROBLEM — R53.81 PHYSICAL DECONDITIONING: Status: ACTIVE | Noted: 2021-11-14

## 2021-11-14 LAB
ANION GAP SERPL CALCULATED.3IONS-SCNC: 11 MMOL/L (ref 4–16)
BACTERIA: NEGATIVE /HPF
BILIRUBIN URINE: NEGATIVE MG/DL
BLOOD, URINE: ABNORMAL
BUN BLDV-MCNC: 12 MG/DL (ref 6–23)
CALCIUM SERPL-MCNC: 9.2 MG/DL (ref 8.3–10.6)
CHLORIDE BLD-SCNC: 106 MMOL/L (ref 99–110)
CLARITY: CLEAR
CO2: 24 MMOL/L (ref 21–32)
COLOR: YELLOW
CREAT SERPL-MCNC: 1 MG/DL (ref 0.6–1.1)
GFR AFRICAN AMERICAN: >60 ML/MIN/1.73M2
GFR NON-AFRICAN AMERICAN: 54 ML/MIN/1.73M2
GLUCOSE BLD-MCNC: 117 MG/DL (ref 70–99)
GLUCOSE BLD-MCNC: 117 MG/DL (ref 70–99)
GLUCOSE BLD-MCNC: 124 MG/DL (ref 70–99)
GLUCOSE BLD-MCNC: 125 MG/DL (ref 70–99)
GLUCOSE BLD-MCNC: 135 MG/DL (ref 70–99)
GLUCOSE, URINE: NEGATIVE MG/DL
HCT VFR BLD CALC: 36.9 % (ref 37–47)
HEMOGLOBIN: 12.3 GM/DL (ref 12.5–16)
KETONES, URINE: ABNORMAL MG/DL
LEUKOCYTE ESTERASE, URINE: NEGATIVE
MCH RBC QN AUTO: 32.3 PG (ref 27–31)
MCHC RBC AUTO-ENTMCNC: 33.3 % (ref 32–36)
MCV RBC AUTO: 96.9 FL (ref 78–100)
MUCUS: ABNORMAL HPF
NITRITE URINE, QUANTITATIVE: NEGATIVE
PDW BLD-RTO: 11.9 % (ref 11.7–14.9)
PH, URINE: 5 (ref 5–8)
PLATELET # BLD: 236 K/CU MM (ref 140–440)
PMV BLD AUTO: 10.4 FL (ref 7.5–11.1)
POTASSIUM SERPL-SCNC: 4.7 MMOL/L (ref 3.5–5.1)
PROTEIN UA: NEGATIVE MG/DL
RBC # BLD: 3.81 M/CU MM (ref 4.2–5.4)
RBC URINE: <1 /HPF (ref 0–6)
SARS-COV-2, NAAT: NOT DETECTED
SODIUM BLD-SCNC: 141 MMOL/L (ref 135–145)
SOURCE: NORMAL
SPECIFIC GRAVITY UA: 1.02 (ref 1–1.03)
SQUAMOUS EPITHELIAL: 1 /HPF
TRANSITIONAL EPITHELIAL: <1 /HPF
TRICHOMONAS: ABNORMAL /HPF
UROBILINOGEN, URINE: NEGATIVE MG/DL (ref 0.2–1)
WBC # BLD: 8.4 K/CU MM (ref 4–10.5)
WBC UA: 1 /HPF (ref 0–5)

## 2021-11-14 PROCEDURE — 1200000000 HC SEMI PRIVATE

## 2021-11-14 PROCEDURE — 71045 X-RAY EXAM CHEST 1 VIEW: CPT

## 2021-11-14 PROCEDURE — 80048 BASIC METABOLIC PNL TOTAL CA: CPT

## 2021-11-14 PROCEDURE — 2580000003 HC RX 258: Performed by: NURSE PRACTITIONER

## 2021-11-14 PROCEDURE — 85027 COMPLETE CBC AUTOMATED: CPT

## 2021-11-14 PROCEDURE — 6370000000 HC RX 637 (ALT 250 FOR IP): Performed by: NURSE PRACTITIONER

## 2021-11-14 PROCEDURE — 87635 SARS-COV-2 COVID-19 AMP PRB: CPT

## 2021-11-14 PROCEDURE — 36415 COLL VENOUS BLD VENIPUNCTURE: CPT

## 2021-11-14 PROCEDURE — 6370000000 HC RX 637 (ALT 250 FOR IP): Performed by: PHYSICIAN ASSISTANT

## 2021-11-14 PROCEDURE — 82962 GLUCOSE BLOOD TEST: CPT

## 2021-11-14 PROCEDURE — 70450 CT HEAD/BRAIN W/O DYE: CPT

## 2021-11-14 PROCEDURE — 87040 BLOOD CULTURE FOR BACTERIA: CPT

## 2021-11-14 RX ORDER — TRAZODONE HYDROCHLORIDE 50 MG/1
50 TABLET ORAL NIGHTLY
Status: DISCONTINUED | OUTPATIENT
Start: 2021-11-14 | End: 2021-11-14

## 2021-11-14 RX ORDER — LISINOPRIL 20 MG/1
20 TABLET ORAL DAILY
Status: DISCONTINUED | OUTPATIENT
Start: 2021-11-14 | End: 2021-11-18 | Stop reason: HOSPADM

## 2021-11-14 RX ORDER — PANTOPRAZOLE SODIUM 40 MG/1
40 TABLET, DELAYED RELEASE ORAL
Status: DISCONTINUED | OUTPATIENT
Start: 2021-11-14 | End: 2021-11-18 | Stop reason: CLARIF

## 2021-11-14 RX ORDER — AMLODIPINE BESYLATE 5 MG/1
10 TABLET ORAL DAILY
Status: DISCONTINUED | OUTPATIENT
Start: 2021-11-14 | End: 2021-11-18 | Stop reason: HOSPADM

## 2021-11-14 RX ORDER — ONDANSETRON 2 MG/ML
4 INJECTION INTRAMUSCULAR; INTRAVENOUS EVERY 6 HOURS PRN
Status: DISCONTINUED | OUTPATIENT
Start: 2021-11-14 | End: 2021-11-18 | Stop reason: HOSPADM

## 2021-11-14 RX ORDER — POLYETHYLENE GLYCOL 3350 17 G/17G
17 POWDER, FOR SOLUTION ORAL DAILY PRN
Status: DISCONTINUED | OUTPATIENT
Start: 2021-11-14 | End: 2021-11-18 | Stop reason: HOSPADM

## 2021-11-14 RX ORDER — LANOLIN ALCOHOL/MO/W.PET/CERES
3 CREAM (GRAM) TOPICAL NIGHTLY
Status: DISCONTINUED | OUTPATIENT
Start: 2021-11-14 | End: 2021-11-18 | Stop reason: HOSPADM

## 2021-11-14 RX ORDER — NICOTINE POLACRILEX 4 MG
15 LOZENGE BUCCAL PRN
Status: DISCONTINUED | OUTPATIENT
Start: 2021-11-14 | End: 2021-11-18 | Stop reason: HOSPADM

## 2021-11-14 RX ORDER — DONEPEZIL HYDROCHLORIDE 5 MG/1
10 TABLET, FILM COATED ORAL NIGHTLY
Status: DISCONTINUED | OUTPATIENT
Start: 2021-11-14 | End: 2021-11-18 | Stop reason: HOSPADM

## 2021-11-14 RX ORDER — DEXTROSE MONOHYDRATE 25 G/50ML
12.5 INJECTION, SOLUTION INTRAVENOUS PRN
Status: DISCONTINUED | OUTPATIENT
Start: 2021-11-14 | End: 2021-11-18 | Stop reason: HOSPADM

## 2021-11-14 RX ORDER — TRAZODONE HYDROCHLORIDE 50 MG/1
50 TABLET ORAL NIGHTLY PRN
Status: DISCONTINUED | OUTPATIENT
Start: 2021-11-14 | End: 2021-11-18 | Stop reason: HOSPADM

## 2021-11-14 RX ORDER — SODIUM CHLORIDE 0.9 % (FLUSH) 0.9 %
5-40 SYRINGE (ML) INJECTION EVERY 12 HOURS SCHEDULED
Status: DISCONTINUED | OUTPATIENT
Start: 2021-11-14 | End: 2021-11-18 | Stop reason: HOSPADM

## 2021-11-14 RX ORDER — RISPERIDONE 0.5 MG/1
1 TABLET, FILM COATED ORAL DAILY
Status: DISCONTINUED | OUTPATIENT
Start: 2021-11-14 | End: 2021-11-18 | Stop reason: HOSPADM

## 2021-11-14 RX ORDER — SODIUM CHLORIDE 9 MG/ML
25 INJECTION, SOLUTION INTRAVENOUS PRN
Status: DISCONTINUED | OUTPATIENT
Start: 2021-11-14 | End: 2021-11-18 | Stop reason: HOSPADM

## 2021-11-14 RX ORDER — METOPROLOL TARTRATE 50 MG/1
50 TABLET, FILM COATED ORAL 2 TIMES DAILY
Status: DISCONTINUED | OUTPATIENT
Start: 2021-11-14 | End: 2021-11-18 | Stop reason: HOSPADM

## 2021-11-14 RX ORDER — DEXTROSE MONOHYDRATE 50 MG/ML
100 INJECTION, SOLUTION INTRAVENOUS PRN
Status: DISCONTINUED | OUTPATIENT
Start: 2021-11-14 | End: 2021-11-18 | Stop reason: HOSPADM

## 2021-11-14 RX ORDER — SODIUM CHLORIDE 0.9 % (FLUSH) 0.9 %
5-40 SYRINGE (ML) INJECTION PRN
Status: DISCONTINUED | OUTPATIENT
Start: 2021-11-14 | End: 2021-11-18 | Stop reason: HOSPADM

## 2021-11-14 RX ORDER — ACETAMINOPHEN 325 MG/1
650 TABLET ORAL EVERY 6 HOURS PRN
Status: DISCONTINUED | OUTPATIENT
Start: 2021-11-14 | End: 2021-11-18 | Stop reason: HOSPADM

## 2021-11-14 RX ORDER — ONDANSETRON 4 MG/1
4 TABLET, ORALLY DISINTEGRATING ORAL EVERY 8 HOURS PRN
Status: DISCONTINUED | OUTPATIENT
Start: 2021-11-14 | End: 2021-11-18 | Stop reason: HOSPADM

## 2021-11-14 RX ORDER — FUROSEMIDE 20 MG/1
20 TABLET ORAL 2 TIMES DAILY
Status: DISCONTINUED | OUTPATIENT
Start: 2021-11-14 | End: 2021-11-18 | Stop reason: HOSPADM

## 2021-11-14 RX ORDER — ATORVASTATIN CALCIUM 40 MG/1
80 TABLET, FILM COATED ORAL NIGHTLY
Status: DISCONTINUED | OUTPATIENT
Start: 2021-11-14 | End: 2021-11-18 | Stop reason: HOSPADM

## 2021-11-14 RX ORDER — DIVALPROEX SODIUM 125 MG/1
750 CAPSULE, COATED PELLETS ORAL NIGHTLY
Status: DISCONTINUED | OUTPATIENT
Start: 2021-11-14 | End: 2021-11-18 | Stop reason: HOSPADM

## 2021-11-14 RX ORDER — MEMANTINE HYDROCHLORIDE 10 MG/1
10 TABLET ORAL DAILY
Status: DISCONTINUED | OUTPATIENT
Start: 2021-11-14 | End: 2021-11-18 | Stop reason: HOSPADM

## 2021-11-14 RX ORDER — ACETAMINOPHEN 650 MG/1
650 SUPPOSITORY RECTAL EVERY 6 HOURS PRN
Status: DISCONTINUED | OUTPATIENT
Start: 2021-11-14 | End: 2021-11-18 | Stop reason: HOSPADM

## 2021-11-14 RX ADMIN — METOPROLOL TARTRATE 50 MG: 50 TABLET, FILM COATED ORAL at 22:00

## 2021-11-14 RX ADMIN — MELATONIN TAB 3 MG 3 MG: 3 TAB at 22:00

## 2021-11-14 RX ADMIN — RISPERIDONE 1 MG: 0.5 TABLET ORAL at 10:36

## 2021-11-14 RX ADMIN — MEMANTINE 10 MG: 10 TABLET ORAL at 10:36

## 2021-11-14 RX ADMIN — METFORMIN HYDROCHLORIDE 500 MG: 500 TABLET ORAL at 10:36

## 2021-11-14 RX ADMIN — LISINOPRIL 20 MG: 20 TABLET ORAL at 10:37

## 2021-11-14 RX ADMIN — ATORVASTATIN CALCIUM 80 MG: 40 TABLET, FILM COATED ORAL at 22:00

## 2021-11-14 RX ADMIN — METOPROLOL TARTRATE 50 MG: 50 TABLET, FILM COATED ORAL at 10:36

## 2021-11-14 RX ADMIN — SODIUM CHLORIDE, PRESERVATIVE FREE 10 ML: 5 INJECTION INTRAVENOUS at 10:48

## 2021-11-14 RX ADMIN — FUROSEMIDE 20 MG: 20 TABLET ORAL at 10:36

## 2021-11-14 RX ADMIN — PANTOPRAZOLE SODIUM 40 MG: 40 TABLET, DELAYED RELEASE ORAL at 17:17

## 2021-11-14 RX ADMIN — PANTOPRAZOLE SODIUM 40 MG: 40 TABLET, DELAYED RELEASE ORAL at 10:36

## 2021-11-14 RX ADMIN — AMLODIPINE BESYLATE 10 MG: 5 TABLET ORAL at 10:36

## 2021-11-14 RX ADMIN — DIVALPROEX SODIUM 750 MG: 125 CAPSULE, COATED PELLETS ORAL at 22:00

## 2021-11-14 RX ADMIN — DONEPEZIL HYDROCHLORIDE 10 MG: 5 TABLET, FILM COATED ORAL at 22:00

## 2021-11-14 RX ADMIN — SODIUM CHLORIDE, PRESERVATIVE FREE 10 ML: 5 INJECTION INTRAVENOUS at 22:00

## 2021-11-14 NOTE — ED NOTES
Bed: ED-19  Expected date:   Expected time:   Means of arrival:   Comments:  Madison Mast RN  11/14/21 7635

## 2021-11-14 NOTE — PROGRESS NOTES
Hospitalist Progress Note      Name:  Kelvin Morelos /Age/Sex: 1950  (66 y.o. female)   MRN & CSN:  [id number] & [id number] Admission Date/Time: 2021  9:16 PM   Location:  [facility] PCP: Korina Payne MD         Hospital Day: 2    Assessment and Plan:   Kelvin Morelos is a 66 y.o.  female  who presents with generalized weakness, hallucinations.      Acute on chronic encephalopathy  -Has history of Alzheimer's dementia, currently lives with sister who states she is alert and oriented x3 at baseline  -Has had decreased p.o. intake over the last few days with increased confusion  -Presented with generalized weakness, hallucinations in setting of worsening Alzheimer's dementia  -CT head nonacute  -Infectious work-up negative thus far, rapid Covid pending  -PT OT, SLP consult  -Case management consult for discharge planning  -Dietitian consult, patient with poor oral intake for several days  - continue home aricept, held home risperdal, maintain sleep-wake cycle, schedule melatonin   -Fall precautions  - worsening dementia likely contributing   -Consult neurology in setting of below    Chronic ventriculomegaly   - MRI brain 2021 with chronic ventriculomegaly, possibly secondary to brain atrophy, NPH is in the differential diagnosis  -Admit CT head with age-related changes including chronic small vessel ischemic disease and cerebral atrophy  -Sister reports that she was to have a lumbar puncture done as an outpatient which has not been completed yet  -Consult neurology      Leukocytosis  -WBC 12.7, improved today   -UA negative for infection  -CXR without abnormality  -Lactate 1.4  -No clear source of infection at this time  - BCx pending   -Continue to trend     Essential hypertension, uncontrolled  -Continue amlodipine 10 mg daily   -Increase lisinopril to 25 mg daily     T2DM-non-insulin-dependent   -Hold Metformin  -will use low-dose SSI   -A1c 6.5 on 2021  -Accu-Cheks  -Hypoglycemia protocol     Depression  -Continue Depakote and Risperdal     -CAD  -will resume metoprolol 50 mg daily     Insomnia  -schedule melatonin, continue PRN trazodone     This patient was seen and examined autonomously  A hospitalist attending physician was available for questions/consultation as needed. Diet ADULT DIET; Regular   DVT Prophylaxis [x] Lovenox, []  Heparin, [] SCDs, [] Ambulation   GI Prophylaxis [] PPI,  [] H2 Blocker,  [] Carafate,  [x] Diet/Tube Feeds   Code Status Full Code   Disposition Patient requires continued admission due to acute encephalopathy      History of Present Illness:     Chief Complaint: Clarissa Alvarez is a 66 y.o.  female  who presents with acute encephalopathy. Patient seen and examined at bedside. No acute events overnight. Patient is drowsy upon my evaluation, awakes with aggressive stimulation, but falls back to sleep. Does not participate in exam.  I did discuss with nursing who said she was awake this morning and did eat some breakfast and take her pills. I had a long discussion with sister over the phone. States that this has been progressively worsening over the last 3 days. She has a history of dementia, but she is normally alert and oriented x3 at home. Her sister does help take care of her until she does have the ability to care for in the home currently. She has a concern due to her history of \"water on the brain\" and states that she was was to have this drained as an outpatient which is never been done. Denies any new medication changes. Ten point ROS reviewed negative, unless as noted above    Objective: Intake/Output Summary (Last 24 hours) at 11/14/2021 1152  Last data filed at 11/14/2021 1048  Gross per 24 hour   Intake 10 ml   Output --   Net 10 ml      Vitals:   Vitals:    11/14/21 1036   BP: (!) 157/80   Pulse: 93   Resp: 15   Temp:    SpO2: 96%     Physical Exam:     GEN Sleeping female, laying in bed in no acute distress.  Appears given age.  Marvene Sins are equally round. No scleral erythema, discharge, or conjunctivitis. HENT Mucous membranes are moist.  NECK Supple, no apparent thyromegaly or masses. RESP Clear to auscultation, no wheezes, rales or rhonchi. Respirations even and unlabored on RA. CARDIO/VASC   S1/S2 auscultated. Regular rate without appreciable murmurs, rubs, or gallops. Peripheral pulses equal bilaterally and palpable. No peripheral edema. GI Abdomen is soft without significant tenderness, masses, or guarding. Bowel sounds are normoactive.   Whitfield catheter is not present. MSK No gross joint deformities. SKIN Normal coloration, warm, dry. NEURO Drowsy, awakens with vigorous stimulation, not following commands, no gross focal deficits, moves all extremities x4  PSYCH Sleeping,  Affect appropriate.     Medications:   Medications:    sodium chloride flush  5-40 mL IntraVENous 2 times per day    amLODIPine  10 mg Oral Daily    atorvastatin  80 mg Oral Nightly    divalproex  750 mg Oral Nightly    donepezil  10 mg Oral Nightly    furosemide  20 mg Oral BID    lisinopril  20 mg Oral Daily    memantine  10 mg Oral Daily    metFORMIN  500 mg Oral BID WC    metoprolol tartrate  50 mg Oral BID    pantoprazole  40 mg Oral BID AC    risperiDONE  1 mg Oral Daily    traZODone  50 mg Oral Nightly    insulin lispro  0-6 Units SubCUTAneous TID WC    insulin lispro  0-3 Units SubCUTAneous Nightly      Infusions:    sodium chloride      dextrose       PRN Meds: sodium chloride flush, 5-40 mL, PRN  sodium chloride, 25 mL, PRN  ondansetron, 4 mg, Q8H PRN   Or  ondansetron, 4 mg, Q6H PRN  polyethylene glycol, 17 g, Daily PRN  acetaminophen, 650 mg, Q6H PRN   Or  acetaminophen, 650 mg, Q6H PRN  glucose, 15 g, PRN  dextrose, 12.5 g, PRN  glucagon (rDNA), 1 mg, PRN  dextrose, 100 mL/hr, PRN          Electronically signed by Luc Potts PA-C on 11/14/2021 at 11:52 AM

## 2021-11-14 NOTE — ED TRIAGE NOTES
Patient has dementia, but has been hallucinating today. Caregiver states her feet were purple.  EMS and this nurse found feet to be pink warm and dry
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
## 2021-11-15 PROBLEM — E44.0 MODERATE MALNUTRITION (HCC): Chronic | Status: ACTIVE | Noted: 2021-11-15

## 2021-11-15 LAB
AMMONIA: 10 UMOL/L (ref 11–51)
ANION GAP SERPL CALCULATED.3IONS-SCNC: 12 MMOL/L (ref 4–16)
BUN BLDV-MCNC: 14 MG/DL (ref 6–23)
CALCIUM SERPL-MCNC: 9.7 MG/DL (ref 8.3–10.6)
CHLORIDE BLD-SCNC: 100 MMOL/L (ref 99–110)
CO2: 25 MMOL/L (ref 21–32)
CREAT SERPL-MCNC: 1 MG/DL (ref 0.6–1.1)
FOLATE: 14.5 NG/ML (ref 3.1–17.5)
GFR AFRICAN AMERICAN: >60 ML/MIN/1.73M2
GFR NON-AFRICAN AMERICAN: 54 ML/MIN/1.73M2
GLUCOSE BLD-MCNC: 100 MG/DL (ref 70–99)
GLUCOSE BLD-MCNC: 132 MG/DL (ref 70–99)
GLUCOSE BLD-MCNC: 140 MG/DL (ref 70–99)
GLUCOSE BLD-MCNC: 162 MG/DL (ref 70–99)
HCT VFR BLD CALC: 41 % (ref 37–47)
HEMOGLOBIN: 13.4 GM/DL (ref 12.5–16)
MCH RBC QN AUTO: 32 PG (ref 27–31)
MCHC RBC AUTO-ENTMCNC: 32.7 % (ref 32–36)
MCV RBC AUTO: 97.9 FL (ref 78–100)
PDW BLD-RTO: 12 % (ref 11.7–14.9)
PLATELET # BLD: 237 K/CU MM (ref 140–440)
PMV BLD AUTO: 10.5 FL (ref 7.5–11.1)
POTASSIUM SERPL-SCNC: 4.5 MMOL/L (ref 3.5–5.1)
RBC # BLD: 4.19 M/CU MM (ref 4.2–5.4)
SODIUM BLD-SCNC: 137 MMOL/L (ref 135–145)
TSH HIGH SENSITIVITY: 1.61 UIU/ML (ref 0.27–4.2)
VITAMIN B-12: 524.4 PG/ML (ref 211–911)
WBC # BLD: 7.5 K/CU MM (ref 4–10.5)

## 2021-11-15 PROCEDURE — 6370000000 HC RX 637 (ALT 250 FOR IP): Performed by: NURSE PRACTITIONER

## 2021-11-15 PROCEDURE — 2580000003 HC RX 258: Performed by: NURSE PRACTITIONER

## 2021-11-15 PROCEDURE — 82140 ASSAY OF AMMONIA: CPT

## 2021-11-15 PROCEDURE — 1200000000 HC SEMI PRIVATE

## 2021-11-15 PROCEDURE — 82746 ASSAY OF FOLIC ACID SERUM: CPT

## 2021-11-15 PROCEDURE — 82962 GLUCOSE BLOOD TEST: CPT

## 2021-11-15 PROCEDURE — 92610 EVALUATE SWALLOWING FUNCTION: CPT

## 2021-11-15 PROCEDURE — 84443 ASSAY THYROID STIM HORMONE: CPT

## 2021-11-15 PROCEDURE — 85027 COMPLETE CBC AUTOMATED: CPT

## 2021-11-15 PROCEDURE — 6370000000 HC RX 637 (ALT 250 FOR IP): Performed by: PHYSICIAN ASSISTANT

## 2021-11-15 PROCEDURE — 82607 VITAMIN B-12: CPT

## 2021-11-15 PROCEDURE — 2580000003 HC RX 258: Performed by: INTERNAL MEDICINE

## 2021-11-15 PROCEDURE — 80048 BASIC METABOLIC PNL TOTAL CA: CPT

## 2021-11-15 RX ORDER — SODIUM CHLORIDE 9 MG/ML
INJECTION, SOLUTION INTRAVENOUS CONTINUOUS
Status: DISCONTINUED | OUTPATIENT
Start: 2021-11-15 | End: 2021-11-16

## 2021-11-15 RX ADMIN — DONEPEZIL HYDROCHLORIDE 10 MG: 5 TABLET, FILM COATED ORAL at 22:57

## 2021-11-15 RX ADMIN — SODIUM CHLORIDE, PRESERVATIVE FREE 10 ML: 5 INJECTION INTRAVENOUS at 22:58

## 2021-11-15 RX ADMIN — DIVALPROEX SODIUM 750 MG: 125 CAPSULE, COATED PELLETS ORAL at 23:32

## 2021-11-15 RX ADMIN — PANTOPRAZOLE SODIUM 40 MG: 40 TABLET, DELAYED RELEASE ORAL at 09:46

## 2021-11-15 RX ADMIN — MELATONIN TAB 3 MG 3 MG: 3 TAB at 22:56

## 2021-11-15 RX ADMIN — SODIUM CHLORIDE: 9 INJECTION, SOLUTION INTRAVENOUS at 22:56

## 2021-11-15 RX ADMIN — PANTOPRAZOLE SODIUM 40 MG: 40 TABLET, DELAYED RELEASE ORAL at 17:01

## 2021-11-15 RX ADMIN — ATORVASTATIN CALCIUM 80 MG: 40 TABLET, FILM COATED ORAL at 22:56

## 2021-11-15 RX ADMIN — METOPROLOL TARTRATE 50 MG: 50 TABLET, FILM COATED ORAL at 22:56

## 2021-11-15 RX ADMIN — SODIUM CHLORIDE, PRESERVATIVE FREE 10 ML: 5 INJECTION INTRAVENOUS at 09:46

## 2021-11-15 RX ADMIN — MEMANTINE 10 MG: 10 TABLET ORAL at 09:46

## 2021-11-15 ASSESSMENT — PAIN SCALES - PAIN ASSESSMENT IN ADVANCED DEMENTIA (PAINAD)
TOTALSCORE: 0
CONSOLABILITY: 0
FACIALEXPRESSION: 0
BODYLANGUAGE: 0
BREATHING: 0
NEGVOCALIZATION: 0

## 2021-11-15 ASSESSMENT — PAIN SCALES - GENERAL
PAINLEVEL_OUTOF10: 0

## 2021-11-15 NOTE — CONSULTS
Comprehensive Nutrition Assessment    Type and Reason for Visit:  Initial, Consult (poor intake/appetite 5 or more days)    Nutrition Recommendations/Plan:   · Continue current diet  · Begin diabetic and high protein pudding supplement, between meals  · Total assist with feeding    Nutrition Assessment:  Pt admitted with physical deconditioning, weakness and reduced appetite. H/O Alzheimer's dementia, HTN, DM. Poor po intake PTA noted per chart review. Pt is a total assist with feeding noted, ate some breakfast. Lunch untouched at bedside during my room visit, Pt alert, confused. Pt meets criteria for moderate malnutrition. Will order oral supplements and continue to follow as high nutrition risk. Malnutrition Assessment:  Malnutrition Status: Moderate malnutrition    Context:  Chronic Illness     Findings of the 6 clinical characteristics of malnutrition:  Energy Intake:   75% or less estimated energy requirements for 1 month or longer  Weight Loss:  Unable to assess     Body Fat Loss:  Mild body fat loss Buccal region, Orbital   Muscle Mass Loss:  Mild muscle mass loss Temples (temporalis), Clavicles (pectoralis & deltoids), Hand (interosseous)  Fluid Accumulation:  No significant fluid accumulation Extremities   Strength:  Not Performed    Estimated Daily Nutrient Needs:  Energy (kcal):  3707-8316 (30-35 kcal/kg); Weight Used for Energy Requirements:  Usual     Protein (g):  64-77 (1-1.2 g/kg); Weight Used for Protein Requirements:  Usual        Fluid (ml/day):  6842-1460; Method Used for Fluid Requirements:  1 ml/kcal      Nutrition Related Findings:  Glu 140-162, A1C 6.5      Wounds:  None       Current Nutrition Therapies:    ADULT DIET; Dysphagia - Minced and Moist    Anthropometric Measures:  · Height: 5' 2\" (157.5 cm)  · Current Body Weight:  (?)   · Admission Body Weight:  (?)    · Usual Body Weight: 140 lb 8 oz (63.7 kg) (9/11/21)     · Ideal Body Weight: 110 lbs; % Ideal Body Weight  ?   · BMI Categories: Overweight (BMI 25.0-29. 9)       Nutrition Diagnosis:   · Moderate malnutrition, In context of chronic illness related to inadequate protein-energy intake, cognitive or neurological impairment as evidenced by poor intake prior to admission, mild loss of subcutaneous fat, mild muscle loss    Nutrition Interventions:   Food and/or Nutrient Delivery:  Continue Current Diet, Start Oral Nutrition Supplement  Nutrition Education/Counseling:  Education not indicated   Coordination of Nutrition Care:  Continue to monitor while inpatient, Feeding Assistance/Environment Change, Speech Therapy, Swallow Evaluation    Goals:  Pt will consume at least half of her meals and supplements       Nutrition Monitoring and Evaluation:   Behavioral-Environmental Outcomes:  None Identified   Food/Nutrient Intake Outcomes:  Diet Advancement/Tolerance, Food and Nutrient Intake, Supplement Intake  Physical Signs/Symptoms Outcomes:  Biochemical Data, Chewing or Swallowing, GI Status, Meal Time Behavior, Nutrition Focused Physical Findings, Weight     Discharge Planning:    Continue Oral Nutrition Supplement     Electronically signed by Keren Ahn RD, LD on 11/15/21 at 3:04 PM EST    Contact: [phone]

## 2021-11-15 NOTE — PLAN OF CARE
Nutrition Problem #1: Moderate malnutrition, In context of chronic illness  Intervention: Food and/or Nutrient Delivery: Continue Current Diet, Start Oral Nutrition Supplement  Nutritional Goals: Pt will consume at least half of her meals and supplements

## 2021-11-15 NOTE — ED NOTES
Bed: ED-38  Expected date:   Expected time:   Means of arrival:   Comments:  Delmer Haq RN  11/14/21 1930

## 2021-11-15 NOTE — PROGRESS NOTES
Speech Language Pathology  Facility/Department: [address] [facility]   CLINICAL BEDSIDE SWALLOW EVALUATION    NAME: Madison Aguila  : 1950  MRN: [id number]    IMPRESSIONS: Madison Aguila was referred for a bedside swallow evaluation following admission to [facility] with physical deconditioning, weakness and reduced appetite. Medical hx includes Alzheimer's dementia, HTN, DM. No known history of dysphagia prior to admission. Pt seen for evaluation seated upright in bed, alert, confused, requiring cues and assistance for participation. She did not follow directions to participate in oral mechanism examination. She was noted to be edentulous, and repeatedly stated \"I usually wear dentures. \" No dentures were found in room. She was presented with PO trials of thin liquids via cup/straw, puree, soft solids, and regular solids. She required assistance/total feeding d/t poor coordination and reduced attention. Also required verbal cues throughout to proceed with steps of assessment. Oral stage limited d/t missing dentition, with intact labial seal, slow mastication/reduced bolus formation for solids, inconsistent oral holding, pocketing of regular solids in left lateral sulcus, adequate eventual AP transit/clearance. Pharyngeal stage WFL with intact swallow initiation/laryngeal elevation. No s/s aspiration across all trials. Recommend minced and moist diet/thin liquids. Pt is a total feed d/t cognitive status and reduced coordination. SLP will follow to monitor 1-2x. ADMISSION DATE: 2021  ADMITTING DIAGNOSIS: has Essential hypertension; Lumbar herniated disc; Spinal stenosis; Restless leg syndrome; FH: CAD (coronary artery disease); Ureteric stone; Family history of ischemic heart disease and other diseases of the circulatory system; Need for vaccination against Streptococcus pneumoniae using pneumococcal conjugate vaccine 13;  At high risk for falls; Elevated alkaline phosphatase level; Mixed hyperlipidemia; Urinary incontinence in female; Shoulder pain; Gastroesophageal reflux disease; Late onset Alzheimer's disease without behavioral disturbance ([address] Dementia with behavioral disturbance ([address] Alzheimer's disease with late onset (CODE) ([address] CAD (coronary artery disease); Delusional disorder ([address] Acute psychosis ([address] Major neurocognitive disorder due to Alzheimer's disease, with behavioral disturbance ([address] Functional diarrhea; Leg swelling; Moderate episode of recurrent major depressive disorder ([address] Controlled type 2 diabetes mellitus with complication, without long-term current use of insulin ([address] Primary insomnia; On valproic acid therapy; Late onset Alzheimer's disease with behavioral disturbance ([address] Debility; Episode of shaking; Dementia ([address] Hypertension; Type 2 diabetes mellitus ([address] Ataxic gait; and Physical deconditioning on their problem list.  ONSET DATE: this admission    Recent Chest Xray/CT of Chest: see chart    Date of Eval: 11/15/2021  Evaluating Therapist: KAL Cosme    Current Diet level:         Primary Complaint  Patient Complaint: no definite complaint, perseveratively states \"I usually wear dentures\"    Pain:  Pain Assessment  Pain Assessment: 0-10  Pain Level: 0    Reason for Referral  Palak Ledezma was referred for a bedside swallow evaluation to assess the efficiency of her swallow function, identify signs and symptoms of aspiration and make recommendations regarding safe dietary consistencies, effective compensatory strategies, and safe eating environment. Impression  Dysphagia Diagnosis: Mild to moderate oral stage dysphagia  Dysphagia Impression : oral deficits 2/2 missing dentition  Dysphagia Outcome Severity Scale: Level 5: Mild dysphagia- Distant supervision.  May need one diet consistency restricted     Treatment Plan  Requires SLP Intervention: Yes  Duration/Frequency of Treatment: monitor 1-2x/LOS  D/C Recommendations: 24 hour supervision/assistance       Recommended Diet and Intervention  Diet Solids Recommendation: Dysphagia Minced and Moist (Dysphagia II)  Liquid Consistency Recommendation: Thin  Recommended Form of Meds: PO     Therapeutic Interventions: Diet tolerance monitoring; Patient/Family education    Compensatory Swallowing Strategies  Compensatory Swallowing Strategies: Upright as possible for all oral intake; Total feed; Small bites/sips; Eat/Feed slowly    Treatment/Goals  Short-term Goals  Timeframe for Short-term Goals: length of admission  Goal 1: Pt will tolerate minced and moist diet/thin liquids with adequate oral manipulation/clearance and no s/s aspiration. Goal 2: Caregivers will indicate understanding of all recommendations. General  Chart Reviewed: Yes  Behavior/Cognition: Alert; Confused; Requires cueing  Communication Observation:  (cognitive communication deficits)  Dentition: Edentulous  Patient Positioning: Upright in bed  Baseline Vocal Quality: Normal  Prior Dysphagia History: none known prior to admission  Consistencies Administered: Reg solid; Dysphagia Soft and Bite-Sized (Dysphagia III); Dysphagia Minced and Moist (Dysphagia II); Dysphagia Pureed (Dysphagia I); Thin - cup; Thin - straw           Vision/Hearing       Oral Motor Deficits       Oral Phase Dysfunction  Oral Phase  Oral Phase: Exceptions     Indicators of Pharyngeal Phase Dysfunction   Pharyngeal Phase  Pharyngeal Phase: WFL    Prognosis  Prognosis  Prognosis for safe diet advancement: good  Barriers/Prognosis Comment: if dentures are brought to hospital  Individuals consulted  Consulted and agree with results and recommendations: Patient    Education  Patient Education: results/recommendations  Patient Education Response: No evidence of learning  Safety Devices in place: Yes  Type of devices:  All fall risk precautions in place       Therapy Time  SLP Individual Minutes  Time In: 1010  Time Out: [address] [facility]  Minutes: [address] [facility]-SLP  11/15/2021 11:17 AM

## 2021-11-16 LAB
ANION GAP SERPL CALCULATED.3IONS-SCNC: 14 MMOL/L (ref 4–16)
BUN BLDV-MCNC: 17 MG/DL (ref 6–23)
CALCIUM SERPL-MCNC: 9.7 MG/DL (ref 8.3–10.6)
CHLORIDE BLD-SCNC: 103 MMOL/L (ref 99–110)
CO2: 24 MMOL/L (ref 21–32)
CREAT SERPL-MCNC: 0.9 MG/DL (ref 0.6–1.1)
GFR AFRICAN AMERICAN: >60 ML/MIN/1.73M2
GFR NON-AFRICAN AMERICAN: >60 ML/MIN/1.73M2
GLUCOSE BLD-MCNC: 118 MG/DL (ref 70–99)
GLUCOSE BLD-MCNC: 128 MG/DL (ref 70–99)
GLUCOSE BLD-MCNC: 136 MG/DL (ref 70–99)
GLUCOSE BLD-MCNC: 186 MG/DL (ref 70–99)
GLUCOSE BLD-MCNC: 188 MG/DL (ref 70–99)
POTASSIUM SERPL-SCNC: 5 MMOL/L (ref 3.5–5.1)
SODIUM BLD-SCNC: 141 MMOL/L (ref 135–145)

## 2021-11-16 PROCEDURE — 6370000000 HC RX 637 (ALT 250 FOR IP): Performed by: PHYSICIAN ASSISTANT

## 2021-11-16 PROCEDURE — 82962 GLUCOSE BLOOD TEST: CPT

## 2021-11-16 PROCEDURE — 97166 OT EVAL MOD COMPLEX 45 MIN: CPT

## 2021-11-16 PROCEDURE — 97530 THERAPEUTIC ACTIVITIES: CPT

## 2021-11-16 PROCEDURE — 80048 BASIC METABOLIC PNL TOTAL CA: CPT

## 2021-11-16 PROCEDURE — 6370000000 HC RX 637 (ALT 250 FOR IP): Performed by: NURSE PRACTITIONER

## 2021-11-16 PROCEDURE — 1200000000 HC SEMI PRIVATE

## 2021-11-16 PROCEDURE — 97162 PT EVAL MOD COMPLEX 30 MIN: CPT

## 2021-11-16 PROCEDURE — 2580000003 HC RX 258: Performed by: NURSE PRACTITIONER

## 2021-11-16 PROCEDURE — 97112 NEUROMUSCULAR REEDUCATION: CPT

## 2021-11-16 PROCEDURE — 36415 COLL VENOUS BLD VENIPUNCTURE: CPT

## 2021-11-16 PROCEDURE — 94761 N-INVAS EAR/PLS OXIMETRY MLT: CPT

## 2021-11-16 RX ADMIN — LISINOPRIL 20 MG: 20 TABLET ORAL at 08:10

## 2021-11-16 RX ADMIN — SODIUM CHLORIDE 25 ML: 9 INJECTION, SOLUTION INTRAVENOUS at 16:35

## 2021-11-16 RX ADMIN — AMLODIPINE BESYLATE 10 MG: 5 TABLET ORAL at 08:10

## 2021-11-16 RX ADMIN — DIVALPROEX SODIUM 750 MG: 125 CAPSULE, COATED PELLETS ORAL at 21:14

## 2021-11-16 RX ADMIN — PANTOPRAZOLE SODIUM 40 MG: 40 TABLET, DELAYED RELEASE ORAL at 16:37

## 2021-11-16 RX ADMIN — DONEPEZIL HYDROCHLORIDE 10 MG: 5 TABLET, FILM COATED ORAL at 20:57

## 2021-11-16 RX ADMIN — METOPROLOL TARTRATE 50 MG: 50 TABLET, FILM COATED ORAL at 20:56

## 2021-11-16 RX ADMIN — SODIUM CHLORIDE, PRESERVATIVE FREE 5 ML: 5 INJECTION INTRAVENOUS at 09:00

## 2021-11-16 RX ADMIN — METOPROLOL TARTRATE 50 MG: 50 TABLET, FILM COATED ORAL at 08:10

## 2021-11-16 RX ADMIN — PANTOPRAZOLE SODIUM 40 MG: 40 TABLET, DELAYED RELEASE ORAL at 06:49

## 2021-11-16 RX ADMIN — MEMANTINE 10 MG: 10 TABLET ORAL at 08:10

## 2021-11-16 RX ADMIN — ATORVASTATIN CALCIUM 80 MG: 40 TABLET, FILM COATED ORAL at 20:57

## 2021-11-16 RX ADMIN — INSULIN LISPRO 1 UNITS: 100 INJECTION, SOLUTION INTRAVENOUS; SUBCUTANEOUS at 12:33

## 2021-11-16 RX ADMIN — MELATONIN TAB 3 MG 3 MG: 3 TAB at 20:56

## 2021-11-16 RX ADMIN — TRAZODONE HYDROCHLORIDE 50 MG: 50 TABLET ORAL at 20:56

## 2021-11-16 RX ADMIN — SODIUM CHLORIDE, PRESERVATIVE FREE 10 ML: 5 INJECTION INTRAVENOUS at 21:43

## 2021-11-16 ASSESSMENT — PAIN SCALES - GENERAL
PAINLEVEL_OUTOF10: 0

## 2021-11-16 ASSESSMENT — PAIN SCALES - WONG BAKER: WONGBAKER_NUMERICALRESPONSE: 0

## 2021-11-16 NOTE — CONSULTS
[address] [facility] ACUTE CARE OCCUPATIONAL THERAPY EVALUATION    Rizwana Nascimento, 1950, 1120/1120-A, 11/16/2021    Discharge Recommendation: Bladimir Gaitan with memory care unit. History:  [place]:  The primary encounter diagnosis was Altered mental status, unspecified altered mental status type. A diagnosis of Leukocytosis, unspecified type was also pertinent to this visit. Subjective:  Patient states: Agreeable to therapy. Pain: Pt denied pain this date (0/10). Communication with other providers: PT, RN, LSW  Restrictions: General Precautions, Fall Risk    Home Setup/Prior level of function:    Social/Functional History  Lives With: Family (sister)  Type of Home: House  Home Layout: One level  Home Access: Ramped entrance (1 step up)  Bathroom Shower/Tub: Tub/Shower unit (walk -in tub with built in chair)  Bathroom Toilet: Handicap height  Bathroom Equipment: Grab bars in shower, Grab bars around toilet  Home Equipment: Rolling walker, Electric scooter, [employer] (hasn't been using a walker, hand-held assist)  Receives Help From: Family  ADL Assistance: Needs assistance  Bath: Moderate assistance  Dressing: Moderate assistance  Feeding: Independent  Toileting: Needs assistance  Homemaking Responsibilities: No  Ambulation Assistance: Needs assistance (hand held assist)  Transfer Assistance: Needs assistance  Active : No  Type of occupation: landscaping  Leisure & Hobbies: ace with sister  Additional Comments: Sister says she helps her. Sister has all kinds of equipment including w/c, scooter and deacon lift because her  was disabled. **Taken from evaluation on 9/11/2021. Pt has a dx of dementia and demo confusion this date. Recommend follow-up with family for accurate information. **    Examination:  · Observation: Supine in bed upon arrival  · [facility]  · [facility]  · Vitals: Stable vitals throughout session    Body Systems and functions:  · ROM: [facility] · Strength: WFL   · Sensation: WFL  · Tone: Normal  · Coordination: WFL  · Perception: WNL    Activities of Daily Living (ADLs):  · Feeding: SBA setup, increased time, assist for sequencing of task. · Grooming: MaxA in seated. · UB bathing: MaxA in seated. · LB bathing: DEP in seated  · UB dressing: MaxA in seated. · LB dressing: DEP  · Toileting: DEP pt would require assist for commode transfers, upright posture while sitting on commode, devin care, and LB clothing management. Increased time and VC.     ** At this time pt would require assistance for upright sitting balance, setup, increased time, and VC/assistance to attend to and sequence through all tasks. **     Cognitive and Psychosocial Functioning:  · Overall cognitive status: Pt oriented to self. Pt disoriented to place and time. Per chart review pt has a dx of Alzheimer's and dementia and demo intermittent episodes of confused speech this date. Pt actively hallucinating during session/  · Affect: Normal     Balance:   · Sitting: Jake - MaxA (pt sat EOB demo poor to fair- dynamic sitting balance with posterior LOB). · Standing: [address] (pt stood with RW and with bilateral HHA. Demo poor dynamic standing balance. Pt very retropulsive). Functional Mobility:   · Bed Mobility: ModA (pt performed supine to seated bed mobility with assist to pull self up utilizing BUE, trunk support, and VC for sequencing throughout). · Transfers:   · Jake  (pt performed STS from EOB with RW and bilateral HHA. Required VC throughout for sequencing and increased time. Pt required blocking at B feet and retropulsive posture in standing). · MaxA (STS from chair with bilateral HHA). · Ambulation: MaxA (pt ambulated approx 10ft with bilateral HHA. Demo fair pace throughout decreased balance in standing. Pt demo retropulsive posture).         AM-PAC 6 click short form for inpatient daily activity:   How much help from another person does the patient currently need... Unable  Dep A Lot  Max A A Lot   Mod A A Little  Min A A Little   CGA  SBA None   Mod I  Indep  Sup   1. Putting on and taking off regular lower body clothing? [x] 1    [] 2   [] 2   [] 3   [] 3   [] 4      2. Bathing (including washing, rinsing, drying)? [x] 1   [] 2   [] 2 [] 3 [] 3 [] 4   3. Toileting, which includes using toilet, bedpan, or urinal? [x] 1    [] 2   [] 2   [] 3   [] 3   [] 4     4. Putting on and taking off regular upper body clothing? [] 1   [x] 2   [] 2   [] 3   [] 3    [] 4      5. Taking care of personal grooming such as brushing teeth? [] 1   [x] 2    [] 2 [] 3    [] 3   [] 4      6. Eating meals? [] 1   [] 2   [] 2   [] 3   [x] 3   [] 4      Raw Score:  11     [24=0% impaired(CH), 23=1-19%(CI), 20-22=20-39%(CJ), 15-19=40-59%(CK), 10-14=60-79%(CL), 7-9=80-99%(CM), 6=100%(CN)]    Treatment:  Therapeutic Activity Training:   Therapeutic activity training was instructed today. Cues were given for safety, sequence, UE/LE placement, awareness, and balance. Activities performed today included bed mobility training, sup-sit, sit-stand, ambulation. Safety Measures: Gait belt used, Left in chair, Alarm in place    Assessment:  Assessment  Performance deficits / Impairments: Decreased functional mobility , Decreased balance, Decreased high-level IADLs, Decreased ADL status, Decreased strength, Decreased cognition, Decreased posture, Decreased safe awareness  Treatment Diagnosis: physical deconditioning  Prognosis: Good  Decision Making: Medium Complexity  REQUIRES OT FOLLOW UP: Yes  Discharge Recommendations: [address]    Pt is a 66year old F admitted for physical deconditioning. Per chart review, pt has a dx of dementia and Alzheimer's disease and pt demo hallucinations throughout session. PLOF and home setup were unable to be confirmed d/t confusion and information was taken from evaluation on 9/2021.  Recommend follow-up with family for accurate PLOF information. This date, pt demo decreased cognition, balance, activity tolerance, and overall functional mobility impacting ADL status. Pt is currently functioning below baseline and would benefit from skilled OT services at SNF. Pt would benefit from discharge to facility with skilled with memory care unit. Plan:  Plan  Times per week: 2+  Times per day: Daily      Goals:  1. Pt will complete all aspects of bed mobility for EOB/OOB ADLs Jake. 2. Pt will complete LB bathing with MaxA and AE as needed. 3. Pt will complete all aspects of LB dressing with MaxA and AE as needed. 4. Pt will complete all functional transfers to and from bed, chair, toilet, shower chair with CGA and AD. 5. Pt will ambulate HH distance to bathroom for toileting with Jake and AD. 6. Pt will complete all aspects of toileting task with ModA. 7. Pt will complete oral hygiene/grooming routine in seated at sink with [address] good dynamic sitting balance for approx 8 minutes. 8. Pt will complete ther ex/ther act with focus on UB strengthening. Time:   Time in: 914  Time out: 937  Timed treatment minutes: 13  Total time: 23      Electronically signed by:       CURTIS Patten/L, [place], [id number]

## 2021-11-16 NOTE — PROGRESS NOTES
Physician Progress Note      Joshua Moreno  CSN #:                  [id number]  :                       1950  ADMIT DATE:       2021 9:16 PM  [address] [place] DATE:  RESPONDING  PROVIDER #:        Gwyn Carpio MD          QUERY TEXT:    Patient admitted with hallucinations. Noted documentation of acute on chronic   encephalopathy in  progress note. In order to support the diagnosis of   acute on chronic encephalopathy, please include additional clinical indicators   in your documentation. Or please document if the diagnosis of acute on   chronic encephalopathy has been ruled out after further study. The medical record reflects the following:  Risk Factors: Alzheimer's, hallucinations  Clinical Indicators:  Progress note \"Acute on chronic encephalopathy -Has   history of Alzheimer's dementia, currently lives with sister who states she   is alert and oriented x3 at baseline\" and \"Presented with generalized   weakness, hallucinations in setting of worsening Alzheimer's dementia\" and   \"Infectious work-up negative thus far, rapid Covid pending\", COVID - 19 on    \"not dectected\", ED Provider noted \" She presents with visual   hallucinations. This may be consistent with her history of dementia. \" Head CT   on  \"No acute intracranial abnormality. Age related ch  Treatment: CBC, COVID-19 test, head CT, UA. Thank you,  DAVE Pearce, RN, CDS  [phone]  Options provided:  -- Acute on chronic encephalopathy was ruled out  -- Acute on chronic encephalopathy present as evidenced by, Please document   evidence. -- Other - I will add my own diagnosis  -- Disagree - Not applicable / Not valid  -- Disagree - Clinically unable to determine / Unknown  -- Refer to Clinical Documentation Reviewer    PROVIDER RESPONSE TEXT:    Provider disagreed with this query. I do not see nor agree with diagnosis of acute on chronic encephalopathy.    please send to the author for clarification. Query created by: Ad Goyal on 11/16/2021 8:14 AM      QUERY TEXT:    Pt admitted with hallucinations and has malnutrition documented. Please   further specify type of malnutrition with documentation in the medical record. The medical record reflects the following:  Risk Factors: Alzheimer's, poor oral intake PTA  Clinical Indicators: Dietitian noted in 11/15 consult note \"Moderate   malnutrition, In context of chronic illness related to inadequate   protein-energy intake, cognitive or neurological impairment as evidenced by   poor intake prior to admission, mild loss of subcutaneous fat, mild muscle   loss\". Treatment: Dietitian consult, oral nutrition supplement, dysphagia diet. ASPEN Criteria:    https://aspenjournals. onlinelibrary. chahal. com/doi/full/10.1177/040143118409110  5    Thank you,  JULIA PatelN, RN, CDS  [phone]  Options provided:  -- Moderate Malnutrition  -- Other - I will add my own diagnosis  -- Disagree - Not applicable / Not valid  -- Disagree - Clinically unable to determine / Unknown  -- Refer to Clinical Documentation Reviewer    PROVIDER RESPONSE TEXT:    This patient has moderate malnutrition.     Query created by: Ad Goyal on 11/16/2021 8:23 AM      Electronically signed by:  Marquis Molina MD 11/16/2021 3:59 PM

## 2021-11-16 NOTE — PROGRESS NOTES
[address] [facility],[address] ACUTE CARE PHYSICAL THERAPY EVALUATION  Cristobal Pabon, 1950, 1120/1120-A, 11/16/2021    History  [place]:  The primary encounter diagnosis was Altered mental status, unspecified altered mental status type. A diagnosis of Leukocytosis, unspecified type was also pertinent to this visit. Patient  has a past medical history of Alzheimer disease ([facility] Bronchitis, CAD (coronary artery disease), Chest wall trauma, COPD (chronic obstructive pulmonary disease) ([facility] FH: CAD (coronary artery disease), Hypertension, Kidney stone, Lumbar herniated disc, Restless leg syndrome, and Spinal stenosis. Patient  has a past surgical history that includes Hysterectomy; Ureter stent placement; Hysterectomy, total abdominal; and Dental surgery. Subjective:  Patient states:  \"I don't walk with my car. \"    Pain:  Denies pain. Communication with other providers:  Handoff to RN, OT  Restrictions: general precautions, fall risk    Home Setup/Prior level of function    Lives With: Family (sister)  Type of Home: House  Home Layout: One level  Home Access: Ramped entrance (1 step up)  Bathroom Shower/Tub: Tub/Shower unit (walk -in tub with built in chair)  Bathroom Toilet: Handicap height  Bathroom Equipment: Grab bars in shower, Grab bars around toilet  Home Equipment: Rolling walker, Electric scooter, [address] (hasn't been using a walker, hand-held assist)  Receives Help From: Family  ADL Assistance: Needs assistance  Bath: Moderate assistance  Dressing:  Moderate assistance  Feeding: Independent  Toileting: Needs assistance  Homemaking Responsibilities: No  Ambulation Assistance: Needs assistance (hand held assist)  Transfer Assistance: Needs assistance  Active : No  Type of occupation: landscaping  Leisure & Hobbies: casino with sister  Additional Comments: Sister says she helps her.  Sister has all kinds of equipment including w/c, scooter and deacon lift because her  was disabled     **Taken from evaluation on 9/11/2021. Pt has a dx of dementia and demo confusion this date. Recommend follow-up with family for accurate information. **    Examination of body systems (includes body structures/functions, activity/participation limitations):  · Observation:  Pt supine in bed upon arrival and agreeable to therapy  · Vision:  Appears WFL  · Hearing:  [facility]  · Cardiopulmonary:  No O2 needs  · Cognition: impaired- pt oriented to self only, hallucinating throughout session, easily distractible, see OT/SLP note for further evaluation. Musculoskeletal  · ROM R/L:  WFL. · Strength R/L:  3+/5, significant impairment in function and endurance. · Neuro:  Unable to formally test due to pt's limited command following      Mobility:  · Rolling L/R:  Min assist  · Supine to sit:  Mod A with assist at trunk and max cues for maintaining focus on task  · Transfers: Pt completed STS to/from EOB x2 trials mod A and to/from chair max A. Cues provided for hand placement and sequencing. Pt attempted first stand to RW but pt unable to maintain hands on handles so deferred. · Sitting balance:  Poor, pt sat EOB ~8 minutes with CGA-max A with intermittent left lateral and backward lean. Cues provided for sitting posture and pt able to correct but unable to maintain. · Standing balance:  Poor, pt stood each trial ~25 seconds max A.    · Gait: Pt ambulated 5' with bilateral HHA max A with retropulsion and left lateral lean. Pt ambulated with significantly narrow RO[place]ARIE, decreased step length bilaterally, and shuffling gait. Cues provided for sequencing throughout    [facility] 6 Clicks Inpatient Mobility:  AM-PAC Inpatient Mobility Raw Score : 12    Safety: patient left in chair with alarm on, call light within reach, RN notified, gait belt used. Assessment:  Pt is a 66 y.o. female admitted to the hospital for physical deconditioning.  Per chart, pt typically ambulates but PLOF is unsure as pt poor historian and

## 2021-11-16 NOTE — PROGRESS NOTES
Hospital Medicine: Progress Note     Nel Gutierrez  1950         Admit Date: 11/13/2021   Primary Care Physician:  Milton Aceves MD    /61   Pulse 78   Temp 98.8 °F (37.1 °C) (Oral)   Resp 16   Ht 5' 2\" (1.575 m)   SpO2 97%   BMI 25.70 kg/m²     Chief Complaint:    Weakness, decreased appetite and hallucinations. Perpetual history:   64-year-old female with history of hypertension and Alzheimer's dementia who was brought to the ED by the sister due to increasing weakness, decreased appetite and hallucinations of 5 days duration. She was admitted for further evaluation and management. Impression/Plan:    Delirium:  Unclear etiology. Superimposed on dementia. She is awake and alert. Continue to reorient. Generalized weakness:  PT/OT consultation. Diabetes mellitus:  Hold Metformin for now. Continue SSI. Hypertension:  Continue amlodipine and lisinopril. Leukocytosis:  No sign of active infection. Cultures were taken. Leukocytosis is now resolved. Discharge plan: CM evaluation, may need placement. Interval History:    Seen and examined at bedside, awake and alert, she is delirious, no agitation. Physical exam:  General appearance: Resting in bed, delirious, no acute distress. Head/EENT: ncat, perrl, eomi, mmm, clear oropharynx. Neck: supple, no meningismus or thyromegaly, no JVD or bruit. Chest: symmetric with equal expansion. Lungs:clear to auscultation bilaterally, no egophony. Heart: normal s1s2, but rapid, no extremity swelling. Abdomen: soft, +ve bowel sound,non tender, no guarding or rebound. Extremities: no c/c/e, good ROM x 4. Pulses: palpable and strong bilaterally. Skin: warm and dry. Neurologic: no focal deficits. Data Review:     CBC: Recent Labs     11/13/21 2220 11/14/21 0830 11/15/21  0532   WBC 12.7* 8.4 7.5   HGB 14.3 12.3* 13.4    236 237     [place].  Panel:    Recent Labs     11/13/21 2220 11/14/21 0830 11/15/21  0532   NA 139 141 137   K 4.7 4.7 4.5    106 100   CO2 23 24 25   BUN 13 12 14   CREATININE 0.9 1.0 1.0   GLUCOSE 163* 124* 140*     Hepatic:   Recent Labs     11/13/21  2220   AST 21   ALT 14   BILITOT 0.4   ALKPHOS 122       Meds:    sodium chloride flush  5-40 mL IntraVENous 2 times per day    amLODIPine  10 mg Oral Daily    atorvastatin  80 mg Oral Nightly    divalproex  750 mg Oral Nightly    donepezil  10 mg Oral Nightly    [Held by provider] furosemide  20 mg Oral BID    lisinopril  20 mg Oral Daily    memantine  10 mg Oral Daily    [Held by provider] metFORMIN  500 mg Oral BID WC    metoprolol tartrate  50 mg Oral BID    pantoprazole  40 mg Oral BID AC    [Held by provider] risperiDONE  1 mg Oral Daily    insulin lispro  0-6 Units SubCUTAneous TID WC    insulin lispro  0-3 Units SubCUTAneous Nightly    melatonin  3 mg Oral Nightly     Giovana Marshall MD    Note: Computer voice recognition system was used in parts of this documentation. There is a possibility of sound alike word errors inherent to this technology that may be missed during proof-reading and may alter the context of this note. Minoxidil Counseling: Minoxidil is a topical medication which can increase blood flow where it is applied. It is uncertain how this medication increases hair growth. Side effects are uncommon and include stinging and allergic reactions.

## 2021-11-16 NOTE — PROGRESS NOTES
Patient admitted to unit. 2 person skin check performed by this RN and Oliva WHIPPLE. No skin issues. Patient oriented to room.

## 2021-11-16 NOTE — PROGRESS NOTES
Attempted to feed patient lunch at this time. Patient only takes a few bites and is not interested in meal. Will continue to assess and monitor readiness to eat.

## 2021-11-16 NOTE — PROGRESS NOTES
Hospital Medicine: Progress Note     Cristobal Pabon  1950         Admit Date: 11/13/2021   Primary Care Physician:  Genna Richard MD    BP (!) 153/71   Pulse 81   Temp 97.7 °F (36.5 °C) (Rectal)   Resp 16   Ht 5' 2\" (1.575 m)   Wt 117 lb 15.1 oz (53.5 kg)   SpO2 99%   BMI 21.57 kg/m²     Chief Complaint:    Weakness, decreased appetite and hallucinations. Perpetual history:   25-year-old female with history of hypertension and Alzheimer's dementia who was brought to the ED by the sister due to increasing weakness, decreased appetite and hallucinations of 5 days duration. She was admitted for further evaluation and management. Impression/Plan:    Delirium:  Unclear etiology. Superimposed on dementia. She is awake and alert. Continue to reorient. Generalized weakness:  PT/OT consultation. Diabetes mellitus:  Hold Metformin for now. Continue SSI, add basal insulin when appropriate. Hypertension:  Continue amlodipine and lisinopril. Leukocytosis:  No sign of active infection. Cultures were taken. Leukocytosis is now resolved. Moderate malnutrition:  Encourage diet as tolerated. Diet for supplement. Dementia:  Continue to reorient. Continue Aricept and Namenda    Discharge plan: CM evaluation, may need placement. She can be discharged hopefully tomorrow. Interval History:    Examined at bedside, resting in the chair, awake and alert, delirious, no acute distress. Physical exam:  General appearance: Sitting up in the chair, no apparent distress. Head/EENT: ncat, perrl, eomi, mmm, clear oropharynx. Neck: supple, no meningismus or thyromegaly, no JVD or bruit. Chest: symmetric with equal expansion. Lungs:clear to auscultation bilaterally, no egophony. Heart: normal s1s2, but rapid, no extremity swelling. Abdomen: soft, +ve bowel sound,non tender. Extremities: no c/c/e, good ROM x 4. Pulses: palpable and strong bilaterally. Skin: warm and dry.   Neurologic: no focal deficits. Data Review:     CBC:   Recent Labs     11/13/21  2220 11/14/21  0830 11/15/21  0532   WBC 12.7* 8.4 7.5   HGB 14.3 12.3* 13.4    236 237     [place]. Panel:    Recent Labs     11/14/21  0830 11/15/21  0532 11/16/21  0804    137 141   K 4.7 4.5 5.0    100 103   CO2 24 25 24   BUN 12 14 17   CREATININE 1.0 1.0 0.9   GLUCOSE 124* 140* 136*     Hepatic:   Recent Labs     11/13/21  2220   AST 21   ALT 14   BILITOT 0.4   ALKPHOS 122       Meds:    sodium chloride flush  5-40 mL IntraVENous 2 times per day    amLODIPine  10 mg Oral Daily    atorvastatin  80 mg Oral Nightly    divalproex  750 mg Oral Nightly    donepezil  10 mg Oral Nightly    [Held by provider] furosemide  20 mg Oral BID    lisinopril  20 mg Oral Daily    memantine  10 mg Oral Daily    [Held by provider] metFORMIN  500 mg Oral BID WC    metoprolol tartrate  50 mg Oral BID    pantoprazole  40 mg Oral BID AC    [Held by provider] risperiDONE  1 mg Oral Daily    insulin lispro  0-6 Units SubCUTAneous TID WC    insulin lispro  0-3 Units SubCUTAneous Nightly    melatonin  3 mg Oral Nightly     Shahzad Olsen MD    Note: Computer voice recognition system was used in parts of this documentation. There is a possibility of sound alike word errors inherent to this technology that may be missed during proof-reading and may alter the context of this note.

## 2021-11-16 NOTE — PROGRESS NOTES
Changed patient and provided devin/incontinence care at this time. Replace telemetry leads as patient keeps removing stickers and wires. Patient is alert and awake; attempted to feed patient dinner but she does not take interest at this time. Will continue to assess and monitor.

## 2021-11-17 LAB
ANION GAP SERPL CALCULATED.3IONS-SCNC: 10 MMOL/L (ref 4–16)
BUN BLDV-MCNC: 9 MG/DL (ref 6–23)
CALCIUM SERPL-MCNC: 8.8 MG/DL (ref 8.3–10.6)
CHLORIDE BLD-SCNC: 107 MMOL/L (ref 99–110)
CO2: 25 MMOL/L (ref 21–32)
CREAT SERPL-MCNC: 0.8 MG/DL (ref 0.6–1.1)
GFR AFRICAN AMERICAN: >60 ML/MIN/1.73M2
GFR NON-AFRICAN AMERICAN: >60 ML/MIN/1.73M2
GLUCOSE BLD-MCNC: 121 MG/DL (ref 70–99)
GLUCOSE BLD-MCNC: 124 MG/DL (ref 70–99)
GLUCOSE BLD-MCNC: 125 MG/DL (ref 70–99)
GLUCOSE BLD-MCNC: 130 MG/DL (ref 70–99)
POTASSIUM SERPL-SCNC: 3.8 MMOL/L (ref 3.5–5.1)
SODIUM BLD-SCNC: 142 MMOL/L (ref 135–145)

## 2021-11-17 PROCEDURE — 97530 THERAPEUTIC ACTIVITIES: CPT

## 2021-11-17 PROCEDURE — 36415 COLL VENOUS BLD VENIPUNCTURE: CPT

## 2021-11-17 PROCEDURE — 1200000000 HC SEMI PRIVATE

## 2021-11-17 PROCEDURE — 6370000000 HC RX 637 (ALT 250 FOR IP): Performed by: PHYSICIAN ASSISTANT

## 2021-11-17 PROCEDURE — 94761 N-INVAS EAR/PLS OXIMETRY MLT: CPT

## 2021-11-17 PROCEDURE — 6370000000 HC RX 637 (ALT 250 FOR IP): Performed by: NURSE PRACTITIONER

## 2021-11-17 PROCEDURE — 82962 GLUCOSE BLOOD TEST: CPT

## 2021-11-17 PROCEDURE — 80048 BASIC METABOLIC PNL TOTAL CA: CPT

## 2021-11-17 RX ADMIN — PANTOPRAZOLE SODIUM 40 MG: 40 TABLET, DELAYED RELEASE ORAL at 07:01

## 2021-11-17 RX ADMIN — METOPROLOL TARTRATE 50 MG: 50 TABLET, FILM COATED ORAL at 10:08

## 2021-11-17 RX ADMIN — DIVALPROEX SODIUM 750 MG: 125 CAPSULE, COATED PELLETS ORAL at 21:29

## 2021-11-17 RX ADMIN — AMLODIPINE BESYLATE 10 MG: 5 TABLET ORAL at 10:08

## 2021-11-17 RX ADMIN — PANTOPRAZOLE SODIUM 40 MG: 40 TABLET, DELAYED RELEASE ORAL at 17:15

## 2021-11-17 RX ADMIN — MEMANTINE 10 MG: 10 TABLET ORAL at 10:09

## 2021-11-17 RX ADMIN — LISINOPRIL 20 MG: 20 TABLET ORAL at 10:09

## 2021-11-17 RX ADMIN — MELATONIN TAB 3 MG 3 MG: 3 TAB at 21:29

## 2021-11-17 RX ADMIN — METOPROLOL TARTRATE 50 MG: 50 TABLET, FILM COATED ORAL at 21:29

## 2021-11-17 RX ADMIN — ATORVASTATIN CALCIUM 80 MG: 40 TABLET, FILM COATED ORAL at 21:29

## 2021-11-17 RX ADMIN — DONEPEZIL HYDROCHLORIDE 10 MG: 5 TABLET, FILM COATED ORAL at 21:29

## 2021-11-17 ASSESSMENT — PAIN SCALES - WONG BAKER
WONGBAKER_NUMERICALRESPONSE: 0

## 2021-11-17 ASSESSMENT — PAIN SCALES - PAIN ASSESSMENT IN ADVANCED DEMENTIA (PAINAD)
CONSOLABILITY: 0
NEGVOCALIZATION: 0
CONSOLABILITY: 0
BODYLANGUAGE: 0
TOTALSCORE: 0
NEGVOCALIZATION: 0
FACIALEXPRESSION: 0
BODYLANGUAGE: 0
NEGVOCALIZATION: 0
BREATHING: 0
CONSOLABILITY: 0
BODYLANGUAGE: 0
TOTALSCORE: 0
BREATHING: 0
FACIALEXPRESSION: 0
FACIALEXPRESSION: 0
BODYLANGUAGE: 0
BODYLANGUAGE: 0
FACIALEXPRESSION: 0
BREATHING: 0
TOTALSCORE: 0
CONSOLABILITY: 0
BREATHING: 0
CONSOLABILITY: 0
FACIALEXPRESSION: 0
TOTALSCORE: 0
BODYLANGUAGE: 0
CONSOLABILITY: 0
BREATHING: 0
NEGVOCALIZATION: 0
NEGVOCALIZATION: 0
BREATHING: 0
NEGVOCALIZATION: 0
FACIALEXPRESSION: 0

## 2021-11-17 ASSESSMENT — PAIN SCALES - GENERAL
PAINLEVEL_OUTOF10: 0
PAINLEVEL_OUTOF10: 0

## 2021-11-17 NOTE — PROGRESS NOTES
Physician Progress Note      Kvng Zamarripa  [facility] #:                  [id number]  :                       1950  ADMIT DATE:       2021 9:16 PM  [address] [place] DATE:  RESPONDING  PROVIDER #:        Guillermo Giordano          QUERY TEXT:    Patient admitted with hallucinations. Noted documentation of acute on chronic   encephalopathy in  progress note. In order to support the diagnosis of   acute on chronic encephalopathy, please include additional clinical indicators   in your documentation. Or please document if the diagnosis of acute on   chronic encephalopathy has been ruled out after further study. The medical record reflects the following:  Risk Factors: Alzheimer's, hallucinations  Clinical Indicators:  Progress note \"Acute on chronic encephalopathy -Has   history of Alzheimer's dementia, currently lives with sister who states she   is alert and oriented x3 at baseline\" and \"Presented with generalized   weakness, hallucinations in setting of worsening Alzheimer's dementia\" and   \"Infectious work-up negative thus far, rapid Covid pending\", COVID - 19 on    \"not dectected\", ED Provider noted \" She presents with visual   hallucinations. This may be consistent with her history of dementia. \" Head CT   on  \"No acute intracranial abnormality. Age related ch  Treatment: CBC, COVID-19 test, head CT, UA. Thank you,  JULIA SparksN, RN, CDS  [phone]  Options provided:  -- Acute on chronic encephalopathy was ruled out  -- Acute on chronic encephalopathy present as evidenced by, Please document   evidence.   -- Other - I will add my own diagnosis  -- Disagree - Not applicable / Not valid  -- Disagree - Clinically unable to determine / Unknown  -- Refer to Clinical Documentation Reviewer    PROVIDER RESPONSE TEXT:    Acute on chronic encephalopathy is present as evidenced by increased confusion   from baseline    Query created by: Jersey Hazel on 2021 8:45 AM      Electronically signed by:  Delia Hubbard 11/17/2021 9:53 AM

## 2021-11-17 NOTE — PROGRESS NOTES
Physical Therapy    Physical Therapy Treatment Note  Name: Yvette Cabrales MRN: [id number] :   1950   Date:  2021   Admission Date: 2021 Room:  [address] [place], [place] [address]A   Restrictions/Precautions:        fall risk  Communication with other providers:  Nurse reports ok to see for therapy  Subjective:  Patient states:  Pt confused and did not answer appropriately   Pain:   Location, Type, Intensity (0/10 to 10/10):  Not quantified  Objective:    Observation:  Pt supine in bed  Treatment, including education/measures:  Pt supine in bed. Review of POC. Cues both TC and VC given to sit up EOB. Attempted to assist patient with maxA to tranfers to EOB after several attempts to encourage pt to initiate transfer with VC. Pt actively resisted attempt to transfer. Pt returned to supine and positioned comfortably.  Left with all needs met   Assessment / Impression:       Patient's tolerance of treatment:  poor   Adverse Reaction: na  Significant change in status and impact:  na  Barriers to improvement:  Dementia   Plan for Next Session:    Continue working towards all goals  Time in:  2:30   Time out:  2:45  Timed treatment minutes:  15  Total treatment time:  15    Previously filed items:     Short term goals  Time Frame for Short term goals: 1 week  Short term goal 1: Pt to complete bed mobility min A  Short term goal 2: Pt to complete all transfers to/from bed, commode, and chair min A  Short term goal 3: Pt to ambulate 25' with LRAD min A       Electronically signed by:    Alexx Dawson PTA  2021, 3:06 PM

## 2021-11-17 NOTE — DISCHARGE INSTR - COC
Continuity of Care Form    Patient Name: Rizwana Nascimento   :  1950  MRN:  [id number]    Admit date:  2021  Discharge date:  2021    Code Status Order: Full Code   Advance Directives:      Admitting Physician:  Izabella Poe MD  PCP: Keri Mccormick MD    Discharging Nurse: [facility] Unit/Room#: 1120/1120-A  Discharging Unit Phone Number: ***    Emergency Contact:   Extended Emergency Contact Information  Primary Emergency Contact: [address] of [address] [place] [address] Phone: [phone]  Relation: Brother/Sister    Past Surgical History:  Past Surgical History:   Procedure Laterality Date    DENTAL SURGERY      40 yrs ago    HYSTERECTOMY      HYSTERECTOMY, TOTAL ABDOMINAL      URETER STENT PLACEMENT         Immunization History:   Immunization History   Administered Date(s) Administered    Influenza, High Dose (Fluzone 65 yrs and older) 2020    Influenza, Triv, inactivated, subunit, adjuvanted, IM (Fluad 65 yrs and older) 10/22/2019    Pneumococcal Conjugate 13-valent (Gee Cabrera) 10/25/2019       Active Problems:  Patient Active Problem List   Diagnosis Code    Essential hypertension I10    Lumbar herniated disc M51.26    Spinal stenosis M48.00    Restless leg syndrome G25.81    FH: CAD (coronary artery disease) Z82.49    Ureteric stone N20.1    Family history of ischemic heart disease and other diseases of the circulatory system Z82.49    Need for vaccination against Streptococcus pneumoniae using pneumococcal conjugate vaccine 13 Z23    At high risk for falls Z91.81    Elevated alkaline phosphatase level R74.8    Mixed hyperlipidemia E78.2    Urinary incontinence in female R32    Shoulder pain M25.519    Gastroesophageal reflux disease K21.9    Late onset Alzheimer's disease without behavioral disturbance (HCC) G30.1, F02.80    Dementia with behavioral disturbance (HCC) F03.91    Alzheimer's disease with late onset (CODE) ([facility] [address] G30.1    CAD (coronary artery Assisted  Bathing  Assisted  Dressing  Assisted  Toileting  Assisted  Feeding  Assisted  Med Admin  Assisted  Med Delivery   crushed    Wound Care Documentation and Therapy:        Elimination:  Continence: Bowel: {YES / JK:[id number]}  Bladder: {YES / KH:80362}  Urinary Catheter:  ***  Colostomy/Ileostomy/Ileal Conduit: {YES / HE:65132}       Date of Last BM: ***  No intake or output data in the 24 hours ending 11/17/21 1440  I/O last 3 completed shifts: In: 26 [P.O.:280]  Out: -     Safety Concerns: At Risk for Falls    Impairments/Disabilities:      None      Patient's personal belongings (please select all that are sent with patient):  {[facility] DME Belongings:[id number]}    RN SIGNATURE:      CASE MANAGEMENT/SOCIAL WORK SECTION    Inpatient Status Date: ***    Readmission Risk Assessment Score:  Readmission Risk              Risk of Unplanned Readmission:  18           Discharging to Facility/ Agency   Name:   Address:  Phone:  Fax:    Dialysis Facility (if applicable)   Name:  Address:  Dialysis Schedule:  Phone:  Fax:    / signature: {Esignature:[id number]}    PHYSICIAN SECTION    Nutrition Therapy:  Current Nutrition Therapy:   - Oral Diet:  General    Routes of Feeding: Oral  Liquids: No Restrictions  Daily Fluid Restriction: no  Last Modified Barium Swallow with Video (Video Swallowing Test): not done    Treatments at the Time of Hospital Discharge:   Respiratory Treatments: None  Oxygen Therapy:  is not on home oxygen therapy. Ventilator:    - No ventilator support    Rehab Therapies: Physical Therapy and Occupational Therapy  Weight Bearing Status/Restrictions: No weight bearing restirctions  Other Medical Equipment (for information only, NOT a DME order):     Other Treatments:     Prognosis: Good    Condition at Discharge: Stable    Rehab Potential (if transferring to Rehab): Good    Recommended Labs or Other Treatments After Discharge: None    Physician Certification: I certify the above information and transfer of Robert Vega  is necessary for the continuing treatment of the diagnosis listed and that she requires [place] for less 30 days.      Update Admission H&P: No change in H&P    PHYSICIAN SIGNATURE:  Electronically signed by Eulalia Aguilar MD on 11/18/21 at 10:22 AM EST

## 2021-11-17 NOTE — PROGRESS NOTES
Hospital Medicine: Progress Note     Robert Vega  1950         Admit Date: 11/13/2021   Primary Care Physician:  Dian Pak MD    BP (!) 167/97   Pulse 85   Temp 97.7 °F (36.5 °C) (Oral)   Resp 16   Ht 5' 2\" (1.575 m)   Wt 117 lb 15.1 oz (53.5 kg)   SpO2 96%   BMI 21.57 kg/m²     Chief Complaint:    Weakness, decreased appetite and hallucinations. Perpetual history:   75-year-old female with history of HTN and Alzheimer's dementia who was brought to the ED by the sister due to increasing weakness, decreased appetite and hallucinations of 5 days duration. She was admitted for further evaluation and management. Impression/Plan:    Delirium:  Unclear etiology. Likely progression of her dementia. She is awake and alert. Continue to reorient. Generalized weakness:  Working with PT and OT. Diabetes mellitus:  BG is well controlled. Hold Metformin for now. Continue SSI, add basal insulin when appropriate. Hypertension:  Continue amlodipine and lisinopril. Leukocytosis:  No sign of active infection. Cultures were taken. Leukocytosis is now resolved. Moderate malnutrition:  Encourage diet as tolerated. Diet for supplement. Dementia:  Continue to reorient. Continue Aricept and Namenda    Discharge plan:   I spoke to her Sister Connie Mcintosh and updated her on her care. She stated that patient was at [facility] in [facility] a couple of months ago, she was there for about 3 weeks. She did not like the service she received over there as they did not let her visit her sister. She is requesting a different rehab unit. I will update CM. Interval History:    Examined at bedside, she is about the same, she is comfortable but delirious, no agitation. Nursing has no pressing concerns. Physical exam:  General appearance: Comfortable in bed, no apparent distress. Head/EENT: ncat, perrl, eomi, mmm. Neck: supple, no meningismus or thyromegaly, no JVD or bruit.   Chest: symmetric with equal expansion. Lungs:clear to auscultation bilaterally, no egophony. Heart: normal s1s2, but rapid, no extremity swelling. Abdomen: soft, +ve bowel sound,non tender. Extremities: no c/c/e, good ROM x 4. Pulses: palpable and strong bilaterally. Skin: warm and dry. Neurologic: no focal deficits. Data Review:     CBC:   Recent Labs     11/15/21  0532   WBC 7.5   HGB 13.4        [place]. Panel:    Recent Labs     11/15/21  0532 11/16/21  0804    141   K 4.5 5.0    103   CO2 25 24   BUN 14 17   CREATININE 1.0 0.9   GLUCOSE 140* 136*     Meds:    sodium chloride flush  5-40 mL IntraVENous 2 times per day    amLODIPine  10 mg Oral Daily    atorvastatin  80 mg Oral Nightly    divalproex  750 mg Oral Nightly    donepezil  10 mg Oral Nightly    [Held by provider] furosemide  20 mg Oral BID    lisinopril  20 mg Oral Daily    memantine  10 mg Oral Daily    [Held by provider] metFORMIN  500 mg Oral BID WC    metoprolol tartrate  50 mg Oral BID    pantoprazole  40 mg Oral BID AC    [Held by provider] risperiDONE  1 mg Oral Daily    insulin lispro  0-6 Units SubCUTAneous TID WC    insulin lispro  0-3 Units SubCUTAneous Nightly    melatonin  3 mg Oral Nightly     Ebenezer Ardon MD    Note: Computer voice recognition system was used in parts of this documentation. There is a possibility of sound alike word errors inherent to this technology that may be missed during proof-reading and may alter the context of this note.

## 2021-11-17 NOTE — CARE COORDINATION
LSW received Cm consult for placement. LSW reviewed chart Pt is from home with sister. Pt has been diagnosed with dementia. Call to Pt caregiver and sister. LSW discussed the recommendation for SNF. Sister is in agreement. Sister would like for Pt to go to [facility]. LSW made a referral to [facility]. Call form [facility]. Pt has been approved to admit when medically stable. Pt will need a rapid Covid and JADA completed. LSW sent PS to [facility].     PasRR completed. Warm

## 2021-11-17 NOTE — PROGRESS NOTES
Physician Progress Note      Joshua Moreno  CSN #:                  [id number]  :                       1950  ADMIT DATE:       2021 9:16 PM  [facility] DATE:  RESPONDING  PROVIDER #:        Jeffery Sarah        QUERY TEXT:    Type of Encephalopathy: Please provide further specificity, if known. Clinical indicators include: chronic, encephalopathy, infectious, infection,   hypoglycemia, acute  Options provided:  -- Anoxic/hypoxic encephalopathy  -- Metabolic encephalopathy  -- Toxic encephalopathy  -- Hepatic encephalopathy  -- Hypertensive encephalopathy  -- Other - I will add my own diagnosis  -- Disagree - Not applicable / Not valid  -- Disagree - Clinically Unable to determine / Unknown        PROVIDER RESPONSE TEXT:    The patient has metabolic encephalopathy.       Electronically signed by:  Jeffery Sarah 2021 10:19 AM

## 2021-11-18 ENCOUNTER — CARE COORDINATION (OUTPATIENT)
Dept: CASE MANAGEMENT | Age: 71
End: 2021-11-18

## 2021-11-18 VITALS
WEIGHT: 117.95 LBS | SYSTOLIC BLOOD PRESSURE: 109 MMHG | HEIGHT: 62 IN | HEART RATE: 81 BPM | RESPIRATION RATE: 12 BRPM | BODY MASS INDEX: 21.7 KG/M2 | DIASTOLIC BLOOD PRESSURE: 58 MMHG | OXYGEN SATURATION: 96 % | TEMPERATURE: 98.2 F

## 2021-11-18 LAB
ANION GAP SERPL CALCULATED.3IONS-SCNC: 12 MMOL/L (ref 4–16)
BUN BLDV-MCNC: 14 MG/DL (ref 6–23)
CALCIUM SERPL-MCNC: 9.3 MG/DL (ref 8.3–10.6)
CHLORIDE BLD-SCNC: 105 MMOL/L (ref 99–110)
CO2: 25 MMOL/L (ref 21–32)
CREAT SERPL-MCNC: 1.1 MG/DL (ref 0.6–1.1)
GFR AFRICAN AMERICAN: 59 ML/MIN/1.73M2
GFR NON-AFRICAN AMERICAN: 49 ML/MIN/1.73M2
GLUCOSE BLD-MCNC: 131 MG/DL (ref 70–99)
GLUCOSE BLD-MCNC: 138 MG/DL (ref 70–99)
GLUCOSE BLD-MCNC: 165 MG/DL (ref 70–99)
GLUCOSE BLD-MCNC: 97 MG/DL (ref 70–99)
POTASSIUM SERPL-SCNC: 4.2 MMOL/L (ref 3.5–5.1)
SARS-COV-2, NAAT: NOT DETECTED
SODIUM BLD-SCNC: 142 MMOL/L (ref 135–145)
SOURCE: NORMAL

## 2021-11-18 PROCEDURE — 82962 GLUCOSE BLOOD TEST: CPT

## 2021-11-18 PROCEDURE — 36415 COLL VENOUS BLD VENIPUNCTURE: CPT

## 2021-11-18 PROCEDURE — 6370000000 HC RX 637 (ALT 250 FOR IP): Performed by: NURSE PRACTITIONER

## 2021-11-18 PROCEDURE — 87635 SARS-COV-2 COVID-19 AMP PRB: CPT

## 2021-11-18 PROCEDURE — 6360000002 HC RX W HCPCS: Performed by: NURSE PRACTITIONER

## 2021-11-18 PROCEDURE — 80048 BASIC METABOLIC PNL TOTAL CA: CPT

## 2021-11-18 RX ORDER — LANSOPRAZOLE
30 KIT
Status: DISCONTINUED | OUTPATIENT
Start: 2021-11-18 | End: 2021-11-18 | Stop reason: HOSPADM

## 2021-11-18 RX ADMIN — LANSOPRAZOLE 30 MG: KIT at 08:01

## 2021-11-18 RX ADMIN — INSULIN LISPRO 1 UNITS: 100 INJECTION, SOLUTION INTRAVENOUS; SUBCUTANEOUS at 10:50

## 2021-11-18 RX ADMIN — MEMANTINE 10 MG: 10 TABLET ORAL at 10:44

## 2021-11-18 RX ADMIN — LISINOPRIL 20 MG: 20 TABLET ORAL at 10:44

## 2021-11-18 RX ADMIN — ONDANSETRON 4 MG: 2 INJECTION INTRAMUSCULAR; INTRAVENOUS at 12:57

## 2021-11-18 RX ADMIN — AMLODIPINE BESYLATE 10 MG: 5 TABLET ORAL at 10:43

## 2021-11-18 ASSESSMENT — PAIN SCALES - PAIN ASSESSMENT IN ADVANCED DEMENTIA (PAINAD)
CONSOLABILITY: 0
TOTALSCORE: 0
FACIALEXPRESSION: 0
BODYLANGUAGE: 0
NEGVOCALIZATION: 0
BREATHING: 0

## 2021-11-18 ASSESSMENT — PAIN SCALES - WONG BAKER: WONGBAKER_NUMERICALRESPONSE: 0

## 2021-11-18 NOTE — PLAN OF CARE
Problem: Falls - Risk of:  Goal: Will remain free from falls  Description: Will remain free from falls  Outcome: Completed  Goal: Absence of physical injury  Description: Absence of physical injury  Outcome: Completed     Problem: Nutrition  Goal: Optimal nutrition therapy  Outcome: Completed     Problem: Skin Integrity:  Goal: Will show no infection signs and symptoms  Description: Will show no infection signs and symptoms  Outcome: Completed  Goal: Absence of new skin breakdown  Description: Absence of new skin breakdown  Outcome: Completed

## 2021-11-18 NOTE — DISCHARGE SUMMARY
Physician Discharge Summary     Patient ID  Ambika Sebastian   1950  [id number]    Primary Care Doctor:  Prisca Mercado MD    Admit date: 11/13/2021   Discharge date: 11/18/2021      Admitting Physician: Leonila Mota MD   Discharge Physician: Leonila Mota MD MD    Discharge Diagnoses:   Generalized weakness  Delirium  Dementia  Diabetes mellitus  Hypertension    Discharge Condition:  Stable    Brief admission history and hospital course:   58-year-old female with history of Alzheimer's dementia and hypertension who was brought to the ED due to increasing weakness, decreased appetite and hallucinations. She was admitted for further evaluation and management. Issues addressed during this hospitalization include but not limited to-    Delirium: Suspect due to progressive dementia. There were no behavioral disturbances. Supportive care was provided, she was reoriented. Dementia: She was continued on Aricept and Namenda. Generalized weakness: She was worked with by PT and OT. Recommendation for ECF was made. Diabetes mellitus: Blood sugar was controlled. She was treated with SSI. On discharge Metformin was restarted. Hypertension: She was continued on amlodipine and lisinopril. Leukocytosis: There was concern for infection. Cultures were negative, leukocytosis resolved. Moderate malnutrition: Due to poor oral intake. She was encouraged to eat more. Diet supplement was provided. She was discharged to [facility] for further rehab in a stable condition. Consultations:  PT/OT  Dietitian    Physical Examination on the day of discharge:    General appearance:  Resting in bed, awake and alert, no apparent distress. Head/EENT: ncat, perrl, eomi, mmm. Neck: supple, no meningismus or thyromegaly, no JVD or bruit. Chest: symmetric with equal expansion. Lungs:clear to auscultation bilaterally, no egophony. Heart: normal s1s2, but rapid, no extremity swelling.   Abdomen: soft, +ve bowel sound,non tender. Extremities: no c/c/e, good ROM x 4. Pulses: palpable and strong bilaterally. Skin: warm and dry. Neurologic: no focal deficits. Disposition: ECF. Patient Instructions:     Medication List      CONTINUE taking these medications    Acidophilus/L-Sporogenes Tabs  TAKE 1 CAPSULE BY MOUTH DAILY (WITH BREAKFAST)     amLODIPine 10 MG tablet  Commonly known as: Norvasc  Take 1 tablet by mouth daily     atorvastatin 80 MG tablet  Commonly known as: LIPITOR  Take 1 tablet by mouth nightly Indications: take at bedtime. divalproex 125 MG capsule  Commonly known as: Depakote Sprinkles  Take 6 capsules by mouth nightly     donepezil 10 MG tablet  Commonly known as: Aricept  Take 1 tablet by mouth nightly     furosemide 20 MG tablet  Commonly known as: Lasix  Take 1 tablet by mouth 2 times daily     Lancets Misc  1 each by Does not apply route daily Check BG twice weekly. NPI [id number].  Dx E11.8     lisinopril 20 MG tablet  Commonly known as: PRINIVIL;ZESTRIL  Take 1 tablet by mouth daily     memantine 10 MG tablet  Commonly known as: NAMENDA  Take 1 tablet by mouth daily     metFORMIN 500 MG tablet  Commonly known as: GLUCOPHAGE  Take 1 tablet by mouth 2 times daily (with meals)     metoprolol tartrate 50 MG tablet  Commonly known as: LOPRESSOR  Take 1 tablet by mouth 2 times daily     omega-3 acid ethyl esters 1 g capsule  Commonly known as: LOVAZA  Take 2 capsules by mouth 2 times daily     pantoprazole 40 MG tablet  Commonly known as: PROTONIX  Take 1 tablet by mouth 2 times daily (before meals)     risperiDONE 1 MG tablet  Commonly known as: RisperDAL  Take 1 tablet by mouth daily     sucralfate 1 GM tablet  Commonly known as: CARAFATE  Take 1 tablet by mouth 4 times daily (before meals and nightly)     therapeutic multivitamin-minerals tablet  Take 1 tablet by mouth daily     traZODone 50 MG tablet  Commonly known as: DESYREL  Take 1 tablet by mouth nightly     [employer] Adult Briefs Misc  USE AS DIRECTED PREVAIL LARGE CARD [id number] briefs large 18's bag          Activity: Ad tri. Follow-up plans/arrangement:   PCP in 5-7 days. Code status on discharge:  Full resuscitation. Time spent on discharge was 30 minutes. Signed: Leonila Mota MD MD    Note: Computer voice recognition system was used in parts of this documentation. There is a possibility of sound alike word errors inherent to this technology that may be missed during proof-reading and may alter the context of this discharge summary.

## 2021-11-19 ENCOUNTER — CARE COORDINATION (OUTPATIENT)
Dept: CASE MANAGEMENT | Age: 71
End: 2021-11-19

## 2021-11-19 LAB
CULTURE: NORMAL
Lab: NORMAL
SPECIMEN: NORMAL

## 2021-11-22 ENCOUNTER — HOSPITAL ENCOUNTER (OUTPATIENT)
Age: 71
Setting detail: SPECIMEN
Discharge: HOME | End: 2021-11-22

## 2021-11-22 LAB
ALBUMIN SERPL-MCNC: 3.4 GM/DL (ref 3.4–5)
ALP BLD-CCNC: 104 IU/L (ref 40–128)
ALT SERPL-CCNC: 8 U/L (ref 10–40)
ANION GAP SERPL CALCULATED.3IONS-SCNC: 14 MMOL/L (ref 4–16)
AST SERPL-CCNC: 17 IU/L (ref 15–37)
BANDED NEUTROPHILS ABSOLUTE COUNT: 1.01 K/CU MM
BANDED NEUTROPHILS RELATIVE PERCENT: 13 % (ref 5–11)
BASOPHILS ABSOLUTE: 0.1 K/CU MM
BASOPHILS RELATIVE PERCENT: 1 % (ref 0–1)
BILIRUB SERPL-MCNC: 0.2 MG/DL (ref 0–1)
BUN BLDV-MCNC: 25 MG/DL (ref 6–23)
CALCIUM SERPL-MCNC: 8.8 MG/DL (ref 8.3–10.6)
CHLORIDE BLD-SCNC: 99 MMOL/L (ref 99–110)
CO2: 26 MMOL/L (ref 21–32)
CREAT SERPL-MCNC: 1.1 MG/DL (ref 0.6–1.1)
DIFFERENTIAL TYPE: ABNORMAL
EOSINOPHILS ABSOLUTE: 0.2 K/CU MM
EOSINOPHILS RELATIVE PERCENT: 2 % (ref 0–3)
GFR AFRICAN AMERICAN: 59 ML/MIN/1.73M2
GFR NON-AFRICAN AMERICAN: 49 ML/MIN/1.73M2
GLUCOSE BLD-MCNC: 117 MG/DL (ref 70–99)
HCT VFR BLD CALC: 38.4 % (ref 37–47)
HEMOGLOBIN: 12 GM/DL (ref 12.5–16)
LYMPHOCYTES ABSOLUTE: 1.6 K/CU MM
LYMPHOCYTES RELATIVE PERCENT: 21 % (ref 24–44)
MACROCYTES: ABNORMAL
MCH RBC QN AUTO: 32.4 PG (ref 27–31)
MCHC RBC AUTO-ENTMCNC: 31.3 % (ref 32–36)
MCV RBC AUTO: 103.8 FL (ref 78–100)
METAMYELOCYTES ABSOLUTE COUNT: 0.23 K/CU MM
METAMYELOCYTES PERCENT: 3 %
MONOCYTES ABSOLUTE: 0.7 K/CU MM
MONOCYTES RELATIVE PERCENT: 9 % (ref 0–4)
MYELOCYTE PERCENT: 1 %
MYELOCYTES ABSOLUTE COUNT: 0.08 K/CU MM
PDW BLD-RTO: 11.6 % (ref 11.7–14.9)
PLATELET # BLD: 185 K/CU MM (ref 140–440)
PLT MORPHOLOGY: ABNORMAL
PMV BLD AUTO: 11.7 FL (ref 7.5–11.1)
POLYCHROMASIA: ABNORMAL
POTASSIUM SERPL-SCNC: 4.1 MMOL/L (ref 3.5–5.1)
RBC # BLD: 3.7 M/CU MM (ref 4.2–5.4)
SEGMENTED NEUTROPHILS ABSOLUTE COUNT: 3.9 K/CU MM
SEGMENTED NEUTROPHILS RELATIVE PERCENT: 50 % (ref 36–66)
SODIUM BLD-SCNC: 139 MMOL/L (ref 135–145)
TOTAL PROTEIN: 5.6 GM/DL (ref 6.4–8.2)
WBC # BLD: 7.8 K/CU MM (ref 4–10.5)

## 2021-11-22 PROCEDURE — 80053 COMPREHEN METABOLIC PANEL: CPT

## 2021-11-22 PROCEDURE — 36415 COLL VENOUS BLD VENIPUNCTURE: CPT

## 2021-11-22 PROCEDURE — 85027 COMPLETE CBC AUTOMATED: CPT

## 2021-11-22 PROCEDURE — 85007 BL SMEAR W/DIFF WBC COUNT: CPT

## 2021-12-09 ENCOUNTER — CARE COORDINATION (OUTPATIENT)
Dept: CARE COORDINATION | Age: 71
End: 2021-12-09

## 2021-12-09 NOTE — CARE COORDINATION
Per [facility] Email:    [facility] client Ludmila Simpson has transitioned form skilled care to retirement care at [facility] 12/07/2021. No active needs noted. Facility MD to follow clients ongoing care. Thank you.   Marah Saleem RN, BSN       Skilled Inpatient Care Coodinator     M: [phone]

## 2021-12-17 ENCOUNTER — TELEPHONE (OUTPATIENT)
Dept: INTERNAL MEDICINE CLINIC | Age: 71
End: 2021-12-17

## 2021-12-17 NOTE — TELEPHONE ENCOUNTER
----- Message from Raquel Brunner sent at 12/17/2021 12:09 PM EST -----  Subject: Message to Provider    QUESTIONS  Information for Provider? Patient sister called in to say PT passed on the   12/14/2021   ---------------------------------------------------------------------------  --------------  CALL BACK INFO  What is the best way for the office to contact you? OK to leave message on   voicemail  Preferred Call Back Phone Number? [phone]  ---------------------------------------------------------------------------  --------------  SCRIPT ANSWERS  Relationship to Patient? Third Party  Representative Name?  [facility]

## 2023-08-18 NOTE — CARE COORDINATION
[facility] Transitions Follow Up Call    2021    Patient: Svetlana Crane  Patient : 1950   MRN: <[id number]>  Reason for Admission: Ataxic Gait  Discharge Date: 21 RARS: Readmission Risk Score: 29         Spoke with: Mague(sister)  Sister reports patient is doing better. No diarrhea today. Appetite and fluid intake is good. Bladder elimination normal. Patient continues to sleep more. She denies pain or fatigue. The  trembling is better, she walks with her so she doesn't go down. Patient is taking medication as ordered. She was awaiting a call to reschedule an appointment that she missed yesterday. Call ended at this time. Will continue to follow. Care Transitions Subsequent and Final Call    Subsequent and Final Calls  Care Transitions Interventions   Home Care Waiver: Declined        Transportation Support: Declined      DME Assistance: Declined     Senior Services: Declined    Other Interventions: Follow Up  No future appointments.     Indu Chirinos LPN Quality 431: Preventive Care And Screening: Unhealthy Alcohol Use - Screening: Patient not identified as an unhealthy alcohol user when screened for unhealthy alcohol use using a systematic screening method Detail Level: Detailed Quality 110: Preventive Care And Screening: Influenza Immunization: Influenza Immunization not Administered for Documented Reasons. Quality 226: Preventive Care And Screening: Tobacco Use: Screening And Cessation Intervention: Patient screened for tobacco use and is an ex/non-smoker Quality 111:Pneumonia Vaccination Status For Older Adults: Pneumococcal vaccine was not administered on or after patient’s 60th birthday and before the end of the measurement period, reason not otherwise specified

## 2024-01-30 NOTE — CARE COORDINATION
Patient identified as potential readmission. Last admission 1/30-1/31 for dementia. Patient was transferred to [facility] 1/31-2/3. Patient here today for nausea, vomiting and diarrhea. CM consulted to room #15 by Dr Tiffanie Dolan to assist with discharge planning. Patient to the emergency department today with friend for N/V/D. Patient incontinent of stool while in the emergency department waiting room. CM contacted patient's sister, Brenna Villalba emergency contact at [phone] to advise of impending discharge. Brenna Villalba states patient lives with her in a home in [place]. Brenna Villalba states patient was at work with her today and began having nausea and vomiting. [facility] one of her (Mague's) coworkers brought patient to the ER and dropped her off for evaluation. [facility] patient has a PCP and insurance which assists with medication affordability. Brenna Villalba states that patient accompanies her to work daily and states patient is never left alone. [facility] patient does not have [facility] and states no need at this time. Patient followed by care transitions. Brenna Villalba states she will get patient clean clothes and will pick patient up as soon as she can. Readmission was avoided and patient was able to be discharged home. 25.6